# Patient Record
Sex: FEMALE | Race: BLACK OR AFRICAN AMERICAN | Employment: OTHER | URBAN - METROPOLITAN AREA
[De-identification: names, ages, dates, MRNs, and addresses within clinical notes are randomized per-mention and may not be internally consistent; named-entity substitution may affect disease eponyms.]

---

## 2017-05-02 ENCOUNTER — HOSPITAL ENCOUNTER (OUTPATIENT)
Dept: LAB | Age: 82
Discharge: HOME | End: 2017-05-02
Payer: MEDICARE

## 2017-05-02 ENCOUNTER — OFFICE VISIT (OUTPATIENT)
Dept: FAMILY MEDICINE CLINIC | Age: 82
End: 2017-05-02

## 2017-05-02 VITALS
OXYGEN SATURATION: 98 % | WEIGHT: 144 LBS | TEMPERATURE: 97.1 F | RESPIRATION RATE: 20 BRPM | DIASTOLIC BLOOD PRESSURE: 80 MMHG | HEART RATE: 60 BPM | BODY MASS INDEX: 29.03 KG/M2 | HEIGHT: 59 IN | SYSTOLIC BLOOD PRESSURE: 140 MMHG

## 2017-05-02 DIAGNOSIS — Z23 ENCOUNTER FOR IMMUNIZATION: ICD-10-CM

## 2017-05-02 DIAGNOSIS — E78.2 MIXED HYPERLIPIDEMIA: Chronic | ICD-10-CM

## 2017-05-02 DIAGNOSIS — Z13.39 SCREENING FOR ALCOHOLISM: ICD-10-CM

## 2017-05-02 DIAGNOSIS — Z00.00 ROUTINE GENERAL MEDICAL EXAMINATION AT A HEALTH CARE FACILITY: ICD-10-CM

## 2017-05-02 DIAGNOSIS — I10 ESSENTIAL HYPERTENSION: Primary | Chronic | ICD-10-CM

## 2017-05-02 DIAGNOSIS — I10 ESSENTIAL HYPERTENSION: Chronic | ICD-10-CM

## 2017-05-02 DIAGNOSIS — Z71.89 ADVANCED DIRECTIVES, COUNSELING/DISCUSSION: ICD-10-CM

## 2017-05-02 LAB
ALBUMIN SERPL BCP-MCNC: 3.7 G/DL (ref 3.4–5)
ALBUMIN/GLOB SERPL: 1.1 {RATIO} (ref 0.8–1.7)
ALP SERPL-CCNC: 32 U/L (ref 45–117)
ALT SERPL-CCNC: 23 U/L (ref 13–56)
ANION GAP BLD CALC-SCNC: 8 MMOL/L (ref 3–18)
AST SERPL W P-5'-P-CCNC: 27 U/L (ref 15–37)
BILIRUB SERPL-MCNC: 0.5 MG/DL (ref 0.2–1)
BUN SERPL-MCNC: 44 MG/DL (ref 7–18)
BUN/CREAT SERPL: 33 (ref 12–20)
CALCIUM SERPL-MCNC: 9.5 MG/DL (ref 8.5–10.1)
CHLORIDE SERPL-SCNC: 104 MMOL/L (ref 100–108)
CHOLEST SERPL-MCNC: 109 MG/DL
CO2 SERPL-SCNC: 29 MMOL/L (ref 21–32)
CREAT SERPL-MCNC: 1.35 MG/DL (ref 0.6–1.3)
GLOBULIN SER CALC-MCNC: 3.5 G/DL (ref 2–4)
GLUCOSE SERPL-MCNC: 95 MG/DL (ref 74–99)
HDLC SERPL-MCNC: 65 MG/DL (ref 40–60)
HDLC SERPL: 1.7 {RATIO} (ref 0–5)
LDLC SERPL CALC-MCNC: 26.8 MG/DL (ref 0–100)
LIPID PROFILE,FLP: ABNORMAL
POTASSIUM SERPL-SCNC: 3.6 MMOL/L (ref 3.5–5.5)
PROT SERPL-MCNC: 7.2 G/DL (ref 6.4–8.2)
SODIUM SERPL-SCNC: 141 MMOL/L (ref 136–145)
TRIGL SERPL-MCNC: 86 MG/DL (ref ?–150)
VLDLC SERPL CALC-MCNC: 17.2 MG/DL

## 2017-05-02 PROCEDURE — 80053 COMPREHEN METABOLIC PANEL: CPT | Performed by: FAMILY MEDICINE

## 2017-05-02 PROCEDURE — 80061 LIPID PANEL: CPT | Performed by: FAMILY MEDICINE

## 2017-05-02 PROCEDURE — 36415 COLL VENOUS BLD VENIPUNCTURE: CPT | Performed by: FAMILY MEDICINE

## 2017-05-02 RX ORDER — HYDROCHLOROTHIAZIDE 25 MG/1
25 TABLET ORAL DAILY
Qty: 30 TAB | Refills: 5 | Status: SHIPPED | OUTPATIENT
Start: 2017-05-02 | End: 2017-11-02 | Stop reason: SDUPTHER

## 2017-05-02 RX ORDER — SIMVASTATIN 40 MG/1
40 TABLET, FILM COATED ORAL
Qty: 30 TAB | Refills: 5 | Status: SHIPPED | OUTPATIENT
Start: 2017-05-02 | End: 2018-02-01 | Stop reason: SDUPTHER

## 2017-05-02 RX ORDER — HYDROCHLOROTHIAZIDE 25 MG/1
25 TABLET ORAL DAILY
COMMUNITY
End: 2017-05-02 | Stop reason: SDUPTHER

## 2017-05-02 NOTE — MR AVS SNAPSHOT
Visit Information Date & Time Provider Department Dept. Phone Encounter #  
 5/2/2017 11:30 AM Ghassan ArnavAlex [employer] [phone] [id number] Your Appointments 8/2/2017  9:15 AM  
FOLLOW UP EXAM with DAVEY [employer] ([address]) Appt Note: 3 month f/u  
 [address] [facility] [id number]  
[facility] [address] Upcoming Health Maintenance Date Due DTaP/Tdap/Td series (1 - Tdap) 8/27/1954 ZOSTER VACCINE AGE 60> 8/27/1993 GLAUCOMA SCREENING Q2Y 8/27/1998 OSTEOPOROSIS SCREENING (DEXA) 8/27/1998 Pneumococcal 65+ Low/Medium Risk (1 of 2 - PCV13) 8/27/1998 INFLUENZA AGE 9 TO ADULT 8/1/2017 MEDICARE YEARLY EXAM 5/3/2018 Allergies as of 5/2/2017  Review Complete On: 5/2/2017 By: Leonor Bolaños LPN No Known Allergies Current Immunizations  Never Reviewed No immunizations on file. Not reviewed this visit You Were Diagnosed With   
  
 Codes Comments Essential hypertension    -  Primary ICD-10-CM: I10 
ICD-9-CM: 401.9 Mixed hyperlipidemia     ICD-10-CM: E78.2 ICD-9-CM: 272.2 Routine general medical examination at a health care facility     ICD-10-CM: Z00.00 ICD-9-CM: V70.0 Screening for alcoholism     ICD-10-CM: Z13.89 ICD-9-CM: V79.1 Encounter for immunization     ICD-10-CM: K90 ICD-9-CM: V03.89 Advanced directives, counseling/discussion     ICD-10-CM: Z71.89 ICD-9-CM: V65.49 Vitals BP Pulse Temp Resp Height(growth percentile) Weight(growth percentile) 159/83 (BP 1 Location: Left arm, BP Patient Position: Sitting) 60 97.1 °F (36.2 °C) (Oral) 20 4' 11\" (1.499 m) 144 lb (65.3 kg) SpO2 BMI OB Status Smoking Status 98% 29.08 kg/m2 Postmenopausal Never Smoker Vitals History BMI and BSA Data Body Mass Index Body Surface Area  29.08 kg/m 2 1.65 m 2  
  
 Preferred Pharmacy Pharmacy Name Phone [facility] [address] [phone] Your Updated Medication List  
  
   
This list is accurate as of: 5/2/17 12:15 PM.  Always use your most recent med list.  
  
  
  
  
 aspirin, buffered 81 mg Tab Take 1 tablet by mouth daily. cloNIDine HCl 0.2 mg tablet Commonly known as:  CATAPRES Take 0.2 mg by mouth two (2) times a day. COLCRYS 0.6 mg tablet Generic drug:  colchicine Take 0.6 mg by mouth daily. Indications: GOUT  
  
 ferrous sulfate 324 mg (65 mg iron) tablet Take 325 mg by mouth Daily (before breakfast). fish oil-dha-epa 1,200-144-216 mg Cap Take 1 tablet by mouth daily. hydrALAZINE 100 mg tablet Commonly known as:  APRESOLINE Take 100 mg by mouth three (3) times daily. hydroCHLOROthiazide 25 mg tablet Commonly known as:  HYDRODIURIL Take 1 Tab by mouth daily. KLOR-CON 10 10 mEq tablet Generic drug:  potassium chloride SR Take 20 mEq by mouth daily. metFORMIN 500 mg tablet Commonly known as:  GLUCOPHAGE Take 500 mg by mouth two (2) times daily (with meals). NORVASC 10 mg tablet Generic drug:  amLODIPine Take 10 mg by mouth daily. OTHER Take 1 tablet by mouth daily. pneumococcal 13 ifrah conj dip 0.5 mL Syrg injection Commonly known as:  PREVNAR 13 (PF)  
0.5 mL by IntraMUSCular route once for 1 dose. polyethylene glycol 17 gram/dose powder Commonly known as:  Chaneta [place] Take 17 g by mouth daily. 1 tablespoon with 8 oz of water daily  
  
 psyllium Powd Commonly known as:  REGULOID, SUGAR FREE  
3.4 GRAMS PO QD THEN INCREASE SLOWLY. INFO; SERVING SIZE VARIES WITH DIFFERENT PRODUCTS; DISSOLVE IN WATER OR JUICE.  
  
 simvastatin 40 mg tablet Commonly known as:  ZOCOR Take 1 Tab by mouth nightly. STOOL SOFTENER 50 mg capsule Generic drug:  docusate sodium Take 50 mg by mouth two (2) times a day. varicella zoster vacine live 19,400 unit/0.65 mL Susr injection Commonly known as:  ZOSTAVAX  
1 Vial by SubCUTAneous route once for 1 dose. VITAMIN C 500 mg tablet Generic drug:  ascorbic acid (vitamin C) Take 500 mg by mouth daily. Prescriptions Printed Refills  
 pneumococcal 13 ifrah conj dip (PREVNAR 13, PF,) 0.5 mL syrg injection 0 Si.5 mL by IntraMUSCular route once for 1 dose. Class: Print Route: IntraMUSCular  
 varicella zoster vacine live (ZOSTAVAX) 19,400 unit/0.65 mL susr injection 0 Si Vial by SubCUTAneous route once for 1 dose. Class: Print Route: SubCUTAneous Prescriptions Sent to Pharmacy Refills  
 simvastatin (ZOCOR) 40 mg tablet 5 Sig: Take 1 Tab by mouth nightly. Class: Normal  
 Pharmacy: [id number] [facility], [facility] [address]. Ph #: [phone] Route: Oral  
 hydroCHLOROthiazide (HYDRODIURIL) 25 mg tablet 5 Sig: Take 1 Tab by mouth daily. Class: Normal  
 Pharmacy: [id number], [facility] [address]. Ph #: [phone] Route: Oral  
  
We Performed the Following ADVANCE CARE PLANNING FIRST 30 MINS [89565 CPT(R)] Patient Instructions Medicare Part B Preventive Services Limitations Recommendation Scheduled Bone Mass Measurement 
(age 72 & older, biennial) Requires diagnosis related to osteoporosis or estrogen deficiency. Biennial benefit unless patient has history of long-term glucocorticoid tx or baseline is needed because initial test was by other method recommend Cardiovascular Screening Blood Tests (every 5 years) Total cholesterol, HDL, Triglycerides Order as a panel if possible Up to date Colorectal Cancer Screening 
-Fecal occult blood test (annual) -Flexible sigmoidoscopy (5y) 
-Screening colonoscopy (10y) -Barium Enema  Not indicated Counseling to Prevent Tobacco Use (up to 8 sessions per year) - Counseling greater than 3 and up to 10 minutes - Counseling greater than 10 minutes Patients must be asymptomatic of tobacco-related conditions to receive as preventive service Diabetes Screening Tests (at least every 3 years, Medicare covers annually or at 6-month intervals for prediabetic patients) Fasting blood sugar (FBS) or glucose tolerance test (GTT) Patient must be diagnosed with one of the following: 
-Hypertension, Dyslipidemia, obesity, previous impaired FBS or GTT 
Or any two of the following: overweight, FH of diabetes, age ? 72, history of gestational diabetes, birth of baby weighing more than 9 pounds Up to date Diabetes Self-Management Training (DSMT) (no USPSTF recommendation) Requires referral by treating physician for patient with diabetes or renal disease. 10 hours of initial DSMT session of no less than 30 minutes each in a continuous 12-month period. 2 hours of follow-up DSMT in subsequent years. Glaucoma Screening (no USPSTF recommendation) Diabetes mellitus, family history, , age 48 or over,  American, age 72 or over recommend Human Immunodeficiency Virus (HIV) Screening (annually for increased risk patients) HIV-1 and HIV-2 by EIA, TYRONE, rapid antibody test, or oral mucosa transudate Patient must be at increased risk for HIV infection per USPSTF guidelines or pregnant. Tests covered annually for patients at increased risk. Pregnant patients may receive up to 3 test during pregnancy. Medical Nutrition Therapy (MNT) (for diabetes or renal disease not recommended schedule) Requires referral by treating physician for patient with diabetes or renal disease. Can be provided in same year as diabetes self-management training (DSMT), and CMS recommends medical nutrition therapy take place after DSMT. Up to 3 hours for initial year and 2 hours in subsequent years. Shingles Vaccination A shingles vaccine is also recommended once in a lifetime after age 61 recommend Seasonal Influenza Vaccination (annually) Pneumococcal Vaccination (once after 65)  recommend Hepatitis B Vaccinations (if medium/high risk) Medium/high risk factors:  End-stage renal disease, Hemophiliacs who received Factor VIII or IX concentrates, Clients of institutions for the mentally retarded, Persons who live in the same house as a HepB virus carrier, Homosexual men, Illicit injectable drug abusers. Screening Mammography (biennial age 54-69) Annually (age 36 or over) Not indicated Screening Pap Tests and Pelvic Examination (up to age 79 and after 79 if unknown history or abnormal study last 10 years) Every 24 months except high risk Not indicated Ultrasound Screening for Abdominal Aortic Aneurysm (AAA) (once) Patient must be referred through IPPE and not have had a screening for abdominal aortic aneurysm before under Medicare. Limited to patients who meet one of the following criteria: 
- Men who are 73-68 years old and have smoked more than 100 cigarettes in their lifetime. 
-Anyone with a FH of AAA 
-Anyone recommended for screening by USPSTF Introducing [facility]! Bob Infante introduces [employer] patient portal. Now you can access parts of your medical record, email your doctor's office, and request medication refills online. 1. In your internet browser, go to [email or web address] 2. Click on the First Time User? Click Here link in the Sign In box. You will see the New Member Sign Up page. 3. Enter your [employer] Access Code exactly as it appears below. You will not need to use this code after youve completed the sign-up process. If you do not sign up before the expiration date, you must request a new code. · [employer] Access Code: [email or web address] Expires: 7/31/2017 10:54 AM 
 
 4. Enter the last four digits of your Social Security Number (xxxx) and Date of Birth (mm/dd/yyyy) as indicated and click Submit. You will be taken to the next sign-up page. 5. Create a TextPayMe ID. This will be your TextPayMe login ID and cannot be changed, so think of one that is secure and easy to remember. 6. Create a TextPayMe password. You can change your password at any time. 7. Enter your Password Reset Question and Answer. This can be used at a later time if you forget your password. 8. Enter your e-mail address. You will receive e-mail notification when new information is available in [address] 9. Click Sign Up. You can now view and download portions of your medical record. 10. Click the Download Summary menu link to download a portable copy of your medical information. If you have questions, please visit the Frequently Asked Questions section of the [employer] website. Remember, TextPayMe is NOT to be used for urgent needs. For medical emergencies, dial 911. Now available from your iPhone and Android! Please provide this summary of care documentation to your next provider. Your primary care clinician is listed as Cuba Patel. If you have any questions after today's visit, please call [phone].

## 2017-05-02 NOTE — LETTER
5/2/2017 3:40 PM 
 
Ms. Omar Morales [address] Dear Omar Morales: Please find your most recent results below. Resulted Orders LIPID PANEL Result Value Ref Range LIPID PROFILE Cholesterol, total 109 <200 MG/DL Triglyceride 86 <150 MG/DL Comment:  
   The drugs N-acetylcysteine (NAC) and 
Metamiszole have been found to cause falsely 
low results in this chemical assay. Please 
be sure to submit blood samples obtained BEFORE administration of either of these 
drugs to assure correct results. HDL Cholesterol 65 (H) 40 - 60 MG/DL  
 LDL, calculated 26.8 0 - 100 MG/DL VLDL, calculated 17.2 MG/DL  
 CHOL/HDL Ratio 1.7 0 - 5.0 METABOLIC PANEL, COMPREHENSIVE Result Value Ref Range Sodium 141 136 - 145 mmol/L Potassium 3.6 3.5 - 5.5 mmol/L Chloride 104 100 - 108 mmol/L  
 CO2 29 21 - 32 mmol/L Anion gap 8 3.0 - 18 mmol/L Glucose 95 74 - 99 mg/dL BUN 44 (H) 7.0 - 18 MG/DL Creatinine 1.35 (H) 0.6 - 1.3 MG/DL  
 BUN/Creatinine ratio 33 (H) 12 - 20 GFR est AA 45 (L) >60 ml/min/1.73m2 GFR est non-AA 37 (L) >60 ml/min/1.73m2 Comment:  
   (NOTE) Estimated GFR is calculated using the Modification of Diet in Renal  
Disease (MDRD) Study equation, reported for both  Americans [facility]) and non- Americans (GFRNA), and normalized to 1.73m2  
body surface area. The physician must decide which value applies to  
the patient. The MDRD study equation should only be used in  
individuals age 25 or older. It has not been validated for the  
following: pregnant women, patients with serious comorbid conditions,  
or on certain medications, or persons with extremes of body size,  
muscle mass, or nutritional status. Calcium 9.5 8.5 - 10.1 MG/DL Bilirubin, total 0.5 0.2 - 1.0 MG/DL  
 ALT (SGPT) 23 13 - 56 U/L  
 AST (SGOT) 27 15 - 37 U/L Alk.  phosphatase 32 (L) 45 - 117 U/L  
 Protein, total 7.2 6.4 - 8.2 g/dL Albumin 3.7 3.4 - 5.0 g/dL Globulin 3.5 2.0 - 4.0 g/dL A-G Ratio 1.1 0.8 - 1.7 Normal glucose, liver, and cholesterol levels. Mild chronic kidney disease which we will monitor. Please follow up as scheduled. RECOMMENDATIONS: 
Follow up as scheduled Please call me if you have any questions: [phone] Sincerely, Ketty Oscar MD

## 2017-05-02 NOTE — PROGRESS NOTES
This is an Initial Medicare Annual Wellness Exam (AWV) (Performed 12 months after IPPE or effective date of Medicare Part B enrollment, Once in a lifetime)    I have reviewed the patient's medical history in detail and updated the computerized patient record. History     Past Medical History:   Diagnosis Date    Anemia     Carotid bruit 12/07/2013    Carotid Duplex: 1. Bilateral 1-49% stenosis of the internal carotid arteries. 2. No significant stenosis in the external carotid arteries bilaterally. 3. Antegrade flow in both vetebral arteries. 4. Normal flow in both subclavian arteries. Plaque Morphology: 1. Hyperechoic plaque in the bulb and right ICA. 2. Hyperechoic plaque in the bulb and left ICA.  CKD (chronic kidney disease) stage 3, GFR 30-59 ml/min     Diabetes (HCC)     NIDDM    Gastritis 10/27/2014    Duodenum Bx: No Specific Pathologic Abnormality. No Villous Abnormality. 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 No dysplasia or malignancy identified. Bacterial Organisms c/w H. Pylori are present. Z-Line Bx: GE mucosa w/ Acute/Chronic Inflammation & Reactive Changes. Focal Specialized Type Campos Mucosa Identified. No Dysplasia or Malignancy Identified.  Gout 12/10/2009    Elevated Uric Acid Level    Hyperlipidemia     Hypertension     RAMÓN (iron deficiency anemia)       Past Surgical History:   Procedure Laterality Date    HX COLONOSCOPY  10/27/2014    Dr. Arian Caruso HX ENDOSCOPY  10/27/2014    Dr. Kyra Becerril      Current Outpatient Prescriptions   Medication Sig Dispense Refill    simvastatin (ZOCOR) 40 mg tablet Take 1 Tab by mouth nightly. 30 Tab 5    docusate sodium (STOOL SOFTENER) 50 mg capsule Take 50 mg by mouth two (2) times a day.  hydroCHLOROthiazide (HYDRODIURIL) 25 mg tablet Take 1 Tab by mouth daily. 30 Tab 5    hydralazine (APRESOLINE) 100 mg tablet Take 100 mg by mouth three (3) times daily.       potassium chloride SR (KLOR-CON 10) 10 mEq tablet Take 20 mEq by mouth daily.  metformin (GLUCOPHAGE) 500 mg tablet Take 500 mg by mouth two (2) times daily (with meals).  Aspirin, Buffered 81 mg tab Take 1 tablet by mouth daily.  colchicine (COLCRYS) 0.6 mg tablet Take 0.6 mg by mouth daily. Indications: GOUT      cloNIDine HCl (CATAPRES) 0.2 mg tablet Take 0.2 mg by mouth two (2) times a day.  ascorbic acid (VITAMIN C) 500 mg tablet Take 500 mg by mouth daily.  amlodipine (NORVASC) 10 mg tablet Take 10 mg by mouth daily.  fish oil-dha-epa 1,200-144-216 mg cap Take 1 tablet by mouth daily.  polyethylene glycol (MIRALAX) 17 gram/dose powder Take 17 g by mouth daily. 1 tablespoon with 8 oz of water daily 850 g 0    psyllium (REGULOID, SUGAR FREE) powd 3.4 GRAMS PO QD THEN INCREASE SLOWLY. INFO; SERVING SIZE VARIES WITH DIFFERENT PRODUCTS; DISSOLVE IN WATER OR JUICE. 660 g 0    ferrous sulfate 324 mg (65 mg iron) tablet Take 325 mg by mouth Daily (before breakfast).  OTHER Take 1 tablet by mouth daily. No Known Allergies  No family history on file. Social History   Substance Use Topics    Smoking status: Never Smoker    Smokeless tobacco: Not on file    Alcohol use No     Patient Active Problem List   Diagnosis Code    Essential hypertension I10    Mixed hyperlipidemia E78.2         Depression Risk Factor Screening:     PHQ over the last two weeks 5/2/2017   Little interest or pleasure in doing things Not at all   Feeling down, depressed or hopeless Not at all   Total Score PHQ 2 0     Alcohol Risk Factor Screening: On any occasion during the past 3 months, have you had more than 3 drinks containing alcohol? No    Do you average more than 7 drinks per week? No    Functional Ability and Level of Safety:     Hearing Loss   normal-to-mild    Activities of Daily Living   Self-care.    Requires assistance with: no ADLs    Fall Risk     Fall Risk Assessment, last 15 [facility] 5/2/2017   Able to walk? Yes   Fall in past 12 months? No     Abuse Screen   Patient is not abused    Review of Systems   Constitutional: negative for fevers, chills, sweats and fatigue  Respiratory: negative for cough, sputum or wheezing  Cardiovascular: negative for chest pain, chest pressure/discomfort, dyspnea  Gastrointestinal: negative for nausea, vomiting, diarrhea, constipation and abdominal pain    Physical Examination       Evaluation of Cognitive Function:  Mood/affect:  happy  Appearance: age appropriate  Family member/caregiver input: present with her  [address] [facility] CTA    Patient Care Team:  Yvette Gramajo MD as PCP - General (Family Practice)    Advice/Referrals/Counseling   Education and counseling provided:  End-of-Life planning (with patient's consent)  Pneumococcal Vaccine  Influenza Vaccine  Bone mass measurement (DEXA)      Assessment/Plan   current treatment plan is effective, no change in therapy.

## 2017-05-02 NOTE — ACP (ADVANCE CARE PLANNING)
Advance Care Planning    Advance Care Planning (ACP) Provider Conversation Snapshot    Date of ACP Conversation: 05/04/17  Persons included in Conversation:  patient  Length of ACP Conversation in minutes:  16 minutes    Authorized Decision Maker (if patient is incapable of making informed decisions): This person is:   Healthcare Agent/Medical Power of  under Advance Directive          For Patients with Decision Making Capacity: \"In these circumstances, what matters most to you? \"  Care focused more on comfort or quality of life.     Conversation Outcomes / Follow-Up Plan:   Recommended completion of Advance Directive form after review of ACP materials and conversation with prospective healthcare agent

## 2017-05-02 NOTE — PATIENT INSTRUCTIONS
Medicare Part B Preventive Services Limitations Recommendation Scheduled   Bone Mass Measurement  (age 72 & older, biennial) Requires diagnosis related to osteoporosis or estrogen deficiency. Biennial benefit unless patient has history of long-term glucocorticoid tx or baseline is needed because initial test was by other method recommend    Cardiovascular Screening Blood Tests (every 5 years)  Total cholesterol, HDL, Triglycerides Order as a panel if possible Up to date    Colorectal Cancer Screening  -Fecal occult blood test (annual)  -Flexible sigmoidoscopy (5y)  -Screening colonoscopy (10y)  -Barium Enema  Not indicated    Counseling to Prevent Tobacco Use (up to 8 sessions per year)  - Counseling greater than 3 and up to 10 minutes  - Counseling greater than 10 minutes Patients must be asymptomatic of tobacco-related conditions to receive as preventive service     Diabetes Screening Tests (at least every 3 years, Medicare covers annually or at 6-month intervals for prediabetic patients)    Fasting blood sugar (FBS) or glucose tolerance test (GTT) Patient must be diagnosed with one of the following:  -Hypertension, Dyslipidemia, obesity, previous impaired FBS or GTT  Or any two of the following: overweight, FH of diabetes, age ? 72, history of gestational diabetes, birth of baby weighing more than 9 pounds Up to date    Diabetes Self-Management Training (DSMT) (no USPSTF recommendation) Requires referral by treating physician for patient with diabetes or renal disease. 10 hours of initial DSMT session of no less than 30 minutes each in a continuous 12-month period. 2 hours of follow-up DSMT in subsequent years.      Glaucoma Screening (no USPSTF recommendation) Diabetes mellitus, family history, , age 48 or over,  American, age 72 or over recommend    Human Immunodeficiency Virus (HIV) Screening (annually for increased risk patients)  HIV-1 and HIV-2 by EIA, TYRONE, rapid antibody test, or oral mucosa transudate Patient must be at increased risk for HIV infection per USPSTF guidelines or pregnant. Tests covered annually for patients at increased risk. Pregnant patients may receive up to 3 test during pregnancy. Medical Nutrition Therapy (MNT) (for diabetes or renal disease not recommended schedule) Requires referral by treating physician for patient with diabetes or renal disease. Can be provided in same year as diabetes self-management training (DSMT), and CMS recommends medical nutrition therapy take place after DSMT. Up to 3 hours for initial year and 2 hours in subsequent years. Shingles Vaccination A shingles vaccine is also recommended once in a lifetime after age 61 recommend    Seasonal Influenza Vaccination (annually)      Pneumococcal Vaccination (once after 72)  recommend    Hepatitis B Vaccinations (if medium/high risk) Medium/high risk factors:  End-stage renal disease,  Hemophiliacs who received Factor VIII or IX concentrates, Clients of institutions for the mentally retarded, Persons who live in the same house as a HepB virus carrier, Homosexual men, Illicit injectable drug abusers. Screening Mammography (biennial age 54-69) Annually (age 36 or over) Not indicated    Screening Pap Tests and Pelvic Examination (up to age 79 and after 79 if unknown history or abnormal study last 10 years) Every 25 months except high risk Not indicated    Ultrasound Screening for Abdominal Aortic Aneurysm (AAA) (once) Patient must be referred through [facility] and not have had a screening for abdominal aortic aneurysm before under Medicare.   Limited to patients who meet one of the following criteria:  - Men who are 73-68 years old and have smoked more than 100 cigarettes in their lifetime.  -Anyone with a FH of AAA  -Anyone recommended for screening by USPSTF

## 2017-05-04 PROBLEM — Z71.89 ADVANCED DIRECTIVES, COUNSELING/DISCUSSION: Status: ACTIVE | Noted: 2017-05-04

## 2017-05-04 NOTE — PROGRESS NOTES
Chronic Illness Visit    Today's Date:  2017  Patient's Name: Erendira Horn   Patient's :  1933     History:     Chief Complaint   Patient presents with   Zeb Montero is a 80 y.o. female presenting for  Establishment of Care. Hypertension/Hyperlipidemia    BP today is not at goal and less [id number] improved on recheck. Patients risk factors include: none  Patient is not checking home BP  Reports compliance and denies side effects to medications  Daily exercise and diet are as follows: denies healthy diet but tries to stay active  Weight is stable  Takes the below meds regularly with no side effects. Last LDL: 26          Past Medical History:   Diagnosis Date    Anemia     Carotid bruit 2013    Carotid Duplex: 1. Bilateral 1-49% stenosis of the internal carotid arteries. 2. No significant stenosis in the external carotid arteries bilaterally. 3. Antegrade flow in both vetebral arteries. 4. Normal flow in both subclavian arteries. Plaque Morphology: 1. Hyperechoic plaque in the bulb and right ICA. 2. Hyperechoic plaque in the bulb and left ICA.  CKD (chronic kidney disease) stage 3, GFR 30-59 ml/min     Diabetes ([facility])     NIDDM    Gastritis 10/27/2014    Duodenum Bx: No Specific Pathologic Abnormality. No Villous Abnormality. 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 No dysplasia or malignancy identified. Bacterial Organisms c/w H. Pylori are present. Z-Line Bx: GE mucosa w/ Acute/Chronic Inflammation & Reactive Changes. Focal Specialized Type Campos Mucosa Identified. No Dysplasia or Malignancy Identified.      Gout 12/10/2009    Elevated Uric Acid Level    Hyperlipidemia     Hypertension     RAMÓN (iron deficiency anemia)      Past Surgical History:   Procedure Laterality Date    HX COLONOSCOPY  10/27/2014    Dr. Michaela Sun HX ENDOSCOPY  10/27/2014    Dr. Dion Beltran History    Marital status:      Spouse name: N/A    Number of children: N/A    Years of education: N/A     Social History Main Topics    Smoking status: Never Smoker    Smokeless tobacco: None    Alcohol use No    Drug use: No    Sexual activity: Not Asked     Other Topics Concern    None     Social History Narrative     No family history on file. No Known Allergies    Problem List:      Patient Active Problem List   Diagnosis Code    Essential hypertension I10    Mixed hyperlipidemia E78.2       Medications:     Current Outpatient Prescriptions   Medication Sig    simvastatin (ZOCOR) 40 mg tablet Take 1 Tab by mouth nightly.  docusate sodium (STOOL SOFTENER) 50 mg capsule Take 50 mg by mouth two (2) times a day.  hydroCHLOROthiazide (HYDRODIURIL) 25 mg tablet Take 1 Tab by mouth daily.  hydralazine (APRESOLINE) 100 mg tablet Take 100 mg by mouth three (3) times daily.  potassium chloride SR (KLOR-CON 10) 10 mEq tablet Take 20 mEq by mouth daily.  metformin (GLUCOPHAGE) 500 mg tablet Take 500 mg by mouth two (2) times daily (with meals).  Aspirin, Buffered 81 mg tab Take 1 tablet by mouth daily.  colchicine (COLCRYS) 0.6 mg tablet Take 0.6 mg by mouth daily. Indications: GOUT    cloNIDine HCl (CATAPRES) 0.2 mg tablet Take 0.2 mg by mouth two (2) times a day.  ascorbic acid (VITAMIN C) 500 mg tablet Take 500 mg by mouth daily.  amlodipine (NORVASC) 10 mg tablet Take 10 mg by mouth daily.  fish oil-dha-epa 1,200-144-216 mg cap Take 1 tablet by mouth daily.  polyethylene glycol (MIRALAX) 17 gram/dose powder Take 17 g by mouth daily. 1 tablespoon with 8 oz of water daily    psyllium (REGULOID, SUGAR FREE) powd 3.4 GRAMS PO QD THEN INCREASE SLOWLY. INFO; SERVING SIZE VARIES WITH DIFFERENT PRODUCTS; DISSOLVE IN WATER OR JUICE.  ferrous sulfate 324 mg (65 mg iron) tablet Take 325 mg by mouth Daily (before breakfast).  OTHER Take 1 tablet by mouth daily.      No current facility-administered medications for this visit. Constitutional: negative for fevers, chills, sweats and fatigue  Respiratory: negative for cough, sputum or wheezing  Cardiovascular: negative for chest pain, chest pressure/discomfort, dyspnea  Gastrointestinal: negative for nausea, vomiting, diarrhea, constipation and abdominal pain  Musculoskeletal:negative for myalgias and arthralgias  Neurological: negative for headaches and dizziness  Behavioral/Psych: negative for anxiety and depression    Physical Assessment:   VS:    Vitals:    05/02/17 1120 05/02/17 1630   BP: 159/83 140/80   Pulse: 60    Resp: 20    Temp: 97.1 °F (36.2 °C)    TempSrc: Oral    SpO2: 98%    Weight: 144 lb (65.3 kg)    Height: 4' 11\" (1.499 m)        Lab Results   Component Value Date/Time    Sodium 141 05/02/2017 12:57 PM    Potassium 3.6 05/02/2017 12:57 PM    Chloride 104 05/02/2017 12:57 PM    CO2 29 05/02/2017 12:57 PM    Anion gap 8 05/02/2017 12:57 PM    Glucose 95 05/02/2017 12:57 PM    BUN 44 05/02/2017 12:57 PM    Creatinine 1.35 05/02/2017 12:57 PM    BUN/Creatinine ratio 33 05/02/2017 12:57 PM    GFR est AA 45 05/02/2017 12:57 PM    GFR est non-AA 37 05/02/2017 12:57 PM    Calcium 9.5 05/02/2017 12:57 PM       General:   Well-groomed, well-nourished, in no distress, pleasant, alert, appropriate and conversant. Mouth:  Good dentition, oropharynx WNL without membranes, exudates, petechiae or ulcers  Neck:   Neck supple, no swelling, mass or tenderness, no thyromegaly  Cardiovasc:   RRR, no MRG. Pulses 2+ and symmetric at distal extremities. Pulmonary:   Lungs clear bilaterally. Normal respiratory effort. Abdomen:   Abdomen soft, NT, ND, NAB. Extremities:   No edema, no TTP bilateral calves. LEs warm and well-perfused. Neuro:   Alert and oriented, no focal deficits. No facial asymmetry noted.   Skin:    No rashes or jaundice  Psych:  No pressured speech or abnormal thought content        Assessment/Plan & Orders:       1. Essential hypertension    2. Mixed hyperlipidemia    3. Routine general medical examination at a health care facility    4. Screening for alcoholism    5. Encounter for immunization    6. Advanced directives, counseling/discussion        Orders Placed This Encounter    ADVANCE CARE PLANNING FIRST 30 MINS    LIPID PANEL    METABOLIC PANEL, COMPREHENSIVE    simvastatin (ZOCOR) 40 mg tablet    DISCONTD: hydroCHLOROthiazide (HYDRODIURIL) 25 mg tablet    docusate sodium (STOOL SOFTENER) 50 mg capsule    hydroCHLOROthiazide (HYDRODIURIL) 25 mg tablet    pneumococcal 13 ifrah conj dip (PREVNAR 13, PF,) 0.5 mL syrg injection    varicella zoster vacine live (ZOSTAVAX) 19,400 unit/0.65 mL susr injection       HTN/Hyperlipidemia stable continue with current meds. CMP and Lipid panel done and stable. Refills done today. Follow up in 2-3 months    *Plan of care reviewed with patient. Patient in agreement with plan and expresses understanding. All questions answered and patient encouraged to call or RTO if further questions or concerns.     Bandar Melgoza MD  Family Medicine  5/2/2017  11:00 AM

## 2017-07-05 RX ORDER — AMLODIPINE BESYLATE 10 MG/1
10 TABLET ORAL DAILY
Qty: 30 TAB | Refills: 3 | Status: SHIPPED | OUTPATIENT
Start: 2017-07-05 | End: 2017-11-02 | Stop reason: SDUPTHER

## 2017-07-05 RX ORDER — HYDRALAZINE HYDROCHLORIDE 100 MG/1
100 TABLET, FILM COATED ORAL 3 TIMES DAILY
Qty: 90 TAB | Refills: 3 | Status: SHIPPED | OUTPATIENT
Start: 2017-07-05 | End: 2017-11-02 | Stop reason: SDUPTHER

## 2017-07-05 NOTE — TELEPHONE ENCOUNTER
7/5/2017  1:32 PM    Chief Complaint   Patient presents with    Medication Refill       Noted refill request for below medications. PCP Dr Per Lu. Last office visit 5/2017 and upcoming 8/2017. Refills done. Requested Prescriptions     Pending Prescriptions Disp Refills    amLODIPine (NORVASC) 10 mg tablet       Sig: Take 1 Tab by mouth daily.  hydrALAZINE (APRESOLINE) 100 mg tablet       Sig: Take 1 Tab by mouth three (3) times daily.

## 2017-07-05 NOTE — TELEPHONE ENCOUNTER
Pt  called in and stated that need refills on these medications, Amlodipine, Hydralazine, Simvastatin and that she is completely out. He was made aware that she has refill on the Simvastatin and the others  will be sent to the provider and that he can call the pharmacy later to see when they will be ready for  and he verbalized understanding.

## 2017-08-02 ENCOUNTER — OFFICE VISIT (OUTPATIENT)
Dept: FAMILY MEDICINE CLINIC | Age: 82
End: 2017-08-02

## 2017-08-02 VITALS
RESPIRATION RATE: 16 BRPM | SYSTOLIC BLOOD PRESSURE: 163 MMHG | BODY MASS INDEX: 29.43 KG/M2 | WEIGHT: 146 LBS | TEMPERATURE: 96.5 F | OXYGEN SATURATION: 98 % | HEIGHT: 59 IN | HEART RATE: 58 BPM | DIASTOLIC BLOOD PRESSURE: 81 MMHG

## 2017-08-02 DIAGNOSIS — I10 ESSENTIAL HYPERTENSION: Chronic | ICD-10-CM

## 2017-08-02 DIAGNOSIS — N18.30 CONTROLLED TYPE 2 DIABETES MELLITUS WITH STAGE 3 CHRONIC KIDNEY DISEASE, WITHOUT LONG-TERM CURRENT USE OF INSULIN (HCC): Primary | ICD-10-CM

## 2017-08-02 DIAGNOSIS — E11.22 CONTROLLED TYPE 2 DIABETES MELLITUS WITH STAGE 3 CHRONIC KIDNEY DISEASE, WITHOUT LONG-TERM CURRENT USE OF INSULIN (HCC): Primary | ICD-10-CM

## 2017-08-02 LAB — HBA1C MFR BLD HPLC: 6 %

## 2017-08-02 NOTE — PROGRESS NOTES
Patient: Nito Decker MRN: [id number]  SSN: [id number]    YOB: 1933  Age: 80 y.o. Sex: female      Date of Service: 8/2/2017   Provider: DAVEY Miller         REASON FOR VISIT:   Chief Complaint   Patient presents with    Follow-up     HTN and Diabetes         VITALS:   Visit Vitals    /81    Pulse (!) 58    Temp 96.5 °F (35.8 °C) (Oral)    Resp 16    Ht 4' 11\" (1.499 m)    Wt 146 lb (66.2 kg)    SpO2 98%    BMI 29.49 kg/m2       MEDICATIONS:   Current Outpatient Prescriptions on File Prior to Visit   Medication Sig Dispense Refill    amLODIPine (NORVASC) 10 mg tablet Take 1 Tab by mouth daily. 30 Tab 3    hydrALAZINE (APRESOLINE) 100 mg tablet Take 1 Tab by mouth three (3) times daily. 90 Tab 3    metFORMIN (GLUCOPHAGE) 500 mg tablet Take 1 Tab by mouth two (2) times daily (with meals). 60 Tab 5    simvastatin (ZOCOR) 40 mg tablet Take 1 Tab by mouth nightly. 30 Tab 5    docusate sodium (STOOL SOFTENER) 50 mg capsule Take 50 mg by mouth two (2) times a day.  hydroCHLOROthiazide (HYDRODIURIL) 25 mg tablet Take 1 Tab by mouth daily. 30 Tab 5    potassium chloride SR (KLOR-CON 10) 10 mEq tablet Take 20 mEq by mouth daily.  Aspirin, Buffered 81 mg tab Take 1 tablet by mouth daily.  colchicine (COLCRYS) 0.6 mg tablet Take 0.6 mg by mouth daily. Indications: GOUT      cloNIDine HCl (CATAPRES) 0.2 mg tablet Take 0.2 mg by mouth two (2) times a day.  ascorbic acid (VITAMIN C) 500 mg tablet Take 500 mg by mouth daily.  fish oil-dha-epa 1,200-144-216 mg cap Take 1 tablet by mouth daily.  polyethylene glycol (MIRALAX) 17 gram/dose powder Take 17 g by mouth daily. 1 tablespoon with 8 oz of water daily 850 g 0    psyllium (REGULOID, SUGAR FREE) powd 3.4 GRAMS PO QD THEN INCREASE SLOWLY.  INFO; SERVING SIZE VARIES WITH DIFFERENT PRODUCTS; DISSOLVE IN WATER OR JUICE. 660 g 0    ferrous sulfate 324 mg (65 mg iron) tablet Take 325 mg by mouth Daily (before breakfast).  OTHER Take 1 tablet by mouth daily. No current facility-administered medications on file prior to visit. ALLERGIES:   No Known Allergies     ACTIVE MEDICAL PROBLEMS:  Patient Active Problem List   Diagnosis Code    Essential hypertension I10    Mixed hyperlipidemia E78.2    Advanced directives, counseling/discussion Z71.89        MEDICAL/SURGICAL HISTORY:  Past Medical History:   Diagnosis Date    Anemia     Carotid bruit 12/07/2013    Carotid Duplex: 1. Bilateral 1-49% stenosis of the internal carotid arteries. 2. No significant stenosis in the external carotid arteries bilaterally. 3. Antegrade flow in both vetebral arteries. 4. Normal flow in both subclavian arteries. Plaque Morphology: 1. Hyperechoic plaque in the bulb and right ICA. 2. Hyperechoic plaque in the bulb and left ICA.  CKD (chronic kidney disease) stage 3, GFR 30-59 ml/min     Diabetes ([facility])     NIDDM    Gastritis 10/27/2014    Duodenum Bx: No Specific Pathologic Abnormality. No Villous Abnormality. 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 No dysplasia or malignancy identified. Bacterial Organisms c/w H. Pylori are present. Z-Line Bx: GE mucosa w/ Acute/Chronic Inflammation & Reactive Changes. Focal Specialized Type Campos Mucosa Identified. No Dysplasia or Malignancy Identified.  Gout 12/10/2009    Elevated Uric Acid Level    Hyperlipidemia     Hypertension     RAMÓN (iron deficiency anemia)       Past Surgical History:   Procedure Laterality Date    HX COLONOSCOPY  10/27/2014    Dr. Kaylee Brennan HX ENDOSCOPY  10/27/2014    Dr. Kaylan Fernandez:  History reviewed. No pertinent family history.      SOCIAL HISTORY:  Social History   Substance Use Topics    Smoking status: Never Smoker    Smokeless tobacco: Never Used    Alcohol use No         HISTORY OF PRESENT ILLNESS: Dawood Patel is a 80 y.o. female who presents to the office for a routine follow up visit. Diabetes -  Currently the patient's condition is well controlled. Last A1c: 6.6% on 3/11/16  Today's A1c: 6.0%  She reports compliance with metformin 500 mg BID   Home gluocse readings: not checking     Last urine microalbumin: unknown, but patient is noted to have some CKD on labs   Last lipid panel: 5/2/17, LDL 26.8. Patient is on statin therapy  Last foot exam: Follows with podiatry (Dr. Kenyon Callow) every 3 months   Last eye exam: unknown     Patient denies symptomatic hypo- or hyperglycemia since last visit. Denies headaches, lightheadedness, dizziness, chest pain, SOB, heart palpitations, nausea, vomiting, change in BMs, urinary frequency/urgency, skin changes/wounds, sensory disturbances     Hypertension -   Currently, the patient's condition is poorly controlled  She reports compliance with amlodipine 10 mg daily, clonidine 0.2 mg BID, and HCTZ 25 mg daily. She is also supposed to be taking hydralazine 100 mg TID, but admits she often forgets to take it. Home BP readings: not checking as home BP cuff has proven to be unreliable    Patient denies headaches, visual disturbances, shortness of breath, chest pain, heart palpitations, lightheadedness, syncope     REVIEW OF SYSTEMS:  Review of Systems   Constitutional: Negative for chills and fever. Respiratory: Negative for cough, shortness of breath and wheezing. Cardiovascular: Negative for chest pain and palpitations. Gastrointestinal: Negative for abdominal pain, blood in stool, constipation, diarrhea, nausea and vomiting. Neurological: Negative for dizziness and headaches. PHYSICAL EXAMINATION:  Physical Exam   Constitutional: She is oriented to person, place, and time and well-developed, well-nourished, and in no distress. Cardiovascular: Normal rate, regular rhythm, normal heart sounds and intact distal pulses. Exam reveals no gallop and no friction rub. No murmur heard.   Pulmonary/Chest: Effort normal and breath sounds normal. She has no wheezes. She has no rales. Neurological: She is alert and oriented to person, place, and time. Skin: Skin is warm and dry. Psychiatric: Mood, memory and affect normal.         RESULTS:  Results for orders placed or performed in visit on 08/02/17   AMB POC HEMOGLOBIN A1C   Result Value Ref Range    Hemoglobin A1c (POC) 6.0 %        ASSESSMENT/PLAN:  Diagnoses and all orders for this visit:    1. Controlled type 2 diabetes mellitus with stage 3 chronic kidney disease, without long-term current use of insulin (HCC)  - Well controlled   - Continue metformin 500 mg BID   - Renal function stable on last CMP, will continue to monitor  Orders:   -     AMB POC HEMOGLOBIN A1C    2. Essential hypertension  - BP not at goal, though patient is on the max dose of numerous meds and is hesitant to add a new one to her regimen  - Encouraged importance of compliance with hydralazine TID. She will make an effort to remember this medication  - Monitor BP at home     Follow up in 3 months or sooner PRN    Patient expresses understanding and is agreeable with the above plan.         DAVEY Graves   August 2, 2017    10:02 AM

## 2017-08-02 NOTE — MR AVS SNAPSHOT
Visit Information Date & Time Provider Department Dept. Phone Encounter #  
 8/2/2017  9:15 AM DAVEY Jensen [facility] [phone] [id number] Your Appointments 5/8/2018  9:15 AM  
Office Visit with Adrian Hernandez MD  
[facility]) Appt Note: [address] [facility] [address]  
[facility] [address] Upcoming Health Maintenance Date Due DTaP/Tdap/Td series (1 - Tdap) 8/27/1954 ZOSTER VACCINE AGE 60> 6/27/1993 GLAUCOMA SCREENING Q2Y 8/27/1998 OSTEOPOROSIS SCREENING (DEXA) 8/27/1998 Pneumococcal 65+ Low/Medium Risk (1 of 2 - PCV13) 8/27/1998 INFLUENZA AGE 9 TO ADULT 8/1/2017 MEDICARE YEARLY EXAM 5/3/2018 Allergies as of 8/2/2017  Review Complete On: 8/2/2017 By: Kilo Soto No Known Allergies Current Immunizations  Never Reviewed No immunizations on file. Not reviewed this visit You Were Diagnosed With   
  
 Codes Comments Controlled type 2 diabetes mellitus without complication, without long-term current use of insulin ([facility] [address]    -  Primary ICD-10-CM: E11.9 ICD-9-CM: 250.00 Essential hypertension     ICD-10-CM: I10 
ICD-9-CM: 401.9 Vitals BP Pulse Temp Resp Height(growth percentile) Weight(growth percentile) 163/81 (!) 58 96.5 °F (35.8 °C) (Oral) 16 4' 11\" (1.499 m) 146 lb (66.2 kg) SpO2 BMI OB Status Smoking Status 98% 29.49 kg/m2 Postmenopausal Never Smoker Vitals History BMI and BSA Data Body Mass Index Body Surface Area  
 29.49 kg/m 2 1.66 m 2 Preferred Pharmacy Pharmacy Name Phone [facility] [address] [phone] Your Updated Medication List  
  
   
This list is accurate as of: 8/2/17  9:43 AM.  Always use your most recent med list. amLODIPine 10 mg tablet Commonly known as:  Landon Rings Take 1 Tab by mouth daily. aspirin, buffered 81 mg Tab Take 1 tablet by mouth daily. cloNIDine HCl 0.2 mg tablet Commonly known as:  CATAPRES Take 0.2 mg by mouth two (2) times a day. COLCRYS 0.6 mg tablet Generic drug:  colchicine Take 0.6 mg by mouth daily. Indications: GOUT  
  
 ferrous sulfate 324 mg (65 mg iron) tablet Take 325 mg by mouth Daily (before breakfast). fish oil-dha-epa 1,200-144-216 mg Cap Take 1 tablet by mouth daily. hydrALAZINE 100 mg tablet Commonly known as:  APRESOLINE Take 1 Tab by mouth three (3) times daily. hydroCHLOROthiazide 25 mg tablet Commonly known as:  HYDRODIURIL Take 1 Tab by mouth daily. KLOR-CON 10 10 mEq tablet Generic drug:  potassium chloride SR Take 20 mEq by mouth daily. metFORMIN 500 mg tablet Commonly known as:  GLUCOPHAGE Take 1 Tab by mouth two (2) times daily (with meals). OTHER Take 1 tablet by mouth daily. polyethylene glycol 17 gram/dose powder Commonly known as:  Kunal Coma Take 17 g by mouth daily. 1 tablespoon with 8 oz of water daily  
  
 psyllium Powd Commonly known as:  REGULOID, SUGAR FREE  
3.4 GRAMS PO QD THEN INCREASE SLOWLY. INFO; SERVING SIZE VARIES WITH DIFFERENT PRODUCTS; DISSOLVE IN WATER OR JUICE.  
  
 simvastatin 40 mg tablet Commonly known as:  ZOCOR Take 1 Tab by mouth nightly. STOOL SOFTENER 50 mg capsule Generic drug:  docusate sodium Take 50 mg by mouth two (2) times a day. VITAMIN C 500 mg tablet Generic drug:  ascorbic acid (vitamin C) Take 500 mg by mouth daily. We Performed the Following AMB POC HEMOGLOBIN A1C [92536 CPT(R)] AMB POC URINE, MICROALBUMIN, SEMIQUANTITATIVE [63593 CPT(R)] Introducing [facility]!    
 Maribell Goodrich introduces [employer] patient portal. Now you can access parts of your medical record, email your doctor's office, and request medication refills online. 1. In your internet browser, go to [email or web address] 2. Click on the First Time User? Click Here link in the Sign In box. You will see the New Member Sign Up page. 3. Enter your E-House Access Code exactly as it appears below. You will not need to use this code after youve completed the sign-up process. If you do not sign up before the expiration date, you must request a new code. · E-House Access Code: [email or web address] Expires: 10/31/2017  9:01 AM 
 
4. Enter the last four digits of your Social Security Number (xxxx) and Date of Birth (mm/dd/yyyy) as indicated and click Submit. You will be taken to the next sign-up page. 5. Create a E-House ID. This will be your E-House login ID and cannot be changed, so think of one that is secure and easy to remember. 6. Create a E-House password. You can change your password at any time. 7. Enter your Password Reset Question and Answer. This can be used at a later time if you forget your password. 8. Enter your e-mail address. You will receive e-mail notification when new information is available in [address] 9. Click Sign Up. You can now view and download portions of your medical record. 10. Click the Download Summary menu link to download a portable copy of your medical information. If you have questions, please visit the Frequently Asked Questions section of the E-House website. Remember, E-House is NOT to be used for urgent needs. For medical emergencies, dial 911. Now available from your iPhone and Android! Please provide this summary of care documentation to your next provider. Your primary care clinician is listed as Tisha Mendez. If you have any questions after today's visit, please call [phone].

## 2017-10-02 RX ORDER — POTASSIUM CHLORIDE 750 MG/1
20 TABLET, FILM COATED, EXTENDED RELEASE ORAL DAILY
Qty: 30 TAB | Refills: 1 | Status: SHIPPED | OUTPATIENT
Start: 2017-10-02 | End: 2017-12-04 | Stop reason: SDUPTHER

## 2017-10-02 NOTE — TELEPHONE ENCOUNTER
Received a faxed refill request from [phone] [place] [address]. [address] for refill on Klor-Con. Pt last seen in office 2 months ago.

## 2017-11-02 ENCOUNTER — OFFICE VISIT (OUTPATIENT)
Dept: FAMILY MEDICINE CLINIC | Age: 82
End: 2017-11-02

## 2017-11-02 VITALS
HEART RATE: 60 BPM | HEIGHT: 59 IN | OXYGEN SATURATION: 100 % | TEMPERATURE: 96.9 F | SYSTOLIC BLOOD PRESSURE: 114 MMHG | WEIGHT: 142.5 LBS | BODY MASS INDEX: 28.73 KG/M2 | DIASTOLIC BLOOD PRESSURE: 61 MMHG | RESPIRATION RATE: 18 BRPM

## 2017-11-02 DIAGNOSIS — E11.22 CONTROLLED TYPE 2 DIABETES MELLITUS WITH STAGE 3 CHRONIC KIDNEY DISEASE, WITHOUT LONG-TERM CURRENT USE OF INSULIN (HCC): Primary | ICD-10-CM

## 2017-11-02 DIAGNOSIS — N18.30 CONTROLLED TYPE 2 DIABETES MELLITUS WITH STAGE 3 CHRONIC KIDNEY DISEASE, WITHOUT LONG-TERM CURRENT USE OF INSULIN (HCC): Primary | ICD-10-CM

## 2017-11-02 DIAGNOSIS — E78.2 MIXED HYPERLIPIDEMIA: Chronic | ICD-10-CM

## 2017-11-02 DIAGNOSIS — I10 ESSENTIAL HYPERTENSION: Chronic | ICD-10-CM

## 2017-11-02 LAB
MICROALBUMIN UR TEST STR-MCNC: 80 MG/L
MICROALBUMIN/CREAT RATIO POC: 100 MG/DL

## 2017-11-02 RX ORDER — HYDROCHLOROTHIAZIDE 25 MG/1
25 TABLET ORAL DAILY
Qty: 90 TAB | Refills: 1 | Status: SHIPPED | OUTPATIENT
Start: 2017-11-02 | End: 2018-02-02 | Stop reason: SDUPTHER

## 2017-11-02 RX ORDER — HYDRALAZINE HYDROCHLORIDE 100 MG/1
100 TABLET, FILM COATED ORAL 3 TIMES DAILY
Qty: 90 TAB | Refills: 3 | Status: SHIPPED | OUTPATIENT
Start: 2017-11-02 | End: 2018-02-02 | Stop reason: SDUPTHER

## 2017-11-02 RX ORDER — AMLODIPINE BESYLATE 10 MG/1
10 TABLET ORAL DAILY
Qty: 90 TAB | Refills: 1 | Status: SHIPPED | OUTPATIENT
Start: 2017-11-02 | End: 2018-04-24 | Stop reason: SDUPTHER

## 2017-11-02 NOTE — MR AVS SNAPSHOT
Visit Information Date & Time Provider Department Dept. Phone Encounter #  
 11/2/2017  9:15 AM Gavi Tijerina [facility] 81 [phone] [id number] Your Appointments 3/6/2018  9:30 AM  
FOLLOW UP EXAM with MD Gavi Catherine [facility] 81 [address]) Appt Note: 4 month routine care  follow up  
 [address] [facility] [address]  
[facility] [address]  
  
    
 5/8/2018  9:15 AM  
Office Visit with MD Gavi Tijerina [facility] 81 [address]) Appt Note: [address] [facility] [address]  
[facility] [address] Upcoming Health Maintenance Date Due  
 FOOT EXAM Q1 8/27/1943 MICROALBUMIN Q1 8/27/1943 EYE EXAM RETINAL OR DILATED Q1 8/27/1943 DTaP/Tdap/Td series (1 - Tdap) 8/27/1954 ZOSTER VACCINE AGE 60> 6/27/1993 GLAUCOMA SCREENING Q2Y 8/27/1998 OSTEOPOROSIS SCREENING (DEXA) 8/27/1998 Pneumococcal 65+ Low/Medium Risk (1 of 2 - PCV13) 8/27/1998 HEMOGLOBIN A1C Q6M 2/2/2018 LIPID PANEL Q1 5/2/2018 MEDICARE YEARLY EXAM 5/3/2018 Allergies as of 11/2/2017  Review Complete On: 11/2/2017 By: Giselle Simmons LPN No Known Allergies Current Immunizations  Never Reviewed No immunizations on file. Not reviewed this visit You Were Diagnosed With   
  
 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 
ICD-9-CM: 401.9 Mixed hyperlipidemia     ICD-10-CM: E78.2 ICD-9-CM: 272.2 Vitals BP Pulse Temp Resp Height(growth percentile) Weight(growth percentile)  114/61 (BP 1 Location: Right arm, BP Patient Position: Sitting) 60 96.9 °F (36.1 °C) (Oral) 18 4' 11\" (1.499 m) 142 lb 8 oz (64.6 kg) SpO2 BMI OB Status Smoking Status 100% 28.78 kg/m2 Postmenopausal Never Smoker Vitals History BMI and BSA Data Body Mass Index Body Surface Area 28.78 kg/m 2 1.64 m 2 Preferred Pharmacy Pharmacy Name Phone [facility] [address]. [phone] Your Updated Medication List  
  
   
This list is accurate as of: 11/2/17  9:49 AM.  Always use your most recent med list. amLODIPine 10 mg tablet Commonly known as:  Love Breach Take 1 Tab by mouth daily. aspirin, buffered 81 mg Tab Take 1 tablet by mouth daily. cloNIDine HCl 0.2 mg tablet Commonly known as:  CATAPRES Take 0.2 mg by mouth two (2) times a day. COLCRYS 0.6 mg tablet Generic drug:  colchicine Take 0.6 mg by mouth daily. Indications: GOUT  
  
 ferrous sulfate 324 mg (65 mg iron) tablet Take 325 mg by mouth Daily (before breakfast). fish oil-dha-epa 1,200-144-216 mg Cap Take 1 tablet by mouth daily. hydrALAZINE 100 mg tablet Commonly known as:  APRESOLINE Take 1 Tab by mouth three (3) times daily. hydroCHLOROthiazide 25 mg tablet Commonly known as:  HYDRODIURIL Take 1 Tab by mouth daily. metFORMIN 500 mg tablet Commonly known as:  GLUCOPHAGE Take 1 Tab by mouth two (2) times daily (with meals). OTHER Take 1 tablet by mouth daily. polyethylene glycol 17 gram/dose powder Commonly known as:  Alonza Schimke Take 17 g by mouth daily. 1 tablespoon with 8 oz of water daily  
  
 potassium chloride SR 10 mEq tablet Commonly known as:  KLOR-CON 10 Take 2 Tabs by mouth daily. psyllium Powd Commonly known as:  REGULOID, SUGAR FREE  
3.4 GRAMS PO QD THEN INCREASE SLOWLY. INFO; SERVING SIZE VARIES WITH DIFFERENT PRODUCTS; DISSOLVE IN WATER OR JUICE.  
  
 simvastatin 40 mg tablet Commonly known as:  ZOCOR  
 Take 1 Tab by mouth nightly. STOOL SOFTENER 50 mg capsule Generic drug:  docusate sodium Take 50 mg by mouth two (2) times a day. VITAMIN C 500 mg tablet Generic drug:  ascorbic acid (vitamin C) Take 500 mg by mouth daily. Prescriptions Sent to Pharmacy Refills  
 hydrALAZINE (APRESOLINE) 100 mg tablet 3 Sig: Take 1 Tab by mouth three (3) times daily. Class: Normal  
 Pharmacy: [id number] [place] [address]. Ph #: [phone] Route: Oral  
 hydroCHLOROthiazide (HYDRODIURIL) 25 mg tablet 1 Sig: Take 1 Tab by mouth daily. Class: Normal  
 Pharmacy: [id number] [place] [address]. Ph #: [phone] Route: Oral  
 amLODIPine (NORVASC) 10 mg tablet 1 Sig: Take 1 Tab by mouth daily. Class: Normal  
 Pharmacy: [address] [place] [address]. Ph #: [phone] Route: Oral  
  
Introducing [facility] & HEALTH SERVICES! Cheyanne Feldman introduces [employer] patient portal. Now you can access parts of your medical record, email your doctor's office, and request medication refills online. 1. In your internet browser, go to [email or web address] 2. Click on the First Time User? Click Here link in the Sign In box. You will see the New Member Sign Up page. 3. Enter your [employer] Access Code exactly as it appears below. You will not need to use this code after youve completed the sign-up process. If you do not sign up before the expiration date, you must request a new code. · [employer] Access Code: [email or web address] Expires: 1/31/2018  8:48 AM 
 
4. Enter the last four digits of your Social Security Number (xxxx) and Date of Birth (mm/dd/yyyy) as indicated and click Submit. You will be taken to the next sign-up page. 5. Create a I Had Cancert ID. This will be your MyChart login ID and cannot be changed, so think of one that is secure and easy to remember. 6. Create a Connectify password. You can change your password at any time. 7. Enter your Password Reset Question and Answer. This can be used at a later time if you forget your password. 8. Enter your e-mail address. You will receive e-mail notification when new information is available in [address] 9. Click Sign Up. You can now view and download portions of your medical record. 10. Click the Download Summary menu link to download a portable copy of your medical information. If you have questions, please visit the Frequently Asked Questions section of the Connectify website. Remember, Connectify is NOT to be used for urgent needs. For medical emergencies, dial 911. Now available from your iPhone and Android! Please provide this summary of care documentation to your next provider. Your primary care clinician is listed as Ellwood McColl. If you have any questions after today's visit, please call [phone].

## 2017-11-02 NOTE — PROGRESS NOTES
Chronic Illness Visit    Today's Date:  2017  Patient's Name: Alyson Graves   Patient's :  1933     History:     Chief Complaint   Patient presents with    Follow Up Chronic Condition     HTN, Diabetes       Alyson Graves is a 80 y.o. female presenting for  Establishment of Care. DM/Hypertension/Hyperlipidemia    BP today is at goal and less [id number] improved on recheck. Patients risk factors include: none  HgbA1c 6 (Aug 2017) stable on metformin   Patient is not checking home BP  Reports compliance and denies side effects to medications  Daily exercise and diet are as follows: denies healthy diet but tries to stay active  Weight is stable  Takes the below meds regularly with no side effects. Last LDL: 26          Past Medical History:   Diagnosis Date    Anemia     Carotid bruit 2013    Carotid Duplex: 1. Bilateral 1-49% stenosis of the internal carotid arteries. 2. No significant stenosis in the external carotid arteries bilaterally. 3. Antegrade flow in both vetebral arteries. 4. Normal flow in both subclavian arteries. Plaque Morphology: 1. Hyperechoic plaque in the bulb and right ICA. 2. Hyperechoic plaque in the bulb and left ICA.  CKD (chronic kidney disease) stage 3, GFR 30-59 ml/min     Diabetes (HCC)     NIDDM    Gastritis 10/27/2014    Duodenum Bx: No Specific Pathologic Abnormality. No Villous Abnormality. 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 No dysplasia or malignancy identified. Bacterial Organisms c/w H. Pylori are present. Z-Line Bx: GE mucosa w/ Acute/Chronic Inflammation & Reactive Changes. Focal Specialized Type Campos Mucosa Identified. No Dysplasia or Malignancy Identified.      Gout 12/10/2009    Elevated Uric Acid Level    Hyperlipidemia     Hypertension     RAMÓN (iron deficiency anemia)      Past Surgical History:   Procedure Laterality Date    HX COLONOSCOPY  10/27/2014    Dr. Rader Notice ENDOSCOPY  10/27/2014    Dr. Ciera Mandel History     Social History    Marital status:      Spouse name: N/A    Number of children: N/A    Years of education: N/A     Social History Main Topics    Smoking status: Never Smoker    Smokeless tobacco: Never Used    Alcohol use No    Drug use: No    Sexual activity: Yes     Partners: Male     Birth control/ protection: None     Other Topics Concern    None     Social History Narrative     Family History   Problem Relation Age of Onset    Hypertension Mother     Stroke Mother     Stroke Father      No Known Allergies    Problem List:      Patient Active Problem List   Diagnosis Code    Essential hypertension I10    Mixed hyperlipidemia E78.2    Advanced directives, counseling/discussion Z71.89    Controlled type 2 diabetes mellitus with stage 3 chronic kidney disease, without long-term current use of insulin ([facility]) E11.22, N18.3       Medications:     Current Outpatient Prescriptions   Medication Sig    hydrALAZINE (APRESOLINE) 100 mg tablet Take 1 Tab by mouth three (3) times daily.  hydroCHLOROthiazide (HYDRODIURIL) 25 mg tablet Take 1 Tab by mouth daily.  amLODIPine (NORVASC) 10 mg tablet Take 1 Tab by mouth daily.  potassium chloride SR (KLOR-CON 10) 10 mEq tablet Take 2 Tabs by mouth daily.  metFORMIN (GLUCOPHAGE) 500 mg tablet Take 1 Tab by mouth two (2) times daily (with meals).  simvastatin (ZOCOR) 40 mg tablet Take 1 Tab by mouth nightly.  docusate sodium (STOOL SOFTENER) 50 mg capsule Take 50 mg by mouth two (2) times a day.  polyethylene glycol (MIRALAX) 17 gram/dose powder Take 17 g by mouth daily. 1 tablespoon with 8 oz of water daily    Aspirin, Buffered 81 mg tab Take 1 tablet by mouth daily.  colchicine (COLCRYS) 0.6 mg tablet Take 0.6 mg by mouth daily. Indications: GOUT    cloNIDine HCl (CATAPRES) 0.2 mg tablet Take 0.2 mg by mouth two (2) times a day.     ascorbic acid (VITAMIN C) 500 mg tablet Take 500 mg by mouth daily.  psyllium (REGULOID, SUGAR FREE) powd 3.4 GRAMS PO QD THEN INCREASE SLOWLY. INFO; SERVING SIZE VARIES WITH DIFFERENT PRODUCTS; DISSOLVE IN WATER OR JUICE.  ferrous sulfate 324 mg (65 mg iron) tablet Take 325 mg by mouth Daily (before breakfast).  OTHER Take 1 tablet by mouth daily.  fish oil-dha-epa 1,200-144-216 mg cap Take 1 tablet by mouth daily. No current facility-administered medications for this visit. Constitutional: negative for fevers, chills, sweats and fatigue  Respiratory: negative for cough, sputum or wheezing  Cardiovascular: negative for chest pain, chest pressure/discomfort, dyspnea  Gastrointestinal: negative for nausea, vomiting, diarrhea, constipation and abdominal pain  Musculoskeletal:negative for myalgias and arthralgias  Neurological: negative for headaches and dizziness  Behavioral/Psych: negative for anxiety and depression    Physical Assessment:   VS:    Vitals:    11/02/17 0921   BP: 114/61   Pulse: 60   Resp: 18   Temp: 96.9 °F (36.1 °C)   TempSrc: Oral   SpO2: 100%   Weight: 142 lb 8 oz (64.6 kg)   Height: 4' 11\" (1.499 m)       Lab Results   Component Value Date/Time    Sodium 141 05/02/2017 12:57 PM    Potassium 3.6 05/02/2017 12:57 PM    Chloride 104 05/02/2017 12:57 PM    CO2 29 05/02/2017 12:57 PM    Anion gap 8 05/02/2017 12:57 PM    Glucose 95 05/02/2017 12:57 PM    BUN 44 05/02/2017 12:57 PM    Creatinine 1.35 05/02/2017 12:57 PM    BUN/Creatinine ratio 33 05/02/2017 12:57 PM    GFR est AA 45 05/02/2017 12:57 PM    GFR est non-AA 37 05/02/2017 12:57 PM    Calcium 9.5 05/02/2017 12:57 PM       General:   Well-groomed, well-nourished, in no distress, pleasant, alert, appropriate and conversant. Mouth:  Good dentition, oropharynx WNL without membranes, exudates, petechiae or ulcers  Neck:   Neck supple, no swelling, mass or tenderness, no thyromegaly  Cardiovasc:   RRR, no MRG.   Pulses 2+ and symmetric at distal extremities. Pulmonary:   Lungs clear bilaterally. Normal respiratory effort. Abdomen:   Abdomen soft, NT, ND, NAB. Extremities:   No edema, no TTP bilateral calves. LEs warm and well-perfused. Neuro:   Alert and oriented, no focal deficits. No facial asymmetry noted. Skin:    No rashes or jaundice  Psych:  No pressured speech or abnormal thought content        Assessment/Plan & Orders:       1. Controlled type 2 diabetes mellitus with stage 3 chronic kidney disease, without long-term current use of insulin ([facility]    2. Essential hypertension    3. Mixed hyperlipidemia        Orders Placed This Encounter    AMB POC URINE, MICROALBUMIN, SEMIQUANTITATIVE    hydrALAZINE (APRESOLINE) 100 mg tablet    hydroCHLOROthiazide (HYDRODIURIL) 25 mg tablet    amLODIPine (NORVASC) 10 mg tablet     DM stable on metformin   HTN/Hyperlipidemia stable continue with current meds. CMP and Lipid panel done and stable. Refills done today. Follow up in 2-3 months    *Plan of care reviewed with patient. Patient in agreement with plan and expresses understanding. All questions answered and patient encouraged to call or RTO if further questions or concerns.     Taylor Lewis MD  Family Medicine  11/2/2017  9:38 AM

## 2017-12-13 RX ORDER — COLCHICINE 0.6 MG/1
0.6 TABLET ORAL DAILY
OUTPATIENT
Start: 2017-12-13

## 2017-12-13 NOTE — TELEPHONE ENCOUNTER
Patient is requesting refill on   Requested Prescriptions     Pending Prescriptions Disp Refills    colchicine (COLCRYS) 0.6 mg tablet       Sig: Take 1 Tab by mouth daily. Indications: Gout     She stated the name on her medication bottle says Dr. Ki Peterson, and she is difficult getting her refills.  Therefore, she is requesting to have a refill on this medication with her current PCP

## 2017-12-13 NOTE — TELEPHONE ENCOUNTER
Patient's last OV: 11/2/17  Patient's next OV: 3/6/18  Medication(s) last filled on: last filled by Historical Provider

## 2017-12-15 NOTE — TELEPHONE ENCOUNTER
Noted. Call made to the pt, unable to leave a message voicemail down for maintenance. Call made to inform the pt that she need an appointment to discuss the medication requested because the provider has not ever ordered the Colchine for her.

## 2017-12-15 NOTE — TELEPHONE ENCOUNTER
I looked through her chart and never saw where Dr. Florida Isaac had treated for gout or prescribed this medication.  Please have patient schedule to be seen

## 2017-12-18 NOTE — TELEPHONE ENCOUNTER
Requested Prescriptions     Pending Prescriptions Disp Refills    metFORMIN (GLUCOPHAGE) 500 mg tablet 60 Tab 5     Sig: Take 1 Tab by mouth two (2) times daily (with meals). Patient been out for a couple of days.

## 2017-12-19 NOTE — TELEPHONE ENCOUNTER
Requested Prescriptions     Pending Prescriptions Disp Refills    metFORMIN (GLUCOPHAGE) 500 mg tablet 60 Tab 5     Sig: Take 1 Tab by mouth two (2) times daily (with meals). Patient is at the pharmacy waiting.

## 2017-12-20 RX ORDER — METFORMIN HYDROCHLORIDE 500 MG/1
500 TABLET ORAL 2 TIMES DAILY WITH MEALS
Qty: 60 TAB | Refills: 5 | Status: SHIPPED | OUTPATIENT
Start: 2017-12-20 | End: 2018-02-02 | Stop reason: SDUPTHER

## 2018-01-17 NOTE — TELEPHONE ENCOUNTER
Requested Prescriptions     Pending Prescriptions Disp Refills    cloNIDine HCl (CATAPRES) 0.2 mg tablet       Sig: Take 1 Tab by mouth two (2) times a day.      Pt is completely out

## 2018-01-18 RX ORDER — CLONIDINE HYDROCHLORIDE 0.2 MG/1
0.2 TABLET ORAL 2 TIMES DAILY
Qty: 60 TAB | Refills: 1 | Status: SHIPPED | OUTPATIENT
Start: 2018-01-18 | End: 2018-05-19 | Stop reason: SDUPTHER

## 2018-02-01 ENCOUNTER — OFFICE VISIT (OUTPATIENT)
Dept: FAMILY MEDICINE CLINIC | Age: 83
End: 2018-02-01

## 2018-02-01 VITALS
BODY MASS INDEX: 28.43 KG/M2 | TEMPERATURE: 96.7 F | SYSTOLIC BLOOD PRESSURE: 152 MMHG | HEIGHT: 59 IN | HEART RATE: 62 BPM | OXYGEN SATURATION: 100 % | DIASTOLIC BLOOD PRESSURE: 73 MMHG | WEIGHT: 141 LBS | RESPIRATION RATE: 18 BRPM

## 2018-02-01 DIAGNOSIS — E78.2 MIXED HYPERLIPIDEMIA: Chronic | ICD-10-CM

## 2018-02-01 DIAGNOSIS — I10 ESSENTIAL HYPERTENSION: Primary | Chronic | ICD-10-CM

## 2018-02-01 DIAGNOSIS — Z23 NEED FOR STREPTOCOCCUS PNEUMONIAE AND INFLUENZA VACCINATION: ICD-10-CM

## 2018-02-01 RX ORDER — SIMVASTATIN 40 MG/1
40 TABLET, FILM COATED ORAL
Qty: 90 TAB | Refills: 2 | Status: SHIPPED | OUTPATIENT
Start: 2018-02-01 | End: 2018-11-07 | Stop reason: SDUPTHER

## 2018-02-01 NOTE — MR AVS SNAPSHOT
[address] [facility] [address] [phone] Patient: Mindy Anaya MRN: [id number] TAF:[id number] Visit Information Date & Time Provider Department Dept. Phone Encounter #  
 2/1/2018 11:00 AM [address], [facility] [phone] [id number] Follow-up Instructions Return in about 3 months (around 5/1/2018) for Diabetes/ hypertension. Your Appointments 4/24/2018 10:30 AM  
FOLLOW UP EXAM with Malathi Orellana MD  
[address]) Appt Note: diabetes f/u  
 [address] [facility] [address]  
[facility] [address]  
  
    
 5/8/2018  9:15 AM  
Office Visit with Mónica Cabrera MD  
[address]) Appt Note: [address] [facility] [address]  
[facility] [address] Upcoming Health Maintenance Date Due  
 FOOT EXAM Q1 8/27/1943 EYE EXAM RETINAL OR DILATED Q1 8/27/1943 DTaP/Tdap/Td series (1 - Tdap) 8/27/1954 ZOSTER VACCINE AGE 60> 6/27/1993 GLAUCOMA SCREENING Q2Y 8/27/1998 OSTEOPOROSIS SCREENING (DEXA) 8/27/1998 Pneumococcal 65+ Low/Medium Risk (1 of 2 - PCV13) 8/27/1998 HEMOGLOBIN A1C Q6M 2/2/2018 LIPID PANEL Q1 5/2/2018 MEDICARE YEARLY EXAM 5/3/2018 MICROALBUMIN Q1 11/2/2018 Allergies as of 2/1/2018  Review Complete On: 2/1/2018 By: Malathi Orellana MD  
 No Known Allergies Current Immunizations  Never Reviewed Name Date Influenza High Dose Vaccine PF  Incomplete Pneumococcal Polysaccharide (PPSV-23)  Incomplete Not reviewed this visit You Were Diagnosed With   
  
 Codes Comments Mixed hyperlipidemia    -  Primary ICD-10-CM: C22.9 ICD-9-CM: 272.2  Need for Streptococcus pneumoniae and influenza vaccination     ICD-10-CM: Z23 
 ICD-9-CM: V06.6 Vitals BP Pulse Temp Resp Height(growth percentile) Weight(growth percentile) 152/73 (BP 1 Location: Right arm, BP Patient Position: Sitting) 62 96.7 °F (35.9 °C) (Oral) 18 4' 11\" (1.499 m) 141 lb (64 kg) SpO2 BMI OB Status Smoking Status 100% 28.48 kg/m2 Postmenopausal Never Smoker BMI and BSA Data Body Mass Index Body Surface Area  
 28.48 kg/m 2 1.63 m 2 Preferred Pharmacy Pharmacy Name Phone [address] [place] [address]. [phone] Your Updated Medication List  
  
   
This list is accurate as of: 2/1/18 12:14 PM.  Always use your most recent med list. amLODIPine 10 mg tablet Commonly known as:  Saintclair Rickers Take 1 Tab by mouth daily. aspirin, buffered 81 mg Tab Take 1 tablet by mouth daily. cloNIDine HCl 0.2 mg tablet Commonly known as:  CATAPRES Take 1 Tab by mouth two (2) times a day. COLCRYS 0.6 mg tablet Generic drug:  colchicine Take 0.6 mg by mouth daily. Indications: GOUT  
  
 ferrous sulfate 324 mg (65 mg iron) tablet Take 325 mg by mouth Daily (before breakfast). fish oil-dha-epa 1,200-144-216 mg Cap Take 1 tablet by mouth daily. hydrALAZINE 100 mg tablet Commonly known as:  APRESOLINE Take 1 Tab by mouth three (3) times daily. hydroCHLOROthiazide 25 mg tablet Commonly known as:  HYDRODIURIL Take 1 Tab by mouth daily. metFORMIN 500 mg tablet Commonly known as:  GLUCOPHAGE Take 1 Tab by mouth two (2) times daily (with meals). OTHER Take 1 tablet by mouth daily. polyethylene glycol 17 gram/dose powder Commonly known as:  Dhaval Mano Take 17 g by mouth daily. 1 tablespoon with 8 oz of water daily  
  
 potassium chloride SR 10 mEq tablet Commonly known as:  KLOR-CON 10  
TAKE TWO TABLETS BY MOUTH ONCE DAILY psyllium Powd Commonly known as:  REGULOID, SUGAR FREE  
 3. 4 GRAMS PO QD THEN INCREASE SLOWLY. INFO; SERVING SIZE VARIES WITH DIFFERENT PRODUCTS; DISSOLVE IN WATER OR JUICE.  
  
 simvastatin 40 mg tablet Commonly known as:  ZOCOR Take 1 Tab by mouth nightly. STOOL SOFTENER 50 mg capsule Generic drug:  docusate sodium Take 50 mg by mouth two (2) times a day. VITAMIN C 500 mg tablet Generic drug:  ascorbic acid (vitamin C) Take 500 mg by mouth daily. Prescriptions Sent to Pharmacy Refills  
 simvastatin (ZOCOR) 40 mg tablet 2 Sig: Take 1 Tab by mouth nightly. Class: Normal  
 Pharmacy: [facility] [address] [address].  #: [phone] Route: Oral  
  
We Performed the Following INFLUENZA VIRUS VACCINE, HIGH DOSE SEASONAL, PRESERVATIVE FREE [01484 CPT(R)] PNEUMOCOCCAL POLYSACCHARIDE VACCINE, 23-VALENT, ADULT OR IMMUNOSUPPRESSED PT DOSE, [71392 CPT(R)] Follow-up Instructions Return in about 3 months (around 5/1/2018) for Diabetes/ hypertension. Introducing [facility] & HEALTH SERVICES! [facility] introduces [employer] patient portal. Now you can access parts of your medical record, email your doctor's office, and request medication refills online. 1. In your internet browser, go to [email or web address]. [email or web address] 2. Click on the First Time User? Click Here link in the Sign In box. You will see the New Member Sign Up page. 3. Enter your CloudX Access Code exactly as it appears below. You will not need to use this code after youve completed the sign-up process. If you do not sign up before the expiration date, you must request a new code. · [employer] Access Code: [email or web address] Expires: 5/2/2018 12:14 PM 
 
4. Enter the last four digits of your Social Security Number (xxxx) and Date of Birth (mm/dd/yyyy) as indicated and click Submit. You will be taken to the next sign-up page. 5. Create a CloudX ID.  This will be your CloudX login ID and cannot be changed, so think of one that is secure and easy to remember. 6. Create a Conveneer password. You can change your password at any time. 7. Enter your Password Reset Question and Answer. This can be used at a later time if you forget your password. 8. Enter your e-mail address. You will receive e-mail notification when new information is available in [address] 9. Click Sign Up. You can now view and download portions of your medical record. 10. Click the Download Summary menu link to download a portable copy of your medical information. If you have questions, please visit the Frequently Asked Questions section of the [employer] website. Remember, [employer] is NOT to be used for urgent needs. For medical emergencies, dial 911. Now available from your iPhone and Android! Please provide this summary of care documentation to your next provider. Your primary care clinician is listed as Heena Rider. If you have any questions after today's visit, please call [phone].

## 2018-02-02 RX ORDER — HYDROCHLOROTHIAZIDE 25 MG/1
25 TABLET ORAL DAILY
Qty: 90 TAB | Refills: 2 | Status: SHIPPED | OUTPATIENT
Start: 2018-02-02 | End: 2018-04-24 | Stop reason: DRUGHIGH

## 2018-02-02 RX ORDER — HYDRALAZINE HYDROCHLORIDE 100 MG/1
100 TABLET, FILM COATED ORAL 3 TIMES DAILY
Qty: 270 TAB | Refills: 2 | Status: SHIPPED | OUTPATIENT
Start: 2018-02-02 | End: 2019-02-04 | Stop reason: SDUPTHER

## 2018-02-02 RX ORDER — POTASSIUM CHLORIDE 750 MG/1
10 TABLET, FILM COATED, EXTENDED RELEASE ORAL DAILY
Qty: 90 TAB | Refills: 2 | Status: SHIPPED | OUTPATIENT
Start: 2018-02-02 | End: 2018-11-07 | Stop reason: SDUPTHER

## 2018-02-02 RX ORDER — METFORMIN HYDROCHLORIDE 500 MG/1
500 TABLET ORAL 2 TIMES DAILY WITH MEALS
Qty: 60 TAB | Refills: 5 | Status: SHIPPED | OUTPATIENT
Start: 2018-02-02 | End: 2018-03-30 | Stop reason: SDUPTHER

## 2018-02-02 RX ORDER — COLCHICINE 0.6 MG/1
0.6 TABLET ORAL DAILY
Qty: 90 TAB | Refills: 2 | Status: SHIPPED | OUTPATIENT
Start: 2018-02-02 | End: 2019-03-01 | Stop reason: SDUPTHER

## 2018-02-03 NOTE — PROGRESS NOTES
Hypertension Follow Up Progress Note      Today's Date:  2/3/2018   Patient's Name: Nico Jimenez   Patient's :  1933     Subjective:     Nico Jimenez is a 80 y.o. female who presents for follow up of hypertension. Diet and Lifestyle: generally follows a low fat low cholesterol diet, generally follows a low sodium diet, nonsmoker  Home BP Monitoring: is not measured at home    Cardiovascular ROS: taking medications as instructed, no medication side effects noted, no TIA's, no chest pain on exertion, no dyspnea on exertion, no swelling of ankles. New concerns: none.      Review of Systems:   Gen- No weight loss, No Malaise, No fatigue  Eyes- No dipoplia, blurry vision  CVS- No Chest pain, no palpitations, no edema  Resp- No Cough, SOB, BOWERS, Wheezing  Neuro- No headaches  Skin- No easy bruising, No rashes      Hemoglobin A1c   Date Value Ref Range Status   2016 6.6 (H) 4.2 - 5.6 % Final     Comment:     ** NEW REFERENCE RANGE ESTABLISHED FOR THIS METHOD **  (NOTE)  HbA1C Interpretive Ranges  <5.7              Normal  5.7 - 6.4         Consider Prediabetes  >6.5              Consider Diabetes       BUN   Date Value Ref Range Status   2017 44 (H) 7.0 - 18 MG/DL Final     Creatinine   Date Value Ref Range Status   2017 1.35 (H) 0.6 - 1.3 MG/DL Final     GFR est AA   Date Value Ref Range Status   2017 45 (L) >60 ml/min/1.73m2 Final     Hemoglobin A1c   Date Value Ref Range Status   2016 6.6 (H) 4.2 - 5.6 % Final     Comment:     ** NEW REFERENCE RANGE ESTABLISHED FOR THIS METHOD **  (NOTE)  HbA1C Interpretive Ranges  <5.7              Normal  5.7 - 6.4         Consider Prediabetes  >6.5              Consider Diabetes       BUN   Date Value Ref Range Status   2017 44 (H) 7.0 - 18 MG/DL Final     Creatinine   Date Value Ref Range Status   2017 1.35 (H) 0.6 - 1.3 MG/DL Final     GFR est AA   Date Value Ref Range Status   2017 45 (L) >60 ml/min/1.73m2 Final Lab Results   Component Value Date/Time    Cholesterol, total 109 05/02/2017 12:57 PM    HDL Cholesterol 65 05/02/2017 12:57 PM    LDL, calculated 26.8 05/02/2017 12:57 PM    VLDL, calculated 17.2 05/02/2017 12:57 PM    Triglyceride 86 05/02/2017 12:57 PM    CHOL/HDL Ratio 1.7 05/02/2017 12:57 PM       Objective:     Visit Vitals    /73 (BP 1 Location: Right arm, BP Patient Position: Sitting)    Pulse 62    Temp 96.7 °F (35.9 °C) (Oral)    Resp 18    Ht 4' 11\" (1.499 m)    Wt 141 lb (64 kg)    SpO2 100%    BMI 28.48 kg/m2     Appearance: alert, well appearing, and in no distress and oriented to person, place, and time. Cardiovascular: Normal rate and regular rhythm, no gallop, no murmur., S1, S2 normal and no JVD   Pulmonary/Chest: Effort normal and breath sounds normal. No respiratory distress. No wheezes or rales  Lower Extremities (feet): warm, good capillary refill  Lab review: most recent lipid panel reviewed, showing LDL result meets goal.   CVS exam BP noted to be well controlled today in office,    Assessment/Plan:   hypertension stable. current treatment plan is effective, no change in therapy  lab results and schedule of future lab studies reviewed with patient. hypertension previously controlled, did not use BP meds today. current treatment plan is effective, no change in therapy. ICD-10-CM ICD-9-CM    1. Essential hypertension I10 401.9 hydrALAZINE (APRESOLINE) 100 mg tablet      hydroCHLOROthiazide (HYDRODIURIL) 25 mg tablet   2. Mixed hyperlipidemia E78.2 272.2 simvastatin (ZOCOR) 40 mg tablet   3.  Need for Streptococcus pneumoniae and influenza vaccination Z23 V06.6 INFLUENZA VIRUS VACCINE, HIGH DOSE SEASONAL, PRESERVATIVE FREE      PNEUMOCOCCAL POLYSACCHARIDE VACCINE, 23-VALENT, ADULT OR IMMUNOSUPPRESSED PT DOSE,         Recommendations:  Limit sodium < 1500 mg/day  DASH diet  Wt reduction and maintenance  Exercise- 30 min 5 days/wk with intense 20 min 3 days/wk  Moderation of Alcohol intake: 1 serving/day, 5/wk -female, 2 servings/day  9/wk-male  Do not smoke  Avoid NSAIDs, pseudoephedrine, phenylephrine and herbal suppliments such as bitter orange, ginsing, mandie's wort, licorice, caffeine pills. Discussed with patient at length the need for blood pressure re-evaluation and management with primary care. Discussed the long term sequelae for elevated blood pressure including blindness, CVA, MI, and renal failure/ dialysis. Pt agrees to follow up as directed.      Jacqui Walker MD

## 2018-04-02 RX ORDER — METFORMIN HYDROCHLORIDE 500 MG/1
500 TABLET ORAL 2 TIMES DAILY WITH MEALS
Qty: 60 TAB | Refills: 5 | Status: SHIPPED | OUTPATIENT
Start: 2018-04-02 | End: 2018-04-24 | Stop reason: SDUPTHER

## 2018-04-24 ENCOUNTER — OFFICE VISIT (OUTPATIENT)
Dept: FAMILY MEDICINE CLINIC | Age: 83
End: 2018-04-24

## 2018-04-24 ENCOUNTER — HOSPITAL ENCOUNTER (OUTPATIENT)
Dept: LAB | Age: 83
Discharge: HOME | End: 2018-04-24
Payer: MEDICARE

## 2018-04-24 VITALS
HEIGHT: 59 IN | TEMPERATURE: 97.6 F | BODY MASS INDEX: 28.63 KG/M2 | SYSTOLIC BLOOD PRESSURE: 147 MMHG | RESPIRATION RATE: 16 BRPM | OXYGEN SATURATION: 99 % | WEIGHT: 142 LBS | HEART RATE: 50 BPM | DIASTOLIC BLOOD PRESSURE: 76 MMHG

## 2018-04-24 DIAGNOSIS — I10 ESSENTIAL HYPERTENSION: Chronic | ICD-10-CM

## 2018-04-24 DIAGNOSIS — E11.22 CONTROLLED TYPE 2 DIABETES MELLITUS WITH STAGE 3 CHRONIC KIDNEY DISEASE, WITHOUT LONG-TERM CURRENT USE OF INSULIN (HCC): ICD-10-CM

## 2018-04-24 DIAGNOSIS — R53.83 FATIGUE, UNSPECIFIED TYPE: ICD-10-CM

## 2018-04-24 DIAGNOSIS — N18.30 CONTROLLED TYPE 2 DIABETES MELLITUS WITH STAGE 3 CHRONIC KIDNEY DISEASE, WITHOUT LONG-TERM CURRENT USE OF INSULIN (HCC): ICD-10-CM

## 2018-04-24 DIAGNOSIS — M79.89 LEG SWELLING: ICD-10-CM

## 2018-04-24 DIAGNOSIS — N18.30 CONTROLLED TYPE 2 DIABETES MELLITUS WITH STAGE 3 CHRONIC KIDNEY DISEASE, WITHOUT LONG-TERM CURRENT USE OF INSULIN (HCC): Primary | ICD-10-CM

## 2018-04-24 DIAGNOSIS — E11.22 CONTROLLED TYPE 2 DIABETES MELLITUS WITH STAGE 3 CHRONIC KIDNEY DISEASE, WITHOUT LONG-TERM CURRENT USE OF INSULIN (HCC): Primary | ICD-10-CM

## 2018-04-24 LAB
ALBUMIN SERPL-MCNC: 3.9 G/DL (ref 3.4–5)
ALBUMIN/GLOB SERPL: 1.1 {RATIO} (ref 0.8–1.7)
ALP SERPL-CCNC: 50 U/L (ref 45–117)
ALT SERPL-CCNC: 42 U/L (ref 13–56)
ANION GAP SERPL CALC-SCNC: 9 MMOL/L (ref 3–18)
AST SERPL-CCNC: 47 U/L (ref 15–37)
BASOPHILS # BLD: 0 K/UL (ref 0–0.06)
BASOPHILS NFR BLD: 0 % (ref 0–2)
BILIRUB SERPL-MCNC: 0.4 MG/DL (ref 0.2–1)
BUN SERPL-MCNC: 37 MG/DL (ref 7–18)
BUN/CREAT SERPL: 25 (ref 12–20)
CALCIUM SERPL-MCNC: 9.6 MG/DL (ref 8.5–10.1)
CHLORIDE SERPL-SCNC: 103 MMOL/L (ref 100–108)
CHOLEST SERPL-MCNC: 110 MG/DL
CO2 SERPL-SCNC: 29 MMOL/L (ref 21–32)
CREAT SERPL-MCNC: 1.47 MG/DL (ref 0.6–1.3)
DIFFERENTIAL METHOD BLD: ABNORMAL
EOSINOPHIL # BLD: 0.1 K/UL (ref 0–0.4)
EOSINOPHIL NFR BLD: 2 % (ref 0–5)
ERYTHROCYTE [DISTWIDTH] IN BLOOD BY AUTOMATED COUNT: 17.1 % (ref 11.6–14.5)
GLOBULIN SER CALC-MCNC: 3.5 G/DL (ref 2–4)
GLUCOSE SERPL-MCNC: 99 MG/DL (ref 74–99)
HBA1C MFR BLD HPLC: 5.9 %
HCT VFR BLD AUTO: 32 % (ref 35–45)
HDLC SERPL-MCNC: 63 MG/DL (ref 40–60)
HDLC SERPL: 1.7 {RATIO} (ref 0–5)
HGB BLD-MCNC: 10.6 G/DL (ref 12–16)
LDLC SERPL CALC-MCNC: 33.8 MG/DL (ref 0–100)
LIPID PROFILE,FLP: ABNORMAL
LYMPHOCYTES # BLD: 1.4 K/UL (ref 0.9–3.6)
LYMPHOCYTES NFR BLD: 37 % (ref 21–52)
MCH RBC QN AUTO: 27.9 PG (ref 24–34)
MCHC RBC AUTO-ENTMCNC: 33.1 G/DL (ref 31–37)
MCV RBC AUTO: 84.2 FL (ref 74–97)
MONOCYTES # BLD: 0.3 K/UL (ref 0.05–1.2)
MONOCYTES NFR BLD: 7 % (ref 3–10)
NEUTS SEG # BLD: 2.1 K/UL (ref 1.8–8)
NEUTS SEG NFR BLD: 54 % (ref 40–73)
PLATELET # BLD AUTO: 248 K/UL (ref 135–420)
PMV BLD AUTO: 9.8 FL (ref 9.2–11.8)
POTASSIUM SERPL-SCNC: 3.5 MMOL/L (ref 3.5–5.5)
PROT SERPL-MCNC: 7.4 G/DL (ref 6.4–8.2)
RBC # BLD AUTO: 3.8 M/UL (ref 4.2–5.3)
SODIUM SERPL-SCNC: 141 MMOL/L (ref 136–145)
TRIGL SERPL-MCNC: 66 MG/DL (ref ?–150)
VLDLC SERPL CALC-MCNC: 13.2 MG/DL
WBC # BLD AUTO: 3.9 K/UL (ref 4.6–13.2)

## 2018-04-24 PROCEDURE — 36415 COLL VENOUS BLD VENIPUNCTURE: CPT | Performed by: FAMILY MEDICINE

## 2018-04-24 PROCEDURE — 85025 COMPLETE CBC W/AUTO DIFF WBC: CPT | Performed by: FAMILY MEDICINE

## 2018-04-24 PROCEDURE — 80061 LIPID PANEL: CPT | Performed by: FAMILY MEDICINE

## 2018-04-24 PROCEDURE — 80053 COMPREHEN METABOLIC PANEL: CPT | Performed by: FAMILY MEDICINE

## 2018-04-24 RX ORDER — HYDROCHLOROTHIAZIDE 50 MG/1
50 TABLET ORAL DAILY
Qty: 90 TAB | Refills: 1 | Status: SHIPPED | OUTPATIENT
Start: 2018-04-24 | End: 2018-10-25

## 2018-04-24 RX ORDER — METFORMIN HYDROCHLORIDE 500 MG/1
500 TABLET ORAL 2 TIMES DAILY WITH MEALS
Qty: 180 TAB | Refills: 1 | Status: SHIPPED | OUTPATIENT
Start: 2018-04-24 | End: 2018-11-21

## 2018-04-24 RX ORDER — AMLODIPINE BESYLATE 10 MG/1
10 TABLET ORAL DAILY
Qty: 90 TAB | Refills: 1 | Status: SHIPPED | OUTPATIENT
Start: 2018-04-24 | End: 2018-12-24 | Stop reason: SDUPTHER

## 2018-04-24 RX ORDER — COLCHICINE 0.6 MG/1
0.6 TABLET ORAL DAILY
Qty: 90 TAB | Refills: 2 | Status: CANCELLED | OUTPATIENT
Start: 2018-04-24

## 2018-04-24 NOTE — PATIENT INSTRUCTIONS
Please come in to office in a few days for blood pressure check     DASH Diet: Care Instructions  Your Care Instructions    The DASH diet is an eating plan that can help lower your blood pressure. DASH stands for Dietary Approaches to Stop Hypertension. Hypertension is high blood pressure. The DASH diet focuses on eating foods that are high in calcium, potassium, and magnesium. These nutrients can lower blood pressure. The foods that are highest in these nutrients are fruits, vegetables, low-fat dairy products, nuts, seeds, and legumes. But taking calcium, potassium, and magnesium supplements instead of eating foods that are high in those nutrients does not have the same effect. The DASH diet also includes whole grains, fish, and poultry. The DASH diet is one of several lifestyle changes your doctor may recommend to lower your high blood pressure. Your doctor may also want you to decrease the amount of sodium in your diet. Lowering sodium while following the DASH diet can lower blood pressure even further than just the DASH diet alone. Follow-up care is a key part of your treatment and safety. Be sure to make and go to all appointments, and call your doctor if you are having problems. It's also a good idea to know your test results and keep a list of the medicines you take. How can you care for yourself at home? Following the DASH diet  · Eat 4 to 5 servings of fruit each day. A serving is 1 medium-sized piece of fruit, ½ cup chopped or canned fruit, 1/4 cup dried fruit, or 4 ounces (½ cup) of fruit juice. Choose fruit more often than fruit juice. · Eat 4 to 5 servings of vegetables each day. A serving is 1 cup of lettuce or raw leafy vegetables, ½ cup of chopped or cooked vegetables, or 4 ounces (½ cup) of vegetable juice. Choose vegetables more often than vegetable juice. · Get 2 to 3 servings of low-fat and fat-free dairy each day.  A serving is 8 ounces of milk, 1 cup of yogurt, or 1 ½ ounces of cheese. · Eat 6 to 8 servings of grains each day. A serving is 1 slice of bread, 1 ounce of dry cereal, or ½ cup of cooked rice, pasta, or cooked cereal. Try to choose whole-grain products as much as possible. · Limit lean meat, poultry, and fish to 2 servings each day. A serving is 3 ounces, about the size of a deck of cards. · Eat 4 to 5 servings of nuts, seeds, and legumes (cooked dried beans, lentils, and split peas) each week. A serving is 1/3 cup of nuts, 2 tablespoons of seeds, or ½ cup of cooked beans or peas. · Limit fats and oils to 2 to 3 servings each day. A serving is 1 teaspoon of vegetable oil or 2 tablespoons of salad dressing. · Limit sweets and added sugars to 5 servings or less a week. A serving is 1 tablespoon jelly or jam, ½ cup sorbet, or 1 cup of lemonade. · Eat less than 2,300 milligrams (mg) of sodium a day. If you limit your sodium to 1,500 mg a day, you can lower your blood pressure even more. Tips for success  · Start small. Do not try to make dramatic changes to your diet all at once. You might feel that you are missing out on your favorite foods and then be more likely to not follow the plan. Make small changes, and stick with them. Once those changes become habit, add a few more changes. · Try some of the following:  ¨ Make it a goal to eat a fruit or vegetable at every meal and at snacks. This will make it easy to get the recommended amount of fruits and vegetables each day. ¨ Try yogurt topped with fruit and nuts for a snack or healthy dessert. ¨ Add lettuce, tomato, cucumber, and onion to sandwiches. ¨ Combine a ready-made pizza crust with low-fat mozzarella cheese and lots of vegetable toppings. Try using tomatoes, squash, spinach, broccoli, carrots, cauliflower, and onions. ¨ Have a variety of cut-up vegetables with a low-fat dip as an appetizer instead of chips and dip. ¨ Sprinkle sunflower seeds or chopped almonds over salads.  Or try adding chopped walnuts or almonds to cooked vegetables. ¨ Try some vegetarian meals using beans and peas. Add garbanzo or kidney beans to salads. Make burritos and tacos with mashed dya beans or black beans. Where can you learn more? Go to http://viral-zenaida.info/. Enter E566 in the search box to learn more about \"DASH Diet: Care Instructions. \"  Current as of: September 21, 2016  Content Version: 11.4  © 6571-4826 [employer]. Care instructions adapted under license by [employer] (which disclaims liability or warranty for this information). If you have questions about a medical condition or this instruction, always ask your healthcare professional. [address] any warranty or liability for your use of this information.

## 2018-04-24 NOTE — MR AVS SNAPSHOT
[address] [facility] [address] [phone] Patient: Live Alamo MRN: [id number] Visit Information Date & Time Provider Department Dept. Phone Encounter #  
 4/24/2018 10:30 AM Brenda Spears [phone] [id number] Follow-up Instructions Return in about 6 months (around 10/24/2018) for diabetes. Upcoming Health Maintenance Date Due  
 FOOT EXAM Q1 8/27/1943 EYE EXAM RETINAL OR DILATED Q1 8/27/1943 DTaP/Tdap/Td series (1 - Tdap) 8/27/1954 ZOSTER VACCINE AGE 60> 6/27/1993 GLAUCOMA SCREENING Q2Y 8/27/1998 Bone Densitometry (Dexa) Screening 8/27/1998 HEMOGLOBIN A1C Q6M 2/2/2018 LIPID PANEL Q1 5/2/2018 MEDICARE YEARLY EXAM 5/3/2018 MICROALBUMIN Q1 11/2/2018 Pneumococcal 65+ Low/Medium Risk (2 of 2 - PCV13) 2/1/2019 Allergies as of 4/24/2018  Review Complete On: 4/24/2018 By: Edgar Kaufman No Known Allergies Current Immunizations  Never Reviewed Name Date Influenza High Dose Vaccine PF 2/1/2018 Pneumococcal Polysaccharide (PPSV-23) 2/1/2018 Not reviewed this visit You Were Diagnosed With   
  
 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 
ICD-9-CM: 401.9 Leg swelling     ICD-10-CM: M79.89 ICD-9-CM: 729.81 Fatigue, unspecified type     ICD-10-CM: R53.83 ICD-9-CM: 780.79 Vitals BP Pulse Temp Resp Height(growth percentile) Weight(growth percentile) 147/76 (!) 50 97.6 °F (36.4 °C) (Oral) 16 4' 11\" (1.499 m) 142 lb (64.4 kg) SpO2 BMI OB Status Smoking Status 99% 28.68 kg/m2 Postmenopausal Never Smoker Vitals History BMI and BSA Data Body Mass Index Body Surface Area 28.68 kg/m 2 1.64 m 2 Preferred Pharmacy Pharmacy Name Phone [address] [place] [address]. [phone] Your Updated Medication List  
  
   
This list is accurate as of 4/24/18 11:15 AM.  Always use your most recent med list. amLODIPine 10 mg tablet Commonly known as:  Moriah Fairwater Take 1 Tab by mouth daily. aspirin, buffered 81 mg Tab Take 1 tablet by mouth daily. cloNIDine HCl 0.2 mg tablet Commonly known as:  CATAPRES Take 1 Tab by mouth two (2) times a day. colchicine 0.6 mg tablet Commonly known as:  COLCRYS Take 1 Tab by mouth daily. Indications: Gout  
  
 ferrous sulfate 324 mg (65 mg iron) tablet Take 325 mg by mouth Daily (before breakfast). fish oil-dha-epa 1,200-144-216 mg Cap Take 1 tablet by mouth daily. hydrALAZINE 100 mg tablet Commonly known as:  APRESOLINE Take 1 Tab by mouth three (3) times daily. hydroCHLOROthiazide 50 mg tablet Commonly known as:  HYDRODIURIL Take 1 Tab by mouth daily. metFORMIN 500 mg tablet Commonly known as:  GLUCOPHAGE Take 1 Tab by mouth two (2) times daily (with meals). OTHER Take 1 tablet by mouth daily. polyethylene glycol 17 gram/dose powder Commonly known as:  Claudeen Cast Take 17 g by mouth daily. 1 tablespoon with 8 oz of water daily  
  
 potassium chloride SR 10 mEq tablet Commonly known as:  KLOR-CON 10 Take 1 Tab by mouth daily. psyllium Powd Commonly known as:  REGULOID, SUGAR FREE  
3.4 GRAMS PO QD THEN INCREASE SLOWLY. INFO; SERVING SIZE VARIES WITH DIFFERENT PRODUCTS; DISSOLVE IN WATER OR JUICE.  
  
 simvastatin 40 mg tablet Commonly known as:  ZOCOR Take 1 Tab by mouth nightly. STOOL SOFTENER 50 mg capsule Generic drug:  docusate sodium Take 50 mg by mouth two (2) times a day. VITAMIN C 500 mg tablet Generic drug:  ascorbic acid (vitamin C) Take 500 mg by mouth daily. Prescriptions Sent to Pharmacy Refills  
 amLODIPine (NORVASC) 10 mg tablet 1 Sig: Take 1 Tab by mouth daily. Class: Normal  
 Pharmacy: [facility] [address] ROBERT GLOVER [address] [place] [address]. Ph #: [phone] Route: Oral  
 metFORMIN (GLUCOPHAGE) 500 mg tablet 1 Sig: Take 1 Tab by mouth two (2) times daily (with meals). Class: Normal  
 Pharmacy: [facility] [address] [place] [address]. Ph #: [phone] Route: Oral  
 hydroCHLOROthiazide (HYDRODIURIL) 50 mg tablet 1 Sig: Take 1 Tab by mouth daily. Class: Normal  
 Pharmacy: [facility] [address]OVER [address] [place] [address]. Ph #: [phone] Route: Oral  
  
We Performed the Following AMB POC HEMOGLOBIN A1C [41534 CPT(R)] Follow-up Instructions Return in about 6 months (around 10/24/2018) for diabetes. To-Do List   
 04/24/2018 Lab:  CBC WITH AUTOMATED DIFF   
  
 04/24/2018 Lab:  LIPID PANEL   
  
 04/24/2018 Lab:  METABOLIC PANEL, COMPREHENSIVE Patient Instructions Please come in to office in a few days for blood pressure check DASH Diet: Care Instructions Your Care Instructions The DASH diet is an eating plan that can help lower your blood pressure. DASH stands for Dietary Approaches to Stop Hypertension. Hypertension is high blood pressure. The DASH diet focuses on eating foods that are high in calcium, potassium, and magnesium. These nutrients can lower blood pressure. The foods that are highest in these nutrients are fruits, vegetables, low-fat dairy products, nuts, seeds, and legumes. But taking calcium, potassium, and magnesium supplements instead of eating foods that are high in those nutrients does not have the same effect. The DASH diet also includes whole grains, fish, and poultry.  
The DASH diet is one of several lifestyle changes your doctor may recommend to lower your high blood pressure. Your doctor may also want you to decrease the amount of sodium in your diet. Lowering sodium while following the DASH diet can lower blood pressure even further than just the DASH diet alone. Follow-up care is a key part of your treatment and safety. Be sure to make and go to all appointments, and call your doctor if you are having problems. It's also a good idea to know your test results and keep a list of the medicines you take. How can you care for yourself at home? Following the DASH diet · Eat 4 to 5 servings of fruit each day. A serving is 1 medium-sized piece of fruit, ½ cup chopped or canned fruit, 1/4 cup dried fruit, or 4 ounces (½ cup) of fruit juice. Choose fruit more often than fruit juice. · Eat 4 to 5 servings of vegetables each day. A serving is 1 cup of lettuce or raw leafy vegetables, ½ cup of chopped or cooked vegetables, or 4 ounces (½ cup) of vegetable juice. Choose vegetables more often than vegetable juice. · Get 2 to 3 servings of low-fat and fat-free dairy each day. A serving is 8 ounces of milk, 1 cup of yogurt, or 1 ½ ounces of cheese. · Eat 6 to 8 servings of grains each day. A serving is 1 slice of bread, 1 ounce of dry cereal, or ½ cup of cooked rice, pasta, or cooked cereal. Try to choose whole-grain products as much as possible. · Limit lean meat, poultry, and fish to 2 servings each day. A serving is 3 ounces, about the size of a deck of cards. · Eat 4 to 5 servings of nuts, seeds, and legumes (cooked dried beans, lentils, and split peas) each week. A serving is 1/3 cup of nuts, 2 tablespoons of seeds, or ½ cup of cooked beans or peas. · Limit fats and oils to 2 to 3 servings each day. A serving is 1 teaspoon of vegetable oil or 2 tablespoons of salad dressing. · Limit sweets and added sugars to 5 servings or less a week. A serving is 1 tablespoon jelly or jam, ½ cup sorbet, or 1 cup of lemonade. · Eat less than 2,300 milligrams (mg) of sodium a day. If you limit your sodium to 1,500 mg a day, you can lower your blood pressure even more. Tips for success · Start small. Do not try to make dramatic changes to your diet all at once. You might feel that you are missing out on your favorite foods and then be more likely to not follow the plan. Make small changes, and stick with them. Once those changes become habit, add a few more changes. · Try some of the following: ¨ Make it a goal to eat a fruit or vegetable at every meal and at snacks. This will make it easy to get the recommended amount of fruits and vegetables each day. ¨ Try yogurt topped with fruit and nuts for a snack or healthy dessert. ¨ Add lettuce, tomato, cucumber, and onion to sandwiches. ¨ Combine a ready-made pizza crust with low-fat mozzarella cheese and lots of vegetable toppings. Try using tomatoes, squash, spinach, broccoli, carrots, cauliflower, and onions. ¨ Have a variety of cut-up vegetables with a low-fat dip as an appetizer instead of chips and dip. ¨ Sprinkle sunflower seeds or chopped almonds over salads. Or try adding chopped walnuts or almonds to cooked vegetables. ¨ Try some vegetarian meals using beans and peas. Add garbanzo or kidney beans to salads. Make burritos and tacos with mashed day beans or black beans. Where can you learn more? Go to http://viral-zenaida.info/. Enter G812 in the search box to learn more about \"DASH Diet: Care Instructions. \" Current as of: September 21, 2016 Content Version: 11.4 © 5471-2513 [employer]. Care instructions adapted under license by [employer] (which disclaims liability or warranty for this information). If you have questions about a medical condition or this instruction, always ask your healthcare professional. [address] any warranty or liability for your use of this information. Introducing [facility]! Vaishnavi Marie introduces Cadee patient portal. Now you can access parts of your medical record, email your doctor's office, and request medication refills online. 1. In your internet browser, go to [email or web address] 2. Click on the First Time User? Click Here link in the Sign In box. You will see the New Member Sign Up page. 3. Enter your Cadee Access Code exactly as it appears below. You will not need to use this code after youve completed the sign-up process. If you do not sign up before the expiration date, you must request a new code. · Cadee Access Code: [email or web address] Expires: 5/2/2018  1:14 PM 
 
4. Enter the last four digits of your Social Security Number (xxxx) and Date of Birth (mm/dd/yyyy) as indicated and click Submit. You will be taken to the next sign-up page. 5. Create a Cadee ID. This will be your Cadee login ID and cannot be changed, so think of one that is secure and easy to remember. 6. Create a Cadee password. You can change your password at any time. 7. Enter your Password Reset Question and Answer. This can be used at a later time if you forget your password. 8. Enter your e-mail address. You will receive e-mail notification when new information is available in [address] 9. Click Sign Up. You can now view and download portions of your medical record. 10. Click the Download Summary menu link to download a portable copy of your medical information. If you have questions, please visit the Frequently Asked Questions section of the C[employer]e website. Remember, Cadee is NOT to be used for urgent needs. For medical emergencies, dial 911. Now available from your iPhone and Android! Please provide this summary of care documentation to your next provider. Your primary care clinician is listed as August All. If you have any questions after today's visit, please call [phone].

## 2018-04-30 NOTE — PROGRESS NOTES
Follow Up Chronic Condition (Diabetes and HTN)        HPI: Mary Clark is a 80 y.o. female [address]  Here in follow up of her DM and HTN. She is using her medications as prescribed. Denies side effect. She remains very active. She reports frequent fatigue for several months now. She does sleep well however she goes to sleep at midnight frequently and wakes up at 6AM. She is not sure why she goes to bed so late. Just became a habit for her. Past Medical History:   Diagnosis Date    Anemia     Carotid bruit 12/07/2013    Carotid Duplex: 1. Bilateral 1-49% stenosis of the internal carotid arteries. 2. No significant stenosis in the external carotid arteries bilaterally. 3. Antegrade flow in both vetebral arteries. 4. Normal flow in both subclavian arteries. Plaque Morphology: 1. Hyperechoic plaque in the bulb and right ICA. 2. Hyperechoic plaque in the bulb and left ICA.  CKD (chronic kidney disease) stage 3, GFR 30-59 ml/min     Diabetes ([facility])     NIDDM    Gastritis 10/27/2014    Duodenum Bx: No Specific Pathologic Abnormality. No Villous Abnormality. 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 No dysplasia or malignancy identified. Bacterial Organisms c/w H. Pylori are present. Z-Line Bx: GE mucosa w/ Acute/Chronic Inflammation & Reactive Changes. Focal Specialized Type Campos Mucosa Identified. No Dysplasia or Malignancy Identified.      Gout 12/10/2009    Elevated Uric Acid Level    Hyperlipidemia     Hypertension     RAMÓN (iron deficiency anemia)            No Known Allergies    Past Surgical History:   Procedure Laterality Date    HX COLONOSCOPY  10/27/2014    Dr. Scooter Mena  10/27/2014    Dr. Beena Rader        Family History   Problem Relation Age of Onset    Hypertension Mother     Stroke Mother     Stroke Father        Social History     Social History    Marital status:      Spouse name: N/A    Number of children: N/A    Years of education: N/A     Occupational History    Not on file. Social History Main Topics    Smoking status: Never Smoker    Smokeless tobacco: Never Used    Alcohol use No    Drug use: No    Sexual activity: Yes     Partners: Male     Birth control/ protection: None     Other Topics Concern    Not on file     Social History Narrative       Gen- No weight loss, No Malaise, No fatigue  Eyes- No dipoplia, blurry vision  CVS- No Chest pain, no palpitations  Resp- No Cough, SOB, BOWERS, Wheezing  Neuro- No headaches  Skin- No easy bruising, No rashes      Visit Vitals    /76    Pulse (!) 50    Temp 97.6 °F (36.4 °C) (Oral)    Resp 16    Ht 4' 11\" (1.499 m)    Wt 142 lb (64.4 kg)    SpO2 99%    BMI 28.68 kg/m2       PE  GEN- NAD, AAOx3  CVS- RRR, +S1, +S2, No Murmurs, No JVD  PULM- CTABL, No W/R/R  EXT- 2+ pitting edema of b/L lower legs  NEURO-Normal Gait  PSYCH- Euthymic, normal afffect    Results for orders placed or performed in visit on 04/24/18   AMB POC HEMOGLOBIN A1C   Result Value Ref Range    Hemoglobin A1c (POC) 5.9 %       Assesment:  1. Essential hypertension  Slightly above goal. Increase HCTZ to 50mg daily. Follow up for BP recheck in a week  - amLODIPine (NORVASC) 10 mg tablet; Take 1 Tab by mouth daily. Dispense: 90 Tab; Refill: 1  - hydroCHLOROthiazide (HYDRODIURIL) 50 mg tablet; Take 1 Tab by mouth daily. Dispense: 90 Tab; Refill: 1    2. Controlled type 2 diabetes mellitus with stage 3 chronic kidney disease, without long-term current use of insulin (HCC)  Well controlled. Continue metformin use  - AMB POC HEMOGLOBIN A1C  - metFORMIN (GLUCOPHAGE) 500 mg tablet; Take 1 Tab by mouth two (2) times daily (with meals). Dispense: 180 Tab; Refill: 1  - LIPID PANEL; Future  - METABOLIC PANEL, COMPREHENSIVE; Future    3. Leg swelling  Increase HCTZ to 50mg daily  - hydroCHLOROthiazide (HYDRODIURIL) 50 mg tablet; Take 1 Tab by mouth daily. Dispense: 90 Tab; Refill: 1    4. Fatigue, unspecified type  Likely due to sleep patterns. Will check CBC to rule out anemia as cause  - CBC WITH AUTOMATED DIFF; Future    I have discussed the diagnosis with the patient and the intended management  The patient has received an after-visit summary and questions were answered concerning future plans. I have discussed medication usage, side effects and warnings with the patient as well. I have reviewed the plan of care with the patient, accepted their input and they are in agreement with the treatment goals. Follow-up Disposition:  Return in about 6 months (around 10/24/2018) for diabetes.       Juan Miguel Coburn MD                .

## 2018-05-01 NOTE — PROGRESS NOTES
I called pt to discuss lab results and the phone states \" that all circuits are busy please try call later.

## 2018-06-25 ENCOUNTER — OFFICE VISIT (OUTPATIENT)
Dept: FAMILY MEDICINE CLINIC | Age: 83
End: 2018-06-25

## 2018-06-25 ENCOUNTER — HOSPITAL ENCOUNTER (OUTPATIENT)
Dept: LAB | Age: 83
Discharge: HOME | End: 2018-06-25
Payer: MEDICARE

## 2018-06-25 VITALS
TEMPERATURE: 97.6 F | HEIGHT: 59 IN | WEIGHT: 139.8 LBS | HEART RATE: 54 BPM | RESPIRATION RATE: 17 BRPM | DIASTOLIC BLOOD PRESSURE: 82 MMHG | SYSTOLIC BLOOD PRESSURE: 139 MMHG | BODY MASS INDEX: 28.18 KG/M2 | OXYGEN SATURATION: 99 %

## 2018-06-25 DIAGNOSIS — I10 ESSENTIAL HYPERTENSION: Chronic | ICD-10-CM

## 2018-06-25 DIAGNOSIS — R01.1 HEART MURMUR: ICD-10-CM

## 2018-06-25 DIAGNOSIS — D64.9 NORMOCYTIC ANEMIA: ICD-10-CM

## 2018-06-25 DIAGNOSIS — E78.2 MIXED HYPERLIPIDEMIA: Chronic | ICD-10-CM

## 2018-06-25 DIAGNOSIS — M10.9 GOUT OF FOOT, UNSPECIFIED CAUSE, UNSPECIFIED CHRONICITY, UNSPECIFIED LATERALITY: ICD-10-CM

## 2018-06-25 DIAGNOSIS — N18.30 CONTROLLED TYPE 2 DIABETES MELLITUS WITH STAGE 3 CHRONIC KIDNEY DISEASE, WITHOUT LONG-TERM CURRENT USE OF INSULIN (HCC): ICD-10-CM

## 2018-06-25 DIAGNOSIS — E11.22 CONTROLLED TYPE 2 DIABETES MELLITUS WITH STAGE 3 CHRONIC KIDNEY DISEASE, WITHOUT LONG-TERM CURRENT USE OF INSULIN (HCC): Primary | ICD-10-CM

## 2018-06-25 DIAGNOSIS — N18.30 CONTROLLED TYPE 2 DIABETES MELLITUS WITH STAGE 3 CHRONIC KIDNEY DISEASE, WITHOUT LONG-TERM CURRENT USE OF INSULIN (HCC): Primary | ICD-10-CM

## 2018-06-25 DIAGNOSIS — E11.22 CONTROLLED TYPE 2 DIABETES MELLITUS WITH STAGE 3 CHRONIC KIDNEY DISEASE, WITHOUT LONG-TERM CURRENT USE OF INSULIN (HCC): ICD-10-CM

## 2018-06-25 LAB
ANION GAP SERPL CALC-SCNC: 7 MMOL/L (ref 3–18)
BASOPHILS # BLD: 0 K/UL (ref 0–0.1)
BASOPHILS NFR BLD: 0 % (ref 0–2)
BUN SERPL-MCNC: 47 MG/DL (ref 7–18)
BUN/CREAT SERPL: 27 (ref 12–20)
CALCIUM SERPL-MCNC: 9 MG/DL (ref 8.5–10.1)
CHLORIDE SERPL-SCNC: 109 MMOL/L (ref 100–108)
CO2 SERPL-SCNC: 28 MMOL/L (ref 21–32)
CREAT SERPL-MCNC: 1.73 MG/DL (ref 0.6–1.3)
DIFFERENTIAL METHOD BLD: ABNORMAL
EOSINOPHIL # BLD: 0.1 K/UL (ref 0–0.4)
EOSINOPHIL NFR BLD: 2 % (ref 0–5)
ERYTHROCYTE [DISTWIDTH] IN BLOOD BY AUTOMATED COUNT: 16.6 % (ref 11.6–14.5)
FERRITIN SERPL-MCNC: 106 NG/ML (ref 8–388)
GLUCOSE SERPL-MCNC: 89 MG/DL (ref 74–99)
HCT VFR BLD AUTO: 30.7 % (ref 35–45)
HGB BLD-MCNC: 10.1 G/DL (ref 12–16)
IRON SATN MFR SERPL: 17 %
IRON SERPL-MCNC: 48 UG/DL (ref 50–175)
LYMPHOCYTES # BLD: 1.5 K/UL (ref 0.9–3.6)
LYMPHOCYTES NFR BLD: 37 % (ref 21–52)
MCH RBC QN AUTO: 28 PG (ref 24–34)
MCHC RBC AUTO-ENTMCNC: 32.9 G/DL (ref 31–37)
MCV RBC AUTO: 85 FL (ref 74–97)
MONOCYTES # BLD: 0.2 K/UL (ref 0.05–1.2)
MONOCYTES NFR BLD: 6 % (ref 3–10)
NEUTS SEG # BLD: 2.2 K/UL (ref 1.8–8)
NEUTS SEG NFR BLD: 55 % (ref 40–73)
PLATELET # BLD AUTO: 244 K/UL (ref 135–420)
PMV BLD AUTO: 10.2 FL (ref 9.2–11.8)
POTASSIUM SERPL-SCNC: 3.8 MMOL/L (ref 3.5–5.5)
RBC # BLD AUTO: 3.61 M/UL (ref 4.2–5.3)
SODIUM SERPL-SCNC: 144 MMOL/L (ref 136–145)
TIBC SERPL-MCNC: 287 UG/DL (ref 250–450)
URATE SERPL-MCNC: 9.2 MG/DL (ref 2.6–7.2)
WBC # BLD AUTO: 4.1 K/UL (ref 4.6–13.2)

## 2018-06-25 PROCEDURE — 85025 COMPLETE CBC W/AUTO DIFF WBC: CPT | Performed by: FAMILY MEDICINE

## 2018-06-25 PROCEDURE — 83540 ASSAY OF IRON: CPT | Performed by: FAMILY MEDICINE

## 2018-06-25 PROCEDURE — 82728 ASSAY OF FERRITIN: CPT | Performed by: FAMILY MEDICINE

## 2018-06-25 PROCEDURE — 36415 COLL VENOUS BLD VENIPUNCTURE: CPT | Performed by: FAMILY MEDICINE

## 2018-06-25 PROCEDURE — 80048 BASIC METABOLIC PNL TOTAL CA: CPT | Performed by: FAMILY MEDICINE

## 2018-06-25 PROCEDURE — 84550 ASSAY OF BLOOD/URIC ACID: CPT | Performed by: FAMILY MEDICINE

## 2018-06-25 RX ORDER — CHOLECALCIFEROL TAB 125 MCG (5000 UNIT) 125 MCG
TAB ORAL DAILY
COMMUNITY

## 2018-06-25 NOTE — PROGRESS NOTES
Sonia Howell, 80 y.o.,  female    SUBJECTIVE  Establish care, previous clinic has closed    DM w/ CKD 3- long standing history, taking metformin. Reports 's. Reviewed labs 4/18 a1c 5.9. HTN/HL- denies cp, sob, lightheadedness. Taking medications. No previous significant cardiac history. On exam noted murmur, says has not been told in the past, or evaluated. Gout- usually foot swelling and pain, related to seafood. 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 Lives with  who is in his [de-identified]. Performs ADLs without much difficulty. No longer drives    ROS:  See HPI, all others negative        Patient Active Problem List   Diagnosis Code    Essential hypertension I10    Mixed hyperlipidemia E78.2    Advanced directives, counseling/discussion Z71.89    Controlled type 2 diabetes mellitus with stage 3 chronic kidney disease, without long-term current use of insulin (HCC) E11.22, N18.3       Current Outpatient Prescriptions   Medication Sig Dispense Refill    cholecalciferol, VITAMIN D3, (VITAMIN D3) 5,000 unit tab tablet Take  by mouth daily.  cloNIDine HCl (CATAPRES) 0.2 mg tablet TAKE ONE TABLET BY MOUTH TWICE DAILY 60 Tab 2    amLODIPine (NORVASC) 10 mg tablet Take 1 Tab by mouth daily. 90 Tab 1    metFORMIN (GLUCOPHAGE) 500 mg tablet Take 1 Tab by mouth two (2) times daily (with meals). 180 Tab 1    hydroCHLOROthiazide (HYDRODIURIL) 50 mg tablet Take 1 Tab by mouth daily. 90 Tab 1    potassium chloride SR (KLOR-CON 10) 10 mEq tablet Take 1 Tab by mouth daily. 90 Tab 2    hydrALAZINE (APRESOLINE) 100 mg tablet Take 1 Tab by mouth three (3) times daily. 270 Tab 2    colchicine (COLCRYS) 0.6 mg tablet Take 1 Tab by mouth daily. Indications: Gout 90 Tab 2    simvastatin (ZOCOR) 40 mg tablet Take 1 Tab by mouth nightly. 90 Tab 2    docusate sodium (STOOL SOFTENER) 50 mg capsule Take 50 mg by mouth two (2) times a day.       Aspirin, Buffered 81 mg tab Take 1 tablet by mouth daily.  fish oil-dha-epa 1,200-144-216 mg cap Take 1 tablet by mouth daily. No Known Allergies    Past Medical History:   Diagnosis Date    Anemia     Carotid bruit 12/07/2013    Carotid Duplex: 1. Bilateral 1-49% stenosis of the internal carotid arteries. 2. No significant stenosis in the external carotid arteries bilaterally. 3. Antegrade flow in both vetebral arteries. 4. Normal flow in both subclavian arteries. Plaque Morphology: 1. Hyperechoic plaque in the bulb and right ICA. 2. Hyperechoic plaque in the bulb and left ICA.  CKD (chronic kidney disease) stage 3, GFR 30-59 ml/min     Diabetes ([facility])     NIDDM    Gastritis 10/27/2014    Duodenum Bx: No Specific Pathologic Abnormality. No Villous Abnormality. 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 No dysplasia or malignancy identified. Bacterial Organisms c/w H. Pylori are present. Z-Line Bx: GE mucosa w/ Acute/Chronic Inflammation & Reactive Changes. Focal Specialized Type Campos Mucosa Identified. No Dysplasia or Malignancy Identified.  Gout 12/10/2009    Elevated Uric Acid Level    Hyperlipidemia     Hypertension     RAMÓN (iron deficiency anemia)        Social History     Social History    Marital status:      Spouse name: N/A    Number of children: N/A    Years of education: N/A     Occupational History    Not on file.      Social History Main Topics    Smoking status: Never Smoker    Smokeless tobacco: Never Used    Alcohol use No    Drug use: No    Sexual activity: Not Currently     Partners: Male     Birth control/ protection: None     Other Topics Concern    Not on file     Social History Narrative       Family History   Problem Relation Age of Onset    Hypertension Mother     Stroke Mother     Stroke Father          OBJECTIVE    Physical Exam:     Visit Vitals    /82 (BP 1 Location: Left arm, BP Patient Position: Sitting)    Pulse (!) 54    Temp 97.6 °F (36.4 °C) (Oral)    Resp 17    Ht 4' 11\" (1.499 m)    Wt 139 lb 12.8 oz (63.4 kg)    SpO2 99%    BMI 28.24 kg/m2       General: alert, well-appearing, AA,  in no apparent distress or pain  Head: atraumatic. Non-tender maxillary and frontal sinuses  Eyes: Lids with no discharge, no matting, conjunctivae clear and non injected, full EOMs, PERLLA  Ears: pinna non-tender, external auditory canal patent, TM intact  Mouth/throat:tonsils non enlarged, pharynx non erythematous and no lesion, nasal mucosa normal  Neck: supple, no adenopathy palpated  CVS: normal rate, regular rhythm, + murmur  Lungs:clear to ausculation bilaterally, no crackles, wheezing or rhonchi noted  Abdomen: normoactive bowel sounds, soft, non-tender  Extremities: trace bipedal edema, feet: normal monofilament  Skin: warm, no lesions, rashes noted  Psych:  mood and affect normal      Results for orders placed or performed during the hospital encounter of 04/24/18   LIPID PANEL   Result Value Ref Range    LIPID PROFILE          Cholesterol, total 110 <200 MG/DL    Triglyceride 66 <150 MG/DL    HDL Cholesterol 63 (H) 40 - 60 MG/DL    LDL, calculated 33.8 0 - 100 MG/DL    VLDL, calculated 13.2 MG/DL    CHOL/HDL Ratio 1.7 0 - 5.0     METABOLIC PANEL, COMPREHENSIVE   Result Value Ref Range    Sodium 141 136 - 145 mmol/L    Potassium 3.5 3.5 - 5.5 mmol/L    Chloride 103 100 - 108 mmol/L    CO2 29 21 - 32 mmol/L    Anion gap 9 3.0 - 18 mmol/L    Glucose 99 74 - 99 mg/dL    BUN 37 (H) 7.0 - 18 MG/DL    Creatinine 1.47 (H) 0.6 - 1.3 MG/DL    BUN/Creatinine ratio 25 (H) 12 - 20      GFR est AA 41 (L) >60 ml/min/1.73m2    GFR est non-AA 34 (L) >60 ml/min/1.73m2    Calcium 9.6 8.5 - 10.1 MG/DL    Bilirubin, total 0.4 0.2 - 1.0 MG/DL    ALT (SGPT) 42 13 - 56 U/L    AST (SGOT) 47 (H) 15 - 37 U/L    Alk.  phosphatase 50 45 - 117 U/L    Protein, total 7.4 6.4 - 8.2 g/dL    Albumin 3.9 3.4 - 5.0 g/dL    Globulin 3.5 2.0 - 4.0 g/dL    A-G Ratio 1.1 0.8 - 1.7     CBC WITH AUTOMATED DIFF   Result Value Ref Range    WBC 3.9 (L) 4.6 - 13.2 K/uL    RBC 3.80 (L) 4.20 - 5.30 M/uL    HGB 10.6 (L) 12.0 - 16.0 g/dL    HCT 32.0 (L) 35.0 - 45.0 %    MCV 84.2 74.0 - 97.0 FL    MCH 27.9 24.0 - 34.0 PG    MCHC 33.1 31.0 - 37.0 g/dL    RDW 17.1 (H) 11.6 - 14.5 %    PLATELET 781 789 - 047 K/uL    MPV 9.8 9.2 - 11.8 FL    NEUTROPHILS 54 40 - 73 %    LYMPHOCYTES 37 21 - 52 %    MONOCYTES 7 3 - 10 %    EOSINOPHILS 2 0 - 5 %    BASOPHILS 0 0 - 2 %    ABS. NEUTROPHILS 2.1 1.8 - 8.0 K/UL    ABS. LYMPHOCYTES 1.4 0.9 - 3.6 K/UL    ABS. MONOCYTES 0.3 0.05 - 1.2 K/UL    ABS. EOSINOPHILS 0.1 0.0 - 0.4 K/UL    ABS. BASOPHILS 0.0 0.0 - 0.06 K/UL    DF AUTOMATED         ASSESSMENT/PLAN  Diagnoses and all orders for this visit:    1. Controlled type 2 diabetes mellitus with stage 3 chronic kidney disease, without long-term current use of insulin (HCC)  Controlled, cont metformin  Monitor kidney function  Request eye records dr. Mayte Ozuna  -     METABOLIC PANEL, BASIC; Future    2. Essential hypertension  Fair control, cont current regimen for now  -     METABOLIC PANEL, BASIC; Future    3. Mixed hyperlipidemia  Good range LDL <100, on zocor    4. Gout of foot, unspecified cause, unspecified chronicity, unspecified laterality  On prophylactic colchicine  -     URIC ACID; Future    5. Normocytic anemia  Consider ACD  -     CBC WITH AUTOMATED DIFF; Future  -     IRON PROFILE; Future  -     FERRITIN; Future    6. Heart murmur  asymptomatic  -     Louis Pinedo ref [facility]    BMI 28  Encourage wt loss    Follow-up Disposition:  Return in about 4 weeks (around 7/23/2018), or plan for medicare wellness. Patient understands plan of care. Patient has provided input and agrees with goals.

## 2018-06-25 NOTE — MR AVS SNAPSHOT
[facility] 
[phone] Patient: Shaina Aggarwal MRN: [id number] PIN:[id number] Visit Information Date & Time Provider Department Dept. Phone Encounter #  
 6/25/2018 10:30 AM Beckie Dunbar, [address] [facility] [id number] Follow-up Instructions Return in about 4 weeks (around 7/23/2018), or plan for medicare wellness. Upcoming Health Maintenance Date Due  
 FOOT EXAM Q1 8/27/1943 EYE EXAM RETINAL OR DILATED Q1 8/27/1943 DTaP/Tdap/Td series (1 - Tdap) 8/27/1954 ZOSTER VACCINE AGE 60> 6/27/1993 GLAUCOMA SCREENING Q2Y 8/27/1998 Bone Densitometry (Dexa) Screening 8/27/1998 MEDICARE YEARLY EXAM 5/3/2018 Influenza Age 5 to Adult 8/1/2018 HEMOGLOBIN A1C Q6M 10/24/2018 MICROALBUMIN Q1 11/2/2018 Pneumococcal 65+ Low/Medium Risk (2 of 2 - PCV13) 2/1/2019 LIPID PANEL Q1 4/24/2019 Allergies as of 6/25/2018  Review Complete On: 6/25/2018 By: Beckie Dunbar MD  
 No Known Allergies Current Immunizations  Never Reviewed Name Date Influenza High Dose Vaccine PF 2/1/2018 Pneumococcal Polysaccharide (PPSV-23) 2/1/2018 Not reviewed this visit You Were Diagnosed With   
  
 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 
ICD-9-CM: 401.9 Mixed hyperlipidemia     ICD-10-CM: E78.2 ICD-9-CM: 272.2 Gout of foot, unspecified cause, unspecified chronicity, unspecified laterality     ICD-10-CM: M10.9 ICD-9-CM: 274.00 Normocytic anemia     ICD-10-CM: D64.9 ICD-9-CM: 101. 9 Vitals BP Pulse Temp Resp Height(growth percentile) Weight(growth percentile) 142/82 (BP 1 Location: Left arm, BP Patient Position: Sitting) (!) 54 97.6 °F (36.4 °C) (Oral) 17 4' 11\" (1.499 m) 139 lb 12.8 oz (63.4 kg) SpO2 BMI OB Status Smoking Status 99% 28.24 kg/m2 Postmenopausal Never Smoker Vitals History BMI and BSA Data Body Mass Index Body Surface Area  
 28.24 kg/m 2 1.62 m 2 Preferred Pharmacy Pharmacy Name Phone [address] [place] [address]. [phone] Your Updated Medication List  
  
   
This list is accurate as of 6/25/18 11:03 AM.  Always use your most recent med list. amLODIPine 10 mg tablet Commonly known as:  Rosalba Lino Take 1 Tab by mouth daily. aspirin, buffered 81 mg Tab Take 1 tablet by mouth daily. cholecalciferol (VITAMIN D3) 5,000 unit Tab tablet Commonly known as:  VITAMIN D3 Take  by mouth daily. cloNIDine HCl 0.2 mg tablet Commonly known as:  CATAPRES  
TAKE ONE TABLET BY MOUTH TWICE DAILY  
  
 colchicine 0.6 mg tablet Commonly known as:  COLCRYS Take 1 Tab by mouth daily. Indications: Gout  
  
 fish oil-dha-epa 1,200-144-216 mg Cap Take 1 tablet by mouth daily. hydrALAZINE 100 mg tablet Commonly known as:  APRESOLINE Take 1 Tab by mouth three (3) times daily. hydroCHLOROthiazide 50 mg tablet Commonly known as:  HYDRODIURIL Take 1 Tab by mouth daily. metFORMIN 500 mg tablet Commonly known as:  GLUCOPHAGE Take 1 Tab by mouth two (2) times daily (with meals). potassium chloride SR 10 mEq tablet Commonly known as:  KLOR-CON 10 Take 1 Tab by mouth daily. simvastatin 40 mg tablet Commonly known as:  ZOCOR Take 1 Tab by mouth nightly. STOOL SOFTENER 50 mg capsule Generic drug:  docusate sodium Take 50 mg by mouth two (2) times a day. We Performed the Following  DIABETES FOOT EXAM [[facility]] Follow-up Instructions Return in about 4 weeks (around 7/23/2018), or plan for medicare wellness. To-Do List   
 06/25/2018 Lab:  CBC WITH AUTOMATED DIFF   
  
 06/25/2018 Lab:  FERRITIN   
  
 06/25/2018 Lab:  IRON PROFILE   
  
 06/25/2018 Lab:  METABOLIC PANEL, BASIC   
  
 06/25/2018 Lab:  URIC ACID Patient Instructions Learning About Diabetes Food Guidelines Your Care Instructions Meal planning is important to manage diabetes. It helps keep your blood sugar at a target level (which you set with your doctor). You don't have to eat special foods. You can eat what your family eats, including sweets once in a while. But you do have to pay attention to how often you eat and how much you eat of certain foods. You may want to work with a dietitian or a certified diabetes educator (CDE) to help you plan meals and snacks. A dietitian or CDE can also help you lose weight if that is one of your goals. What should you know about eating carbs? Managing the amount of carbohydrate (carbs) you eat is an important part of healthy meals when you have diabetes. Carbohydrate is found in many foods. · Learn which foods have carbs. And learn the amounts of carbs in different foods. ¨ Bread, cereal, pasta, and rice have about 15 grams of carbs in a serving. A serving is 1 slice of bread (1 ounce), ½ cup of cooked cereal, or 1/3 cup of cooked pasta or rice. ¨ Fruits have 15 grams of carbs in a serving. A serving is 1 small fresh fruit, such as an apple or orange; ½ of a banana; ½ cup of cooked or canned fruit; ½ cup of fruit juice; 1 cup of melon or raspberries; or 2 tablespoons of dried fruit. ¨ Milk and no-sugar-added yogurt have 15 grams of carbs in a serving. A serving is 1 cup of milk or 2/3 cup of no-sugar-added yogurt. ¨ Starchy vegetables have 15 grams of carbs in a serving.  A serving is ½ cup of mashed potatoes or sweet potato; 1 cup winter squash; ½ of a small baked potato; ½ cup of cooked beans; or ½ cup cooked corn or green peas. · Learn how much carbs to eat each day and at each meal. A dietitian or CDE can teach you how to keep track of the amount of carbs you eat. This is called carbohydrate counting. · If you are not sure how to count carbohydrate grams, use the Plate Method to plan meals. It is a good, quick way to make sure that you have a balanced meal. It also helps you spread carbs throughout the day. ¨ Divide your plate by types of foods. Put non-starchy vegetables on half the plate, meat or other protein food on one-quarter of the plate, and a grain or starchy vegetable in the final quarter of the plate. To this you can add a small piece of fruit and 1 cup of milk or yogurt, depending on how many carbs you are supposed to eat at a meal. 
· Try to eat about the same amount of carbs at each meal. Do not \"save up\" your daily allowance of carbs to eat at one meal. 
· Proteins have very little or no carbs per serving. Examples of proteins are beef, chicken, turkey, fish, eggs, tofu, cheese, cottage cheese, and peanut butter. A serving size of meat is 3 ounces, which is about the size of a deck of cards. Examples of meat substitute serving sizes (equal to 1 ounce of meat) are 1/4 cup of cottage cheese, 1 egg, 1 tablespoon of peanut butter, and ½ cup of tofu. How can you eat out and still eat healthy? · Learn to estimate the serving sizes of foods that have carbohydrate. If you measure food at home, it will be easier to estimate the amount in a serving of restaurant food. · If the meal you order has too much carbohydrate (such as potatoes, corn, or baked beans), ask to have a low-carbohydrate food instead. Ask for a salad or green vegetables. · If you use insulin, check your blood sugar before and after eating out to help you plan how much to eat in the future.  
· If you eat more carbohydrate at a meal than you had planned, take a walk or do other exercise. This will help lower your blood sugar. What else should you know? · Limit saturated fat, such as the fat from meat and dairy products. This is a healthy choice because people who have diabetes are at higher risk of heart disease. So choose lean cuts of meat and nonfat or low-fat dairy products. Use olive or canola oil instead of butter or shortening when cooking. · Don't skip meals. Your blood sugar may drop too low if you skip meals and take insulin or certain medicines for diabetes. · Check with your doctor before you drink alcohol. Alcohol can cause your blood sugar to drop too low. Alcohol can also cause a bad reaction if you take certain diabetes medicines. Follow-up care is a key part of your treatment and safety. Be sure to make and go to all appointments, and call your doctor if you are having problems. It's also a good idea to know your test results and keep a list of the medicines you take. Where can you learn more? Go to http://viral-zenaida.info/. Enter L811 in the search box to learn more about \"Learning About Diabetes Food Guidelines. \" Current as of: March 13, 2017 Content Version: 11.4 © 6325-5640 [employer]. Care instructions adapted under license by [employer] (which disclaims liability or warranty for this information). If you have questions about a medical condition or this instruction, always ask your healthcare professional. [place] [address] any warranty or liability for your use of this information. Introducing [facility]! [address] introduces Fliqz patient portal. Now you can access parts of your medical record, email your doctor's office, and request medication refills online. 1. In your internet browser, go to [email or web address] 2. Click on the First Time User? Click Here link in the Sign In box. You will see the New Member Sign Up page. 3. Enter your scoo mobility Access Code exactly as it appears below. You will not need to use this code after youve completed the sign-up process. If you do not sign up before the expiration date, you must request a new code. · scoo mobility Access Code: [email or web address] Expires: 9/23/2018 11:03 AM 
 
4. Enter the last four digits of your Social Security Number (xxxx) and Date of Birth (mm/dd/yyyy) as indicated and click Submit. You will be taken to the next sign-up page. 5. Create a scoo mobility ID. This will be your scoo mobility login ID and cannot be changed, so think of one that is secure and easy to remember. 6. Create a scoo mobility password. You can change your password at any time. 7. Enter your Password Reset Question and Answer. This can be used at a later time if you forget your password. 8. Enter your e-mail address. You will receive e-mail notification when new information is available in [address] 9. Click Sign Up. You can now view and download portions of your medical record. 10. Click the Download Summary menu link to download a portable copy of your medical information. If you have questions, please visit the Frequently Asked Questions section of the scoo mobility website. Remember, scoo mobility is NOT to be used for urgent needs. For medical emergencies, dial 911. Now available from your iPhone and Android! Please provide this summary of care documentation to your next provider. Your primary care clinician is listed as Dianne Coon. If you have any questions after today's visit, please call [phone].

## 2018-06-25 NOTE — PROGRESS NOTES
Chief Complaint   Patient presents with    Establish Care    Hypertension    Cholesterol Problem    Diabetes

## 2018-06-25 NOTE — PATIENT INSTRUCTIONS

## 2018-07-02 NOTE — PROGRESS NOTES
Anemia is stable, uric acid and kidney function tests are elevated. Will discuss further on next visit. pls notify pt.

## 2018-07-05 NOTE — PROGRESS NOTES
Contacted patient and verified identity using name and date of birth (2- identifiers). Patient advised anemia is stable, uric acid and kidney function tests are elevated. Patient informed to keep 7/25 appt to discuss further. Patient voiced understanding.

## 2018-07-25 ENCOUNTER — HOSPITAL ENCOUNTER (OUTPATIENT)
Dept: LAB | Age: 83
Discharge: HOME | End: 2018-07-25
Payer: MEDICARE

## 2018-07-25 ENCOUNTER — OFFICE VISIT (OUTPATIENT)
Dept: FAMILY MEDICINE CLINIC | Age: 83
End: 2018-07-25

## 2018-07-25 VITALS
SYSTOLIC BLOOD PRESSURE: 130 MMHG | OXYGEN SATURATION: 98 % | HEIGHT: 59 IN | WEIGHT: 135.4 LBS | BODY MASS INDEX: 27.3 KG/M2 | HEART RATE: 64 BPM | TEMPERATURE: 97.5 F | DIASTOLIC BLOOD PRESSURE: 80 MMHG | RESPIRATION RATE: 14 BRPM

## 2018-07-25 DIAGNOSIS — Z78.0 POST-MENOPAUSAL: ICD-10-CM

## 2018-07-25 DIAGNOSIS — E11.22 CONTROLLED TYPE 2 DIABETES MELLITUS WITH STAGE 3 CHRONIC KIDNEY DISEASE, WITHOUT LONG-TERM CURRENT USE OF INSULIN (HCC): ICD-10-CM

## 2018-07-25 DIAGNOSIS — N18.30 STAGE 3 CHRONIC KIDNEY DISEASE (HCC): ICD-10-CM

## 2018-07-25 DIAGNOSIS — N18.30 CONTROLLED TYPE 2 DIABETES MELLITUS WITH STAGE 3 CHRONIC KIDNEY DISEASE, WITHOUT LONG-TERM CURRENT USE OF INSULIN (HCC): ICD-10-CM

## 2018-07-25 DIAGNOSIS — Z00.00 MEDICARE ANNUAL WELLNESS VISIT, SUBSEQUENT: Primary | ICD-10-CM

## 2018-07-25 DIAGNOSIS — E78.2 MIXED HYPERLIPIDEMIA: Chronic | ICD-10-CM

## 2018-07-25 DIAGNOSIS — Z71.89 ADVANCE CARE PLANNING: ICD-10-CM

## 2018-07-25 DIAGNOSIS — I10 ESSENTIAL HYPERTENSION: Chronic | ICD-10-CM

## 2018-07-25 DIAGNOSIS — D63.8 ANEMIA OF CHRONIC DISEASE: ICD-10-CM

## 2018-07-25 LAB
ALBUMIN SERPL-MCNC: 4 G/DL (ref 3.4–5)
ALBUMIN/GLOB SERPL: 1.1 {RATIO} (ref 0.8–1.7)
ALP SERPL-CCNC: 41 U/L (ref 45–117)
ALT SERPL-CCNC: 26 U/L (ref 13–56)
ANION GAP SERPL CALC-SCNC: 8 MMOL/L (ref 3–18)
AST SERPL-CCNC: 38 U/L (ref 15–37)
BILIRUB SERPL-MCNC: 0.6 MG/DL (ref 0.2–1)
BUN SERPL-MCNC: 40 MG/DL (ref 7–18)
BUN/CREAT SERPL: 27 (ref 12–20)
CALCIUM SERPL-MCNC: 9.8 MG/DL (ref 8.5–10.1)
CHLORIDE SERPL-SCNC: 104 MMOL/L (ref 100–108)
CO2 SERPL-SCNC: 29 MMOL/L (ref 21–32)
CREAT SERPL-MCNC: 1.47 MG/DL (ref 0.6–1.3)
GLOBULIN SER CALC-MCNC: 3.6 G/DL (ref 2–4)
GLUCOSE SERPL-MCNC: 105 MG/DL (ref 74–99)
POTASSIUM SERPL-SCNC: 3.6 MMOL/L (ref 3.5–5.5)
PROT SERPL-MCNC: 7.6 G/DL (ref 6.4–8.2)
SODIUM SERPL-SCNC: 141 MMOL/L (ref 136–145)

## 2018-07-25 PROCEDURE — 80053 COMPREHEN METABOLIC PANEL: CPT | Performed by: FAMILY MEDICINE

## 2018-07-25 PROCEDURE — 36415 COLL VENOUS BLD VENIPUNCTURE: CPT | Performed by: FAMILY MEDICINE

## 2018-07-25 NOTE — PROGRESS NOTES
1. Have you been to the ER, urgent care clinic since your last visit? Hospitalized since your last visit? No    2. Have you seen or consulted any other health care providers outside of the [address] [place] [address] since your last visit? Include any pap smears or colon screening. No        This is the Subsequent Medicare Annual Wellness Exam, performed 12 months or more after the Initial AWV or the last Subsequent AWV    I have reviewed the patient's medical history in detail and updated the computerized patient record. History     Past Medical History:   Diagnosis Date    Anemia     Carotid bruit 12/07/2013    Carotid Duplex: 1. Bilateral 1-49% stenosis of the internal carotid arteries. 2. No significant stenosis in the external carotid arteries bilaterally. 3. Antegrade flow in both vetebral arteries. 4. Normal flow in both subclavian arteries. Plaque Morphology: 1. Hyperechoic plaque in the bulb and right ICA. 2. Hyperechoic plaque in the bulb and left ICA.  CKD (chronic kidney disease) stage 3, GFR 30-59 ml/min     Diabetes (HCC)     NIDDM    Gastritis 10/27/2014    Duodenum Bx: No Specific Pathologic Abnormality. No Villous Abnormality. 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 No dysplasia or malignancy identified. Bacterial Organisms c/w H. Pylori are present. Z-Line Bx: GE mucosa w/ Acute/Chronic Inflammation & Reactive Changes. Focal Specialized Type Campos Mucosa Identified. No Dysplasia or Malignancy Identified.  Gout 12/10/2009    Elevated Uric Acid Level    Hyperlipidemia     Hypertension     RAMÓN (iron deficiency anemia)       Past Surgical History:   Procedure Laterality Date    HX COLONOSCOPY  10/27/2014    Dr. Natalie Kuhn HX ENDOSCOPY  10/27/2014    Dr. Neftali Barone      Current Outpatient Prescriptions   Medication Sig Dispense Refill    cholecalciferol, VITAMIN D3, (VITAMIN D3) 5,000 unit tab tablet Take  by mouth daily.  cloNIDine HCl (CATAPRES) 0.2 mg tablet TAKE ONE TABLET BY MOUTH TWICE DAILY 60 Tab 2    amLODIPine (NORVASC) 10 mg tablet Take 1 Tab by mouth daily. 90 Tab 1    metFORMIN (GLUCOPHAGE) 500 mg tablet Take 1 Tab by mouth two (2) times daily (with meals). 180 Tab 1    hydroCHLOROthiazide (HYDRODIURIL) 50 mg tablet Take 1 Tab by mouth daily. 90 Tab 1    potassium chloride SR (KLOR-CON 10) 10 mEq tablet Take 1 Tab by mouth daily. 90 Tab 2    hydrALAZINE (APRESOLINE) 100 mg tablet Take 1 Tab by mouth three (3) times daily. 270 Tab 2    colchicine (COLCRYS) 0.6 mg tablet Take 1 Tab by mouth daily. Indications: Gout 90 Tab 2    simvastatin (ZOCOR) 40 mg tablet Take 1 Tab by mouth nightly. 90 Tab 2    docusate sodium (STOOL SOFTENER) 50 mg capsule Take 50 mg by mouth two (2) times a day.  Aspirin, Buffered 81 mg tab Take 1 tablet by mouth daily.  fish oil-dha-epa 1,200-144-216 mg cap Take 1 tablet by mouth daily.        No Known Allergies  Family History   Problem Relation Age of Onset    Hypertension Mother     Stroke Mother     Stroke Father      Social History   Substance Use Topics    Smoking status: Never Smoker    Smokeless tobacco: Never Used    Alcohol use No     Patient Active Problem List   Diagnosis Code    Essential hypertension I10    Mixed hyperlipidemia E78.2    Advanced directives, counseling/discussion Z71.89    Controlled type 2 diabetes mellitus with stage 3 chronic kidney disease, without long-term current use of insulin ([facility]) E11.22, N18.3       Depression Risk Factor Screening:     PHQ over the last two weeks 2/1/2018   Little interest or pleasure in doing things Not at all   Feeling down, depressed, irritable, or hopeless Not at all   Total Score PHQ 2 0   Trouble falling or staying asleep, or sleeping too much Not at all   Poor appetite, weight loss, or overeating More than half the days   Feeling bad about yourself - or that you are a failure or have let yourself or your family down Not at all   Trouble concentrating on things such as school, work, reading, or watching TV Not at all   Moving or speaking so slowly that other people could have noticed; or the opposite being so fidgety that others notice Not at all   Thoughts of being better off dead, or hurting yourself in some way Not at all     Alcohol Risk Factor Screening: You do not drink alcohol or very rarely. Functional Ability and Level of Safety:   Hearing Loss  Hearing is good. Activities of Daily Living  The home contains: no safety equipment. Patient does total self care    Fall Risk  Fall Risk Assessment, last 12 mths 4/24/2018   Able to walk? Yes   Fall in past 12 months? No       Abuse Screen  Patient is not abused    Cognitive Screening   Evaluation of Cognitive Function:  Has your family/caregiver stated any concerns about your memory: no  Normal    Patient Care Team   Patient Care Team:  Sameer Armendariz MD as PCP - General (Family Practice)    Assessment/Plan   Education and counseling provided:  Are appropriate based on today's review and evaluation  End-of-Life planning (with patient's consent)-discussed, provided form  Pneumococcal Vaccine- 23 2/18, plan for 13 1 year after 2/19  Cardiovascular screening blood test-utd  Bone mass measurement (DEXA)- due, order placed  Screening for glaucoma- 4/18    Diagnoses and all orders for this visit:    1. Medicare annual wellness visit, subsequent      Health Maintenance Due   Topic Date Due    EYE EXAM RETINAL OR DILATED Q1  [birth year]1943    DTaP/Tdap/Td series (1 - Tdap) 08/27/1954    ZOSTER VACCINE AGE 60>  06/27/1993    GLAUCOMA SCREENING Q2Y  08/27/1998    Bone Densitometry (Dexa) Screening  08/27/1998    MEDICARE YEARLY EXAM  05/03/2018       Lawnell Thomas, 80 y.o.,  female    SUBJECTIVE  Ff-up    DM w/ CKD 3- long standing history, taking metformin. Reports 's. Reviewed labs 4/18 a1c 5.9. CKD- noted elevation of creatinine. Denies nsaid use. She is on hctz for years. HTN/HL- denies cp, sob, lightheadedness. Taking medications. No previous significant cardiac history. Heart murmur- no sob, syncope, chest pain, leg edema. Has appt with cards in September for evaluation. Gout- usually foot swelling and pain, related to seafood. 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 Lives with  who is in his [de-identified]. Performs ADLs without much difficulty. No longer drives    ROS:  See HPI, all others negative        Patient Active Problem List   Diagnosis Code    Essential hypertension I10    Mixed hyperlipidemia E78.2    Advanced directives, counseling/discussion Z71.89    Controlled type 2 diabetes mellitus with stage 3 chronic kidney disease, without long-term current use of insulin ([facility]) E11.22, N18.3       Current Outpatient Prescriptions   Medication Sig Dispense Refill    cholecalciferol, VITAMIN D3, (VITAMIN D3) 5,000 unit tab tablet Take  by mouth daily.  cloNIDine HCl (CATAPRES) 0.2 mg tablet TAKE ONE TABLET BY MOUTH TWICE DAILY 60 Tab 2    amLODIPine (NORVASC) 10 mg tablet Take 1 Tab by mouth daily. 90 Tab 1    metFORMIN (GLUCOPHAGE) 500 mg tablet Take 1 Tab by mouth two (2) times daily (with meals). 180 Tab 1    hydroCHLOROthiazide (HYDRODIURIL) 50 mg tablet Take 1 Tab by mouth daily. 90 Tab 1    potassium chloride SR (KLOR-CON 10) 10 mEq tablet Take 1 Tab by mouth daily. 90 Tab 2    hydrALAZINE (APRESOLINE) 100 mg tablet Take 1 Tab by mouth three (3) times daily. 270 Tab 2    colchicine (COLCRYS) 0.6 mg tablet Take 1 Tab by mouth daily. Indications: Gout 90 Tab 2    simvastatin (ZOCOR) 40 mg tablet Take 1 Tab by mouth nightly. 90 Tab 2    docusate sodium (STOOL SOFTENER) 50 mg capsule Take 50 mg by mouth two (2) times a day.  Aspirin, Buffered 81 mg tab Take 1 tablet by mouth daily.  fish oil-dha-epa 1,200-144-216 mg cap Take 1 tablet by mouth daily.          No Known Allergies    Past Medical History:   Diagnosis Date    Anemia     Carotid bruit 12/07/2013    Carotid Duplex: 1. Bilateral 1-49% stenosis of the internal carotid arteries. 2. No significant stenosis in the external carotid arteries bilaterally. 3. Antegrade flow in both vetebral arteries. 4. Normal flow in both subclavian arteries. Plaque Morphology: 1. Hyperechoic plaque in the bulb and right ICA. 2. Hyperechoic plaque in the bulb and left ICA.  CKD (chronic kidney disease) stage 3, GFR 30-59 ml/min     Diabetes (HCC)     NIDDM    Gastritis 10/27/2014    Duodenum Bx: No Specific Pathologic Abnormality. No Villous Abnormality. 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 No dysplasia or malignancy identified. Bacterial Organisms c/w H. Pylori are present. Z-Line Bx: GE mucosa w/ Acute/Chronic Inflammation & Reactive Changes. Focal Specialized Type Campos Mucosa Identified. No Dysplasia or Malignancy Identified.  Gout 12/10/2009    Elevated Uric Acid Level    Hyperlipidemia     Hypertension     RAMÓN (iron deficiency anemia)        Social History     Social History    Marital status:      Spouse name: N/A    Number of children: N/A    Years of education: N/A     Occupational History    Not on file.      Social History Main Topics    Smoking status: Never Smoker    Smokeless tobacco: Never Used    Alcohol use No    Drug use: No    Sexual activity: Not Currently     Partners: Male     Birth control/ protection: None     Other Topics Concern    Not on file     Social History Narrative       Family History   Problem Relation Age of Onset    Hypertension Mother     Stroke Mother     Stroke Father          OBJECTIVE    Physical Exam:     Visit Vitals    /80 (BP 1 Location: Left arm, BP Patient Position: Sitting)    Pulse 64    Temp 97.5 °F (36.4 °C) (Oral)    Resp 14    Ht 4' 11\" (1.499 m)    Wt 135 lb 6.4 oz (61.4 kg)    SpO2 98%    BMI 27.35 kg/m2 General: alert, well-appearing, AA,  in no apparent distress or pain  Neck: supple, no adenopathy palpated  CVS: normal rate, regular rhythm, + murmur  Lungs:clear to ausculation bilaterally, no crackles, wheezing or rhonchi noted  Abdomen: normoactive bowel sounds, soft, non-tender  Extremities: trace bipedal edema, feet: normal monofilament  Skin: warm, no lesions, rashes noted  Psych:  mood and affect normal      Results for orders placed or performed during the hospital encounter of 06/25/18   CBC WITH AUTOMATED DIFF   Result Value Ref Range    WBC 4.1 (L) 4.6 - 13.2 K/uL    RBC 3.61 (L) 4.20 - 5.30 M/uL    HGB 10.1 (L) 12.0 - 16.0 g/dL    HCT 30.7 (L) 35.0 - 45.0 %    MCV 85.0 74.0 - 97.0 FL    MCH 28.0 24.0 - 34.0 PG    MCHC 32.9 31.0 - 37.0 g/dL    RDW 16.6 (H) 11.6 - 14.5 %    PLATELET 068 970 - 406 K/uL    MPV 10.2 9.2 - 11.8 FL    NEUTROPHILS 55 40 - 73 %    LYMPHOCYTES 37 21 - 52 %    MONOCYTES 6 3 - 10 %    EOSINOPHILS 2 0 - 5 %    BASOPHILS 0 0 - 2 %    ABS. NEUTROPHILS 2.2 1.8 - 8.0 K/UL    ABS. LYMPHOCYTES 1.5 0.9 - 3.6 K/UL    ABS. MONOCYTES 0.2 0.05 - 1.2 K/UL    ABS. EOSINOPHILS 0.1 0.0 - 0.4 K/UL    ABS.  BASOPHILS 0.0 0.0 - 0.1 K/UL    DF AUTOMATED     IRON PROFILE   Result Value Ref Range    Iron 48 (L) 50 - 175 ug/dL    TIBC 287 250 - 450 ug/dL    Iron % saturation 17 %   FERRITIN   Result Value Ref Range    Ferritin 106 8 - 967 NG/ML   METABOLIC PANEL, BASIC   Result Value Ref Range    Sodium 144 136 - 145 mmol/L    Potassium 3.8 3.5 - 5.5 mmol/L    Chloride 109 (H) 100 - 108 mmol/L    CO2 28 21 - 32 mmol/L    Anion gap 7 3.0 - 18 mmol/L    Glucose 89 74 - 99 mg/dL    BUN 47 (H) 7.0 - 18 MG/DL    Creatinine 1.73 (H) 0.6 - 1.3 MG/DL    BUN/Creatinine ratio 27 (H) 12 - 20      GFR est AA 34 (L) >60 ml/min/1.73m2    GFR est non-AA 28 (L) >60 ml/min/1.73m2    Calcium 9.0 8.5 - 10.1 MG/DL   URIC ACID   Result Value Ref Range    Uric acid 9.2 (H) 2.6 - 7.2 MG/DL       ASSESSMENT/PLAN  Diagnoses and all orders for this visit:    Controlled type 2 diabetes mellitus with stage 3 chronic kidney disease, without long-term current use of insulin (HCC)  Controlled, cont metformin  Monitor kidney function, if GFR <30 on repeat check, will plan to discontinue metformin and hctz and work on dietary measures   Recheck BMP today  Eye exam- 4/18  Foot exam 6/18  Microalbumin 11/17  Lipid panel 4/18  a1c 4/18   BMP today  Check CMP/lipid panel/a1c/microalbumin in 3 months    Essential hypertension  Controlled, Creatinine trending up  Will recheck and plan to switch hctz to norvasc if worse. ont current regimen for now  -     METABOLIC PANEL, BASIC; Future     Mixed hyperlipidemia  Good range LDL <100, on zocor       Gout of foot, unspecified cause, unspecified chronicity, unspecified laterality  On prophylactic colchicine, consider switching to allopurinol    Anemia of chronic disease  Stable, Baseline 10's  Monitoring  Recheck cbc in 3 months    BMI 27  Encourage wt loss    Follow-up Disposition:  Return in about 3 months (around 10/25/2018), or if symptoms worsen or fail to improve. Patient understands plan of care. Patient has provided input and agrees with goals.

## 2018-07-25 NOTE — PATIENT INSTRUCTIONS
Medicare Wellness Visit, Female    The best way to live healthy is to have a lifestyle where you eat a well-balanced diet, exercise regularly, limit alcohol use, and quit all forms of tobacco/nicotine, if applicable. Regular preventive services are another way to keep healthy. Preventive services (vaccines, screening tests, monitoring & exams) can help personalize your care plan, which helps you manage your own care. Screening tests can find health problems at the earliest stages, when they are easiest to treat. [facility] follows the current, evidence-based guidelines published by the [facility]) when recommending preventive services for our patients. Because we follow these guidelines, sometimes recommendations change over time as research supports it. (For example, mammograms used to be recommended annually. Even though Medicare will still pay for an annual mammogram, the newer guidelines recommend a mammogram every two years for women of average risk.)    Of course, you and your provider may decide to screen more often for some diseases, based on your risk and co-morbidities (chronic disease you are already diagnosed with). Preventive services for you include:    - Medicare offers their members a free annual wellness visit, which is time for you and your primary care provider to discuss and plan for your preventive service needs. Take advantage of this benefit every year!    -All people over age 72 should receive the recommended pneumonia vaccines. Current USPSTF guidelines recommend a series of two vaccines for the best pneumonia protection.     -All adults should have a yearly flu vaccine and a tetanus vaccine every 10 years. All adults age 61 years should receive a shingles vaccine once in their lifetime.      -A bone mass density test is recommended when a woman turns 65 to screen for osteoporosis.  This test is only recommended once as a screening. Some providers will use this same test as a disease monitoring tool if you already have osteoporosis. -All adults age 38-68 years who are overweight should have a diabetes screening test once every three years.     -Other screening tests & preventive services for persons with diabetes include: an eye exam to screen for diabetic retinopathy, a kidney function test, a foot exam, and stricter control over your cholesterol.     -Cardiovascular screening for adults with routine risk involves an electrocardiogram (ECG) at intervals determined by the provider.     -Colorectal cancer screenings should be done for adults age 54-65 years with normal risk. There are a number of acceptable methods of screening for this type of cancer. Each test has its own benefits and drawbacks. Discuss with your provider what is most appropriate for you during your annual wellness visit. The different tests include: colonoscopy (considered the best screening method), a fecal occult blood test, a fecal DNA test, and sigmoidoscopy. -Breast cancer screenings are recommended every other year for women of normal risk age 54-69 years.     -Cervical cancer screenings for women over age 72 are only recommended with certain risk factors.     -All adults born between [facility] should be screened once for Hepatitis C.      Here is a list of your current Health Maintenance items (your personalized list of preventive services) with a due date:  Health Maintenance Due   Topic Date Due    Eye Exam  08/27/1943    DTaP/Tdap/Td  (1 - Tdap) 08/27/1954    Shingles Vaccine  06/27/1993    Glaucoma Screening   08/27/1998    Bone Mineral Density   08/27/1998    Annual Well Visit  05/03/2018

## 2018-07-25 NOTE — ACP (ADVANCE CARE PLANNING)
Advance Care Planning (ACP) Provider Note - Comprehensive     Date of ACP Conversation: 07/25/18  Persons included in Conversation:  patient  Length of ACP Conversation in minutes:  16 minutes    Authorized Decision Maker (if patient is incapable of making informed decisions): This person is:  Has yet to decide        General ACP for ALL Patients with Decision Making Capacity:   Importance of advance care planning, including choosing a healthcare agent to communicate patient's healthcare decisions if patient lost the ability to make decisions, such as after a sudden illness or accident  Understanding of the healthcare agent role was assessed and information provided  Exploration of values, goals, and preferences if recovery is not expected, even with continued medical treatment in the event of: Imminent death  Severe, permanent brain injury  \"In these circumstances, what matters most to you? \"  Care focused more on comfort or quality of life. For Serious or Chronic Illness:  Understanding of medical condition    Understanding of CPR, goals and expected outcomes, benefits and burdens discussed. Information on CPR success rates provided (e.g. for CPR in hospital, survival to d/c at two weeks is 22%, for chronically ill or elderly/frail survival is less than 3%); Individual asked to communicate understanding of information in his/her own words.   Explored fears and concerns regarding CPR or possible outcomes    Interventions Provided:  Recommended completion of Advance Directive form after review of ACP materials and conversation with prospective healthcare agent   Recommended communicating the plan and making copies for the healthcare agent, personal physician, and others as appropriate (e.g., health system)  Recommended review of completed ACP document annually or upon change in health status

## 2018-07-25 NOTE — MR AVS SNAPSHOT
[facility] [address] [facility] 
[phone] Patient: Onesimo Peter MRN: [id number] WXV:[id number] Visit Information Date & Time Provider Department Dept. Phone Encounter #  
 7/25/2018  9:30 AM Luis Zelaya, [address] [facility] [id number] Follow-up Instructions Return in about 3 months (around 10/25/2018), or if symptoms worsen or fail to improve. Your Appointments 7/25/2018  9:30 AM  
Office Visit with Luis Zelaya MD  
[address] [place] [address] ([facility]) Appt Note: AWV  and 4wk f/u/SIGN RELEASE FOR EYE/Dr. Bárbara Tran [address] [place] [address] [address] 200 [facility]  
  
    
 9/6/2018 11:00 AM  
New Patient with Jonatan Dickinson MD  
[facility] ([facility]) Appt Note: Np referred by Dr Rex Kim for heart murmur [facility] [id number]  
[phone] [address] Upcoming Health Maintenance Date Due  
 EYE EXAM RETINAL OR DILATED Q1 8/27/1943 DTaP/Tdap/Td series (1 - Tdap) 8/27/1954 ZOSTER VACCINE AGE 60> 6/27/1993 GLAUCOMA SCREENING Q2Y 8/27/1998 Bone Densitometry (Dexa) Screening 8/27/1998 MEDICARE YEARLY EXAM 5/3/2018 Influenza Age 5 to Adult 8/1/2018 HEMOGLOBIN A1C Q6M 10/24/2018 MICROALBUMIN Q1 11/2/2018 Pneumococcal 65+ Low/Medium Risk (2 of 2 - PCV13) 2/1/2019 LIPID PANEL Q1 4/24/2019 FOOT EXAM Q1 6/25/2019 Allergies as of 7/25/2018  Review Complete On: 7/25/2018 By: Raya Galeas LPN No Known Allergies Current Immunizations  Never Reviewed Name Date Influenza High Dose Vaccine PF 2/1/2018 Pneumococcal Polysaccharide (PPSV-23) 2/1/2018 Not reviewed this visit You Were Diagnosed With   
  
 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 
ICD-9-CM: 401.9 Mixed hyperlipidemia     ICD-10-CM: E78.2 ICD-9-CM: 272.2 Anemia of chronic disease     ICD-10-CM: D63.8 ICD-9-CM: 285.29 Post-menopausal     ICD-10-CM: Z78.0 ICD-9-CM: V49.81 Stage 3 chronic kidney disease     ICD-10-CM: N18.3 ICD-9-CM: 755. 3 Vitals BP Pulse Temp Resp Height(growth percentile) Weight(growth percentile) 130/80 (BP 1 Location: Left arm, BP Patient Position: Sitting) 64 97.5 °F (36.4 °C) (Oral) 14 4' 11\" (1.499 m) 135 lb 6.4 oz (61.4 kg) SpO2 BMI OB Status Smoking Status 98% 27.35 kg/m2 Postmenopausal Never Smoker Vitals History BMI and BSA Data Body Mass Index Body Surface Area  
 27.35 kg/m 2 1.6 m 2 Preferred Pharmacy Pharmacy Name Phone [address]. [phone] Your Updated Medication List  
  
   
This list is accurate as of 7/25/18  8:56 AM.  Always use your most recent med list. amLODIPine 10 mg tablet Commonly known as:  Elenita Efrain Take 1 Tab by mouth daily. aspirin, buffered 81 mg Tab Take 1 tablet by mouth daily. cholecalciferol (VITAMIN D3) 5,000 unit Tab tablet Commonly known as:  VITAMIN D3 Take  by mouth daily. cloNIDine HCl 0.2 mg tablet Commonly known as:  CATAPRES  
TAKE ONE TABLET BY MOUTH TWICE DAILY  
  
 colchicine 0.6 mg tablet Commonly known as:  COLCRYS Take 1 Tab by mouth daily. Indications: Gout  
  
 fish oil-dha-epa 1,200-144-216 mg Cap Take 1 tablet by mouth daily. hydrALAZINE 100 mg tablet Commonly known as:  APRESOLINE Take 1 Tab by mouth three (3) times daily. hydroCHLOROthiazide 50 mg tablet Commonly known as:  HYDRODIURIL Take 1 Tab by mouth daily. metFORMIN 500 mg tablet Commonly known as:  GLUCOPHAGE Take 1 Tab by mouth two (2) times daily (with meals). potassium chloride SR 10 mEq tablet Commonly known as:  KLOR-CON 10 Take 1 Tab by mouth daily. simvastatin 40 mg tablet Commonly known as:  ZOCOR Take 1 Tab by mouth nightly. STOOL SOFTENER 50 mg capsule Generic drug:  docusate sodium Take 50 mg by mouth two (2) times a day. Follow-up Instructions Return in about 3 months (around 10/25/2018), or if symptoms worsen or fail to improve. To-Do List   
 07/25/2018 Imaging:  DEXA BONE DENSITY STUDY AXIAL   
  
 07/25/2018 Lab:  METABOLIC PANEL, BASIC   
  
 10/25/2018 Lab:  CBC WITH AUTOMATED DIFF   
  
 10/25/2018 Lab:  HEMOGLOBIN A1C W/O EAG   
  
 10/25/2018 Lab:  LIPID PANEL   
  
 10/25/2018 Lab:  METABOLIC PANEL, COMPREHENSIVE   
  
 10/25/2018 Lab:  MICROALBUMIN, UR, RAND W/ MICROALB/CREAT RATIO Patient Instructions Medicare Wellness Visit, Female The best way to live healthy is to have a lifestyle where you eat a well-balanced diet, exercise regularly, limit alcohol use, and quit all forms of tobacco/nicotine, if applicable. Regular preventive services are another way to keep healthy. Preventive services (vaccines, screening tests, monitoring & exams) can help personalize your care plan, which helps you manage your own care. Screening tests can find health problems at the earliest stages, when they are easiest to treat. [facility] follows the current, evidence-based guidelines published by the [place] [employer] (USPSTF) when recommending preventive services for our patients.  Because we follow these guidelines, sometimes recommendations change over time as research supports it. (For example, mammograms used to be recommended annually. Even though Medicare will still pay for an annual mammogram, the newer guidelines recommend a mammogram every two years for women of average risk.) Of course, you and your provider may decide to screen more often for some diseases, based on your risk and co-morbidities (chronic disease you are already diagnosed with). Preventive services for you include: - Medicare offers their members a free annual wellness visit, which is time for you and your primary care provider to discuss and plan for your preventive service needs. Take advantage of this benefit every year! 
 
-All people over age 72 should receive the recommended pneumonia vaccines. Current USPSTF guidelines recommend a series of two vaccines for the best pneumonia protection.  
 
-All adults should have a yearly flu vaccine and a tetanus vaccine every 10 years. All adults age 61 years should receive a shingles vaccine once in their lifetime.   
 
-A bone mass density test is recommended when a woman turns 65 to screen for osteoporosis. This test is only recommended once as a screening. Some providers will use this same test as a disease monitoring tool if you already have osteoporosis. -All adults age 38-68 years who are overweight should have a diabetes screening test once every three years.  
 
-Other screening tests & preventive services for persons with diabetes include: an eye exam to screen for diabetic retinopathy, a kidney function test, a foot exam, and stricter control over your cholesterol.  
 
-Cardiovascular screening for adults with routine risk involves an electrocardiogram (ECG) at intervals determined by the provider.  
 
-Colorectal cancer screenings should be done for adults age 54-65 years with normal risk.  There are a number of acceptable methods of screening for this type of cancer. Each test has its own benefits and drawbacks. Discuss with your provider what is most appropriate for you during your annual wellness visit. The different tests include: colonoscopy (considered the best screening method), a fecal occult blood test, a fecal DNA test, and sigmoidoscopy. -Breast cancer screenings are recommended every other year for women of normal risk age 54-69 years.  
 
-Cervical cancer screenings for women over age 72 are only recommended with certain risk factors.  
 
-All adults born between [facility] should be screened once for Hepatitis C. Here is a list of your current Health Maintenance items (your personalized list of preventive services) with a due date: 
Health Maintenance Due Topic Date Due  Eye Exam  08/27/1943  
 DTaP/Tdap/Td  (1 - Tdap) 08/27/1954  Shingles Vaccine  06/27/1993  Glaucoma Screening   08/27/1998  Bone Mineral Density   08/27/1998 [facility] Annual Well Visit  05/03/2018 Introducing [facility]! Ameena Butler introduces [employer] patient portal. Now you can access parts of your medical record, email your doctor's office, and request medication refills online. 1. In your internet browser, go to [email or web address] 2. Click on the First Time User? Click Here link in the Sign In box. You will see the New Member Sign Up page. 3. Enter your [employer] Access Code exactly as it appears below. You will not need to use this code after youve completed the sign-up process. If you do not sign up before the expiration date, you must request a new code. · [employer] Access Code: [email or web address] Expires: 9/23/2018 11:03 AM 
 
4. Enter the last four digits of your Social Security Number (xxxx) and Date of Birth (mm/dd/yyyy) as indicated and click Submit. You will be taken to the next sign-up page. 5. Create a [employer] ID.  This will be your [employer] login ID and cannot be changed, so think of one that is secure and easy to remember. 6. Create a [employer] password. You can change your password at any time. 7. Enter your Password Reset Question and Answer. This can be used at a later time if you forget your password. 8. Enter your e-mail address. You will receive e-mail notification when new information is available in [address] 9. Click Sign Up. You can now view and download portions of your medical record. 10. Click the Download Summary menu link to download a portable copy of your medical information. If you have questions, please visit the Frequently Asked Questions section of the [employer] website. Remember, [employer] is NOT to be used for urgent needs. For medical emergencies, dial 911. Now available from your iPhone and Android! Please provide this summary of care documentation to your next provider. Your primary care clinician is listed as Chin Diaz. If you have any questions after today's visit, please call [phone].

## 2018-07-27 NOTE — PROGRESS NOTES
Patient identified with 2 identifiers (name and ).   Patient aware of kidney function is stable will monitor

## 2018-08-03 ENCOUNTER — HOSPITAL ENCOUNTER (OUTPATIENT)
Dept: BONE DENSITY | Age: 83
Discharge: HOME | End: 2018-08-03
Attending: FAMILY MEDICINE
Payer: MEDICARE

## 2018-08-03 DIAGNOSIS — Z78.0 POST-MENOPAUSAL: ICD-10-CM

## 2018-08-03 PROCEDURE — 77080 DXA BONE DENSITY AXIAL: CPT

## 2018-08-14 NOTE — PROGRESS NOTES
Contacted patient and verified identity using name and date of birth (2- identifiers). Patient advised normal bone density scan. Patient voiced understanding.

## 2018-09-17 ENCOUNTER — OFFICE VISIT (OUTPATIENT)
Dept: CARDIOLOGY CLINIC | Age: 83
End: 2018-09-17

## 2018-09-17 VITALS
HEIGHT: 59 IN | OXYGEN SATURATION: 97 % | BODY MASS INDEX: 27.21 KG/M2 | WEIGHT: 135 LBS | SYSTOLIC BLOOD PRESSURE: 126 MMHG | HEART RATE: 62 BPM | DIASTOLIC BLOOD PRESSURE: 70 MMHG

## 2018-09-17 DIAGNOSIS — N18.30 CONTROLLED TYPE 2 DIABETES MELLITUS WITH STAGE 3 CHRONIC KIDNEY DISEASE, WITHOUT LONG-TERM CURRENT USE OF INSULIN (HCC): ICD-10-CM

## 2018-09-17 DIAGNOSIS — I10 ESSENTIAL HYPERTENSION: Chronic | ICD-10-CM

## 2018-09-17 DIAGNOSIS — E78.2 MIXED HYPERLIPIDEMIA: Chronic | ICD-10-CM

## 2018-09-17 DIAGNOSIS — E11.22 CONTROLLED TYPE 2 DIABETES MELLITUS WITH STAGE 3 CHRONIC KIDNEY DISEASE, WITHOUT LONG-TERM CURRENT USE OF INSULIN (HCC): ICD-10-CM

## 2018-09-17 DIAGNOSIS — R01.1 HEART MURMUR: Primary | ICD-10-CM

## 2018-09-17 NOTE — MR AVS SNAPSHOT
[address] [place] [address] [phone] Patient: Cathleen Cho MRN: [id number] Visit Information Date & Time Provider Department Dept. Phone Encounter #  
 9/17/2018  9:00 AM Charly Kaufamn MD [facility] [address] [id number] Your Appointments 10/25/2018  8:45 AM  
FOLLOW UP EXAM with Kitty Mariano MD  
[facility] ([address]) Appt Note: 3 month f/u  
 [address] 200 [facility]  
[address] [facility] 200 [facility] Upcoming Health Maintenance Date Due  
 EYE EXAM RETINAL OR DILATED Q1 8/27/1943 DTaP/Tdap/Td series (1 - Tdap) 8/27/1954 ZOSTER VACCINE AGE 60> 6/27/1993 Influenza Age 5 to Adult 8/1/2018 HEMOGLOBIN A1C Q6M 10/24/2018 MICROALBUMIN Q1 11/2/2018 Pneumococcal 65+ Low/Medium Risk (2 of 2 - PCV13) 2/1/2019 LIPID PANEL Q1 4/24/2019 FOOT EXAM Q1 6/25/2019 MEDICARE YEARLY EXAM 7/26/2019 GLAUCOMA SCREENING Q2Y 4/11/2020 Allergies as of 9/17/2018  Review Complete On: 7/25/2018 By: Kitty Mariano MD  
 No Known Allergies Current Immunizations  Never Reviewed Name Date Influenza High Dose Vaccine PF 2/1/2018 Pneumococcal Polysaccharide (PPSV-23) 2/1/2018 Not reviewed this visit You Were Diagnosed With   
  
 Codes Comments Heart murmur    -  Primary ICD-10-CM: R01.1 ICD-9-CM: 785.2 Essential hypertension     ICD-10-CM: I10 
ICD-9-CM: 401.9 Mixed hyperlipidemia     ICD-10-CM: E78.2 ICD-9-CM: 272.2 Vitals BP Pulse Height(growth percentile) Weight(growth percentile) SpO2 BMI  
 126/70 62 4' 11\" (1.499 m) 135 lb (61.2 kg) 97% 27.27 kg/m2 OB Status Smoking Status Postmenopausal Never Smoker Vitals History BMI and BSA Data Body Mass Index Body Surface Area  
 27.27 kg/m 2 1.6 m 2 Preferred Pharmacy Pharmacy Name Phone [address] [place] [address]. [phone] Your Updated Medication List  
  
   
This list is accurate as of 9/17/18  9:34 AM.  Always use your most recent med list. amLODIPine 10 mg tablet Commonly known as:  Wil Calvert Take 1 Tab by mouth daily. aspirin, buffered 81 mg Tab Take 1 tablet by mouth daily. cholecalciferol (VITAMIN D3) 5,000 unit Tab tablet Commonly known as:  VITAMIN D3 Take  by mouth daily. cloNIDine HCl 0.2 mg tablet Commonly known as:  CATAPRES  
TAKE ONE TABLET BY MOUTH TWICE DAILY  
  
 colchicine 0.6 mg tablet Commonly known as:  COLCRYS Take 1 Tab by mouth daily. Indications: Gout  
  
 fish oil-dha-epa 1,200-144-216 mg Cap Take 1 tablet by mouth daily. hydrALAZINE 100 mg tablet Commonly known as:  APRESOLINE Take 1 Tab by mouth three (3) times daily. hydroCHLOROthiazide 50 mg tablet Commonly known as:  HYDRODIURIL Take 1 Tab by mouth daily. metFORMIN 500 mg tablet Commonly known as:  GLUCOPHAGE Take 1 Tab by mouth two (2) times daily (with meals). potassium chloride SR 10 mEq tablet Commonly known as:  KLOR-CON 10 Take 1 Tab by mouth daily. simvastatin 40 mg tablet Commonly known as:  ZOCOR Take 1 Tab by mouth nightly. STOOL SOFTENER 50 mg capsule Generic drug:  docusate sodium Take 50 mg by mouth two (2) times a day. We Performed the Following AMB POC EKG ROUTINE W/ 12 LEADS, INTER & REP [85704 CPT(R)] To-Do List   
 09/17/2018 Echocardiography:  ECHO ADULT COMPLETE Patient Instructions Echo for heart murmur if normal follow up prn Introducing [facility]!    
 [facility] [employer] introduces [employer] patient portal. Now you can access parts of your medical record, email your doctor's office, and request medication refills online. 1. In your internet browser, go to [email or web address] 2. Click on the First Time User? Click Here link in the Sign In box. You will see the New Member Sign Up page. 3. Enter your [employer] Access Code exactly as it appears below. You will not need to use this code after youve completed the sign-up process. If you do not sign up before the expiration date, you must request a new code. · [employer] Access Code: [email or web address] Expires: 9/23/2018 11:03 AM 
 
4. Enter the last four digits of your Social Security Number (xxxx) and Date of Birth (mm/dd/yyyy) as indicated and click Submit. You will be taken to the next sign-up page. 5. Create a [employer] ID. This will be your [employer] login ID and cannot be changed, so think of one that is secure and easy to remember. 6. Create a [employer] password. You can change your password at any time. 7. Enter your Password Reset Question and Answer. This can be used at a later time if you forget your password. 8. Enter your e-mail address. You will receive e-mail notification when new information is available in [address] 9. Click Sign Up. You can now view and download portions of your medical record. 10. Click the Download Summary menu link to download a portable copy of your medical information. If you have questions, please visit the Frequently Asked Questions section of the [employer] website. Remember, [employer] is NOT to be used for urgent needs. For medical emergencies, dial 911. Now available from your iPhone and Android! Please provide this summary of care documentation to your next provider. Your primary care clinician is listed as Allan Ramirez. If you have any questions after today's visit, please call [phone].

## 2018-09-17 NOTE — PROGRESS NOTES
HISTORY OF PRESENT ILLNESS  Joni Seay is a 80 y.o. female. HPI    Patient presents for a new office visit. She does not have a prior cardiac history. She was referred here for evaluation of a cardiac murmur. She has a long-standing history of essential hypertension, type 2 diabetes mellitus, and dyslipidemia. Patient admits that she does get occasional leg swelling when she stands on her feet for long periods of time but this will improve at night or when she elevates her legs. Has noted gradual decrease in her activity level and increased fatigue over the past few years but this has been more of a gradual onset. She is never experience any exertional chest pain or shortness of breath. No heart palpitations, dizziness, syncope or near syncope. Past Medical History:   Diagnosis Date    Anemia     Carotid bruit 12/07/2013    Carotid Duplex: 1. Bilateral 1-49% stenosis of the internal carotid arteries. 2. No significant stenosis in the external carotid arteries bilaterally. 3. Antegrade flow in both vetebral arteries. 4. Normal flow in both subclavian arteries. Plaque Morphology: 1. Hyperechoic plaque in the bulb and right ICA. 2. Hyperechoic plaque in the bulb and left ICA.  CKD (chronic kidney disease) stage 3, GFR 30-59 ml/min     Diabetes ([facility])     NIDDM    Gastritis 10/27/2014    Duodenum Bx: No Specific Pathologic Abnormality. No Villous Abnormality. 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 No dysplasia or malignancy identified. Bacterial Organisms c/w H. Pylori are present. Z-Line Bx: GE mucosa w/ Acute/Chronic Inflammation & Reactive Changes. Focal Specialized Type Campos Mucosa Identified. No Dysplasia or Malignancy Identified.      Gout 12/10/2009    Elevated Uric Acid Level    Hyperlipidemia     Hypertension     RAMÓN (iron deficiency anemia)      Current Outpatient Prescriptions   Medication Sig Dispense Refill    cholecalciferol, VITAMIN D3, (VITAMIN D3) 5,000 unit tab tablet Take  by mouth daily.  cloNIDine HCl (CATAPRES) 0.2 mg tablet TAKE ONE TABLET BY MOUTH TWICE DAILY 60 Tab 2    amLODIPine (NORVASC) 10 mg tablet Take 1 Tab by mouth daily. 90 Tab 1    metFORMIN (GLUCOPHAGE) 500 mg tablet Take 1 Tab by mouth two (2) times daily (with meals). 180 Tab 1    hydroCHLOROthiazide (HYDRODIURIL) 50 mg tablet Take 1 Tab by mouth daily. 90 Tab 1    potassium chloride SR (KLOR-CON 10) 10 mEq tablet Take 1 Tab by mouth daily. 90 Tab 2    hydrALAZINE (APRESOLINE) 100 mg tablet Take 1 Tab by mouth three (3) times daily. 270 Tab 2    colchicine (COLCRYS) 0.6 mg tablet Take 1 Tab by mouth daily. Indications: Gout 90 Tab 2    simvastatin (ZOCOR) 40 mg tablet Take 1 Tab by mouth nightly. 90 Tab 2    docusate sodium (STOOL SOFTENER) 50 mg capsule Take 50 mg by mouth two (2) times a day.  Aspirin, Buffered 81 mg tab Take 1 tablet by mouth daily.  fish oil-dha-epa 1,200-144-216 mg cap Take 1 tablet by mouth daily. No Known Allergies     Social History   Substance Use Topics    Smoking status: Never Smoker    Smokeless tobacco: Never Used    Alcohol use No     Family History   Problem Relation Age of Onset    Hypertension Mother     Stroke Mother     Stroke Father          Review of Systems   Constitutional: Positive for malaise/fatigue. Negative for chills, fever and weight loss. HENT: Negative for nosebleeds. Eyes: Negative for blurred vision and double vision. Respiratory: Negative for cough, shortness of breath and wheezing. Cardiovascular: Positive for leg swelling. Negative for chest pain, palpitations, orthopnea, claudication and PND. Gastrointestinal: Negative for abdominal pain, heartburn, nausea and vomiting. Genitourinary: Negative for dysuria and hematuria. Musculoskeletal: Negative for falls and myalgias. Skin: Negative for rash. Neurological: Negative for dizziness, focal weakness and headaches. Endo/Heme/Allergies: Does not bruise/bleed easily. Psychiatric/Behavioral: Negative for substance abuse. Visit Vitals    /70    Pulse 62    Ht 4' 11\" (1.499 m)    Wt 61.2 kg (135 lb)    SpO2 97%    BMI 27.27 kg/m2       Physical Exam   Constitutional: She is oriented to person, place, and time. She appears well-developed and well-nourished. HENT:   Head: Normocephalic and atraumatic. Eyes: Conjunctivae are normal.   Neck: Neck supple. No JVD present. Carotid bruit is not present. Cardiovascular: Normal rate, regular rhythm, S1 normal, S2 normal and normal pulses. Exam reveals no gallop. Murmur heard. Midsystolic murmur is present with a grade of 1/6  at the lower right sternal border  Pulmonary/Chest: Effort normal and breath sounds normal. She has no wheezes. She has no rales. Abdominal: Soft. Bowel sounds are normal. There is no tenderness. Musculoskeletal: She exhibits no edema, tenderness or deformity. Neurological: She is alert and oriented to person, place, and time. Skin: Skin is warm and dry. Psychiatric: She has a normal mood and affect. Her behavior is normal. Thought content normal.     EKG: Normal sinus rhythm, left axis deviation, poor R-wave progression, no ST or T-wave abnormalities concerning for ischemia. Normal QTc interval.  No previous EKG for comparison. ASSESSMENT and PLAN  Encounter Diagnoses   Name Primary?  Heart murmur Yes    Essential hypertension     Mixed hyperlipidemia     Controlled type 2 diabetes mellitus with stage 3 chronic kidney disease, without long-term current use of insulin (HCC)      Heart murmur. Newly diagnosed according to the patient. She does not have any concerning symptoms for congestive heart failure. She does have a mildly abnormal EKG, so I have recommended an echocardiogram for further evaluation of any significant structural heart disease.   If this is unremarkable, no further cardiac workup is necessary. Essential hypertension. Patient's blood pressure appears well-controlled on her current medical regimen all of which I would continue. Dyslipidemia. Patient has been treated with simvastatin. This is followed by her PCP. .    Diabetes mellitus, type II. Patient is managed with oral agents. From a cardiac standpoint for her hemoglobin A1c to be less than 7. As long as her echocardiogram is unremarkable, patient can follow-up as needed.

## 2018-09-17 NOTE — PROGRESS NOTES
Jennifer Hurd presents today for   Chief Complaint   Patient presents with    New Patient     referred by PCP for heart murmur        Jennifer Hurd preferred language for health care discussion is english/other. Is someone accompanying this pt? No     Is the patient using any DME equipment during OV? No     Depression Screening:  PHQ over the last two weeks 2/1/2018   Little interest or pleasure in doing things Not at all   Feeling down, depressed, irritable, or hopeless Not at all   Total Score PHQ 2 0   Trouble falling or staying asleep, or sleeping too much Not at all   Poor appetite, weight loss, or overeating More than half the days   Feeling bad about yourself - or that you are a failure or have let yourself or your family down Not at all   Trouble concentrating on things such as school, work, reading, or watching TV Not at all   Moving or speaking so slowly that other people could have noticed; or the opposite being so fidgety that others notice Not at all   Thoughts of being better off dead, or hurting yourself in some way Not at all       Learning Assessment:  Learning Assessment 9/17/2018   PRIMARY LEARNER Patient   HIGHEST LEVEL OF EDUCATION - PRIMARY LEARNER  -   [facility] LEARNER -   CO-LEARNER CAREGIVER -   PRIMARY LANGUAGE ENGLISH   LEARNER PREFERENCE PRIMARY DEMONSTRATION   ANSWERED BY Patient   RELATIONSHIP SELF       Abuse Screening:  Abuse Screening Questionnaire 4/24/2018   Do you ever feel afraid of your partner? N   Are you in a relationship with someone who physically or mentally threatens you? N   Is it safe for you to go home? Y       Fall Risk  Fall Risk Assessment, last 12 mths 4/24/2018   Able to walk? Yes   Fall in past 12 months? No       Pt currently taking Anticoagulant therapy? ASA 81mg daily     Coordination of Care:  1. Have you been to the ER, urgent care clinic since your last visit? Hospitalized since your last visit? No     2.  Have you seen or consulted any other health care providers outside of the [address] [place] [address] since your last visit? Include any pap smears or colon screening.  No

## 2018-09-21 ENCOUNTER — HOSPITAL ENCOUNTER (OUTPATIENT)
Dept: NON INVASIVE DIAGNOSTICS | Age: 83
Discharge: HOME | End: 2018-09-21
Attending: INTERNAL MEDICINE
Payer: MEDICARE

## 2018-09-21 VITALS
SYSTOLIC BLOOD PRESSURE: 126 MMHG | DIASTOLIC BLOOD PRESSURE: 70 MMHG | WEIGHT: 135 LBS | BODY MASS INDEX: 27.21 KG/M2 | HEIGHT: 59 IN

## 2018-09-21 DIAGNOSIS — R01.1 HEART MURMUR: ICD-10-CM

## 2018-09-21 LAB
ECHO AO ROOT DIAM: 3.18 CM
ECHO AV REGURGITANT PHT: 534.4 CM
ECHO IVC SNIFF: 2.12 CM
ECHO LA AREA 4C: 21.9 CM2
ECHO LA VOL 2C: 61.8 ML (ref 22–52)
ECHO LA VOL 4C: 67.18 ML (ref 22–52)
ECHO LA VOL BP: 71.98 ML (ref 22–52)
ECHO LA VOL/BSA BIPLANE: 46.13 ML/M2
ECHO LA VOLUME INDEX A2C: 39.61 ML/M2
ECHO LA VOLUME INDEX A4C: 43.06 ML/M2
ECHO LV INTERNAL DIMENSION DIASTOLIC: 4.16 CM (ref 3.9–5.3)
ECHO LV INTERNAL DIMENSION SYSTOLIC: 2.66 CM
ECHO LV IVSD: 1.38 CM (ref 0.6–0.9)
ECHO LV MASS 2D: 258.6 G (ref 67–162)
ECHO LV MASS INDEX 2D: 165.7 G/M2
ECHO LV POSTERIOR WALL DIASTOLIC: 1.39 CM (ref 0.6–0.9)
ECHO LVOT DIAM: 1.97 CM
ECHO LVOT PEAK GRADIENT: 5.2 MMHG
ECHO LVOT PEAK VELOCITY: 113.92 CM/S
ECHO LVOT VTI: 25.45 CM
ECHO MV A VELOCITY: 100.1 CM/S
ECHO MV E DECELERATION TIME (DT): 254.3 MS
ECHO MV E VELOCITY: 0.66 CM/S
ECHO MV E/A RATIO: 0.01
ECHO PVEIN A VELOCITY: 0.06 CM/S
ECHO PVEIN PEAK D VELOCITY: 0.28 CM/S
ECHO PVEIN S/D RATIO: 94.7
ECHO TV REGURGITANT MAX VELOCITY: 306.99 CM/S
ECHO TV REGURGITANT PEAK GRADIENT: 37.7 MMHG
PISA AR MAX VEL: 426.53 CM/S

## 2018-09-21 PROCEDURE — 93306 TTE W/DOPPLER COMPLETE: CPT

## 2018-09-21 NOTE — PROGRESS NOTES
Per your last note\" Heart murmur. Newly diagnosed according to the patient. She does not have any concerning symptoms for congestive heart failure. She does have a mildly abnormal EKG, so I have recommended an echocardiogram for further evaluation of any significant structural heart disease. If this is unremarkable, no further cardiac workup is necessary.

## 2018-09-25 ENCOUNTER — TELEPHONE (OUTPATIENT)
Dept: CARDIOLOGY CLINIC | Age: 83
End: 2018-09-25

## 2018-09-25 NOTE — TELEPHONE ENCOUNTER
Patient made aware of results and instructions. This has been fully explained to the patient, who indicates understanding.

## 2018-09-25 NOTE — TELEPHONE ENCOUNTER
----- Message from Linda Carrel, MD sent at 9/24/2018  9:13 AM EDT -----  Please let the patient know that her tricuspid valve leaks a moderate amount and that is likely causing her heart murmur. At this point I would continue to control her blood pressure. No further workup needed. She can follow up with me as scheduled  ----- Message -----     From: Fermin Dubose LPN     Sent: 3/09/5445   2:47 PM       To: Linda Carrel, MD    Per your last note\" Heart murmur. Newly diagnosed according to the patient. She does not have any concerning symptoms for congestive heart failure. She does have a mildly abnormal EKG, so I have recommended an echocardiogram for further evaluation of any significant structural heart disease. If this is unremarkable, no further cardiac workup is necessary.

## 2018-10-25 ENCOUNTER — OFFICE VISIT (OUTPATIENT)
Dept: FAMILY MEDICINE CLINIC | Age: 83
End: 2018-10-25

## 2018-10-25 ENCOUNTER — HOSPITAL ENCOUNTER (OUTPATIENT)
Dept: LAB | Age: 83
Discharge: HOME | End: 2018-10-25
Payer: MEDICARE

## 2018-10-25 VITALS
SYSTOLIC BLOOD PRESSURE: 162 MMHG | BODY MASS INDEX: 27.54 KG/M2 | OXYGEN SATURATION: 99 % | RESPIRATION RATE: 14 BRPM | TEMPERATURE: 98.1 F | HEIGHT: 59 IN | HEART RATE: 57 BPM | DIASTOLIC BLOOD PRESSURE: 72 MMHG | WEIGHT: 136.6 LBS

## 2018-10-25 DIAGNOSIS — R01.1 HEART MURMUR: ICD-10-CM

## 2018-10-25 DIAGNOSIS — Z23 ENCOUNTER FOR IMMUNIZATION: ICD-10-CM

## 2018-10-25 DIAGNOSIS — M10.9 GOUT, UNSPECIFIED CAUSE, UNSPECIFIED CHRONICITY, UNSPECIFIED SITE: ICD-10-CM

## 2018-10-25 DIAGNOSIS — N18.30 CKD (CHRONIC KIDNEY DISEASE) STAGE 3, GFR 30-59 ML/MIN (HCC): ICD-10-CM

## 2018-10-25 DIAGNOSIS — E78.2 MIXED HYPERLIPIDEMIA: ICD-10-CM

## 2018-10-25 DIAGNOSIS — D63.8 ANEMIA OF CHRONIC DISEASE: ICD-10-CM

## 2018-10-25 DIAGNOSIS — I10 ESSENTIAL HYPERTENSION: Primary | Chronic | ICD-10-CM

## 2018-10-25 DIAGNOSIS — I10 ESSENTIAL HYPERTENSION: Chronic | ICD-10-CM

## 2018-10-25 LAB
ANION GAP SERPL CALC-SCNC: 8 MMOL/L (ref 3–18)
BASOPHILS # BLD: 0 K/UL (ref 0–0.1)
BASOPHILS NFR BLD: 0 % (ref 0–2)
BUN SERPL-MCNC: 31 MG/DL (ref 7–18)
BUN/CREAT SERPL: 25 (ref 12–20)
CALCIUM SERPL-MCNC: 8.9 MG/DL (ref 8.5–10.1)
CHLORIDE SERPL-SCNC: 104 MMOL/L (ref 100–108)
CO2 SERPL-SCNC: 28 MMOL/L (ref 21–32)
CREAT SERPL-MCNC: 1.26 MG/DL (ref 0.6–1.3)
DIFFERENTIAL METHOD BLD: ABNORMAL
EOSINOPHIL # BLD: 0.1 K/UL (ref 0–0.4)
EOSINOPHIL NFR BLD: 2 % (ref 0–5)
ERYTHROCYTE [DISTWIDTH] IN BLOOD BY AUTOMATED COUNT: 16.2 % (ref 11.6–14.5)
GLUCOSE SERPL-MCNC: 108 MG/DL (ref 74–99)
HCT VFR BLD AUTO: 31.4 % (ref 35–45)
HGB BLD-MCNC: 10.2 G/DL (ref 12–16)
LYMPHOCYTES # BLD: 1.6 K/UL (ref 0.9–3.6)
LYMPHOCYTES NFR BLD: 37 % (ref 21–52)
MCH RBC QN AUTO: 28 PG (ref 24–34)
MCHC RBC AUTO-ENTMCNC: 32.5 G/DL (ref 31–37)
MCV RBC AUTO: 86.3 FL (ref 74–97)
MONOCYTES # BLD: 0.3 K/UL (ref 0.05–1.2)
MONOCYTES NFR BLD: 8 % (ref 3–10)
NEUTS SEG # BLD: 2.4 K/UL (ref 1.8–8)
NEUTS SEG NFR BLD: 53 % (ref 40–73)
PLATELET # BLD AUTO: 249 K/UL (ref 135–420)
PMV BLD AUTO: 9.8 FL (ref 9.2–11.8)
POTASSIUM SERPL-SCNC: 3.9 MMOL/L (ref 3.5–5.5)
RBC # BLD AUTO: 3.64 M/UL (ref 4.2–5.3)
SODIUM SERPL-SCNC: 140 MMOL/L (ref 136–145)
WBC # BLD AUTO: 4.4 K/UL (ref 4.6–13.2)

## 2018-10-25 PROCEDURE — 85025 COMPLETE CBC W/AUTO DIFF WBC: CPT | Performed by: FAMILY MEDICINE

## 2018-10-25 PROCEDURE — 36415 COLL VENOUS BLD VENIPUNCTURE: CPT | Performed by: FAMILY MEDICINE

## 2018-10-25 PROCEDURE — 80048 BASIC METABOLIC PNL TOTAL CA: CPT | Performed by: FAMILY MEDICINE

## 2018-10-25 NOTE — PATIENT INSTRUCTIONS
PLEASE TAKE YOUR MEDICATIONS PRIOR TO YOUR NEXT DOCTOR VISIT     DASH Diet: Care Instructions  Your Care Instructions    The DASH diet is an eating plan that can help lower your blood pressure. DASH stands for Dietary Approaches to Stop Hypertension. Hypertension is high blood pressure. The DASH diet focuses on eating foods that are high in calcium, potassium, and magnesium. These nutrients can lower blood pressure. The foods that are highest in these nutrients are fruits, vegetables, low-fat dairy products, nuts, seeds, and legumes. But taking calcium, potassium, and magnesium supplements instead of eating foods that are high in those nutrients does not have the same effect. The DASH diet also includes whole grains, fish, and poultry. The DASH diet is one of several lifestyle changes your doctor may recommend to lower your high blood pressure. Your doctor may also want you to decrease the amount of sodium in your diet. Lowering sodium while following the DASH diet can lower blood pressure even further than just the DASH diet alone. Follow-up care is a key part of your treatment and safety. Be sure to make and go to all appointments, and call your doctor if you are having problems. It's also a good idea to know your test results and keep a list of the medicines you take. How can you care for yourself at home? Following the DASH diet  · Eat 4 to 5 servings of fruit each day. A serving is 1 medium-sized piece of fruit, ½ cup chopped or canned fruit, 1/4 cup dried fruit, or 4 ounces (½ cup) of fruit juice. Choose fruit more often than fruit juice. · Eat 4 to 5 servings of vegetables each day. A serving is 1 cup of lettuce or raw leafy vegetables, ½ cup of chopped or cooked vegetables, or 4 ounces (½ cup) of vegetable juice. Choose vegetables more often than vegetable juice. · Get 2 to 3 servings of low-fat and fat-free dairy each day.  A serving is 8 ounces of milk, 1 cup of yogurt, or 1 ½ ounces of cheese. · Eat 6 to 8 servings of grains each day. A serving is 1 slice of bread, 1 ounce of dry cereal, or ½ cup of cooked rice, pasta, or cooked cereal. Try to choose whole-grain products as much as possible. · Limit lean meat, poultry, and fish to 2 servings each day. A serving is 3 ounces, about the size of a deck of cards. · Eat 4 to 5 servings of nuts, seeds, and legumes (cooked dried beans, lentils, and split peas) each week. A serving is 1/3 cup of nuts, 2 tablespoons of seeds, or ½ cup of cooked beans or peas. · Limit fats and oils to 2 to 3 servings each day. A serving is 1 teaspoon of vegetable oil or 2 tablespoons of salad dressing. · Limit sweets and added sugars to 5 servings or less a week. A serving is 1 tablespoon jelly or jam, ½ cup sorbet, or 1 cup of lemonade. · Eat less than 2,300 milligrams (mg) of sodium a day. If you limit your sodium to 1,500 mg a day, you can lower your blood pressure even more. Tips for success  · Start small. Do not try to make dramatic changes to your diet all at once. You might feel that you are missing out on your favorite foods and then be more likely to not follow the plan. Make small changes, and stick with them. Once those changes become habit, add a few more changes. · Try some of the following:  ? Make it a goal to eat a fruit or vegetable at every meal and at snacks. This will make it easy to get the recommended amount of fruits and vegetables each day. ? Try yogurt topped with fruit and nuts for a snack or healthy dessert. ? Add lettuce, tomato, cucumber, and onion to sandwiches. ? Combine a ready-made pizza crust with low-fat mozzarella cheese and lots of vegetable toppings. Try using tomatoes, squash, spinach, broccoli, carrots, cauliflower, and onions. ? Have a variety of cut-up vegetables with a low-fat dip as an appetizer instead of chips and dip. ? Sprinkle sunflower seeds or chopped almonds over salads.  Or try adding chopped walnuts or almonds to cooked vegetables. ? Try some vegetarian meals using beans and peas. Add garbanzo or kidney beans to salads. Make burritos and tacos with mashed day beans or black beans. Where can you learn more? Go to http://viral-zenaida.info/. Enter B747 in the search box to learn more about \"DASH Diet: Care Instructions. \"  Current as of: December 6, 2017  Content Version: 11.8  © 9922-9964 [employer]. Care instructions adapted under license by [employer] (which disclaims liability or warranty for this information). If you have questions about a medical condition or this instruction, always ask your healthcare professional. [address] any warranty or liability for your use of this information.

## 2018-10-25 NOTE — PROGRESS NOTES
Yemi Gamez, 80 y.o.,  female    SUBJECTIVE  Ff-up    DM w/ CKD 3- taking metformin. Reports 's. 4/18 a1c 5.9. HTN/HL- denies cp, sob, lightheadedness. Taking medications. hctz was switched to norvasc due to increasing creatinine levels. No previous significant cardiac history. Did not take BP meds yet today as she is fasting. Heart murmur- no sob, syncope, chest pain, leg edema. Cardiology notes reviewed. Echo showing  Aortic valve regurg, monitoring    Gout- usually foot swelling and pain, related to seafood. 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 Lives with  who is in his [de-identified]. Performs ADLs without much difficulty. No longer drives    ROS:  See HPI, all others negative        Patient Active Problem List   Diagnosis Code    Essential hypertension I10    Mixed hyperlipidemia E78.2    Advanced directives, counseling/discussion Z71.89    Controlled type 2 diabetes mellitus with stage 3 chronic kidney disease, without long-term current use of insulin ([facility]) E11.22, N18.3       Current Outpatient Prescriptions   Medication Sig Dispense Refill    cholecalciferol, VITAMIN D3, (VITAMIN D3) 5,000 unit tab tablet Take  by mouth daily.  cloNIDine HCl (CATAPRES) 0.2 mg tablet TAKE ONE TABLET BY MOUTH TWICE DAILY 60 Tab 2    amLODIPine (NORVASC) 10 mg tablet Take 1 Tab by mouth daily. 90 Tab 1    metFORMIN (GLUCOPHAGE) 500 mg tablet Take 1 Tab by mouth two (2) times daily (with meals). 180 Tab 1    hydroCHLOROthiazide (HYDRODIURIL) 50 mg tablet Take 1 Tab by mouth daily. 90 Tab 1    potassium chloride SR (KLOR-CON 10) 10 mEq tablet Take 1 Tab by mouth daily. 90 Tab 2    hydrALAZINE (APRESOLINE) 100 mg tablet Take 1 Tab by mouth three (3) times daily. 270 Tab 2    colchicine (COLCRYS) 0.6 mg tablet Take 1 Tab by mouth daily. Indications: Gout 90 Tab 2    simvastatin (ZOCOR) 40 mg tablet Take 1 Tab by mouth nightly.  90 Tab 2    docusate sodium (STOOL SOFTENER) 50 mg capsule Take 50 mg by mouth two (2) times a day.  Aspirin, Buffered 81 mg tab Take 1 tablet by mouth daily.  fish oil-dha-epa 1,200-144-216 mg cap Take 1 tablet by mouth daily. No Known Allergies    Past Medical History:   Diagnosis Date    Anemia     Carotid bruit 12/07/2013    Carotid Duplex: 1. Bilateral 1-49% stenosis of the internal carotid arteries. 2. No significant stenosis in the external carotid arteries bilaterally. 3. Antegrade flow in both vetebral arteries. 4. Normal flow in both subclavian arteries. Plaque Morphology: 1. Hyperechoic plaque in the bulb and right ICA. 2. Hyperechoic plaque in the bulb and left ICA.  CKD (chronic kidney disease) stage 3, GFR 30-59 ml/min     Diabetes ([facility])     NIDDM    Gastritis 10/27/2014    Duodenum Bx: No Specific Pathologic Abnormality. No Villous Abnormality. 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 No dysplasia or malignancy identified. Bacterial Organisms c/w H. Pylori are present. Z-Line Bx: GE mucosa w/ Acute/Chronic Inflammation & Reactive Changes. Focal Specialized Type Campos Mucosa Identified. No Dysplasia or Malignancy Identified.  Gout 12/10/2009    Elevated Uric Acid Level    Hyperlipidemia     Hypertension     RAMÓN (iron deficiency anemia)        Social History     Social History    Marital status:      Spouse name: N/A    Number of children: N/A    Years of education: N/A     Occupational History    Not on file.      Social History Main Topics    Smoking status: Never Smoker    Smokeless tobacco: Never Used    Alcohol use No    Drug use: No    Sexual activity: Not Currently     Partners: Male     Birth control/ protection: None     Other Topics Concern    Not on file     Social History Narrative       Family History   Problem Relation Age of Onset    Hypertension Mother     Stroke Mother     Stroke Father          OBJECTIVE    Physical Exam: Visit Vitals  /72 (BP 1 Location: Left arm, BP Patient Position: Sitting)   Pulse (!) 57   Temp 98.1 °F (36.7 °C) (Oral)   Resp 14   Ht 4' 11\" (1.499 m)   Wt 136 lb 9.6 oz (62 kg)   SpO2 99%   BMI 27.59 kg/m²       General: alert, well-appearing, AA,  in no apparent distress or pain  Neck: supple, no adenopathy palpated  CVS: normal rate, regular rhythm, + murmur  Lungs:clear to ausculation bilaterally, no crackles, wheezing or rhonchi noted  Extremities: trace bipedal edema, feet: normal monofilament  Skin: warm, no lesions, rashes noted  Psych:  mood and affect normal      Results for orders placed or performed during the hospital encounter of 06/25/18   CBC WITH AUTOMATED DIFF   Result Value Ref Range    WBC 4.1 (L) 4.6 - 13.2 K/uL    RBC 3.61 (L) 4.20 - 5.30 M/uL    HGB 10.1 (L) 12.0 - 16.0 g/dL    HCT 30.7 (L) 35.0 - 45.0 %    MCV 85.0 74.0 - 97.0 FL    MCH 28.0 24.0 - 34.0 PG    MCHC 32.9 31.0 - 37.0 g/dL    RDW 16.6 (H) 11.6 - 14.5 %    PLATELET 830 223 - 616 K/uL    MPV 10.2 9.2 - 11.8 FL    NEUTROPHILS 55 40 - 73 %    LYMPHOCYTES 37 21 - 52 %    MONOCYTES 6 3 - 10 %    EOSINOPHILS 2 0 - 5 %    BASOPHILS 0 0 - 2 %    ABS. NEUTROPHILS 2.2 1.8 - 8.0 K/UL    ABS. LYMPHOCYTES 1.5 0.9 - 3.6 K/UL    ABS. MONOCYTES 0.2 0.05 - 1.2 K/UL    ABS. EOSINOPHILS 0.1 0.0 - 0.4 K/UL    ABS.  BASOPHILS 0.0 0.0 - 0.1 K/UL    DF AUTOMATED     IRON PROFILE   Result Value Ref Range    Iron 48 (L) 50 - 175 ug/dL    TIBC 287 250 - 450 ug/dL    Iron % saturation 17 %   FERRITIN   Result Value Ref Range    Ferritin 106 8 - 432 NG/ML   METABOLIC PANEL, BASIC   Result Value Ref Range    Sodium 144 136 - 145 mmol/L    Potassium 3.8 3.5 - 5.5 mmol/L    Chloride 109 (H) 100 - 108 mmol/L    CO2 28 21 - 32 mmol/L    Anion gap 7 3.0 - 18 mmol/L    Glucose 89 74 - 99 mg/dL    BUN 47 (H) 7.0 - 18 MG/DL    Creatinine 1.73 (H) 0.6 - 1.3 MG/DL    BUN/Creatinine ratio 27 (H) 12 - 20      GFR est AA 34 (L) >60 ml/min/1.73m2    GFR est non-AA 28 (L) >60 ml/min/1.73m2    Calcium 9.0 8.5 - 10.1 MG/DL   URIC ACID   Result Value Ref Range    Uric acid 9.2 (H) 2.6 - 7.2 MG/DL       ASSESSMENT/PLAN  Diagnoses and all orders for this visit:    Controlled type 2 diabetes mellitus with stage 3 chronic kidney disease, without long-term current use of insulin (HCC)  Controlled, cont metformin  Monitor kidney function, if GFR <30 on repeat check, will plan to discontinue metformin and hctz and work on dietary measures   Recheck BMP/cbc today  Eye exam- 4/18  Foot exam 6/18  Microalbumin 11/17  Lipid panel 4/18  a1c 4/18     Essential hypertension  Advised to take bp meds prior to doctor visits  Previously Controlled  Cont current meds for now, norvasc, clonidine, hydralazine  -     METABOLIC PANEL, BASIC; Future     Mixed hyperlipidemia  Good range LDL <100, on zocor       Gout of foot, unspecified cause, unspecified chronicity, unspecified laterality  On prophylactic colchicine, consider switching to allopurinol    Anemia of chronic disease  Stable, Baseline 10's  Monitoring  Recheck cbc today  BMI 27  Encourage wt loss    Heart murmur  Aortic regurg  Asymptomatic  Monitoring, following cardiolgoy    CKD 3  Monitoring    Encounter for vaccine  High dose flu vaccine given today    Follow-up Disposition:  Return in about 4 weeks or sooner prn    Patient understands plan of care. Patient has provided input and agrees with goals.

## 2018-11-02 NOTE — PROGRESS NOTES
Anemia is stable  Kidney function has improved, will not make medication changes at this time  Monitoring  pls notify pt.

## 2018-11-02 NOTE — PROGRESS NOTES
Patient identified with 2 identifiers (name and ). Patient aware of anemia stable and kidney function has improved. Will not make medication changes at this time.

## 2018-11-21 ENCOUNTER — OFFICE VISIT (OUTPATIENT)
Dept: FAMILY MEDICINE CLINIC | Age: 83
End: 2018-11-21

## 2018-11-21 VITALS
SYSTOLIC BLOOD PRESSURE: 148 MMHG | DIASTOLIC BLOOD PRESSURE: 76 MMHG | HEART RATE: 65 BPM | WEIGHT: 138 LBS | TEMPERATURE: 97.6 F | BODY MASS INDEX: 27.82 KG/M2 | OXYGEN SATURATION: 97 % | HEIGHT: 59 IN | RESPIRATION RATE: 16 BRPM

## 2018-11-21 DIAGNOSIS — N18.30 CONTROLLED TYPE 2 DIABETES MELLITUS WITH STAGE 3 CHRONIC KIDNEY DISEASE, WITHOUT LONG-TERM CURRENT USE OF INSULIN (HCC): Primary | ICD-10-CM

## 2018-11-21 DIAGNOSIS — E11.22 CONTROLLED TYPE 2 DIABETES MELLITUS WITH STAGE 3 CHRONIC KIDNEY DISEASE, WITHOUT LONG-TERM CURRENT USE OF INSULIN (HCC): Primary | ICD-10-CM

## 2018-11-21 DIAGNOSIS — D63.8 ANEMIA OF CHRONIC DISEASE: ICD-10-CM

## 2018-11-21 DIAGNOSIS — N18.30 CKD (CHRONIC KIDNEY DISEASE) STAGE 3, GFR 30-59 ML/MIN (HCC): ICD-10-CM

## 2018-11-21 DIAGNOSIS — I10 ESSENTIAL HYPERTENSION: ICD-10-CM

## 2018-11-21 DIAGNOSIS — R01.1 HEART MURMUR: ICD-10-CM

## 2018-11-21 DIAGNOSIS — E78.2 MIXED HYPERLIPIDEMIA: ICD-10-CM

## 2018-11-21 LAB — HGB BLD-MCNC: 5.6 G/DL

## 2018-11-21 RX ORDER — LISINOPRIL 10 MG/1
10 TABLET ORAL DAILY
Qty: 90 TAB | Refills: 0 | Status: SHIPPED | OUTPATIENT
Start: 2018-11-21 | End: 2018-12-31 | Stop reason: SDUPTHER

## 2018-11-21 NOTE — PROGRESS NOTES
Bert Lambert, 80 y.o.,  female    SUBJECTIVE  Ff-up    DM w/ CKD 3- taking metformin. Reports FBG 1teens. 4/18 a1c 5.9. Creatinine level was trending up, upon repeat improved with stopping hctz. HTN/HL- denies cp, sob, lightheadedness. Taking medications:norvasc, hydralazine, clonidine. Heart murmur- no sob, syncope, chest pain, leg edema. Cardiology following. Echo showing  Aortic valve regurg, monitoring    Gout- usually foot swelling and pain, related to seafood. 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 Lives with  who is in his [de-identified]. Performs ADLs without much difficulty. 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  cloNIDine HCl (CATAPRES) 0.2 mg tablet TAKE ONE TABLET BY MOUTH TWICE DAILY 60 Tab 2    amLODIPine (NORVASC) 10 mg tablet Take 1 Tab by mouth daily. 90 Tab 1    metFORMIN (GLUCOPHAGE) 500 mg tablet Take 1 Tab by mouth two (2) times daily (with meals). 180 Tab 1    hydroCHLOROthiazide (HYDRODIURIL) 50 mg tablet Take 1 Tab by mouth daily. 90 Tab 1    potassium chloride SR (KLOR-CON 10) 10 mEq tablet Take 1 Tab by mouth daily. 90 Tab 2    hydrALAZINE (APRESOLINE) 100 mg tablet Take 1 Tab by mouth three (3) times daily. 270 Tab 2    colchicine (COLCRYS) 0.6 mg tablet Take 1 Tab by mouth daily. Indications: Gout 90 Tab 2    simvastatin (ZOCOR) 40 mg tablet Take 1 Tab by mouth nightly.  90 Tab 2    docusate sodium (STOOL SOFTENER) 50 mg capsule Take 50 mg by mouth two (2) times a day.  Aspirin, Buffered 81 mg tab Take 1 tablet by mouth daily.  fish oil-dha-epa 1,200-144-216 mg cap Take 1 tablet by mouth daily. No Known Allergies    Past Medical History:   Diagnosis Date    Anemia     Carotid bruit 12/07/2013    Carotid Duplex: 1. Bilateral 1-49% stenosis of the internal carotid arteries. 2. No significant stenosis in the external carotid arteries bilaterally. 3. Antegrade flow in both vetebral arteries. 4. Normal flow in both subclavian arteries. Plaque Morphology: 1. Hyperechoic plaque in the bulb and right ICA. 2. Hyperechoic plaque in the bulb and left ICA.  CKD (chronic kidney disease) stage 3, GFR 30-59 ml/min     Diabetes ([facility])     NIDDM    Gastritis 10/27/2014    Duodenum Bx: No Specific Pathologic Abnormality. No Villous Abnormality. 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 No dysplasia or malignancy identified. Bacterial Organisms c/w H. Pylori are present. Z-Line Bx: GE mucosa w/ Acute/Chronic Inflammation & Reactive Changes. Focal Specialized Type Campos Mucosa Identified. No Dysplasia or Malignancy Identified.  Gout 12/10/2009    Elevated Uric Acid Level    Hyperlipidemia     Hypertension     RAMÓN (iron deficiency anemia)        Social History     Social History    Marital status:      Spouse name: N/A    Number of children: N/A    Years of education: N/A     Occupational History    Not on file.      Social History Main Topics    Smoking status: Never Smoker    Smokeless tobacco: Never Used    Alcohol use No    Drug use: No    Sexual activity: Not Currently     Partners: Male     Birth control/ protection: None     Other Topics Concern    Not on file     Social History Narrative       Family History   Problem Relation Age of Onset    Hypertension Mother     Stroke Mother     Stroke Father          OBJECTIVE    Physical Exam:     Visit Vitals  /76 (BP 1 Location: Left arm, BP Patient Position: Sitting)   Pulse 65   Temp 97.6 °F (36.4 °C) (Oral)   Resp 16   Ht 4' 11\" (1.499 m)   Wt 138 lb (62.6 kg)   SpO2 97%   BMI 27.87 kg/m²       General: alert, well-appearing, AA,  in no apparent distress or pain  Neck: supple, no adenopathy palpated  CVS: normal rate, regular rhythm, + murmur  Lungs:clear to ausculation bilaterally, no crackles, wheezing or rhonchi noted  Extremities: trace bipedal edema, feet: normal monofilament  Skin: warm, no lesions, rashes noted  Psych:  mood and affect normal    Results for orders placed or performed in visit on 11/21/18   AMB POC HEMOGLOBIN (HGB)   Result Value Ref Range    Hemoglobin (POC) 5.6        ASSESSMENT/PLAN  Diagnoses and all orders for this visit:    Controlled type 2 diabetes mellitus with stage 3 chronic kidney disease, without long-term current use of insulin (HCC)  Controlled  Will stop metformin and monitor  Monitor kidney function, BMP next month   Eye exam- 4/18  Foot exam 6/18  Microalbumin 11/17  Lipid panel 4/18  a1c 11/18   Check bmp next month, prior to next visit    Essential hypertension  Fair control  Cont  norvasc, clonidine, hydralazine  Trial addition of lisinopril 10 mg w/ CKD  -     METABOLIC PANEL, BASIC; Future     Mixed hyperlipidemia  Good range LDL <100, on zocor     Gout of foot, unspecified cause, unspecified chronicity, unspecified laterality  On prophylactic colchicine, consider switching to allopurinol    Anemia of chronic disease  Stable, Baseline 10's  Monitoring  BMI 27  Encourage wt loss    Heart murmur  Aortic regurg  Asymptomatic  Monitoring, following cardiolgoy    CKD 3  Monitoring    Follow-up Disposition:  Return in about 4 weeks or sooner prn    Patient understands plan of care. Patient has provided input and agrees with goals.

## 2018-11-21 NOTE — PATIENT INSTRUCTIONS

## 2018-12-19 ENCOUNTER — HOSPITAL ENCOUNTER (OUTPATIENT)
Dept: LAB | Age: 83
Discharge: HOME | End: 2018-12-19
Payer: MEDICARE

## 2018-12-19 DIAGNOSIS — N18.30 CONTROLLED TYPE 2 DIABETES MELLITUS WITH STAGE 3 CHRONIC KIDNEY DISEASE, WITHOUT LONG-TERM CURRENT USE OF INSULIN (HCC): ICD-10-CM

## 2018-12-19 DIAGNOSIS — E11.22 CONTROLLED TYPE 2 DIABETES MELLITUS WITH STAGE 3 CHRONIC KIDNEY DISEASE, WITHOUT LONG-TERM CURRENT USE OF INSULIN (HCC): ICD-10-CM

## 2018-12-19 LAB
ANION GAP SERPL CALC-SCNC: 7 MMOL/L (ref 3–18)
BUN SERPL-MCNC: 47 MG/DL (ref 7–18)
BUN/CREAT SERPL: 37 (ref 12–20)
CALCIUM SERPL-MCNC: 8.9 MG/DL (ref 8.5–10.1)
CHLORIDE SERPL-SCNC: 106 MMOL/L (ref 100–108)
CO2 SERPL-SCNC: 31 MMOL/L (ref 21–32)
CREAT SERPL-MCNC: 1.27 MG/DL (ref 0.6–1.3)
GLUCOSE SERPL-MCNC: 123 MG/DL (ref 74–99)
POTASSIUM SERPL-SCNC: 3.8 MMOL/L (ref 3.5–5.5)
SODIUM SERPL-SCNC: 144 MMOL/L (ref 136–145)

## 2018-12-19 PROCEDURE — 36415 COLL VENOUS BLD VENIPUNCTURE: CPT

## 2018-12-19 PROCEDURE — 80048 BASIC METABOLIC PNL TOTAL CA: CPT

## 2018-12-31 ENCOUNTER — OFFICE VISIT (OUTPATIENT)
Dept: FAMILY MEDICINE CLINIC | Age: 83
End: 2018-12-31

## 2018-12-31 VITALS
TEMPERATURE: 97.9 F | RESPIRATION RATE: 13 BRPM | HEART RATE: 65 BPM | HEIGHT: 59 IN | SYSTOLIC BLOOD PRESSURE: 160 MMHG | OXYGEN SATURATION: 98 % | WEIGHT: 146.4 LBS | DIASTOLIC BLOOD PRESSURE: 82 MMHG | BODY MASS INDEX: 29.52 KG/M2

## 2018-12-31 DIAGNOSIS — N18.30 CKD (CHRONIC KIDNEY DISEASE) STAGE 3, GFR 30-59 ML/MIN (HCC): Primary | ICD-10-CM

## 2018-12-31 DIAGNOSIS — E78.2 MIXED HYPERLIPIDEMIA: ICD-10-CM

## 2018-12-31 DIAGNOSIS — E11.22 CONTROLLED TYPE 2 DIABETES MELLITUS WITH STAGE 3 CHRONIC KIDNEY DISEASE, WITHOUT LONG-TERM CURRENT USE OF INSULIN (HCC): ICD-10-CM

## 2018-12-31 DIAGNOSIS — Z71.89 ADVANCE CARE PLANNING: ICD-10-CM

## 2018-12-31 DIAGNOSIS — R01.1 HEART MURMUR: ICD-10-CM

## 2018-12-31 DIAGNOSIS — D63.8 ANEMIA OF CHRONIC DISEASE: ICD-10-CM

## 2018-12-31 DIAGNOSIS — M10.9 GOUT, UNSPECIFIED CAUSE, UNSPECIFIED CHRONICITY, UNSPECIFIED SITE: ICD-10-CM

## 2018-12-31 DIAGNOSIS — N18.30 CONTROLLED TYPE 2 DIABETES MELLITUS WITH STAGE 3 CHRONIC KIDNEY DISEASE, WITHOUT LONG-TERM CURRENT USE OF INSULIN (HCC): ICD-10-CM

## 2018-12-31 DIAGNOSIS — I10 ESSENTIAL HYPERTENSION: ICD-10-CM

## 2018-12-31 RX ORDER — HYDROCHLOROTHIAZIDE 50 MG/1
25 TABLET ORAL DAILY
COMMUNITY
End: 2018-12-31 | Stop reason: SDUPTHER

## 2018-12-31 RX ORDER — POTASSIUM CHLORIDE 750 MG/1
TABLET, FILM COATED, EXTENDED RELEASE ORAL
COMMUNITY
Start: 2018-11-08 | End: 2019-03-01

## 2018-12-31 RX ORDER — BLOOD-GLUCOSE METER
EACH MISCELLANEOUS
Qty: 1 EACH | Refills: 0 | Status: SHIPPED | OUTPATIENT
Start: 2018-12-31 | End: 2022-10-20 | Stop reason: SDUPTHER

## 2018-12-31 RX ORDER — LISINOPRIL 10 MG/1
10 TABLET ORAL DAILY
Qty: 90 TAB | Refills: 0 | Status: SHIPPED | OUTPATIENT
Start: 2018-12-31 | End: 2019-03-01 | Stop reason: SDUPTHER

## 2018-12-31 RX ORDER — HYDROCHLOROTHIAZIDE 50 MG/1
25 TABLET ORAL DAILY
Qty: 90 TAB | Refills: 1 | Status: SHIPPED | OUTPATIENT
Start: 2018-12-31 | End: 2019-03-01

## 2018-12-31 NOTE — PROGRESS NOTES
1. Have you been to the ER, urgent care clinic since your last visit? Hospitalized since your last visit? No    2. Have you seen or consulted any other health care providers outside of the [address] [place] [address] since your last visit? Include any pap smears or colon screening.  No    Chief Complaint   Patient presents with    Diabetes    Hypertension    Cholesterol Problem    Gout    Chronic Kidney Disease

## 2018-12-31 NOTE — PROGRESS NOTES
Estrellita Gallegos, 80 y.o.,  female    SUBJECTIVE  Ff-up    DM w/ CKD 3-taken off metformin with a1c 5.6 in November. She Reports FBG still  1teens. HTN- she reports wanting to resume hctz, we have discontinued previously with worsening trend on kidney function test.  Says slightly worsening ankle edema off medication. She was tried on lisinopril 10 mg on last visit. She says she is currently taking along with hydralazine, clonidine and norvasc, however when medicine back was checked it was not there. She is to call us when she gets home. HL- on statin. Heart murmur- no sob, syncope, chest pain, leg edema. Cardiology following. Echo showing  Aortic valve regurg, monitoring    Gout- usually foot swelling and pain, related to seafood. 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 Lives with  who is in his [de-identified]. Performs ADLs without much difficulty. No longer drives    ROS:  See HPI, all others negative        Patient Active Problem List   Diagnosis Code    Essential hypertension I10    Mixed hyperlipidemia E78.2    Advanced directives, counseling/discussion Z71.89    Controlled type 2 diabetes mellitus with stage 3 chronic kidney disease, without long-term current use of insulin (HCC) E11.22, N18.3       Current Outpatient Prescriptions   Medication Sig Dispense Refill    cholecalciferol, VITAMIN D3, (VITAMIN D3) 5,000 unit tab tablet Take  by mouth daily.  cloNIDine HCl (CATAPRES) 0.2 mg tablet TAKE ONE TABLET BY MOUTH TWICE DAILY 60 Tab 2    amLODIPine (NORVASC) 10 mg tablet Take 1 Tab by mouth daily. 90 Tab 1    metFORMIN (GLUCOPHAGE) 500 mg tablet Take 1 Tab by mouth two (2) times daily (with meals). 180 Tab 1    hydroCHLOROthiazide (HYDRODIURIL) 50 mg tablet Take 1 Tab by mouth daily. 90 Tab 1    potassium chloride SR (KLOR-CON 10) 10 mEq tablet Take 1 Tab by mouth daily.  90 Tab 2    hydrALAZINE (APRESOLINE) 100 mg tablet Take 1 Tab by mouth three (3) times daily. 270 Tab 2    colchicine (COLCRYS) 0.6 mg tablet Take 1 Tab by mouth daily. Indications: Gout 90 Tab 2    simvastatin (ZOCOR) 40 mg tablet Take 1 Tab by mouth nightly. 90 Tab 2    docusate sodium (STOOL SOFTENER) 50 mg capsule Take 50 mg by mouth two (2) times a day.  Aspirin, Buffered 81 mg tab Take 1 tablet by mouth daily.  fish oil-dha-epa 1,200-144-216 mg cap Take 1 tablet by mouth daily. No Known Allergies    Past Medical History:   Diagnosis Date    Anemia     Carotid bruit 12/07/2013    Carotid Duplex: 1. Bilateral 1-49% stenosis of the internal carotid arteries. 2. No significant stenosis in the external carotid arteries bilaterally. 3. Antegrade flow in both vetebral arteries. 4. Normal flow in both subclavian arteries. Plaque Morphology: 1. Hyperechoic plaque in the bulb and right ICA. 2. Hyperechoic plaque in the bulb and left ICA.  CKD (chronic kidney disease) stage 3, GFR 30-59 ml/min     Diabetes ([facility])     NIDDM    Gastritis 10/27/2014    Duodenum Bx: No Specific Pathologic Abnormality. No Villous Abnormality. 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 No dysplasia or malignancy identified. Bacterial Organisms c/w H. Pylori are present. Z-Line Bx: GE mucosa w/ Acute/Chronic Inflammation & Reactive Changes. Focal Specialized Type Campos Mucosa Identified. No Dysplasia or Malignancy Identified.  Gout 12/10/2009    Elevated Uric Acid Level    Hyperlipidemia     Hypertension     RAMÓN (iron deficiency anemia)        Social History     Social History    Marital status:      Spouse name: N/A    Number of children: N/A    Years of education: N/A     Occupational History    Not on file.      Social History Main Topics    Smoking status: Never Smoker    Smokeless tobacco: Never Used    Alcohol use No    Drug use: No    Sexual activity: Not Currently     Partners: Male     Birth control/ protection: None     Other Topics Concern    Not on file     Social History Narrative       Family History   Problem Relation Age of Onset    Hypertension Mother     Stroke Mother     Stroke Father          OBJECTIVE    Physical Exam:     Visit Vitals  /82 (BP 1 Location: Right arm, BP Patient Position: Sitting)   Pulse 65   Temp 97.9 °F (36.6 °C) (Oral)   Resp 13   Ht 4' 11\" (1.499 m)   Wt 146 lb 6.4 oz (66.4 kg)   SpO2 98%   BMI 29.57 kg/m²       General: alert, well-appearing, AA,  in no apparent distress or pain  Neck: supple, no adenopathy palpated  CVS: normal rate, regular rhythm, + murmur  Lungs:clear to ausculation bilaterally, no crackles, wheezing or rhonchi noted  Extremities: trace bipedal edema  Skin: warm, no lesions, rashes noted  Psych:  mood and affect normal    Results for orders placed or performed during the hospital encounter of 60/84/43   METABOLIC PANEL, BASIC   Result Value Ref Range    Sodium 144 136 - 145 mmol/L    Potassium 3.8 3.5 - 5.5 mmol/L    Chloride 106 100 - 108 mmol/L    CO2 31 21 - 32 mmol/L    Anion gap 7 3.0 - 18 mmol/L    Glucose 123 (H) 74 - 99 mg/dL    BUN 47 (H) 7.0 - 18 MG/DL    Creatinine 1.27 0.6 - 1.3 MG/DL    BUN/Creatinine ratio 37 (H) 12 - 20      GFR est AA 48 (L) >60 ml/min/1.73m2    GFR est non-AA 40 (L) >60 ml/min/1.73m2    Calcium 8.9 8.5 - 10.1 MG/DL       ASSESSMENT/PLAN  Diagnoses and all orders for this visit:    Controlled type 2 diabetes mellitus with stage 3 chronic kidney disease, without long-term current use of insulin (HCC)  Controlled, off metformin    Essential hypertension  Fair control  Cont  norvasc, clonidine, hydralazine  Will resume hctz with worsening ankle edema  Trial addition of lisinopril 10 mg w/ CKD  DASH diet, BP Log     Mixed hyperlipidemia  Good range LDL <100, on zocor     Gout of foot, unspecified cause, unspecified chronicity, unspecified laterality  On prophylactic colchicine, consider switching to allopurinol    Anemia of chronic disease  Stable, Baseline 10's  Monitoring  BMI 27  Encourage wt loss    Heart murmur  Aortic regurg  Asymptomatic  Monitoring, following cardiolgoy    CKD 3  Monitoring    BMI 29  Encouraged wt loss    Follow-up Disposition:  Return in about 4 weeks or sooner prn    Patient understands plan of care. Patient has provided input and agrees with goals.

## 2019-01-08 NOTE — TELEPHONE ENCOUNTER
This pharmacy faxed over request for the following prescriptions to be filled:    Medication requested :   Requested Prescriptions     Pending Prescriptions Disp Refills    glucose blood VI test strips (PRODIGY NO CODING) strip 100 Strip 3     Sig: Check fasting glucose once daily       PCP: helen  Pharmacy or Print: [facility]   Mail order or Jourdan [phone]    Scheduled appointment if not seen by current providers in office: lov 12/31/18 ucv 3/1/19

## 2019-01-28 ENCOUNTER — OFFICE VISIT (OUTPATIENT)
Dept: FAMILY MEDICINE CLINIC | Age: 84
End: 2019-01-28

## 2019-01-28 VITALS
DIASTOLIC BLOOD PRESSURE: 72 MMHG | HEIGHT: 59 IN | RESPIRATION RATE: 13 BRPM | BODY MASS INDEX: 30.28 KG/M2 | WEIGHT: 150.2 LBS | TEMPERATURE: 97.7 F | OXYGEN SATURATION: 98 % | HEART RATE: 62 BPM | SYSTOLIC BLOOD PRESSURE: 136 MMHG

## 2019-01-28 DIAGNOSIS — D63.8 ANEMIA OF CHRONIC DISEASE: ICD-10-CM

## 2019-01-28 DIAGNOSIS — M10.9 GOUT, UNSPECIFIED CAUSE, UNSPECIFIED CHRONICITY, UNSPECIFIED SITE: ICD-10-CM

## 2019-01-28 DIAGNOSIS — E78.2 MIXED HYPERLIPIDEMIA: ICD-10-CM

## 2019-01-28 DIAGNOSIS — R01.1 HEART MURMUR: ICD-10-CM

## 2019-01-28 DIAGNOSIS — E11.22 CONTROLLED TYPE 2 DIABETES MELLITUS WITH STAGE 3 CHRONIC KIDNEY DISEASE, WITHOUT LONG-TERM CURRENT USE OF INSULIN (HCC): Primary | ICD-10-CM

## 2019-01-28 DIAGNOSIS — I10 ESSENTIAL HYPERTENSION: ICD-10-CM

## 2019-01-28 DIAGNOSIS — N18.30 CONTROLLED TYPE 2 DIABETES MELLITUS WITH STAGE 3 CHRONIC KIDNEY DISEASE, WITHOUT LONG-TERM CURRENT USE OF INSULIN (HCC): Primary | ICD-10-CM

## 2019-01-28 DIAGNOSIS — N18.30 CKD (CHRONIC KIDNEY DISEASE) STAGE 3, GFR 30-59 ML/MIN (HCC): ICD-10-CM

## 2019-01-28 NOTE — PROGRESS NOTES
1. Have you been to the ER, urgent care clinic since your last visit? Hospitalized since your last visit? No    2. Have you seen or consulted any other health care providers outside of the [address] [place] [address] since your last visit? Include any pap smears or colon screening.  No    Chief Complaint   Patient presents with    Gout    Hypertension    Cholesterol Problem    Chronic Kidney Disease

## 2019-01-28 NOTE — PROGRESS NOTES
Toy Gaytan, 80 y.o.,  female    SUBJECTIVE  Ff-up    DM w/ CKD 3-  She Reports FBG still  1teens. Off metformin for several months now, last a1c is 5.6  Off med    HTN-resumed hctz,  With improved  ankle edema off medication. Continues to take lisinopril hydralazine, clonidine and norvasc. HL- on statin. Heart murmur- no sob, syncope, chest pain, leg edema. Cardiology following. Echo showing  Aortic valve regurg, monitoring    Gout- usually foot swelling and pain, related to seafood. 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 Lives with  who is in his [de-identified]. Performs ADLs without much difficulty. No longer drives    ROS:  See HPI, all others negative        Patient Active Problem List   Diagnosis Code    Essential hypertension I10    Mixed hyperlipidemia E78.2    Advanced directives, counseling/discussion Z71.89    Controlled type 2 diabetes mellitus with stage 3 chronic kidney disease, without long-term current use of insulin ([facility]) E11.22, N18.3       Current Outpatient Prescriptions   Medication Sig Dispense Refill    cholecalciferol, VITAMIN D3, (VITAMIN D3) 5,000 unit tab tablet Take  by mouth daily.  cloNIDine HCl (CATAPRES) 0.2 mg tablet TAKE ONE TABLET BY MOUTH TWICE DAILY 60 Tab 2    amLODIPine (NORVASC) 10 mg tablet Take 1 Tab by mouth daily. 90 Tab 1    metFORMIN (GLUCOPHAGE) 500 mg tablet Take 1 Tab by mouth two (2) times daily (with meals). 180 Tab 1    hydroCHLOROthiazide (HYDRODIURIL) 50 mg tablet Take 1 Tab by mouth daily. 90 Tab 1    potassium chloride SR (KLOR-CON 10) 10 mEq tablet Take 1 Tab by mouth daily. 90 Tab 2    hydrALAZINE (APRESOLINE) 100 mg tablet Take 1 Tab by mouth three (3) times daily. 270 Tab 2    colchicine (COLCRYS) 0.6 mg tablet Take 1 Tab by mouth daily. Indications: Gout 90 Tab 2    simvastatin (ZOCOR) 40 mg tablet Take 1 Tab by mouth nightly.  90 Tab 2    docusate sodium (STOOL SOFTENER) 50 mg capsule Take 50 mg by mouth two (2) times a day.  Aspirin, Buffered 81 mg tab Take 1 tablet by mouth daily.  fish oil-dha-epa 1,200-144-216 mg cap Take 1 tablet by mouth daily. No Known Allergies    Past Medical History:   Diagnosis Date    Anemia     Carotid bruit 12/07/2013    Carotid Duplex: 1. Bilateral 1-49% stenosis of the internal carotid arteries. 2. No significant stenosis in the external carotid arteries bilaterally. 3. Antegrade flow in both vetebral arteries. 4. Normal flow in both subclavian arteries. Plaque Morphology: 1. Hyperechoic plaque in the bulb and right ICA. 2. Hyperechoic plaque in the bulb and left ICA.  CKD (chronic kidney disease) stage 3, GFR 30-59 ml/min     Diabetes ([facility])     NIDDM    Gastritis 10/27/2014    Duodenum Bx: No Specific Pathologic Abnormality. No Villous Abnormality. 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 No dysplasia or malignancy identified. Bacterial Organisms c/w H. Pylori are present. Z-Line Bx: GE mucosa w/ Acute/Chronic Inflammation & Reactive Changes. Focal Specialized Type Campos Mucosa Identified. No Dysplasia or Malignancy Identified.  Gout 12/10/2009    Elevated Uric Acid Level    Hyperlipidemia     Hypertension     RAMÓN (iron deficiency anemia)        Social History     Social History    Marital status:      Spouse name: N/A    Number of children: N/A    Years of education: N/A     Occupational History    Not on file.      Social History Main Topics    Smoking status: Never Smoker    Smokeless tobacco: Never Used    Alcohol use No    Drug use: No    Sexual activity: Not Currently     Partners: Male     Birth control/ protection: None     Other Topics Concern    Not on file     Social History Narrative       Family History   Problem Relation Age of Onset    Hypertension Mother     Stroke Mother     Stroke Father          OBJECTIVE    Physical Exam:     Visit Vitals  /72 (BP 1 Location: Right arm, BP Patient Position: Sitting)   Pulse 62   Temp 97.7 °F (36.5 °C) (Oral)   Resp 13   Ht 4' 11\" (1.499 m)   Wt 150 lb 3.2 oz (68.1 kg)   SpO2 98%   BMI 30.34 kg/m²       General: alert, well-appearing, AA,  in no apparent distress or pain  Neck: supple, no adenopathy palpated  CVS: normal rate, regular rhythm, + murmur  Lungs:clear to ausculation bilaterally, no crackles, wheezing or rhonchi noted  Extremities: trace bipedal edema  Skin: warm, no lesions, rashes noted  Psych:  mood and affect normal    Results for orders placed or performed during the hospital encounter of 96/61/81   METABOLIC PANEL, BASIC   Result Value Ref Range    Sodium 144 136 - 145 mmol/L    Potassium 3.8 3.5 - 5.5 mmol/L    Chloride 106 100 - 108 mmol/L    CO2 31 21 - 32 mmol/L    Anion gap 7 3.0 - 18 mmol/L    Glucose 123 (H) 74 - 99 mg/dL    BUN 47 (H) 7.0 - 18 MG/DL    Creatinine 1.27 0.6 - 1.3 MG/DL    BUN/Creatinine ratio 37 (H) 12 - 20      GFR est AA 48 (L) >60 ml/min/1.73m2    GFR est non-AA 40 (L) >60 ml/min/1.73m2    Calcium 8.9 8.5 - 10.1 MG/DL       ASSESSMENT/PLAN  Diagnoses and all orders for this visit:    Controlled type 2 diabetes mellitus with stage 3 chronic kidney disease, without long-term current use of insulin (HCC)  Controlled, off metformin  A1c/ cmp/ lipid/ cbc/ microalbumin prior to next visit    Essential hypertension  Fair control  Cont  norvasc, clonidine, hydralazine  Hctz,  lisinopril 10 mg w/ CKD  DASH diet, BP Log     Mixed hyperlipidemia  Good range LDL <100, on zocor     Gout of foot, unspecified cause, unspecified chronicity, unspecified laterality  On prophylactic colchicine, consider switching to allopurinol    Anemia of chronic disease  Stable, Baseline 10's  Monitoring  BMI 27  Encourage wt loss    Heart murmur  Aortic regurg  Asymptomatic  Monitoring, following cardiolgoy    CKD 3  Monitoring    BMI 30  Encouraged wt loss    Follow-up Disposition:  Return in about 2 months or sooner prn    Patient understands plan of care. Patient has provided input and agrees with goals.

## 2019-01-28 NOTE — PATIENT INSTRUCTIONS
DASH Diet: Care Instructions  Your Care Instructions    The DASH diet is an eating plan that can help lower your blood pressure. DASH stands for Dietary Approaches to Stop Hypertension. Hypertension is high blood pressure. The DASH diet focuses on eating foods that are high in calcium, potassium, and magnesium. These nutrients can lower blood pressure. The foods that are highest in these nutrients are fruits, vegetables, low-fat dairy products, nuts, seeds, and legumes. But taking calcium, potassium, and magnesium supplements instead of eating foods that are high in those nutrients does not have the same effect. The DASH diet also includes whole grains, fish, and poultry. The DASH diet is one of several lifestyle changes your doctor may recommend to lower your high blood pressure. Your doctor may also want you to decrease the amount of sodium in your diet. Lowering sodium while following the DASH diet can lower blood pressure even further than just the DASH diet alone. Follow-up care is a key part of your treatment and safety. Be sure to make and go to all appointments, and call your doctor if you are having problems. It's also a good idea to know your test results and keep a list of the medicines you take. How can you care for yourself at home? Following the DASH diet  · Eat 4 to 5 servings of fruit each day. A serving is 1 medium-sized piece of fruit, ½ cup chopped or canned fruit, 1/4 cup dried fruit, or 4 ounces (½ cup) of fruit juice. Choose fruit more often than fruit juice. · Eat 4 to 5 servings of vegetables each day. A serving is 1 cup of lettuce or raw leafy vegetables, ½ cup of chopped or cooked vegetables, or 4 ounces (½ cup) of vegetable juice. Choose vegetables more often than vegetable juice. · Get 2 to 3 servings of low-fat and fat-free dairy each day. A serving is 8 ounces of milk, 1 cup of yogurt, or 1 ½ ounces of cheese. · Eat 6 to 8 servings of grains each day.  A serving is 1 slice of bread, 1 ounce of dry cereal, or ½ cup of cooked rice, pasta, or cooked cereal. Try to choose whole-grain products as much as possible. · Limit lean meat, poultry, and fish to 2 servings each day. A serving is 3 ounces, about the size of a deck of cards. · Eat 4 to 5 servings of nuts, seeds, and legumes (cooked dried beans, lentils, and split peas) each week. A serving is 1/3 cup of nuts, 2 tablespoons of seeds, or ½ cup of cooked beans or peas. · Limit fats and oils to 2 to 3 servings each day. A serving is 1 teaspoon of vegetable oil or 2 tablespoons of salad dressing. · Limit sweets and added sugars to 5 servings or less a week. A serving is 1 tablespoon jelly or jam, ½ cup sorbet, or 1 cup of lemonade. · Eat less than 2,300 milligrams (mg) of sodium a day. If you limit your sodium to 1,500 mg a day, you can lower your blood pressure even more. Tips for success  · Start small. Do not try to make dramatic changes to your diet all at once. You might feel that you are missing out on your favorite foods and then be more likely to not follow the plan. Make small changes, and stick with them. Once those changes become habit, add a few more changes. · Try some of the following:  ? Make it a goal to eat a fruit or vegetable at every meal and at snacks. This will make it easy to get the recommended amount of fruits and vegetables each day. ? Try yogurt topped with fruit and nuts for a snack or healthy dessert. ? Add lettuce, tomato, cucumber, and onion to sandwiches. ? Combine a ready-made pizza crust with low-fat mozzarella cheese and lots of vegetable toppings. Try using tomatoes, squash, spinach, broccoli, carrots, cauliflower, and onions. ? Have a variety of cut-up vegetables with a low-fat dip as an appetizer instead of chips and dip. ? Sprinkle sunflower seeds or chopped almonds over salads. Or try adding chopped walnuts or almonds to cooked vegetables.   ? Try some vegetarian meals using beans and peas. Add garbanzo or kidney beans to salads. Make burritos and tacos with mashed day beans or black beans. Where can you learn more? Go to http://viral-zenaida.info/. Enter N846 in the search box to learn more about \"DASH Diet: Care Instructions. \"  Current as of: July 22, 2018  Content Version: 11.9  © 4784-7733 [employer], [employer]. Care instructions adapted under license by [employer] (which disclaims liability or warranty for this information). If you have questions about a medical condition or this instruction, always ask your healthcare professional. [address] any warranty or liability for your use of this information.

## 2019-02-04 DIAGNOSIS — I10 ESSENTIAL HYPERTENSION: Chronic | ICD-10-CM

## 2019-02-04 RX ORDER — HYDRALAZINE HYDROCHLORIDE 100 MG/1
100 TABLET, FILM COATED ORAL 3 TIMES DAILY
Qty: 270 TAB | Refills: 2 | Status: ON HOLD | OUTPATIENT
Start: 2019-02-04 | End: 2019-09-03 | Stop reason: SDUPTHER

## 2019-02-04 NOTE — TELEPHONE ENCOUNTER
This patient contacted office for the following prescriptions to be filled:    Medication requested :   Requested Prescriptions     Pending Prescriptions Disp Refills    hydrALAZINE (APRESOLINE) 100 mg tablet 270 Tab 2     Sig: Take 1 Tab by mouth three (3) times daily.      PCP: Dr. Abhijit Garcia or Print: [address] [place] [address]   Mail order or Local pharmacy: [phone]    Scheduled appointment if not seen by current providers in office: LOV 1/28/19, next appt 3/1/19

## 2019-02-25 ENCOUNTER — HOSPITAL ENCOUNTER (OUTPATIENT)
Dept: LAB | Age: 84
Discharge: HOME | End: 2019-02-25
Payer: MEDICARE

## 2019-02-25 DIAGNOSIS — E11.22 CONTROLLED TYPE 2 DIABETES MELLITUS WITH STAGE 3 CHRONIC KIDNEY DISEASE, WITHOUT LONG-TERM CURRENT USE OF INSULIN (HCC): ICD-10-CM

## 2019-02-25 DIAGNOSIS — N18.30 CONTROLLED TYPE 2 DIABETES MELLITUS WITH STAGE 3 CHRONIC KIDNEY DISEASE, WITHOUT LONG-TERM CURRENT USE OF INSULIN (HCC): ICD-10-CM

## 2019-02-25 LAB
ALBUMIN SERPL-MCNC: 4 G/DL (ref 3.4–5)
ALBUMIN/GLOB SERPL: 1.1 {RATIO} (ref 0.8–1.7)
ALP SERPL-CCNC: 52 U/L (ref 45–117)
ALT SERPL-CCNC: 27 U/L (ref 13–56)
ANION GAP SERPL CALC-SCNC: 6 MMOL/L (ref 3–18)
AST SERPL-CCNC: 35 U/L (ref 15–37)
BASOPHILS # BLD: 0 K/UL (ref 0–0.1)
BASOPHILS NFR BLD: 1 % (ref 0–2)
BILIRUB SERPL-MCNC: 0.5 MG/DL (ref 0.2–1)
BUN SERPL-MCNC: 51 MG/DL (ref 7–18)
BUN/CREAT SERPL: 30 (ref 12–20)
CALCIUM SERPL-MCNC: 9.1 MG/DL (ref 8.5–10.1)
CHLORIDE SERPL-SCNC: 105 MMOL/L (ref 100–108)
CHOLEST SERPL-MCNC: 128 MG/DL
CO2 SERPL-SCNC: 30 MMOL/L (ref 21–32)
CREAT SERPL-MCNC: 1.7 MG/DL (ref 0.6–1.3)
CREAT UR-MCNC: 58.7 MG/DL (ref 30–125)
DIFFERENTIAL METHOD BLD: ABNORMAL
EOSINOPHIL # BLD: 0 K/UL (ref 0–0.4)
EOSINOPHIL NFR BLD: 1 % (ref 0–5)
ERYTHROCYTE [DISTWIDTH] IN BLOOD BY AUTOMATED COUNT: 16.3 % (ref 11.6–14.5)
GLOBULIN SER CALC-MCNC: 3.6 G/DL (ref 2–4)
GLUCOSE SERPL-MCNC: 117 MG/DL (ref 74–99)
HBA1C MFR BLD: 6.6 % (ref 4.2–5.6)
HCT VFR BLD AUTO: 32.4 % (ref 35–45)
HDLC SERPL-MCNC: 69 MG/DL (ref 40–60)
HDLC SERPL: 1.9 {RATIO} (ref 0–5)
HGB BLD-MCNC: 10.6 G/DL (ref 12–16)
LDLC SERPL CALC-MCNC: 41.6 MG/DL (ref 0–100)
LIPID PROFILE,FLP: ABNORMAL
LYMPHOCYTES # BLD: 1.6 K/UL (ref 0.9–3.6)
LYMPHOCYTES NFR BLD: 36 % (ref 21–52)
MCH RBC QN AUTO: 27.9 PG (ref 24–34)
MCHC RBC AUTO-ENTMCNC: 32.7 G/DL (ref 31–37)
MCV RBC AUTO: 85.3 FL (ref 74–97)
MICROALBUMIN UR-MCNC: 32.2 MG/DL (ref 0–3)
MICROALBUMIN/CREAT UR-RTO: 549 MG/G (ref 0–30)
MONOCYTES # BLD: 0.3 K/UL (ref 0.05–1.2)
MONOCYTES NFR BLD: 8 % (ref 3–10)
NEUTS SEG # BLD: 2.4 K/UL (ref 1.8–8)
NEUTS SEG NFR BLD: 54 % (ref 40–73)
PLATELET # BLD AUTO: 216 K/UL (ref 135–420)
PMV BLD AUTO: 10.2 FL (ref 9.2–11.8)
POTASSIUM SERPL-SCNC: 4 MMOL/L (ref 3.5–5.5)
PROT SERPL-MCNC: 7.6 G/DL (ref 6.4–8.2)
RBC # BLD AUTO: 3.8 M/UL (ref 4.2–5.3)
SODIUM SERPL-SCNC: 141 MMOL/L (ref 136–145)
TRIGL SERPL-MCNC: 87 MG/DL (ref ?–150)
VLDLC SERPL CALC-MCNC: 17.4 MG/DL
WBC # BLD AUTO: 4.4 K/UL (ref 4.6–13.2)

## 2019-02-25 PROCEDURE — 85025 COMPLETE CBC W/AUTO DIFF WBC: CPT

## 2019-02-25 PROCEDURE — 80061 LIPID PANEL: CPT

## 2019-02-25 PROCEDURE — 82043 UR ALBUMIN QUANTITATIVE: CPT

## 2019-02-25 PROCEDURE — 36415 COLL VENOUS BLD VENIPUNCTURE: CPT

## 2019-02-25 PROCEDURE — 83036 HEMOGLOBIN GLYCOSYLATED A1C: CPT

## 2019-02-25 PROCEDURE — 80053 COMPREHEN METABOLIC PANEL: CPT

## 2019-03-01 ENCOUNTER — OFFICE VISIT (OUTPATIENT)
Dept: FAMILY MEDICINE CLINIC | Age: 84
End: 2019-03-01

## 2019-03-01 VITALS
DIASTOLIC BLOOD PRESSURE: 60 MMHG | RESPIRATION RATE: 16 BRPM | OXYGEN SATURATION: 100 % | HEART RATE: 66 BPM | SYSTOLIC BLOOD PRESSURE: 122 MMHG | WEIGHT: 149 LBS | HEIGHT: 59 IN | TEMPERATURE: 97.5 F | BODY MASS INDEX: 30.04 KG/M2

## 2019-03-01 DIAGNOSIS — D63.8 ANEMIA OF CHRONIC DISEASE: ICD-10-CM

## 2019-03-01 DIAGNOSIS — R05.8 DRY COUGH: ICD-10-CM

## 2019-03-01 DIAGNOSIS — M10.9 GOUT, UNSPECIFIED CAUSE, UNSPECIFIED CHRONICITY, UNSPECIFIED SITE: ICD-10-CM

## 2019-03-01 DIAGNOSIS — N18.30 CKD (CHRONIC KIDNEY DISEASE) STAGE 3, GFR 30-59 ML/MIN (HCC): ICD-10-CM

## 2019-03-01 DIAGNOSIS — I10 ESSENTIAL HYPERTENSION: Primary | ICD-10-CM

## 2019-03-01 DIAGNOSIS — E78.2 MIXED HYPERLIPIDEMIA: ICD-10-CM

## 2019-03-01 DIAGNOSIS — Z23 ENCOUNTER FOR IMMUNIZATION: ICD-10-CM

## 2019-03-01 RX ORDER — COLCHICINE 0.6 MG/1
0.6 TABLET ORAL DAILY
Qty: 90 TAB | Refills: 2 | Status: SHIPPED | OUTPATIENT
Start: 2019-03-01 | End: 2019-04-05 | Stop reason: SDUPTHER

## 2019-03-01 RX ORDER — LISINOPRIL 10 MG/1
10 TABLET ORAL DAILY
Qty: 90 TAB | Refills: 1 | Status: SHIPPED | OUTPATIENT
Start: 2019-03-01 | End: 2019-06-20 | Stop reason: SDUPTHER

## 2019-03-01 RX ORDER — ASCORBIC ACID 500 MG
500 TABLET ORAL DAILY
COMMUNITY
End: 2019-10-18

## 2019-03-01 RX ORDER — METOPROLOL SUCCINATE 25 MG/1
25 TABLET, EXTENDED RELEASE ORAL DAILY
Qty: 90 TAB | Refills: 0 | Status: SHIPPED | OUTPATIENT
Start: 2019-03-01 | End: 2019-06-20

## 2019-03-01 RX ORDER — CETIRIZINE HCL 10 MG
10 TABLET ORAL DAILY
Qty: 90 TAB | Refills: 0 | Status: SHIPPED | OUTPATIENT
Start: 2019-03-01 | End: 2020-07-02

## 2019-03-01 NOTE — PROGRESS NOTES
Alphonse Whitehead, 80 y.o.,  female SUBJECTIVE Ff-up DM w/ CKD 3-  She Reports FBG still  1teens. Off metformin for several months now, reviewed labs a1c 6.6 Worsening kidney function noted. Creatinine 1.7 HTN- Continues to take lisinopril , hydralazine, hctz, clonidine and norvasc. HL- on statin. Heart murmur- no sob, syncope, chest pain, leg edema. Cardiology following. Echo showing  Aortic valve regurg, monitoring Gout- usually foot swelling and pain, related to seafood. 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 C/o dry cough for 1 week. No fever, nasal congestion, sinus pressure Lives with  who is in his [de-identified]. Performs ADLs without much difficulty. No longer drives ROS: 
See HPI, all others negative Patient Active Problem List  
Diagnosis Code  Essential hypertension I10  
 Mixed hyperlipidemia E78.2  Advanced directives, counseling/discussion Z71.89  
 Controlled type 2 diabetes mellitus with stage 3 chronic kidney disease, without long-term current use of insulin ([facility]) E11.22, N18.3 Current Outpatient Prescriptions Medication Sig Dispense Refill  cholecalciferol, VITAMIN D3, (VITAMIN D3) 5,000 unit tab tablet Take  by mouth daily.  cloNIDine HCl (CATAPRES) 0.2 mg tablet TAKE ONE TABLET BY MOUTH TWICE DAILY 60 Tab 2  
 amLODIPine (NORVASC) 10 mg tablet Take 1 Tab by mouth daily. 90 Tab 1  
 metFORMIN (GLUCOPHAGE) 500 mg tablet Take 1 Tab by mouth two (2) times daily (with meals). 180 Tab 1  
 hydroCHLOROthiazide (HYDRODIURIL) 50 mg tablet Take 1 Tab by mouth daily. 90 Tab 1  potassium chloride SR (KLOR-CON 10) 10 mEq tablet Take 1 Tab by mouth daily. 90 Tab 2  
 hydrALAZINE (APRESOLINE) 100 mg tablet Take 1 Tab by mouth three (3) times daily. 270 Tab 2  
 colchicine (COLCRYS) 0.6 mg tablet Take 1 Tab by mouth daily.  Indications: Gout 90 Tab 2  
  simvastatin (ZOCOR) 40 mg tablet Take 1 Tab by mouth nightly. 90 Tab 2  
 docusate sodium (STOOL SOFTENER) 50 mg capsule Take 50 mg by mouth two (2) times a day.  Aspirin, Buffered 81 mg tab Take 1 tablet by mouth daily.  fish oil-dha-epa 1,200-144-216 mg cap Take 1 tablet by mouth daily. No Known Allergies Past Medical History:  
Diagnosis Date  Anemia  Carotid bruit 12/07/2013 Carotid Duplex: 1. Bilateral 1-49% stenosis of the internal carotid arteries. 2. No significant stenosis in the external carotid arteries bilaterally. 3. Antegrade flow in both vetebral arteries. 4. Normal flow in both subclavian arteries. Plaque Morphology: 1. Hyperechoic plaque in the bulb and right ICA. 2. Hyperechoic plaque in the bulb and left ICA.  CKD (chronic kidney disease) stage 3, GFR 30-59 ml/min  [facility] NIDDM  
 Gastritis 10/27/2014 Duodenum Bx: No Specific Pathologic Abnormality. No Villous Abnormality. 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 No dysplasia or malignancy identified. Bacterial Organisms c/w H. Pylori are present. Z-Line Bx: GE mucosa w/ Acute/Chronic Inflammation & Reactive Changes. Focal Specialized Type Campos Mucosa Identified. No Dysplasia or Malignancy Identified.  Gout 12/10/2009 Elevated Uric Acid Level  Hyperlipidemia  Hypertension  RAMÓN (iron deficiency anemia) Social History Social History  Marital status:  Spouse name: N/A  
 Number of children: N/A  
 Years of education: N/A Occupational History  Not on file. Social History Main Topics  Smoking status: Never Smoker  Smokeless tobacco: Never Used  Alcohol use No  
 Drug use: No  
 Sexual activity: Not Currently Partners: Male Birth control/ protection: None Other Topics Concern  Not on file Social History Narrative Family History Problem Relation Age of Onset  Hypertension Mother  Stroke Mother  Stroke Father OBJECTIVE Physical Exam:  
 
Visit Vitals /60 (BP 1 Location: Left arm, BP Patient Position: Sitting) Pulse 66 Temp 97.5 °F (36.4 °C) (Oral) Resp 16 Ht 4' 11\" (1.499 m) Wt 149 lb (67.6 kg) SpO2 100% BMI 30.09 kg/m² General: alert, well-appearing, AA,  in no apparent distress or pain Neck: supple, no adenopathy palpated CVS: normal rate, regular rhythm, + murmur Lungs:clear to ausculation bilaterally, no crackles, wheezing or rhonchi noted Extremities: trace bipedal edema Skin: warm, no lesions, rashes noted Psych:  mood and affect normal 
 
Results for orders placed or performed during the hospital encounter of 02/25/19 CBC WITH AUTOMATED DIFF Result Value Ref Range WBC 4.4 (L) 4.6 - 13.2 K/uL  
 RBC 3.80 (L) 4.20 - 5.30 M/uL  
 HGB 10.6 (L) 12.0 - 16.0 g/dL HCT 32.4 (L) 35.0 - 45.0 % MCV 85.3 74.0 - 97.0 FL  
 MCH 27.9 24.0 - 34.0 PG  
 MCHC 32.7 31.0 - 37.0 g/dL  
 RDW 16.3 (H) 11.6 - 14.5 % PLATELET 411 178 - 272 K/uL MPV 10.2 9.2 - 11.8 FL  
 NEUTROPHILS 54 40 - 73 % LYMPHOCYTES 36 21 - 52 % MONOCYTES 8 3 - 10 % EOSINOPHILS 1 0 - 5 % BASOPHILS 1 0 - 2 %  
 ABS. NEUTROPHILS 2.4 1.8 - 8.0 K/UL  
 ABS. LYMPHOCYTES 1.6 0.9 - 3.6 K/UL  
 ABS. MONOCYTES 0.3 0.05 - 1.2 K/UL  
 ABS. EOSINOPHILS 0.0 0.0 - 0.4 K/UL  
 ABS. BASOPHILS 0.0 0.0 - 0.1 K/UL  
 DF AUTOMATED METABOLIC PANEL, COMPREHENSIVE Result Value Ref Range Sodium 141 136 - 145 mmol/L Potassium 4.0 3.5 - 5.5 mmol/L Chloride 105 100 - 108 mmol/L  
 CO2 30 21 - 32 mmol/L Anion gap 6 3.0 - 18 mmol/L Glucose 117 (H) 74 - 99 mg/dL BUN 51 (H) 7.0 - 18 MG/DL Creatinine 1.70 (H) 0.6 - 1.3 MG/DL  
 BUN/Creatinine ratio 30 (H) 12 - 20 GFR est AA 35 (L) >60 ml/min/1.73m2 GFR est non-AA 29 (L) >60 ml/min/1.73m2 Calcium 9.1 8.5 - 10.1 MG/DL  Bilirubin, total 0.5 0.2 - 1.0 MG/DL  
 ALT (SGPT) 27 13 - 56 U/L  
 AST (SGOT) 35 15 - 37 U/L Alk. phosphatase 52 45 - 117 U/L Protein, total 7.6 6.4 - 8.2 g/dL Albumin 4.0 3.4 - 5.0 g/dL Globulin 3.6 2.0 - 4.0 g/dL A-G Ratio 1.1 0.8 - 1.7 LIPID PANEL Result Value Ref Range LIPID PROFILE Cholesterol, total 128 <200 MG/DL Triglyceride 87 <150 MG/DL  
 HDL Cholesterol 69 (H) 40 - 60 MG/DL  
 LDL, calculated 41.6 0 - 100 MG/DL VLDL, calculated 17.4 MG/DL  
 CHOL/HDL Ratio 1.9 0 - 5.0 HEMOGLOBIN A1C W/O EAG Result Value Ref Range Hemoglobin A1c 6.6 (H) 4.2 - 5.6 % MICROALBUMIN, UR, RAND W/ MICROALB/CREAT RATIO Result Value Ref Range Microalbumin,urine random 32.20 (H) 0 - 3.0 MG/DL Creatinine, urine 58.70 30 - 125 mg/dL Microalbumin/Creat ratio (mg/g creat) 549 (H) 0 - 30 mg/g ASSESSMENT/PLAN Diagnoses and all orders for this visit: 
 
Controlled type 2 diabetes mellitus with stage 3 chronic kidney disease, without long-term current use of insulin ([facility] Now diet controlled, off metformin a1c 6.6 Monitoring Essential hypertension Controlled Increasing creatinine level Hold hctz/KCl Start metoprolol XL 25 mg once daily Cont  norvasc, clonidine, hydralazine, lisinopril 10 mg  
 
 Mixed hyperlipidemia Good range LDL <100, on zocor Gout of foot, unspecified cause, unspecified chronicity, unspecified laterality On prophylactic colchicine, consider switching to allopurinol Anemia of chronic disease Stable, Baseline 10's Monitoring BMI 27 Encourage wt loss Heart murmur Aortic regurg Asymptomatic Monitoring, following cardiolgoy CKD 3 See above Recheck BMP in 3 weeks off hctz Monitoring Dry cough Start zyrtec 10 mg qday Encounter for vaccine PCV 13 today Follow-up Disposition: 
Return in about 4 weeks or sooner prn Patient understands plan of care. Patient has provided input and agrees with goals.

## 2019-03-01 NOTE — PROGRESS NOTES
1. Have you been to the ER, urgent care clinic since your last visit? Hospitalized since your last visit? No 
 
2. Have you seen or consulted any other health care providers outside of the [address] [place] [address] since your last visit? Include any pap smears or colon screening. No 
 
Last pneumonia vaccine 2/2018

## 2019-03-01 NOTE — PATIENT INSTRUCTIONS
DASH Diet: Care Instructions Your Care Instructions The DASH diet is an eating plan that can help lower your blood pressure. DASH stands for Dietary Approaches to Stop Hypertension. Hypertension is high blood pressure. The DASH diet focuses on eating foods that are high in calcium, potassium, and magnesium. These nutrients can lower blood pressure. The foods that are highest in these nutrients are fruits, vegetables, low-fat dairy products, nuts, seeds, and legumes. But taking calcium, potassium, and magnesium supplements instead of eating foods that are high in those nutrients does not have the same effect. The DASH diet also includes whole grains, fish, and poultry. The DASH diet is one of several lifestyle changes your doctor may recommend to lower your high blood pressure. Your doctor may also want you to decrease the amount of sodium in your diet. Lowering sodium while following the DASH diet can lower blood pressure even further than just the DASH diet alone. Follow-up care is a key part of your treatment and safety. Be sure to make and go to all appointments, and call your doctor if you are having problems. It's also a good idea to know your test results and keep a list of the medicines you take. How can you care for yourself at home? Following the DASH diet · Eat 4 to 5 servings of fruit each day. A serving is 1 medium-sized piece of fruit, ½ cup chopped or canned fruit, 1/4 cup dried fruit, or 4 ounces (½ cup) of fruit juice. Choose fruit more often than fruit juice. · Eat 4 to 5 servings of vegetables each day. A serving is 1 cup of lettuce or raw leafy vegetables, ½ cup of chopped or cooked vegetables, or 4 ounces (½ cup) of vegetable juice. Choose vegetables more often than vegetable juice. · Get 2 to 3 servings of low-fat and fat-free dairy each day. A serving is 8 ounces of milk, 1 cup of yogurt, or 1 ½ ounces of cheese. · Eat 6 to 8 servings of grains each day. A serving is 1 slice of bread, 1 ounce of dry cereal, or ½ cup of cooked rice, pasta, or cooked cereal. Try to choose whole-grain products as much as possible. · Limit lean meat, poultry, and fish to 2 servings each day. A serving is 3 ounces, about the size of a deck of cards. · Eat 4 to 5 servings of nuts, seeds, and legumes (cooked dried beans, lentils, and split peas) each week. A serving is 1/3 cup of nuts, 2 tablespoons of seeds, or ½ cup of cooked beans or peas. · Limit fats and oils to 2 to 3 servings each day. A serving is 1 teaspoon of vegetable oil or 2 tablespoons of salad dressing. · Limit sweets and added sugars to 5 servings or less a week. A serving is 1 tablespoon jelly or jam, ½ cup sorbet, or 1 cup of lemonade. · Eat less than 2,300 milligrams (mg) of sodium a day. If you limit your sodium to 1,500 mg a day, you can lower your blood pressure even more. Tips for success · Start small. Do not try to make dramatic changes to your diet all at once. You might feel that you are missing out on your favorite foods and then be more likely to not follow the plan. Make small changes, and stick with them. Once those changes become habit, add a few more changes. · Try some of the following: ? Make it a goal to eat a fruit or vegetable at every meal and at snacks. This will make it easy to get the recommended amount of fruits and vegetables each day. ? Try yogurt topped with fruit and nuts for a snack or healthy dessert. ? Add lettuce, tomato, cucumber, and onion to sandwiches. ? Combine a ready-made pizza crust with low-fat mozzarella cheese and lots of vegetable toppings. Try using tomatoes, squash, spinach, broccoli, carrots, cauliflower, and onions. ? Have a variety of cut-up vegetables with a low-fat dip as an appetizer instead of chips and dip. ? Sprinkle sunflower seeds or chopped almonds over salads.  Or try adding chopped walnuts or almonds to cooked vegetables. ? Try some vegetarian meals using beans and peas. Add garbanzo or kidney beans to salads. Make burritos and tacos with mashed day beans or black beans. Where can you learn more? Go to http://viral-zenaida.info/. Enter Y781 in the search box to learn more about \"DASH Diet: Care Instructions. \" Current as of: July 22, 2018 Content Version: 11.9 © 3547-5024 [employer]. Care instructions adapted under license by [employer] (which disclaims liability or warranty for this information). If you have questions about a medical condition or this instruction, always ask your healthcare professional. [address] any warranty or liability for your use of this information.

## 2019-04-05 ENCOUNTER — OFFICE VISIT (OUTPATIENT)
Dept: FAMILY MEDICINE CLINIC | Age: 84
End: 2019-04-05

## 2019-04-05 VITALS
SYSTOLIC BLOOD PRESSURE: 130 MMHG | HEIGHT: 59 IN | WEIGHT: 154 LBS | BODY MASS INDEX: 31.04 KG/M2 | TEMPERATURE: 98.3 F | OXYGEN SATURATION: 98 % | DIASTOLIC BLOOD PRESSURE: 82 MMHG | HEART RATE: 62 BPM | RESPIRATION RATE: 16 BRPM

## 2019-04-05 DIAGNOSIS — R60.0 LEG EDEMA: ICD-10-CM

## 2019-04-05 DIAGNOSIS — R01.1 HEART MURMUR: ICD-10-CM

## 2019-04-05 DIAGNOSIS — E11.22 CONTROLLED TYPE 2 DIABETES MELLITUS WITH STAGE 3 CHRONIC KIDNEY DISEASE, WITHOUT LONG-TERM CURRENT USE OF INSULIN (HCC): Primary | ICD-10-CM

## 2019-04-05 DIAGNOSIS — R05.8 DRY COUGH: ICD-10-CM

## 2019-04-05 DIAGNOSIS — M10.9 GOUT, UNSPECIFIED CAUSE, UNSPECIFIED CHRONICITY, UNSPECIFIED SITE: ICD-10-CM

## 2019-04-05 DIAGNOSIS — E78.2 MIXED HYPERLIPIDEMIA: ICD-10-CM

## 2019-04-05 DIAGNOSIS — N18.30 CKD (CHRONIC KIDNEY DISEASE) STAGE 3, GFR 30-59 ML/MIN (HCC): ICD-10-CM

## 2019-04-05 DIAGNOSIS — N18.30 CONTROLLED TYPE 2 DIABETES MELLITUS WITH STAGE 3 CHRONIC KIDNEY DISEASE, WITHOUT LONG-TERM CURRENT USE OF INSULIN (HCC): Primary | ICD-10-CM

## 2019-04-05 DIAGNOSIS — I10 ESSENTIAL HYPERTENSION: ICD-10-CM

## 2019-04-05 RX ORDER — COLCHICINE 0.6 MG/1
0.6 TABLET ORAL DAILY
Qty: 90 TAB | Refills: 2 | Status: SHIPPED | OUTPATIENT
Start: 2019-04-05 | End: 2020-01-28 | Stop reason: SDUPTHER

## 2019-04-05 RX ORDER — POTASSIUM CHLORIDE 750 MG/1
TABLET, EXTENDED RELEASE ORAL
Qty: 90 TAB | Refills: 0 | Status: SHIPPED | OUTPATIENT
Start: 2019-04-05 | End: 2019-06-20

## 2019-04-05 RX ORDER — FUROSEMIDE 20 MG/1
TABLET ORAL
Qty: 90 TAB | Refills: 0 | Status: SHIPPED | OUTPATIENT
Start: 2019-04-05 | End: 2019-09-09 | Stop reason: SDUPTHER

## 2019-04-05 NOTE — PROGRESS NOTES
Chief Complaint   Patient presents with    Diabetes    Hypertension    Cholesterol Problem    Anemia     1. Have you been to the ER, urgent care clinic since your last visit? Hospitalized since your last visit? No    2. Have you seen or consulted any other health care providers outside of the [address] [place] [address] since your last visit? Include any pap smears or colon screening.  No

## 2019-04-05 NOTE — PATIENT INSTRUCTIONS
DASH Diet: Care Instructions  Your Care Instructions    The DASH diet is an eating plan that can help lower your blood pressure. DASH stands for Dietary Approaches to Stop Hypertension. Hypertension is high blood pressure. The DASH diet focuses on eating foods that are high in calcium, potassium, and magnesium. These nutrients can lower blood pressure. The foods that are highest in these nutrients are fruits, vegetables, low-fat dairy products, nuts, seeds, and legumes. But taking calcium, potassium, and magnesium supplements instead of eating foods that are high in those nutrients does not have the same effect. The DASH diet also includes whole grains, fish, and poultry. The DASH diet is one of several lifestyle changes your doctor may recommend to lower your high blood pressure. Your doctor may also want you to decrease the amount of sodium in your diet. Lowering sodium while following the DASH diet can lower blood pressure even further than just the DASH diet alone. Follow-up care is a key part of your treatment and safety. Be sure to make and go to all appointments, and call your doctor if you are having problems. It's also a good idea to know your test results and keep a list of the medicines you take. How can you care for yourself at home? Following the DASH diet  · Eat 4 to 5 servings of fruit each day. A serving is 1 medium-sized piece of fruit, ½ cup chopped or canned fruit, 1/4 cup dried fruit, or 4 ounces (½ cup) of fruit juice. Choose fruit more often than fruit juice. · Eat 4 to 5 servings of vegetables each day. A serving is 1 cup of lettuce or raw leafy vegetables, ½ cup of chopped or cooked vegetables, or 4 ounces (½ cup) of vegetable juice. Choose vegetables more often than vegetable juice. · Get 2 to 3 servings of low-fat and fat-free dairy each day. A serving is 8 ounces of milk, 1 cup of yogurt, or 1 ½ ounces of cheese. · Eat 6 to 8 servings of grains each day.  A serving is 1 slice of bread, 1 ounce of dry cereal, or ½ cup of cooked rice, pasta, or cooked cereal. Try to choose whole-grain products as much as possible. · Limit lean meat, poultry, and fish to 2 servings each day. A serving is 3 ounces, about the size of a deck of cards. · Eat 4 to 5 servings of nuts, seeds, and legumes (cooked dried beans, lentils, and split peas) each week. A serving is 1/3 cup of nuts, 2 tablespoons of seeds, or ½ cup of cooked beans or peas. · Limit fats and oils to 2 to 3 servings each day. A serving is 1 teaspoon of vegetable oil or 2 tablespoons of salad dressing. · Limit sweets and added sugars to 5 servings or less a week. A serving is 1 tablespoon jelly or jam, ½ cup sorbet, or 1 cup of lemonade. · Eat less than 2,300 milligrams (mg) of sodium a day. If you limit your sodium to 1,500 mg a day, you can lower your blood pressure even more. Tips for success  · Start small. Do not try to make dramatic changes to your diet all at once. You might feel that you are missing out on your favorite foods and then be more likely to not follow the plan. Make small changes, and stick with them. Once those changes become habit, add a few more changes. · Try some of the following:  ? Make it a goal to eat a fruit or vegetable at every meal and at snacks. This will make it easy to get the recommended amount of fruits and vegetables each day. ? Try yogurt topped with fruit and nuts for a snack or healthy dessert. ? Add lettuce, tomato, cucumber, and onion to sandwiches. ? Combine a ready-made pizza crust with low-fat mozzarella cheese and lots of vegetable toppings. Try using tomatoes, squash, spinach, broccoli, carrots, cauliflower, and onions. ? Have a variety of cut-up vegetables with a low-fat dip as an appetizer instead of chips and dip. ? Sprinkle sunflower seeds or chopped almonds over salads. Or try adding chopped walnuts or almonds to cooked vegetables.   ? Try some vegetarian meals using beans and peas. Add garbanzo or kidney beans to salads. Make burritos and tacos with mashed day beans or black beans. Where can you learn more? Go to http://viral-zenaida.info/. Enter P494 in the search box to learn more about \"DASH Diet: Care Instructions. \"  Current as of: July 22, 2018  Content Version: 11.9  © 6855-0040 [employer]. Care instructions adapted under license by [employer] (which disclaims liability or warranty for this information). If you have questions about a medical condition or this instruction, always ask your healthcare professional. [address] any warranty or liability for your use of this information.

## 2019-04-05 NOTE — PROGRESS NOTES
Pierre Eastman, 80 y.o.,  female    SUBJECTIVE  Ff-up      HTN- adjusted BP medication with increasing creatinine levels. Added BB, and discontinued hctz. Continues to take lisinopril hydralazine, clonidine and norvasc. She did not get repeat BMP done yet. She reports increasing bilateral ankle swelling, reports low salt intake, no pain, no sob. She says leg swelling end of day, worse with prolonged sitting. Improved dry cough on zyrtec    DM w/ CKD 3-  She Reports FBG still  1teens. HL- on statin. Heart murmur- no sob, syncope, chest pain, leg edema. Cardiology following. Echo showing  Aortic valve regurg, monitoring    Gout- usually foot swelling and pain, related to seafood. 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 Lives with  who is in his [de-identified]. Performs ADLs without much difficulty. No longer drives    ROS:  See HPI, all others negative        Patient Active Problem List   Diagnosis Code    Essential hypertension I10    Mixed hyperlipidemia E78.2    Advanced directives, counseling/discussion Z71.89    Controlled type 2 diabetes mellitus with stage 3 chronic kidney disease, without long-term current use of insulin ([facility]) E11.22, N18.3       Current Outpatient Prescriptions   Medication Sig Dispense Refill    cholecalciferol, VITAMIN D3, (VITAMIN D3) 5,000 unit tab tablet Take  by mouth daily.  cloNIDine HCl (CATAPRES) 0.2 mg tablet TAKE ONE TABLET BY MOUTH TWICE DAILY 60 Tab 2    amLODIPine (NORVASC) 10 mg tablet Take 1 Tab by mouth daily. 90 Tab 1    metFORMIN (GLUCOPHAGE) 500 mg tablet Take 1 Tab by mouth two (2) times daily (with meals). 180 Tab 1    hydroCHLOROthiazide (HYDRODIURIL) 50 mg tablet Take 1 Tab by mouth daily. 90 Tab 1    potassium chloride SR (KLOR-CON 10) 10 mEq tablet Take 1 Tab by mouth daily. 90 Tab 2    hydrALAZINE (APRESOLINE) 100 mg tablet Take 1 Tab by mouth three (3) times daily.  270 Tab 2    colchicine (COLCRYS) 0.6 mg tablet Take 1 Tab by mouth daily. Indications: Gout 90 Tab 2    simvastatin (ZOCOR) 40 mg tablet Take 1 Tab by mouth nightly. 90 Tab 2    docusate sodium (STOOL SOFTENER) 50 mg capsule Take 50 mg by mouth two (2) times a day.  Aspirin, Buffered 81 mg tab Take 1 tablet by mouth daily.  fish oil-dha-epa 1,200-144-216 mg cap Take 1 tablet by mouth daily. No Known Allergies    Past Medical History:   Diagnosis Date    Anemia     Carotid bruit 12/07/2013    Carotid Duplex: 1. Bilateral 1-49% stenosis of the internal carotid arteries. 2. No significant stenosis in the external carotid arteries bilaterally. 3. Antegrade flow in both vetebral arteries. 4. Normal flow in both subclavian arteries. Plaque Morphology: 1. Hyperechoic plaque in the bulb and right ICA. 2. Hyperechoic plaque in the bulb and left ICA.  CKD (chronic kidney disease) stage 3, GFR 30-59 ml/min     Diabetes (HCC)     NIDDM    Gastritis 10/27/2014    Duodenum Bx: No Specific Pathologic Abnormality. No Villous Abnormality. 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 No dysplasia or malignancy identified. Bacterial Organisms c/w H. Pylori are present. Z-Line Bx: GE mucosa w/ Acute/Chronic Inflammation & Reactive Changes. Focal Specialized Type Campos Mucosa Identified. No Dysplasia or Malignancy Identified.  Gout 12/10/2009    Elevated Uric Acid Level    Hyperlipidemia     Hypertension     RAMÓN (iron deficiency anemia)        Social History     Social History    Marital status:      Spouse name: N/A    Number of children: N/A    Years of education: N/A     Occupational History    Not on file.      Social History Main Topics    Smoking status: Never Smoker    Smokeless tobacco: Never Used    Alcohol use No    Drug use: No    Sexual activity: Not Currently     Partners: Male     Birth control/ protection: None     Other Topics Concern    Not on file     Social History Narrative       Family History   Problem Relation Age of Onset    Hypertension Mother     Stroke Mother     Stroke Father          OBJECTIVE    Physical Exam:     Visit Vitals  /82 (BP 1 Location: Left arm, BP Patient Position: Sitting)   Pulse 62   Temp 98.3 °F (36.8 °C) (Oral)   Resp 16   Ht 4' 11\" (1.499 m)   Wt 154 lb (69.9 kg)   SpO2 98%   BMI 31.10 kg/m²       General: alert, well-appearing, AA,  in no apparent distress or pain  HEENT: throat and pharynx clear, eac patent, tm intact  Neck: supple, no adenopathy palpated  CVS: normal rate, regular rhythm, + murmur  Lungs:clear to ausculation bilaterally, no crackles, wheezing or rhonchi noted  Extremities:G1  bipedal edema- worse than last visit  Skin: warm, no lesions, rashes noted  Psych:  mood and affect normal    ASSESSMENT/PLAN  Diagnoses and all orders for this visit:    Controlled type 2 diabetes mellitus with stage 3 chronic kidney disease, without long-term current use of insulin (HCC)  Now diet controlled, off metformin  a1c 6.6  Monitoring    Essential hypertension  Controlled  Cont metoprolol,  norvasc, clonidine, hydralazine, lisinopril 10 mg   Will add low dose lasix/kcl prn for leg edeme    Leg edema  Advised compression stockings   Low salt diet,   Prn lasix/kcl     Mixed hyperlipidemia  Good range LDL <100, on zocor     Gout of foot, unspecified cause, unspecified chronicity, unspecified laterality  On prophylactic colchicine    Anemia of chronic disease  Stable, Baseline 10's  Monitoring  BMI 27  Encourage wt loss    Heart murmur  Aortic regurg  Asymptomatic  Monitoring, following cardiolgoy    CKD 3  See above  Recheck BMP in  2 weeks  Monitoring    Dry cough  Improved, cont zyrtec 10 mg qday    Follow-up Disposition:  Return in about 3 months, plan for [facility] then    Patient understands plan of care. Patient has provided input and agrees with goals.

## 2019-06-06 ENCOUNTER — OFFICE VISIT (OUTPATIENT)
Dept: FAMILY MEDICINE CLINIC | Age: 84
End: 2019-06-06

## 2019-06-06 ENCOUNTER — TELEPHONE (OUTPATIENT)
Dept: FAMILY MEDICINE CLINIC | Age: 84
End: 2019-06-06

## 2019-06-06 ENCOUNTER — HOSPITAL ENCOUNTER (EMERGENCY)
Age: 84
Discharge: HOME | End: 2019-06-06
Attending: EMERGENCY MEDICINE
Payer: MEDICARE

## 2019-06-06 VITALS
BODY MASS INDEX: 30.04 KG/M2 | WEIGHT: 149 LBS | TEMPERATURE: 98.1 F | DIASTOLIC BLOOD PRESSURE: 70 MMHG | HEART RATE: 36 BPM | SYSTOLIC BLOOD PRESSURE: 150 MMHG | HEIGHT: 59 IN | OXYGEN SATURATION: 97 % | RESPIRATION RATE: 16 BRPM

## 2019-06-06 VITALS
RESPIRATION RATE: 17 BRPM | HEART RATE: 34 BPM | SYSTOLIC BLOOD PRESSURE: 136 MMHG | DIASTOLIC BLOOD PRESSURE: 51 MMHG | OXYGEN SATURATION: 100 %

## 2019-06-06 DIAGNOSIS — N18.30 CKD (CHRONIC KIDNEY DISEASE) STAGE 3, GFR 30-59 ML/MIN (HCC): ICD-10-CM

## 2019-06-06 DIAGNOSIS — R00.1 BRADYCARDIA, DRUG INDUCED: Primary | ICD-10-CM

## 2019-06-06 DIAGNOSIS — E11.22 CONTROLLED TYPE 2 DIABETES MELLITUS WITH STAGE 3 CHRONIC KIDNEY DISEASE, WITHOUT LONG-TERM CURRENT USE OF INSULIN (HCC): ICD-10-CM

## 2019-06-06 DIAGNOSIS — R00.1 BRADYCARDIA: Primary | ICD-10-CM

## 2019-06-06 DIAGNOSIS — T50.905A BRADYCARDIA, DRUG INDUCED: Primary | ICD-10-CM

## 2019-06-06 DIAGNOSIS — R01.1 HEART MURMUR: ICD-10-CM

## 2019-06-06 DIAGNOSIS — M10.9 GOUT, UNSPECIFIED CAUSE, UNSPECIFIED CHRONICITY, UNSPECIFIED SITE: ICD-10-CM

## 2019-06-06 DIAGNOSIS — I10 ESSENTIAL HYPERTENSION: ICD-10-CM

## 2019-06-06 DIAGNOSIS — N18.30 CONTROLLED TYPE 2 DIABETES MELLITUS WITH STAGE 3 CHRONIC KIDNEY DISEASE, WITHOUT LONG-TERM CURRENT USE OF INSULIN (HCC): ICD-10-CM

## 2019-06-06 DIAGNOSIS — E78.2 MIXED HYPERLIPIDEMIA: ICD-10-CM

## 2019-06-06 DIAGNOSIS — J20.9 ACUTE BRONCHITIS, UNSPECIFIED ORGANISM: ICD-10-CM

## 2019-06-06 DIAGNOSIS — D63.8 ANEMIA OF CHRONIC DISEASE: ICD-10-CM

## 2019-06-06 LAB
ANION GAP SERPL CALC-SCNC: 7 MMOL/L (ref 3–18)
ATRIAL RATE: 48 BPM
BASOPHILS # BLD: 0 K/UL (ref 0–0.1)
BASOPHILS NFR BLD: 1 % (ref 0–2)
BUN SERPL-MCNC: 53 MG/DL (ref 7–18)
BUN/CREAT SERPL: 29 (ref 12–20)
CALCIUM SERPL-MCNC: 8.9 MG/DL (ref 8.5–10.1)
CALCULATED P AXIS, ECG09: 47 DEGREES
CALCULATED R AXIS, ECG10: -42 DEGREES
CALCULATED T AXIS, ECG11: 57 DEGREES
CHLORIDE SERPL-SCNC: 106 MMOL/L (ref 100–108)
CO2 SERPL-SCNC: 28 MMOL/L (ref 21–32)
CREAT SERPL-MCNC: 1.83 MG/DL (ref 0.6–1.3)
DIAGNOSIS, 93000: NORMAL
DIFFERENTIAL METHOD BLD: ABNORMAL
EOSINOPHIL # BLD: 0.1 K/UL (ref 0–0.4)
EOSINOPHIL NFR BLD: 2 % (ref 0–5)
ERYTHROCYTE [DISTWIDTH] IN BLOOD BY AUTOMATED COUNT: 16.5 % (ref 11.6–14.5)
GLUCOSE SERPL-MCNC: 136 MG/DL (ref 74–99)
HCT VFR BLD AUTO: 31.8 % (ref 35–45)
HGB BLD-MCNC: 10.1 G/DL (ref 12–16)
LYMPHOCYTES # BLD: 1.8 K/UL (ref 0.9–3.6)
LYMPHOCYTES NFR BLD: 49 % (ref 21–52)
MAGNESIUM SERPL-MCNC: 2.6 MG/DL (ref 1.6–2.6)
MCH RBC QN AUTO: 28.1 PG (ref 24–34)
MCHC RBC AUTO-ENTMCNC: 31.8 G/DL (ref 31–37)
MCV RBC AUTO: 88.3 FL (ref 74–97)
MONOCYTES # BLD: 0.3 K/UL (ref 0.05–1.2)
MONOCYTES NFR BLD: 9 % (ref 3–10)
NEUTS SEG # BLD: 1.4 K/UL (ref 1.8–8)
NEUTS SEG NFR BLD: 39 % (ref 40–73)
P-R INTERVAL, ECG05: 212 MS
PLATELET # BLD AUTO: 185 K/UL (ref 135–420)
PMV BLD AUTO: 10.9 FL (ref 9.2–11.8)
POTASSIUM SERPL-SCNC: 5.1 MMOL/L (ref 3.5–5.5)
Q-T INTERVAL, ECG07: 524 MS
QRS DURATION, ECG06: 94 MS
QTC CALCULATION (BEZET), ECG08: 468 MS
RBC # BLD AUTO: 3.6 M/UL (ref 4.2–5.3)
SODIUM SERPL-SCNC: 141 MMOL/L (ref 136–145)
VENTRICULAR RATE, ECG03: 48 BPM
WBC # BLD AUTO: 3.6 K/UL (ref 4.6–13.2)

## 2019-06-06 PROCEDURE — 80048 BASIC METABOLIC PNL TOTAL CA: CPT

## 2019-06-06 PROCEDURE — 83735 ASSAY OF MAGNESIUM: CPT

## 2019-06-06 PROCEDURE — 99284 EMERGENCY DEPT VISIT MOD MDM: CPT

## 2019-06-06 PROCEDURE — 93005 ELECTROCARDIOGRAM TRACING: CPT

## 2019-06-06 PROCEDURE — 85025 COMPLETE CBC W/AUTO DIFF WBC: CPT

## 2019-06-06 RX ORDER — BENZONATATE 100 MG/1
CAPSULE ORAL
Refills: 0 | COMMUNITY
Start: 2019-06-03 | End: 2019-06-20

## 2019-06-06 RX ORDER — AZITHROMYCIN 250 MG/1
TABLET, FILM COATED ORAL
Qty: 6 TAB | Refills: 0 | Status: SHIPPED | OUTPATIENT
Start: 2019-06-06 | End: 2019-06-20

## 2019-06-06 RX ORDER — FLUTICASONE PROPIONATE 50 MCG
2 SPRAY, SUSPENSION (ML) NASAL DAILY
Qty: 1 BOTTLE | Refills: 0 | Status: SHIPPED | OUTPATIENT
Start: 2019-06-06 | End: 2019-06-20

## 2019-06-06 NOTE — ED PROVIDER NOTES
HPI patient was seen in her family practice office earlier this morning for a routine follow-up visit. During that visit she was noticed to have a slow heart rate 30s. At this point her family practice doctor sent her to the emergency room for further evaluation. Patient has been on beta-blockers for years to control her high blood pressure. Currently she denies any symptoms denied specifically denies any chest pains palpitations dizziness or shortness of breath. She states she has not taken her blood pressure medicines yet this morning. Past Medical History:   Diagnosis Date    Anemia     Carotid bruit 12/07/2013    Carotid Duplex: 1. Bilateral 1-49% stenosis of the internal carotid arteries. 2. No significant stenosis in the external carotid arteries bilaterally. 3. Antegrade flow in both vetebral arteries. 4. Normal flow in both subclavian arteries. Plaque Morphology: 1. Hyperechoic plaque in the bulb and right ICA. 2. Hyperechoic plaque in the bulb and left ICA.  CKD (chronic kidney disease) stage 3, GFR 30-59 ml/min ([facility])     Diabetes ([facility] [address]     NIDDM    Gastritis 10/27/2014    Duodenum Bx: No Specific Pathologic Abnormality. No Villous Abnormality. 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 No dysplasia or malignancy identified. Bacterial Organisms c/w H. Pylori are present. Z-Line Bx: GE mucosa w/ Acute/Chronic Inflammation & Reactive Changes. Focal Specialized Type Campos Mucosa Identified. No Dysplasia or Malignancy Identified.      Gout 12/10/2009    Elevated Uric Acid Level    Hyperlipidemia     Hypertension     RAMÓN (iron deficiency anemia)        Past Surgical History:   Procedure Laterality Date    HX COLONOSCOPY  10/27/2014    Dr. Jan Panda HX ENDOSCOPY  10/27/2014    Dr. Claude Dimmer          Family History:   Problem Relation Age of Onset    Hypertension Mother     Stroke Mother     Stroke Father        Social History Socioeconomic History    Marital status:      Spouse name: Not on file    Number of children: Not on file    Years of education: Not on file    Highest education level: Not on file   Occupational History    Not on file   Social Needs    Financial resource strain: Not on file    Food insecurity:     Worry: Not on file     Inability: Not on file    Transportation needs:     Medical: Not on file     Non-medical: Not on file   Tobacco Use    Smoking status: Never Smoker    Smokeless tobacco: Never Used   Substance and Sexual Activity    Alcohol use: No    Drug use: No    Sexual activity: Not Currently     Partners: Male     Birth control/protection: None   Lifestyle    Physical activity:     Days per week: Not on file     Minutes per session: Not on file    Stress: Not on file   Relationships    Social connections:     Talks on phone: Not on file     Gets together: Not on file     Attends Congregation service: Not on file     Active member of club or organization: Not on file     Attends meetings of clubs or organizations: Not on file     Relationship status: Not on file    Intimate partner violence:     Fear of current or ex partner: Not on file     Emotionally abused: Not on file     Physically abused: Not on file     Forced sexual activity: Not on file   Other Topics Concern    Not on file   Social History Narrative    Not on file         ALLERGIES: Patient has no known allergies. Review of Systems   Constitutional: Negative. HENT: Negative. Respiratory: Negative. Cardiovascular: Negative. Gastrointestinal: Negative. Genitourinary: Negative. Musculoskeletal: Negative. Skin: Negative. Neurological: Negative. Psychiatric/Behavioral: Negative. Vitals:    06/06/19 0919   BP: 152/53   Pulse: (!) 41   Resp: 22   SpO2: 97%            Physical Exam   Constitutional: She is oriented to person, place, and time. She appears well-developed.    HENT:   Head: Normocephalic and atraumatic. Mouth/Throat: Oropharynx is clear and moist.   Eyes: Pupils are equal, round, and reactive to light. Conjunctivae and EOM are normal.   Neck: Normal range of motion. Neck supple. Cardiovascular: Normal rate and regular rhythm. Pulmonary/Chest: Effort normal and breath sounds normal.   Abdominal: Soft. Musculoskeletal: Normal range of motion. Neurological: She is alert and oriented to person, place, and time. Skin: Skin is warm and dry. Psychiatric: She has a normal mood and affect. Nursing note and vitals reviewed. MDM  Number of Diagnoses or Management Options  Bradycardia, drug induced: An EKG was read by myself at 9:12 AM showed sinus bradycardia with a first-degree AV block and a ventricular rate of 48 no acute changes noted. Procedures      I reviewed the results with the patient and her family. Patient has asymptomatic bradycardia due to her beta-blocker use. Her blood pressures remained normal while she is here in the emergency department with systolics in the 1 05-0 30 range and diastolics in the mid to high 70 range. Patient again denies any symptoms or complaints. We will send her home with follow-up with her PCP as discussed. Patient understands and agrees with this plan.

## 2019-06-06 NOTE — PROGRESS NOTES
Zainab Bustamante, 80 y.o.,  female    SUBJECTIVE  Cough x 1 week    Pt c/o nasal congestion, productive cough, difficulty sleeping due to cough. No fever, sob or wheezing. [facility]. Reviewed note CXR neg, CBC normal WBC, hgb 10.3 she was given tessalon perles without improvement of symptoms. On exam her HR is 36. She has been on metoprolol for several months usually baseline HR 66. She denies chest pain, shortness of breath, fatigue, lightheadedness. She has h/o well controlled DM, CKD3, HL, gout. She has known aortic valve regurgitation. Lives with  who is in his [de-identified]. Performs ADLs without much difficulty. No longer drives    ROS:  See HPI, all others negative        Patient Active Problem List   Diagnosis Code    Essential hypertension I10    Mixed hyperlipidemia E78.2    Advanced directives, counseling/discussion Z71.89    Controlled type 2 diabetes mellitus with stage 3 chronic kidney disease, without long-term current use of insulin ([facility]) E11.22, N18.3       Current Outpatient Prescriptions   Medication Sig Dispense Refill    cholecalciferol, VITAMIN D3, (VITAMIN D3) 5,000 unit tab tablet Take  by mouth daily.  cloNIDine HCl (CATAPRES) 0.2 mg tablet TAKE ONE TABLET BY MOUTH TWICE DAILY 60 Tab 2    amLODIPine (NORVASC) 10 mg tablet Take 1 Tab by mouth daily. 90 Tab 1    metFORMIN (GLUCOPHAGE) 500 mg tablet Take 1 Tab by mouth two (2) times daily (with meals). 180 Tab 1    hydroCHLOROthiazide (HYDRODIURIL) 50 mg tablet Take 1 Tab by mouth daily. 90 Tab 1    potassium chloride SR (KLOR-CON 10) 10 mEq tablet Take 1 Tab by mouth daily. 90 Tab 2    hydrALAZINE (APRESOLINE) 100 mg tablet Take 1 Tab by mouth three (3) times daily. 270 Tab 2    colchicine (COLCRYS) 0.6 mg tablet Take 1 Tab by mouth daily. Indications: Gout 90 Tab 2    simvastatin (ZOCOR) 40 mg tablet Take 1 Tab by mouth nightly.  90 Tab 2    docusate sodium (STOOL SOFTENER) 50 mg capsule Take 50 mg by mouth two (2) times a day.  Aspirin, Buffered 81 mg tab Take 1 tablet by mouth daily.  fish oil-dha-epa 1,200-144-216 mg cap Take 1 tablet by mouth daily. No Known Allergies    Past Medical History:   Diagnosis Date    Anemia     Carotid bruit 12/07/2013    Carotid Duplex: 1. Bilateral 1-49% stenosis of the internal carotid arteries. 2. No significant stenosis in the external carotid arteries bilaterally. 3. Antegrade flow in both vetebral arteries. 4. Normal flow in both subclavian arteries. Plaque Morphology: 1. Hyperechoic plaque in the bulb and right ICA. 2. Hyperechoic plaque in the bulb and left ICA.  CKD (chronic kidney disease) stage 3, GFR 30-59 ml/min     Diabetes ([facility])     NIDDM    Gastritis 10/27/2014    Duodenum Bx: No Specific Pathologic Abnormality. No Villous Abnormality. 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 No dysplasia or malignancy identified. Bacterial Organisms c/w H. Pylori are present. Z-Line Bx: GE mucosa w/ Acute/Chronic Inflammation & Reactive Changes. Focal Specialized Type Campos Mucosa Identified. No Dysplasia or Malignancy Identified.  Gout 12/10/2009    Elevated Uric Acid Level    Hyperlipidemia     Hypertension     RAMÓN (iron deficiency anemia)        Social History     Social History    Marital status:      Spouse name: N/A    Number of children: N/A    Years of education: N/A     Occupational History    Not on file.      Social History Main Topics    Smoking status: Never Smoker    Smokeless tobacco: Never Used    Alcohol use No    Drug use: No    Sexual activity: Not Currently     Partners: Male     Birth control/ protection: None     Other Topics Concern    Not on file     Social History Narrative       Family History   Problem Relation Age of Onset    Hypertension Mother     Stroke Mother     Stroke Father          OBJECTIVE    Physical Exam:     Visit Vitals  /70 (BP 1 Location: Left arm, BP Patient Position: Sitting)   Pulse (!) 36   Temp 98.1 °F (36.7 °C) (Oral)   Resp 16   Ht 4' 11\" (1.499 m)   Wt 149 lb (67.6 kg)   SpO2 97%   BMI 30.09 kg/m²       General: alert, well-appearing, AA,  in no apparent distress or pain  HEENT: throat and pharynx clear, eac patent, tm intact, boggy nasal mucosa  Neck: supple, no adenopathy palpated  CVS: + markedly bradycardic,  regular rhythm, + murmur  Lungs:clear to ausculation bilaterally, no crackles, wheezing or rhonchi noted  Extremities: trace bipedal edema- chronic  Skin: warm, no lesions, rashes noted  Psych:  mood and affect normal    ASSESSMENT/PLAN  Diagnoses and all orders for this visit:    Bradycardia  Asymptomatic, Hemodynamically stable   However HR 30's. Will send to ER for evaluation, spoke to [facility] charge nurse regarding her case as attending physician in the rooms. Escorted by [facility]id to ED  Stop metoprolol    Acute bronchitis  Given zpack and flonse    Controlled type 2 diabetes mellitus with stage 3 chronic kidney disease, without long-term current use of insulin (HCC)  diet controlled  a1c 6.6  Monitoring    Essential hypertension  Stop metoprolol with marked bradycardia  Cont  norvasc, clonidine, hydralazine, lisinopril 10 mg     Leg edema  Advised compression stockings   Low salt diet,   Prn lasix/kcl     Mixed hyperlipidemia  Good range LDL <100, on zocor     Gout of foot, unspecified cause, unspecified chronicity, unspecified laterality  On prophylactic colchicine    Anemia of chronic disease  Stable, Baseline 10's  Monitoring  BMI 27  Encourage wt loss    Heart murmur  Aortic regurg  Asymptomatic  Monitoring, following cardiolgoy    CKD 3  See above  Recheck BMP in  2 weeks  Monitoring      Follow-up Disposition:  Return depending on ED visit    Patient understands plan of care. Patient has provided input and agrees with goals.

## 2019-06-06 NOTE — DISCHARGE INSTRUCTIONS
Patient Education        Learning About Rate-Control Medicines  Introduction    Rate-control medicines are used if your heart beats too fast. These drugs can slow down your heart rate. They can also ease the symptoms of a fast heart rate. When your heart beats too fast, you may feel dizzy or pass out. You may have chest pain. Also, your heart may race or pound. These drugs can be used to treat problems such as atrial fibrillation. There are three common types of these drugs. They are beta-blockers, calcium channel blockers, and digoxin. Examples  Beta-blockers  · Atenolol (Tenormin)  · Carvedilol (Coreg)  · Metoprolol (Lopressor, Toprol)  · Propranolol (Inderal)  Calcium channel blockers  · Diltiazem (Cardizem, Taztia)  · Verapamil (Calan, Verelan)  Digoxin  · Digoxin (Lanoxin)  Possible side effects  Common side effects of beta-blockers include:  · Feeling dizzy or lightheaded. · Feeling tired. · Trouble sleeping. Common side effects of calcium channel blockers include:  · Stomach problems. You may be constipated. Or you may have diarrhea. · Feeling tired. You may have other side effects or reactions not listed here. Check the information that comes with your medicine. What to know about taking this medicine  · Too much digoxin can poison you. This happens when there is too much of the drug in your body. Call your doctor if:  ? You lose your appetite. ? You have stomach problems. You may feel sick to your stomach. Or you may vomit or have diarrhea.  ? Your vision changes. ? You are confused. ? Your heartbeat is not normal.  ? You passed out. · Take your medicines exactly as prescribed. Call your doctor if you think you are having a problem with your medicine. · Check with your doctor or pharmacist before you use any other medicines. These include over-the-counter medicines. Make sure your doctor knows all of the medicines, vitamins, herbs, and supplements you take.  Taking some medicines together can cause problems. Where can you learn more? Go to http://viral-zenaida.info/. Enter Y040 in the search box to learn more about \"Learning About Rate-Control Medicines. \"  Current as of: July 22, 2018  Content Version: 11.9  © 9581-4943 [employer]. Care instructions adapted under license by [employer] (which disclaims liability or warranty for this information). If you have questions about a medical condition or this instruction, always ask your healthcare professional. [address] any warranty or liability for your use of this information.

## 2019-06-06 NOTE — TELEPHONE ENCOUNTER
Called patient to remind her that she is ti stop her metoprolol per verbal order from Dr. Enedelia Shea. Also need to schedule 2 week follow up.

## 2019-06-06 NOTE — ED NOTES
Dr. José Miguel Coleman aware of patient heart rate being in low 30's. Patient is to hold her betablocker.

## 2019-06-06 NOTE — ED NOTES
Dotty Flores is a 80 y.o. female that was discharged in good condition. The patients diagnosis, condition and treatment were explained to  patient and aftercare instructions were given. The patient verbalized understanding. Patient armband removed and shredded. Patient denies dizziness. Patient was instructed by Dr. Givens Shed not take her betablocker.

## 2019-06-06 NOTE — PROGRESS NOTES
Chief Complaint   Patient presents with    URI     FU from [facility] going on for a week now    Cough     1. Have you been to the ER, urgent care clinic since your last visit? Hospitalized since your last visit? Yes When: 6/3/19 Where: [facility] Reason for visit: URI    2. Have you seen or consulted any other health care providers outside of the [address] [place] [address] since your last visit? Include any pap smears or colon screening.  No

## 2019-06-18 NOTE — PATIENT INSTRUCTIONS
Detail Level: Zone PLS GO TO THE EMERGENCY ROOM TO HAVE HEART RATE CHECKED  FF-UP DEPENDING ON ER VISIT

## 2019-06-20 ENCOUNTER — OFFICE VISIT (OUTPATIENT)
Dept: FAMILY MEDICINE CLINIC | Age: 84
End: 2019-06-20

## 2019-06-20 VITALS
OXYGEN SATURATION: 98 % | RESPIRATION RATE: 16 BRPM | HEART RATE: 56 BPM | BODY MASS INDEX: 29.84 KG/M2 | HEIGHT: 59 IN | WEIGHT: 148 LBS | SYSTOLIC BLOOD PRESSURE: 139 MMHG | DIASTOLIC BLOOD PRESSURE: 80 MMHG | TEMPERATURE: 98.1 F

## 2019-06-20 DIAGNOSIS — N18.30 CKD (CHRONIC KIDNEY DISEASE) STAGE 3, GFR 30-59 ML/MIN (HCC): ICD-10-CM

## 2019-06-20 DIAGNOSIS — R00.1 BRADYCARDIA: Primary | ICD-10-CM

## 2019-06-20 DIAGNOSIS — I10 ESSENTIAL HYPERTENSION: ICD-10-CM

## 2019-06-20 DIAGNOSIS — D63.8 ANEMIA OF CHRONIC DISEASE: ICD-10-CM

## 2019-06-20 DIAGNOSIS — E11.22 CONTROLLED TYPE 2 DIABETES MELLITUS WITH STAGE 3 CHRONIC KIDNEY DISEASE, WITHOUT LONG-TERM CURRENT USE OF INSULIN (HCC): ICD-10-CM

## 2019-06-20 DIAGNOSIS — N18.30 CONTROLLED TYPE 2 DIABETES MELLITUS WITH STAGE 3 CHRONIC KIDNEY DISEASE, WITHOUT LONG-TERM CURRENT USE OF INSULIN (HCC): ICD-10-CM

## 2019-06-20 DIAGNOSIS — E78.2 MIXED HYPERLIPIDEMIA: ICD-10-CM

## 2019-06-20 RX ORDER — LISINOPRIL 10 MG/1
10 TABLET ORAL DAILY
Qty: 90 TAB | Refills: 0 | Status: SHIPPED | OUTPATIENT
Start: 2019-06-20 | End: 2019-09-03

## 2019-06-20 NOTE — PROGRESS NOTES
Kenyon Villa, 80 y.o.,  female    SUBJECTIVE  Ff-up    Bradycardia- she was found to have asymptomatic bradycardia (HR 30's) during an acute visit for bronchitis last week. She was evaluated in the ED and showed sinus judith with 1st deg AV block, instructed to stop metoprolol. However she continued to take medication. She does not feel any different. HTN/CKD 3-   She continues to takehydralazine, clonidine and norvasc. Says she has not been taking lisinopril, unclear reason. DM w/ CKD 3-  She Reports FBG still  1teens. HL- on statin. Heart murmur- no sob, syncope, chest pain, leg edema. Cardiology following. Echo showing  Aortic valve regurg, monitoring    Gout- usually foot swelling and pain, related to seafood. 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 Lives with  who is in his [de-identified]. Performs ADLs without much difficulty. No longer drives    ROS:  See HPI, all others negative        Patient Active Problem List   Diagnosis Code    Essential hypertension I10    Mixed hyperlipidemia E78.2    Advanced directives, counseling/discussion Z71.89    Controlled type 2 diabetes mellitus with stage 3 chronic kidney disease, without long-term current use of insulin (HCC) E11.22, N18.3       Current Outpatient Prescriptions   Medication Sig Dispense Refill    cholecalciferol, VITAMIN D3, (VITAMIN D3) 5,000 unit tab tablet Take  by mouth daily.  cloNIDine HCl (CATAPRES) 0.2 mg tablet TAKE ONE TABLET BY MOUTH TWICE DAILY 60 Tab 2    amLODIPine (NORVASC) 10 mg tablet Take 1 Tab by mouth daily. 90 Tab 1    metFORMIN (GLUCOPHAGE) 500 mg tablet Take 1 Tab by mouth two (2) times daily (with meals). 180 Tab 1    hydroCHLOROthiazide (HYDRODIURIL) 50 mg tablet Take 1 Tab by mouth daily. 90 Tab 1    potassium chloride SR (KLOR-CON 10) 10 mEq tablet Take 1 Tab by mouth daily. 90 Tab 2    hydrALAZINE (APRESOLINE) 100 mg tablet Take 1 Tab by mouth three (3) times daily.  [address] Tab 2    colchicine (COLCRYS) 0.6 mg tablet Take 1 Tab by mouth daily. Indications: Gout 90 Tab 2    simvastatin (ZOCOR) 40 mg tablet Take 1 Tab by mouth nightly. 90 Tab 2    docusate sodium (STOOL SOFTENER) 50 mg capsule Take 50 mg by mouth two (2) times a day.  Aspirin, Buffered 81 mg tab Take 1 tablet by mouth daily.  fish oil-dha-epa 1,200-144-216 mg cap Take 1 tablet by mouth daily. No Known Allergies    Past Medical History:   Diagnosis Date    Anemia     Carotid bruit 12/07/2013    Carotid Duplex: 1. Bilateral 1-49% stenosis of the internal carotid arteries. 2. No significant stenosis in the external carotid arteries bilaterally. 3. Antegrade flow in both vetebral arteries. 4. Normal flow in both subclavian arteries. Plaque Morphology: 1. Hyperechoic plaque in the bulb and right ICA. 2. Hyperechoic plaque in the bulb and left ICA.  CKD (chronic kidney disease) stage 3, GFR 30-59 ml/min     Diabetes ([facility])     NIDDM    Gastritis 10/27/2014    Duodenum Bx: No Specific Pathologic Abnormality. No Villous Abnormality. 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 No dysplasia or malignancy identified. Bacterial Organisms c/w H. Pylori are present. Z-Line Bx: GE mucosa w/ Acute/Chronic Inflammation & Reactive Changes. Focal Specialized Type Campos Mucosa Identified. No Dysplasia or Malignancy Identified.  Gout 12/10/2009    Elevated Uric Acid Level    Hyperlipidemia     Hypertension     RAMÓN (iron deficiency anemia)        Social History     Social History    Marital status:      Spouse name: N/A    Number of children: N/A    Years of education: N/A     Occupational History    Not on file.      Social History Main Topics    Smoking status: Never Smoker    Smokeless tobacco: Never Used    Alcohol use No    Drug use: No    Sexual activity: Not Currently     Partners: Male     Birth control/ protection: None     Other Topics Concern  Not on file     Social History Narrative       Family History   Problem Relation Age of Onset    Hypertension Mother     Stroke Mother     Stroke Father          OBJECTIVE    Physical Exam:     Visit Vitals  /80 (BP 1 Location: Left arm, BP Patient Position: Sitting)   Pulse (!) 56   Temp 98.1 °F (36.7 °C) (Oral)   Resp 16   Ht 4' 11\" (1.499 m)   Wt 148 lb (67.1 kg)   SpO2 98%   BMI 29.89 kg/m²       General: alert, well-appearing, AA,  in no apparent distress or pain  HEENT: throat and pharynx clear, eac patent, tm intact  Neck: supple, no adenopathy palpated  CVS: + mild bradycardia, regular rhythm, + murmur  Lungs:clear to ausculation bilaterally, no crackles, wheezing or rhonchi noted  Extremities: Trace bipedal edema  Skin: warm, no lesions, rashes noted  Psych:  mood and affect normal    ASSESSMENT/PLAN  Diagnoses and all orders for this visit:    Bradycardia  Advised to stop metoprolol    Controlled type 2 diabetes mellitus with stage 3 chronic kidney disease, without long-term current use of insulin (HCC)  Now diet controlled, off metformin  a1c 6.6  Monitoring    Essential hypertension  Controlled  Stop BB with bradycardia, resume lisinopril 10 mg  Cont   norvasc, clonidine, hydralazine   Will add low dose lasix/kcl prn for leg edema    Leg edema  Advised compression stockings   Low salt diet,   Prn lasix/kcl     Mixed hyperlipidemia  Good range LDL <100, on zocor     Gout of foot, unspecified cause, unspecified chronicity, unspecified laterality  On prophylactic colchicine    Anemia of chronic disease  Stable, Baseline 10's  Monitoring  BMI 27  Encourage wt loss    Heart murmur  Aortic regurg  Asymptomatic  Monitoring, following cardiolgoy    CKD 3  See above  Recheck BMP in  2 weeks  Monitoring    Follow-up Disposition:  Keep appt in July 25    Patient understands plan of care. Patient has provided input and agrees with goals.

## 2019-06-20 NOTE — PATIENT INSTRUCTIONS
DASH Diet: Care Instructions Your Care Instructions The DASH diet is an eating plan that can help lower your blood pressure. DASH stands for Dietary Approaches to Stop Hypertension. Hypertension is high blood pressure. The DASH diet focuses on eating foods that are high in calcium, potassium, and magnesium. These nutrients can lower blood pressure. The foods that are highest in these nutrients are fruits, vegetables, low-fat dairy products, nuts, seeds, and legumes. But taking calcium, potassium, and magnesium supplements instead of eating foods that are high in those nutrients does not have the same effect. The DASH diet also includes whole grains, fish, and poultry. The DASH diet is one of several lifestyle changes your doctor may recommend to lower your high blood pressure. Your doctor may also want you to decrease the amount of sodium in your diet. Lowering sodium while following the DASH diet can lower blood pressure even further than just the DASH diet alone. Follow-up care is a key part of your treatment and safety. Be sure to make and go to all appointments, and call your doctor if you are having problems. It's also a good idea to know your test results and keep a list of the medicines you take. How can you care for yourself at home? Following the DASH diet · Eat 4 to 5 servings of fruit each day. A serving is 1 medium-sized piece of fruit, ½ cup chopped or canned fruit, 1/4 cup dried fruit, or 4 ounces (½ cup) of fruit juice. Choose fruit more often than fruit juice. · Eat 4 to 5 servings of vegetables each day. A serving is 1 cup of lettuce or raw leafy vegetables, ½ cup of chopped or cooked vegetables, or 4 ounces (½ cup) of vegetable juice. Choose vegetables more often than vegetable juice. · Get 2 to 3 servings of low-fat and fat-free dairy each day. A serving is 8 ounces of milk, 1 cup of yogurt, or 1 ½ ounces of cheese. · Eat 6 to 8 servings of grains each day. A serving is 1 slice of bread, 1 ounce of dry cereal, or ½ cup of cooked rice, pasta, or cooked cereal. Try to choose whole-grain products as much as possible. · Limit lean meat, poultry, and fish to 2 servings each day. A serving is 3 ounces, about the size of a deck of cards. · Eat 4 to 5 servings of nuts, seeds, and legumes (cooked dried beans, lentils, and split peas) each week. A serving is 1/3 cup of nuts, 2 tablespoons of seeds, or ½ cup of cooked beans or peas. · Limit fats and oils to 2 to 3 servings each day. A serving is 1 teaspoon of vegetable oil or 2 tablespoons of salad dressing. · Limit sweets and added sugars to 5 servings or less a week. A serving is 1 tablespoon jelly or jam, ½ cup sorbet, or 1 cup of lemonade. · Eat less than 2,300 milligrams (mg) of sodium a day. If you limit your sodium to 1,500 mg a day, you can lower your blood pressure even more. Tips for success · Start small. Do not try to make dramatic changes to your diet all at once. You might feel that you are missing out on your favorite foods and then be more likely to not follow the plan. Make small changes, and stick with them. Once those changes become habit, add a few more changes. · Try some of the following: ? Make it a goal to eat a fruit or vegetable at every meal and at snacks. This will make it easy to get the recommended amount of fruits and vegetables each day. ? Try yogurt topped with fruit and nuts for a snack or healthy dessert. ? Add lettuce, tomato, cucumber, and onion to sandwiches. ? Combine a ready-made pizza crust with low-fat mozzarella cheese and lots of vegetable toppings. Try using tomatoes, squash, spinach, broccoli, carrots, cauliflower, and onions. ? Have a variety of cut-up vegetables with a low-fat dip as an appetizer instead of chips and dip. ? Sprinkle sunflower seeds or chopped almonds over salads.  Or try adding chopped walnuts or almonds to cooked vegetables. ? Try some vegetarian meals using beans and peas. Add garbanzo or kidney beans to salads. Make burritos and tacos with mashed day beans or black beans. Where can you learn more? Go to http://viral-zenaida.info/. Enter B894 in the search box to learn more about \"DASH Diet: Care Instructions. \" Current as of: July 22, 2018 Content Version: 11.9 © 8074-2869 [employer]. Care instructions adapted under license by [employer] (which disclaims liability or warranty for this information). If you have questions about a medical condition or this instruction, always ask your healthcare professional. [address] any warranty or liability for your use of this information.

## 2019-06-20 NOTE — PROGRESS NOTES
1. Have you been to the ER, urgent care clinic since your last visit? Hospitalized since your last visit? No    2. Have you seen or consulted any other health care providers outside of the [address] [place] [address] since your last visit? Include any pap smears or colon screening.  No

## 2019-07-22 ENCOUNTER — HOSPITAL ENCOUNTER (OUTPATIENT)
Dept: LAB | Age: 84
Discharge: HOME | End: 2019-07-22
Payer: MEDICARE

## 2019-07-22 DIAGNOSIS — N18.30 CKD (CHRONIC KIDNEY DISEASE) STAGE 3, GFR 30-59 ML/MIN (HCC): ICD-10-CM

## 2019-07-22 LAB
ANION GAP SERPL CALC-SCNC: 7 MMOL/L (ref 3–18)
BUN SERPL-MCNC: 48 MG/DL (ref 7–18)
BUN/CREAT SERPL: 32 (ref 12–20)
CALCIUM SERPL-MCNC: 9.1 MG/DL (ref 8.5–10.1)
CHLORIDE SERPL-SCNC: 107 MMOL/L (ref 100–111)
CO2 SERPL-SCNC: 28 MMOL/L (ref 21–32)
CREAT SERPL-MCNC: 1.5 MG/DL (ref 0.6–1.3)
GLUCOSE SERPL-MCNC: 126 MG/DL (ref 74–99)
POTASSIUM SERPL-SCNC: 4 MMOL/L (ref 3.5–5.5)
SODIUM SERPL-SCNC: 142 MMOL/L (ref 136–145)

## 2019-07-22 PROCEDURE — 36415 COLL VENOUS BLD VENIPUNCTURE: CPT

## 2019-07-22 PROCEDURE — 80048 BASIC METABOLIC PNL TOTAL CA: CPT

## 2019-07-25 ENCOUNTER — OFFICE VISIT (OUTPATIENT)
Dept: FAMILY MEDICINE CLINIC | Age: 84
End: 2019-07-25

## 2019-07-25 VITALS
HEART RATE: 48 BPM | BODY MASS INDEX: 29.84 KG/M2 | WEIGHT: 148 LBS | TEMPERATURE: 97.3 F | HEIGHT: 59 IN | DIASTOLIC BLOOD PRESSURE: 60 MMHG | OXYGEN SATURATION: 99 % | SYSTOLIC BLOOD PRESSURE: 110 MMHG | RESPIRATION RATE: 16 BRPM

## 2019-07-25 DIAGNOSIS — R60.0 LEG EDEMA: ICD-10-CM

## 2019-07-25 DIAGNOSIS — R00.1 SINUS BRADYCARDIA: ICD-10-CM

## 2019-07-25 DIAGNOSIS — Z71.89 ADVANCE CARE PLANNING: ICD-10-CM

## 2019-07-25 DIAGNOSIS — N18.30 CKD (CHRONIC KIDNEY DISEASE) STAGE 3, GFR 30-59 ML/MIN (HCC): ICD-10-CM

## 2019-07-25 DIAGNOSIS — E11.22 CONTROLLED TYPE 2 DIABETES MELLITUS WITH STAGE 3 CHRONIC KIDNEY DISEASE, WITHOUT LONG-TERM CURRENT USE OF INSULIN (HCC): Primary | ICD-10-CM

## 2019-07-25 DIAGNOSIS — N18.30 CONTROLLED TYPE 2 DIABETES MELLITUS WITH STAGE 3 CHRONIC KIDNEY DISEASE, WITHOUT LONG-TERM CURRENT USE OF INSULIN (HCC): Primary | ICD-10-CM

## 2019-07-25 DIAGNOSIS — D63.8 ANEMIA OF CHRONIC DISEASE: ICD-10-CM

## 2019-07-25 DIAGNOSIS — Z00.00 MEDICARE ANNUAL WELLNESS VISIT, SUBSEQUENT: ICD-10-CM

## 2019-07-25 DIAGNOSIS — I10 ESSENTIAL HYPERTENSION: Chronic | ICD-10-CM

## 2019-07-25 DIAGNOSIS — E78.2 MIXED HYPERLIPIDEMIA: ICD-10-CM

## 2019-07-25 DIAGNOSIS — M10.9 GOUT, UNSPECIFIED CAUSE, UNSPECIFIED CHRONICITY, UNSPECIFIED SITE: ICD-10-CM

## 2019-07-25 NOTE — PROGRESS NOTES
This is the Subsequent Medicare Annual Wellness Exam, performed 12 months or more after the Initial AWV or the last Subsequent AWV    I have reviewed the patient's medical history in detail and updated the computerized patient record. History     Past Medical History:   Diagnosis Date    Anemia     Carotid bruit 12/07/2013    Carotid Duplex: 1. Bilateral 1-49% stenosis of the internal carotid arteries. 2. No significant stenosis in the external carotid arteries bilaterally. 3. Antegrade flow in both vetebral arteries. 4. Normal flow in both subclavian arteries. Plaque Morphology: 1. Hyperechoic plaque in the bulb and right ICA. 2. Hyperechoic plaque in the bulb and left ICA.  CKD (chronic kidney disease) stage 3, GFR 30-59 ml/min ([facility])     Diabetes ([facility] [address]     NIDDM    Gastritis 10/27/2014    Duodenum Bx: No Specific Pathologic Abnormality. No Villous Abnormality. 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 No dysplasia or malignancy identified. Bacterial Organisms c/w H. Pylori are present. Z-Line Bx: GE mucosa w/ Acute/Chronic Inflammation & Reactive Changes. Focal Specialized Type Campos Mucosa Identified. No Dysplasia or Malignancy Identified.  Gout 12/10/2009    Elevated Uric Acid Level    Hyperlipidemia     Hypertension     RAMÓN (iron deficiency anemia)       Past Surgical History:   Procedure Laterality Date    HX COLONOSCOPY  10/27/2014    Dr. Howard Drew HX ENDOSCOPY  10/27/2014    Dr. Girish Chowdhury      Current Outpatient Medications   Medication Sig Dispense Refill    lisinopril (PRINIVIL, ZESTRIL) 10 mg tablet Take 1 Tab by mouth daily. 90 Tab 0    furosemide (LASIX) 20 mg tablet Take 1 tablet once daily as needed for edema 90 Tab 0    colchicine (COLCRYS) 0.6 mg tablet Take 1 Tab by mouth daily. Indications: gout 90 Tab 2    ascorbic acid, vitamin C, (VITAMIN C) 500 mg tablet Take 500 mg by mouth daily.       hydrALAZINE (APRESOLINE) 100 mg tablet Take 1 Tab by mouth three (3) times daily. 270 Tab 2    glucose blood VI test strips (PRODIGY NO CODING) strip Check fasting glucose once daily 100 Strip 3    amLODIPine (NORVASC) 10 mg tablet TAKE 1 TABLET BY MOUTH ONCE DAILY 90 Tab 2    cloNIDine HCl (CATAPRES) 0.2 mg tablet TAKE 1 TABLET BY MOUTH TWICE DAILY 180 Tab 2    simvastatin (ZOCOR) 40 mg tablet TAKE ONE TABLET BY MOUTH NIGHTLY 90 Tab 2    cholecalciferol, VITAMIN D3, (VITAMIN D3) 5,000 unit tab tablet Take  by mouth daily.  docusate sodium (STOOL SOFTENER) 50 mg capsule Take 50 mg by mouth two (2) times a day.  Aspirin, Buffered 81 mg tab Take 1 tablet by mouth daily.  fish oil-dha-epa 1,200-144-216 mg cap Take 1 tablet by mouth daily.  cetirizine (ZYRTEC) 10 mg tablet Take 1 Tab by mouth daily.  90 Tab 0    Blood-Glucose Meter (PRODIGY AUTOCODE MONITOR SYST) misc Check fasting glucose once daily 1 Each 0     No Known Allergies  Family History   Problem Relation Age of Onset    Hypertension Mother     Stroke Mother     Stroke Father      Social History     Tobacco Use    Smoking status: Never Smoker    Smokeless tobacco: Never Used   Substance Use Topics    Alcohol use: No     Patient Active Problem List   Diagnosis Code    Essential hypertension I10    Mixed hyperlipidemia E78.2    Advanced directives, counseling/discussion Z71.89    Controlled type 2 diabetes mellitus with stage 3 chronic kidney disease, without long-term current use of insulin ([facility]) E11.22, N18.3    Advance care planning Z71.89    Heart murmur R01.1    Gout M10.9    CKD (chronic kidney disease) stage 3, GFR 30-59 ml/min ([facility]) N18.3    Anemia of chronic disease D63.8       Depression Risk Factor Screening:     3 most recent PHQ Screens 7/25/2019   Little interest or pleasure in doing things Not at all   Feeling down, depressed, irritable, or hopeless Not at all   Total Score PHQ 2 0   Trouble falling or staying asleep, or sleeping too much -   Poor appetite, weight loss, or overeating -   Feeling bad about yourself - or that you are a failure or have let yourself or your family down -   Trouble concentrating on things such as school, work, reading, or watching TV -   Moving or speaking so slowly that other people could have noticed; or the opposite being so fidgety that others notice -   Thoughts of being better off dead, or hurting yourself in some way -     Alcohol Risk Factor Screening: You do not drink alcohol or very rarely. Functional Ability and Level of Safety:   Hearing Loss  Hearing is good. Activities of Daily Living  The home contains: no safety equipment. Patient does total self care    Fall Risk  Fall Risk Assessment, last 12 mths 7/25/2019   Able to walk? Yes   Fall in past 12 months? No       Abuse Screen  Patient is not abused    Cognitive Screening   Evaluation of Cognitive Function:  Has your family/caregiver stated any concerns about your memory: no      Patient Care Team   Patient Care Team:  Marah Miner MD as PCP - General (Family Practice)  Erin Kelsey MD (Ophthalmology)    Assessment/Plan   Education and counseling provided:  Are appropriate based on today's review and evaluation  End-of-Life planning (with patient's consent)-discussed, provided form  Pneumococcal Vaccine- 13 and 23 completed  Bone mass measurement (DEXA) 10/2018    Diagnoses and all orders for this visit:     Medicare annual wellness visit, subsequent      Health Maintenance Due   Topic Date Due    DTaP/Tdap/Td series (1 - Tdap) 08/27/1954    Shingrix Vaccine Age 50> (1 of 2) 08/27/1983    FOOT EXAM Q1  06/25/2019    MEDICARE YEARLY EXAM  07/26/2019    HEMOGLOBIN A1C Q6M  08/25/2019         Savi An, 80 y.o.,  female    SUBJECTIVE  Ff-up    Bradycardia- she is off of BB , and has no symptoms of lightheadedness, fatigue. However her HR today is still high mid to high 40's.     Last month, she was found to have asymptomatic bradycardia (HR 30's) during an acute visit for bronchitis. She was evaluated in the ED and showed sinus judith with 1st deg AV block. She does not feel any different. HTN/CKD 3-   She continues to take hydralazine, lisinopril,  clonidine and norvasc. And prn lasix few times a week for leg edema. DM w/ CKD 3-  She Reports FBG still  1teens. HL- on statin. Heart murmur- no sob, syncope, chest pain, leg edema. Cardiology following. Echo showing  Aortic valve regurg, monitoring    Gout- usually foot swelling and pain, related to seafood. 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 Lives with  who is in his [de-identified]. Performs ADLs without much difficulty. No longer drives    ROS:  See HPI, all others negative        Patient Active Problem List   Diagnosis Code    Essential hypertension I10    Mixed hyperlipidemia E78.2    Advanced directives, counseling/discussion Z71.89    Controlled type 2 diabetes mellitus with stage 3 chronic kidney disease, without long-term current use of insulin ([facility]) E11.22, N18.3       Current Outpatient Prescriptions   Medication Sig Dispense Refill    cholecalciferol, VITAMIN D3, (VITAMIN D3) 5,000 unit tab tablet Take  by mouth daily.  cloNIDine HCl (CATAPRES) 0.2 mg tablet TAKE ONE TABLET BY MOUTH TWICE DAILY 60 Tab 2    amLODIPine (NORVASC) 10 mg tablet Take 1 Tab by mouth daily. 90 Tab 1    metFORMIN (GLUCOPHAGE) 500 mg tablet Take 1 Tab by mouth two (2) times daily (with meals). 180 Tab 1    hydroCHLOROthiazide (HYDRODIURIL) 50 mg tablet Take 1 Tab by mouth daily. 90 Tab 1    potassium chloride SR (KLOR-CON 10) 10 mEq tablet Take 1 Tab by mouth daily. 90 Tab 2    hydrALAZINE (APRESOLINE) 100 mg tablet Take 1 Tab by mouth three (3) times daily. 270 Tab 2    colchicine (COLCRYS) 0.6 mg tablet Take 1 Tab by mouth daily. Indications: Gout 90 Tab 2    simvastatin (ZOCOR) 40 mg tablet Take 1 Tab by mouth nightly.  80 Tab 2    docusate sodium (STOOL SOFTENER) 50 mg capsule Take 50 mg by mouth two (2) times a day.  Aspirin, Buffered 81 mg tab Take 1 tablet by mouth daily.  fish oil-dha-epa 1,200-144-216 mg cap Take 1 tablet by mouth daily. No Known Allergies    Past Medical History:   Diagnosis Date    Anemia     Carotid bruit 12/07/2013    Carotid Duplex: 1. Bilateral 1-49% stenosis of the internal carotid arteries. 2. No significant stenosis in the external carotid arteries bilaterally. 3. Antegrade flow in both vetebral arteries. 4. Normal flow in both subclavian arteries. Plaque Morphology: 1. Hyperechoic plaque in the bulb and right ICA. 2. Hyperechoic plaque in the bulb and left ICA.  CKD (chronic kidney disease) stage 3, GFR 30-59 ml/min     Diabetes ([facility])     NIDDM    Gastritis 10/27/2014    Duodenum Bx: No Specific Pathologic Abnormality. No Villous Abnormality. 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 No dysplasia or malignancy identified. Bacterial Organisms c/w H. Pylori are present. Z-Line Bx: GE mucosa w/ Acute/Chronic Inflammation & Reactive Changes. Focal Specialized Type Campos Mucosa Identified. No Dysplasia or Malignancy Identified.  Gout 12/10/2009    Elevated Uric Acid Level    Hyperlipidemia     Hypertension     RAMÓN (iron deficiency anemia)        Social History     Social History    Marital status:      Spouse name: N/A    Number of children: N/A    Years of education: N/A     Occupational History    Not on file.      Social History Main Topics    Smoking status: Never Smoker    Smokeless tobacco: Never Used    Alcohol use No    Drug use: No    Sexual activity: Not Currently     Partners: Male     Birth control/ protection: None     Other Topics Concern    Not on file     Social History Narrative       Family History   Problem Relation Age of Onset    Hypertension Mother     Stroke Mother     Stroke Father OBJECTIVE    Physical Exam:     Visit Vitals  /60 (BP 1 Location: Left arm, BP Patient Position: Sitting)   Pulse (!) 48   Temp 97.3 °F (36.3 °C) (Oral)   Resp 16   Ht 4' 11\" (1.499 m)   Wt 148 lb (67.1 kg)   SpO2 99%   BMI 29.89 kg/m²       General: alert, well-appearing, AA,  in no apparent distress or pain  HEENT: throat and pharynx clear, eac patent, tm intact  Neck: supple, no adenopathy palpated  CVS: + mild bradycardia, regular rhythm, + murmur  Lungs:clear to ausculation bilaterally, no crackles, wheezing or rhonchi noted  Extremities: no edema, Feet: no lesions, normal monofilament  Skin: warm, no lesions, rashes noted  Psych:  mood and affect normal    Results for orders placed or performed during the hospital encounter of 11/26/96   METABOLIC PANEL, BASIC   Result Value Ref Range    Sodium 142 136 - 145 mmol/L    Potassium 4.0 3.5 - 5.5 mmol/L    Chloride 107 100 - 111 mmol/L    CO2 28 21 - 32 mmol/L    Anion gap 7 3.0 - 18 mmol/L    Glucose 126 (H) 74 - 99 mg/dL    BUN 48 (H) 7.0 - 18 MG/DL    Creatinine 1.50 (H) 0.6 - 1.3 MG/DL    BUN/Creatinine ratio 32 (H) 12 - 20      GFR est AA 40 (L) >60 ml/min/1.73m2    GFR est non-AA 33 (L) >60 ml/min/1.73m2    Calcium 9.1 8.5 - 10.1 MG/DL       ASSESSMENT/PLAN  Diagnoses and all orders for this visit:      Controlled type 2 diabetes mellitus with stage 3 chronic kidney disease, without long-term current use of insulin (HCC)  Now diet controlled, off metformin  a1c 6.6  Foot exam today  Monitoring  Check CMP, a1c in 3 months prior to next visit    Essential hypertension  Controlled  Cont   norvasc, clonidine, hydralazine, lisinopril  Will add low dose lasix/kcl prn for leg edema    Leg edema  Advised compression stockings   Low salt diet,   Prn lasix/kcl     Mixed hyperlipidemia  Good range LDL <100, on zocor     Gout of foot, unspecified cause, unspecified chronicity, unspecified laterality  On prophylactic colchicine    Anemia of chronic disease  Stable, Baseline 10's  Monitoring  BMI 27  Encourage wt loss    Heart murmur  Aortic regurg  Asymptomatic  Monitoring, following cardiolgoy    CKD 3  Avoid nsaids  monitoring  Monitoring    Sinus bradycardia  Asymptomatic  1st deg AV block  Now off BB  Will consult cardiology to ensure no concerning arrhythmia requiring internvention    Follow-up Disposition:  Ff-up in 3 months or sooner prn    Patient understands plan of care. Patient has provided input and agrees with goals.

## 2019-07-25 NOTE — PATIENT INSTRUCTIONS
Medicare Wellness Visit, Female     The best way to live healthy is to have a lifestyle where you eat a well-balanced diet, exercise regularly, limit alcohol use, and quit all forms of tobacco/nicotine, if applicable. Regular preventive services are another way to keep healthy. Preventive services (vaccines, screening tests, monitoring & exams) can help personalize your care plan, which helps you manage your own care. Screening tests can find health problems at the earliest stages, when they are easiest to treat. [facility] Mccrack[facility] follows the current, evidence-based guidelines published by the [facility] ([facility]) when recommending preventive services for our patients. Because we follow these guidelines, sometimes recommendations change over time as research supports it. (For example, mammograms used to be recommended annually. Even though Medicare will still pay for an annual mammogram, the newer guidelines recommend a mammogram every two years for women of average risk.)  Of course, you and your doctor may decide to screen more often for some diseases, based on your risk and your health status. Preventive services for you include:  - Medicare offers their members a free annual wellness visit, which is time for you and your primary care provider to discuss and plan for your preventive service needs. Take advantage of this benefit every year!  -All adults over the age of 72 should receive the recommended pneumonia vaccines. Current USPSTF guidelines recommend a series of two vaccines for the best pneumonia protection.   -All adults should have a flu vaccine yearly and a tetanus vaccine every 10 years. All adults age 61 and older should receive a shingles vaccine once in their lifetime.    -A bone mass density test is recommended when a woman turns 65 to screen for osteoporosis. This test is only recommended one time, as a screening.  Some providers will use this same test as a disease monitoring tool if you already have osteoporosis. -All adults age 38-68 who are overweight should have a diabetes screening test once every three years.   -Other screening tests and preventive services for persons with diabetes include: an eye exam to screen for diabetic retinopathy, a kidney function test, a foot exam, and stricter control over your cholesterol.   -Cardiovascular screening for adults with routine risk involves an electrocardiogram (ECG) at intervals determined by your doctor.   -Colorectal cancer screenings should be done for adults age 54-65 with no increased risk factors for colorectal cancer. There are a number of acceptable methods of screening for this type of cancer. Each test has its own benefits and drawbacks. Discuss with your doctor what is most appropriate for you during your annual wellness visit. The different tests include: colonoscopy (considered the best screening method), a fecal occult blood test, a fecal DNA test, and sigmoidoscopy. -Breast cancer screenings are recommended every other year for women of normal risk, age 54-69.  -Cervical cancer screenings for women over age 72 are only recommended with certain risk factors.   -All adults born between [facility] should be screened once for Hepatitis C. Here is a list of your current Health Maintenance items (your personalized list of preventive services) with a due date:  Health Maintenance Due   Topic Date Due    DTaP/Tdap/Td  (1 - Tdap) 08/27/1954    Shingles Vaccine (1 of 2) 08/27/1983    Diabetic Foot Care  06/25/2019    Annual Well Visit  07/26/2019    Hemoglobin A1C    08/25/2019         Medicare Wellness Visit, Female     The best way to live healthy is to have a lifestyle where you eat a well-balanced diet, exercise regularly, limit alcohol use, and quit all forms of tobacco/nicotine, if applicable. Regular preventive services are another way to keep healthy.  Preventive services (vaccines, screening tests, monitoring & exams) can help personalize your care plan, which helps you manage your own care. Screening tests can find health problems at the earliest stages, when they are easiest to treat. Denys Mccracken follows the current, evidence-based guidelines published by the [facility] ([facility]) when recommending preventive services for our patients. Because we follow these guidelines, sometimes recommendations change over time as research supports it. (For example, mammograms used to be recommended annually. Even though Medicare will still pay for an annual mammogram, the newer guidelines recommend a mammogram every two years for women of average risk.)  Of course, you and your doctor may decide to screen more often for some diseases, based on your risk and your health status. Preventive services for you include:  - Medicare offers their members a free annual wellness visit, which is time for you and your primary care provider to discuss and plan for your preventive service needs. Take advantage of this benefit every year!  -All adults over the age of 72 should receive the recommended pneumonia vaccines. Current USPSTF guidelines recommend a series of two vaccines for the best pneumonia protection.   -All adults should have a flu vaccine yearly and a tetanus vaccine every 10 years. All adults age 61 and older should receive a shingles vaccine once in their lifetime.    -A bone mass density test is recommended when a woman turns 65 to screen for osteoporosis. This test is only recommended one time, as a screening. Some providers will use this same test as a disease monitoring tool if you already have osteoporosis.   -All adults age 38-68 who are overweight should have a diabetes screening test once every three years.   -Other screening tests and preventive services for persons with diabetes include: an eye exam to screen for diabetic retinopathy, a kidney function test, a foot exam, and stricter control over your cholesterol.   -Cardiovascular screening for adults with routine risk involves an electrocardiogram (ECG) at intervals determined by your doctor.   -Colorectal cancer screenings should be done for adults age 54-65 with no increased risk factors for colorectal cancer. There are a number of acceptable methods of screening for this type of cancer. Each test has its own benefits and drawbacks. Discuss with your doctor what is most appropriate for you during your annual wellness visit. The different tests include: colonoscopy (considered the best screening method), a fecal occult blood test, a fecal DNA test, and sigmoidoscopy. -Breast cancer screenings are recommended every other year for women of normal risk, age 54-69.  -Cervical cancer screenings for women over age 72 are only recommended with certain risk factors.   -All adults born between [facility] should be screened once for Hepatitis C.      Here is a list of your current Health Maintenance items (your personalized list of preventive services) with a due date:  Health Maintenance Due   Topic Date Due    DTaP/Tdap/Td  (1 - Tdap) 08/27/1954    Shingles Vaccine (1 of 2) 08/27/1983    Diabetic Foot Care  06/25/2019    Annual Well Visit  07/26/2019    Hemoglobin A1C    08/25/2019

## 2019-08-01 RX ORDER — CLONIDINE HYDROCHLORIDE 0.2 MG/1
TABLET ORAL
Qty: 180 TAB | Refills: 2 | Status: SHIPPED | OUTPATIENT
Start: 2019-08-01 | End: 2019-09-03

## 2019-08-12 DIAGNOSIS — E78.2 MIXED HYPERLIPIDEMIA: Chronic | ICD-10-CM

## 2019-08-13 RX ORDER — SIMVASTATIN 40 MG/1
TABLET, FILM COATED ORAL
Qty: 90 TAB | Refills: 2 | Status: SHIPPED | OUTPATIENT
Start: 2019-08-13 | End: 2020-05-18

## 2019-08-14 ENCOUNTER — APPOINTMENT (OUTPATIENT)
Dept: GENERAL RADIOLOGY | Age: 84
End: 2019-08-14
Attending: EMERGENCY MEDICINE
Payer: MEDICARE

## 2019-08-14 ENCOUNTER — HOSPITAL ENCOUNTER (EMERGENCY)
Age: 84
Discharge: HOME | End: 2019-08-14
Attending: EMERGENCY MEDICINE
Payer: MEDICARE

## 2019-08-14 VITALS
SYSTOLIC BLOOD PRESSURE: 127 MMHG | HEART RATE: 64 BPM | DIASTOLIC BLOOD PRESSURE: 71 MMHG | TEMPERATURE: 98 F | RESPIRATION RATE: 20 BRPM | OXYGEN SATURATION: 100 %

## 2019-08-14 DIAGNOSIS — K56.41 FECAL IMPACTION (HCC): Primary | ICD-10-CM

## 2019-08-14 LAB
ALBUMIN SERPL-MCNC: 3.9 G/DL (ref 3.4–5)
ALBUMIN/GLOB SERPL: 0.8 {RATIO} (ref 0.8–1.7)
ALP SERPL-CCNC: 76 U/L (ref 45–117)
ALT SERPL-CCNC: 41 U/L (ref 13–56)
ANION GAP SERPL CALC-SCNC: 8 MMOL/L (ref 3–18)
APPEARANCE UR: CLEAR
AST SERPL-CCNC: 62 U/L (ref 10–38)
BACTERIA URNS QL MICRO: ABNORMAL /HPF
BASOPHILS # BLD: 0 K/UL (ref 0–0.1)
BASOPHILS NFR BLD: 0 % (ref 0–2)
BILIRUB SERPL-MCNC: 0.5 MG/DL (ref 0.2–1)
BILIRUB UR QL: NEGATIVE
BUN SERPL-MCNC: 37 MG/DL (ref 7–18)
BUN/CREAT SERPL: 22 (ref 12–20)
CALCIUM SERPL-MCNC: 9.4 MG/DL (ref 8.5–10.1)
CHLORIDE SERPL-SCNC: 103 MMOL/L (ref 100–111)
CO2 SERPL-SCNC: 29 MMOL/L (ref 21–32)
COLOR UR: YELLOW
CREAT SERPL-MCNC: 1.69 MG/DL (ref 0.6–1.3)
DIFFERENTIAL METHOD BLD: ABNORMAL
EOSINOPHIL # BLD: 0 K/UL (ref 0–0.4)
EOSINOPHIL NFR BLD: 0 % (ref 0–5)
EPITH CASTS URNS QL MICRO: ABNORMAL /LPF (ref 0–5)
ERYTHROCYTE [DISTWIDTH] IN BLOOD BY AUTOMATED COUNT: 17.3 % (ref 11.6–14.5)
GLOBULIN SER CALC-MCNC: 4.6 G/DL (ref 2–4)
GLUCOSE SERPL-MCNC: 149 MG/DL (ref 74–99)
GLUCOSE UR STRIP.AUTO-MCNC: NEGATIVE MG/DL
HCT VFR BLD AUTO: 33.7 % (ref 35–45)
HGB BLD-MCNC: 11.2 G/DL (ref 12–16)
HGB UR QL STRIP: NEGATIVE
KETONES UR QL STRIP.AUTO: NEGATIVE MG/DL
LEUKOCYTE ESTERASE UR QL STRIP.AUTO: NEGATIVE
LIPASE SERPL-CCNC: 114 U/L (ref 73–393)
LYMPHOCYTES # BLD: 0.5 K/UL (ref 0.9–3.6)
LYMPHOCYTES NFR BLD: 6 % (ref 21–52)
MCH RBC QN AUTO: 27.8 PG (ref 24–34)
MCHC RBC AUTO-ENTMCNC: 33.2 G/DL (ref 31–37)
MCV RBC AUTO: 83.6 FL (ref 74–97)
MONOCYTES # BLD: 0.4 K/UL (ref 0.05–1.2)
MONOCYTES NFR BLD: 5 % (ref 3–10)
MUCOUS THREADS URNS QL MICRO: ABNORMAL /LPF
NEUTS SEG # BLD: 7.3 K/UL (ref 1.8–8)
NEUTS SEG NFR BLD: 89 % (ref 40–73)
NITRITE UR QL STRIP.AUTO: NEGATIVE
PH UR STRIP: 6.5 [PH] (ref 5–8)
PLATELET # BLD AUTO: 203 K/UL (ref 135–420)
PMV BLD AUTO: 10.4 FL (ref 9.2–11.8)
POTASSIUM SERPL-SCNC: 3.6 MMOL/L (ref 3.5–5.5)
PROT SERPL-MCNC: 8.5 G/DL (ref 6.4–8.2)
PROT UR STRIP-MCNC: 100 MG/DL
RBC # BLD AUTO: 4.03 M/UL (ref 4.2–5.3)
RBC #/AREA URNS HPF: ABNORMAL /HPF (ref 0–5)
SODIUM SERPL-SCNC: 140 MMOL/L (ref 136–145)
SP GR UR REFRACTOMETRY: 1.01 (ref 1–1.03)
UROBILINOGEN UR QL STRIP.AUTO: 0.2 EU/DL (ref 0.2–1)
WBC # BLD AUTO: 8.2 K/UL (ref 4.6–13.2)
WBC URNS QL MICRO: ABNORMAL /HPF (ref 0–4)

## 2019-08-14 PROCEDURE — 74022 RADEX COMPL AQT ABD SERIES: CPT

## 2019-08-14 PROCEDURE — 80053 COMPREHEN METABOLIC PANEL: CPT

## 2019-08-14 PROCEDURE — 85025 COMPLETE CBC W/AUTO DIFF WBC: CPT

## 2019-08-14 PROCEDURE — 81001 URINALYSIS AUTO W/SCOPE: CPT

## 2019-08-14 PROCEDURE — 83690 ASSAY OF LIPASE: CPT

## 2019-08-14 PROCEDURE — 99283 EMERGENCY DEPT VISIT LOW MDM: CPT

## 2019-08-14 RX ORDER — POLYETHYLENE GLYCOL 3350 17 G/17G
17 POWDER, FOR SOLUTION ORAL DAILY
Refills: 0 | OUTPATIENT
Start: 2019-08-14 | End: 2019-08-28

## 2019-08-14 RX ORDER — SODIUM CHLORIDE 9 MG/ML
100 INJECTION, SOLUTION INTRAVENOUS CONTINUOUS
Status: DISCONTINUED | OUTPATIENT
Start: 2019-08-14 | End: 2019-08-14 | Stop reason: HOSPADM

## 2019-08-14 RX ORDER — POLYETHYLENE GLYCOL 3350 17 G/17G
17 POWDER, FOR SOLUTION ORAL DAILY
Qty: 289 G | Refills: 0 | Status: SHIPPED | OUTPATIENT
Start: 2019-08-14 | End: 2020-01-28

## 2019-08-14 NOTE — ED TRIAGE NOTES
Patient reports that she moved her bowels yesterday but has been feeling like she is not completely emptying her bowels feels constipated.

## 2019-08-14 NOTE — ED PROVIDER NOTES
Presents complaining of constipation. She reports that she has had an inability to pass stool today and having pain at her rectal and anal area. She reports having stool that is at the anus and she is unable to pass it. She denies nausea vomiting fevers or chills. She has not had this problem before. She reports that she eats only small amounts and thinks that that is the reason she is having a problem. She denies any other complaints           Past Medical History:   Diagnosis Date    Anemia     Carotid bruit 12/07/2013    Carotid Duplex: 1. Bilateral 1-49% stenosis of the internal carotid arteries. 2. No significant stenosis in the external carotid arteries bilaterally. 3. Antegrade flow in both vetebral arteries. 4. Normal flow in both subclavian arteries. Plaque Morphology: 1. Hyperechoic plaque in the bulb and right ICA. 2. Hyperechoic plaque in the bulb and left ICA.  CKD (chronic kidney disease) stage 3, GFR 30-59 ml/min ([facility])     [facility]     NIDDM    Gastritis 10/27/2014    Duodenum Bx: No Specific Pathologic Abnormality. No Villous Abnormality. 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 No dysplasia or malignancy identified. Bacterial Organisms c/w H. Pylori are present. Z-Line Bx: GE mucosa w/ Acute/Chronic Inflammation & Reactive Changes. Focal Specialized Type Campos Mucosa Identified. No Dysplasia or Malignancy Identified.      Gout 12/10/2009    Elevated Uric Acid Level    Hyperlipidemia     Hypertension     RAMÓN (iron deficiency anemia)        Past Surgical History:   Procedure Laterality Date    HX COLONOSCOPY  10/27/2014    Dr. Alexis Cabrera HX ENDOSCOPY  10/27/2014    Dr. Kip Franklin          Family History:   Problem Relation Age of Onset    Hypertension Mother     Stroke Mother     Stroke Father        Social History     Socioeconomic History    Marital status:      Spouse name: Not on file    Number of children: Not on file    Years of education: Not on file    Highest education level: Not on file   Occupational History    Not on file   Social Needs    Financial resource strain: Not on file    Food insecurity:     Worry: Not on file     Inability: Not on file    Transportation needs:     Medical: Not on file     Non-medical: Not on file   Tobacco Use    Smoking status: Never Smoker    Smokeless tobacco: Never Used   Substance and Sexual Activity    Alcohol use: No    Drug use: No    Sexual activity: Not Currently     Partners: Male     Birth control/protection: None   Lifestyle    Physical activity:     Days per week: Not on file     Minutes per session: Not on file    Stress: Not on file   Relationships    Social connections:     Talks on phone: Not on file     Gets together: Not on file     Attends Jew service: Not on file     Active member of club or organization: Not on file     Attends meetings of clubs or organizations: Not on file     Relationship status: Not on file    Intimate partner violence:     Fear of current or ex partner: Not on file     Emotionally abused: Not on file     Physically abused: Not on file     Forced sexual activity: Not on file   Other Topics Concern    Not on file   Social History Narrative    Not on file         ALLERGIES: Patient has no known allergies. Review of Systems   Constitutional: Negative. Respiratory: Negative. Cardiovascular: Negative. Gastrointestinal: Positive for abdominal pain, constipation and rectal pain. Genitourinary: Negative for difficulty urinating. All other systems reviewed and are negative. Vitals:    08/14/19 0327   BP: 129/72   Pulse: 90   Resp: 18   Temp: 98.3 °F (36.8 °C)   SpO2: 99%            Physical Exam   Constitutional: She appears well-developed and well-nourished. No distress. HENT:   Head: Normocephalic and atraumatic. Eyes: Conjunctivae and EOM are normal.   Neck: Normal range of motion.  Neck supple. Cardiovascular: Regular rhythm. Pulmonary/Chest: Effort normal. No respiratory distress. Abdominal: Soft. There is tenderness in the suprapubic area. Discomfort with palpation in suprapubic area   Nursing note and vitals reviewed. MDM  Number of Diagnoses or Management Options  Fecal impaction [facility]):   Diagnosis management comments: Patient with fecal impaction status post manual disimpaction. Had a bowel movement afterwards and feels better.        Amount and/or Complexity of Data Reviewed  Clinical lab tests: reviewed           Procedures

## 2019-09-01 ENCOUNTER — APPOINTMENT (OUTPATIENT)
Dept: GENERAL RADIOLOGY | Age: 84
DRG: 311 | End: 2019-09-01
Attending: EMERGENCY MEDICINE
Payer: MEDICARE

## 2019-09-01 ENCOUNTER — HOSPITAL ENCOUNTER (INPATIENT)
Age: 84
LOS: 2 days | Discharge: HOME | DRG: 311 | End: 2019-09-03
Attending: EMERGENCY MEDICINE | Admitting: INTERNAL MEDICINE
Payer: MEDICARE

## 2019-09-01 DIAGNOSIS — I10 ESSENTIAL HYPERTENSION: Chronic | ICD-10-CM

## 2019-09-01 PROBLEM — R11.0 NAUSEA: Status: ACTIVE | Noted: 2019-09-01

## 2019-09-01 PROBLEM — R77.8 ELEVATED TROPONIN: Status: ACTIVE | Noted: 2019-09-01

## 2019-09-01 PROBLEM — I50.32 DIASTOLIC CHF, CHRONIC (HCC): Status: ACTIVE | Noted: 2019-09-01

## 2019-09-01 PROBLEM — N18.30 CHRONIC RENAL FAILURE, STAGE 3 (MODERATE) (HCC): Status: ACTIVE | Noted: 2019-09-01

## 2019-09-01 LAB
ALBUMIN SERPL-MCNC: 4.2 G/DL (ref 3.4–5)
ALBUMIN/GLOB SERPL: 1 {RATIO} (ref 0.8–1.7)
ALP SERPL-CCNC: 63 U/L (ref 45–117)
ALT SERPL-CCNC: 38 U/L (ref 13–56)
ANION GAP SERPL CALC-SCNC: 9 MMOL/L (ref 3–18)
APPEARANCE UR: CLEAR
APTT PPP: 34.2 SEC (ref 23–36.4)
AST SERPL-CCNC: 61 U/L (ref 10–38)
BACTERIA URNS QL MICRO: NEGATIVE /HPF
BASOPHILS # BLD: 0 K/UL (ref 0–0.1)
BASOPHILS NFR BLD: 0 % (ref 0–2)
BILIRUB SERPL-MCNC: 0.7 MG/DL (ref 0.2–1)
BILIRUB UR QL: NEGATIVE
BUN SERPL-MCNC: 37 MG/DL (ref 7–18)
BUN/CREAT SERPL: 22 (ref 12–20)
CALCIUM SERPL-MCNC: 9.4 MG/DL (ref 8.5–10.1)
CHLORIDE SERPL-SCNC: 101 MMOL/L (ref 100–111)
CK MB CFR SERPL CALC: 2.1 % (ref 0–4)
CK MB CFR SERPL CALC: 2.1 % (ref 0–4)
CK MB SERPL-MCNC: 5.7 NG/ML (ref 5–25)
CK MB SERPL-MCNC: 5.9 NG/ML (ref 5–25)
CK SERPL-CCNC: 268 U/L (ref 26–192)
CK SERPL-CCNC: 284 U/L (ref 26–192)
CO2 SERPL-SCNC: 31 MMOL/L (ref 21–32)
COLOR UR: YELLOW
CREAT SERPL-MCNC: 1.67 MG/DL (ref 0.6–1.3)
DIFFERENTIAL METHOD BLD: ABNORMAL
EOSINOPHIL # BLD: 0 K/UL (ref 0–0.4)
EOSINOPHIL NFR BLD: 0 % (ref 0–5)
EPITH CASTS URNS QL MICRO: NORMAL /LPF (ref 0–5)
ERYTHROCYTE [DISTWIDTH] IN BLOOD BY AUTOMATED COUNT: 17.7 % (ref 11.6–14.5)
GLOBULIN SER CALC-MCNC: 4.4 G/DL (ref 2–4)
GLUCOSE BLD STRIP.AUTO-MCNC: 200 MG/DL (ref 70–110)
GLUCOSE SERPL-MCNC: 116 MG/DL (ref 74–99)
GLUCOSE UR STRIP.AUTO-MCNC: NEGATIVE MG/DL
HCT VFR BLD AUTO: 32.9 % (ref 35–45)
HGB BLD-MCNC: 10.8 G/DL (ref 12–16)
HGB UR QL STRIP: NEGATIVE
HYALINE CASTS URNS QL MICRO: NORMAL /LPF (ref 0–2)
INR PPP: 1.1 (ref 0.8–1.2)
KETONES UR QL STRIP.AUTO: NEGATIVE MG/DL
LEUKOCYTE ESTERASE UR QL STRIP.AUTO: ABNORMAL
LYMPHOCYTES # BLD: 1.7 K/UL (ref 0.9–3.6)
LYMPHOCYTES NFR BLD: 31 % (ref 21–52)
MCH RBC QN AUTO: 27.2 PG (ref 24–34)
MCHC RBC AUTO-ENTMCNC: 32.8 G/DL (ref 31–37)
MCV RBC AUTO: 82.9 FL (ref 74–97)
MONOCYTES # BLD: 0.5 K/UL (ref 0.05–1.2)
MONOCYTES NFR BLD: 9 % (ref 3–10)
NEUTS SEG # BLD: 3.2 K/UL (ref 1.8–8)
NEUTS SEG NFR BLD: 60 % (ref 40–73)
NITRITE UR QL STRIP.AUTO: NEGATIVE
PH UR STRIP: 5 [PH] (ref 5–8)
PLATELET # BLD AUTO: 290 K/UL (ref 135–420)
PMV BLD AUTO: 10.1 FL (ref 9.2–11.8)
POTASSIUM SERPL-SCNC: 3.8 MMOL/L (ref 3.5–5.5)
PROT SERPL-MCNC: 8.6 G/DL (ref 6.4–8.2)
PROT UR STRIP-MCNC: 100 MG/DL
PROTHROMBIN TIME: 13.6 SEC (ref 11.5–15.2)
RBC # BLD AUTO: 3.97 M/UL (ref 4.2–5.3)
RBC #/AREA URNS HPF: NORMAL /HPF (ref 0–5)
SODIUM SERPL-SCNC: 141 MMOL/L (ref 136–145)
SP GR UR REFRACTOMETRY: 1.01 (ref 1–1.03)
TROPONIN I SERPL-MCNC: 0.06 NG/ML (ref 0–0.04)
TROPONIN I SERPL-MCNC: 0.07 NG/ML (ref 0–0.04)
TROPONIN I SERPL-MCNC: 0.09 NG/ML (ref 0–0.04)
TSH SERPL DL<=0.05 MIU/L-ACNC: 2.15 UIU/ML (ref 0.36–3.74)
UROBILINOGEN UR QL STRIP.AUTO: 0.2 EU/DL (ref 0.2–1)
WBC # BLD AUTO: 5.4 K/UL (ref 4.6–13.2)
WBC URNS QL MICRO: NORMAL /HPF (ref 0–4)

## 2019-09-01 PROCEDURE — 85025 COMPLETE CBC W/AUTO DIFF WBC: CPT

## 2019-09-01 PROCEDURE — 74011250636 HC RX REV CODE- 250/636: Performed by: EMERGENCY MEDICINE

## 2019-09-01 PROCEDURE — 80053 COMPREHEN METABOLIC PANEL: CPT

## 2019-09-01 PROCEDURE — 85730 THROMBOPLASTIN TIME PARTIAL: CPT

## 2019-09-01 PROCEDURE — 74011250636 HC RX REV CODE- 250/636: Performed by: INTERNAL MEDICINE

## 2019-09-01 PROCEDURE — 84484 ASSAY OF TROPONIN QUANT: CPT

## 2019-09-01 PROCEDURE — 74011250637 HC RX REV CODE- 250/637: Performed by: INTERNAL MEDICINE

## 2019-09-01 PROCEDURE — 74022 RADEX COMPL AQT ABD SERIES: CPT

## 2019-09-01 PROCEDURE — 93005 ELECTROCARDIOGRAM TRACING: CPT

## 2019-09-01 PROCEDURE — 83036 HEMOGLOBIN GLYCOSYLATED A1C: CPT

## 2019-09-01 PROCEDURE — 82550 ASSAY OF CK (CPK): CPT

## 2019-09-01 PROCEDURE — 81001 URINALYSIS AUTO W/SCOPE: CPT

## 2019-09-01 PROCEDURE — 84443 ASSAY THYROID STIM HORMONE: CPT

## 2019-09-01 PROCEDURE — 99285 EMERGENCY DEPT VISIT HI MDM: CPT

## 2019-09-01 PROCEDURE — 96374 THER/PROPH/DIAG INJ IV PUSH: CPT

## 2019-09-01 PROCEDURE — 85610 PROTHROMBIN TIME: CPT

## 2019-09-01 PROCEDURE — 65660000000 HC RM CCU STEPDOWN

## 2019-09-01 PROCEDURE — 82962 GLUCOSE BLOOD TEST: CPT

## 2019-09-01 RX ORDER — COLCHICINE 0.6 MG/1
0.6 CAPSULE ORAL DAILY PRN
Status: DISCONTINUED | OUTPATIENT
Start: 2019-09-01 | End: 2019-09-03 | Stop reason: HOSPADM

## 2019-09-01 RX ORDER — DOCUSATE SODIUM 100 MG/1
100 CAPSULE, LIQUID FILLED ORAL
Status: DISCONTINUED | OUTPATIENT
Start: 2019-09-01 | End: 2019-09-03 | Stop reason: HOSPADM

## 2019-09-01 RX ORDER — AMLODIPINE BESYLATE 10 MG/1
10 TABLET ORAL DAILY
Status: DISCONTINUED | OUTPATIENT
Start: 2019-09-02 | End: 2019-09-03

## 2019-09-01 RX ORDER — DEXTROSE 50 % IN WATER (D50W) INTRAVENOUS SYRINGE
25-50 AS NEEDED
Status: DISCONTINUED | OUTPATIENT
Start: 2019-09-01 | End: 2019-09-03 | Stop reason: HOSPADM

## 2019-09-01 RX ORDER — GUAIFENESIN 100 MG/5ML
81 LIQUID (ML) ORAL DAILY
Status: DISCONTINUED | OUTPATIENT
Start: 2019-09-02 | End: 2019-09-03 | Stop reason: HOSPADM

## 2019-09-01 RX ORDER — HYDRALAZINE HYDROCHLORIDE 50 MG/1
100 TABLET, FILM COATED ORAL 3 TIMES DAILY
Status: DISCONTINUED | OUTPATIENT
Start: 2019-09-02 | End: 2019-09-02

## 2019-09-01 RX ORDER — OXYCODONE AND ACETAMINOPHEN 5; 325 MG/1; MG/1
1 TABLET ORAL
Status: DISCONTINUED | OUTPATIENT
Start: 2019-09-01 | End: 2019-09-02

## 2019-09-01 RX ORDER — SIMVASTATIN 40 MG/1
40 TABLET, FILM COATED ORAL
Status: DISCONTINUED | OUTPATIENT
Start: 2019-09-01 | End: 2019-09-03 | Stop reason: HOSPADM

## 2019-09-01 RX ORDER — NALOXONE HYDROCHLORIDE 0.4 MG/ML
0.4 INJECTION, SOLUTION INTRAMUSCULAR; INTRAVENOUS; SUBCUTANEOUS AS NEEDED
Status: DISCONTINUED | OUTPATIENT
Start: 2019-09-01 | End: 2019-09-03 | Stop reason: HOSPADM

## 2019-09-01 RX ORDER — MAGNESIUM SULFATE 100 %
4 CRYSTALS MISCELLANEOUS AS NEEDED
Status: DISCONTINUED | OUTPATIENT
Start: 2019-09-01 | End: 2019-09-03 | Stop reason: HOSPADM

## 2019-09-01 RX ORDER — ONDANSETRON 2 MG/ML
4 INJECTION INTRAMUSCULAR; INTRAVENOUS
Status: COMPLETED | OUTPATIENT
Start: 2019-09-01 | End: 2019-09-01

## 2019-09-01 RX ORDER — CLONIDINE HYDROCHLORIDE 0.1 MG/1
0.2 TABLET ORAL 2 TIMES DAILY
Status: DISCONTINUED | OUTPATIENT
Start: 2019-09-02 | End: 2019-09-02

## 2019-09-01 RX ORDER — ONDANSETRON 2 MG/ML
4 INJECTION INTRAMUSCULAR; INTRAVENOUS
Status: DISCONTINUED | OUTPATIENT
Start: 2019-09-01 | End: 2019-09-03 | Stop reason: HOSPADM

## 2019-09-01 RX ORDER — ENOXAPARIN SODIUM 100 MG/ML
60 INJECTION SUBCUTANEOUS ONCE
Status: COMPLETED | OUTPATIENT
Start: 2019-09-02 | End: 2019-09-01

## 2019-09-01 RX ORDER — ACETAMINOPHEN 325 MG/1
650 TABLET ORAL
Status: DISCONTINUED | OUTPATIENT
Start: 2019-09-01 | End: 2019-09-03 | Stop reason: HOSPADM

## 2019-09-01 RX ORDER — INSULIN LISPRO 100 [IU]/ML
INJECTION, SOLUTION INTRAVENOUS; SUBCUTANEOUS
Status: DISCONTINUED | OUTPATIENT
Start: 2019-09-02 | End: 2019-09-03 | Stop reason: HOSPADM

## 2019-09-01 RX ADMIN — ENOXAPARIN SODIUM 60 MG: 60 INJECTION SUBCUTANEOUS at 23:47

## 2019-09-01 RX ADMIN — ONDANSETRON 4 MG: 2 INJECTION INTRAMUSCULAR; INTRAVENOUS at 17:09

## 2019-09-01 RX ADMIN — SIMVASTATIN 40 MG: 40 TABLET, FILM COATED ORAL at 23:40

## 2019-09-01 RX ADMIN — ONDANSETRON 4 MG: 2 INJECTION INTRAMUSCULAR; INTRAVENOUS at 23:51

## 2019-09-01 NOTE — ED TRIAGE NOTES
The patient presents for evaluation of nausea that began Friday. Denies vomiting, abdominal pain, or diarrhea.

## 2019-09-01 NOTE — ED NOTES
Pt tunred over to me Dr Mera Carlson. She is an 70-year-old female who presents with nausea since last night. She denies any pain denies any chest pain shortness of breath no diarrhea no fevers no chills no dysuria. Patient states her nausea has improved but has not resolved. No vomiting. No abdominal pain. Labs reviewed white counts fairly normal mild anemia, UA is noted for small leukocyte esterase with 2-5 white cells. She denies dysuria. Chemistry is unremarkable she is at baseline renal dysfunction creatinine of 1.67 unchanged from 1.69 prior with an initial troponin of 0.06. She is currently pending a repeat troponin. I think that is sufficient evaluation. If that is negative I will have her follow-up with her outpatient doctor for continued evaluation or return for any worsening symptoms. Chest x-ray shows no acute pulmonary disease. Upright abdominal exam shows no evidence of obstruction. To supine abdominal exams are unremarkable for acute process requiring intervention at this time. General; AOX3. Pulmonary; CTA-B. Cardiac: RRR no MRG; Abd S/NT/ND. Plan:  Second trop    Second stroke was rising so I ordered a third which is also rising. Discussed with the hospitalist will admit.

## 2019-09-01 NOTE — ED NOTES
Bedside and Verbal shift change report given to Azell Landau (oncoming nurse) by Katerina Cruz RN (offgoing nurse). Report included the following information SBAR, ED Summary, MAR and Recent Results.

## 2019-09-01 NOTE — ED PROVIDER NOTES
EMERGENCY DEPARTMENT HISTORY AND PHYSICAL EXAM    5:09 PM      Date: 9/1/2019  Patient Name: Iqra Walters    History of Presenting Illness     Chief Complaint   Patient presents with    Nausea         History Provided By: Patient and Patient's     Additional History (Context): Iqra Walters is a 80 y.o. female with diabetes, hypertension, hyperlipidemia and renal insufficiency who presents with nausea. Patient has had nausea since last night. She states that she was able to eat breakfast this morning. She had grits and eggs. She denies any vomiting or diarrhea. Patient denies a fever or abdominal pain or chest pain. Patient denies having this nausea in the past patient additionally states that she has chills and feels cold. Patient denies any cough. Patient denies smoking alcohol or any recreational drug use. Sherrell Aleaxnder PCP: Helen Langston MD      Current Outpatient Medications   Medication Sig Dispense Refill    polyethylene glycol (MIRALAX) 17 gram/dose powder Take 17 g by mouth daily. 1 tablespoon with 8 oz of water daily 289 g 0    simvastatin (ZOCOR) 40 mg tablet TAKE 1 TABLET BY MOUTH NIGHTLY 90 Tab 2    cloNIDine HCl (CATAPRES) 0.2 mg tablet TAKE 1 TABLET BY MOUTH TWICE DAILY 180 Tab 2    lisinopril (PRINIVIL, ZESTRIL) 10 mg tablet Take 1 Tab by mouth daily. 90 Tab 0    furosemide (LASIX) 20 mg tablet Take 1 tablet once daily as needed for edema 90 Tab 0    colchicine (COLCRYS) 0.6 mg tablet Take 1 Tab by mouth daily. Indications: gout 90 Tab 2    ascorbic acid, vitamin C, (VITAMIN C) 500 mg tablet Take 500 mg by mouth daily.  cetirizine (ZYRTEC) 10 mg tablet Take 1 Tab by mouth daily. 90 Tab 0    hydrALAZINE (APRESOLINE) 100 mg tablet Take 1 Tab by mouth three (3) times daily.  270 Tab 2    glucose blood VI test strips (PRODIGY NO CODING) strip Check fasting glucose once daily 100 Strip 3    Blood-Glucose Meter (PRODIGY AUTOCODE MONITOR SYST) misc Check fasting glucose once daily 1 Each 0    amLODIPine (NORVASC) 10 mg tablet TAKE 1 TABLET BY MOUTH ONCE DAILY 90 Tab 2    cholecalciferol, VITAMIN D3, (VITAMIN D3) 5,000 unit tab tablet Take  by mouth daily.  docusate sodium (STOOL SOFTENER) 50 mg capsule Take 50 mg by mouth two (2) times a day.  Aspirin, Buffered 81 mg tab Take 1 tablet by mouth daily.  fish oil-dha-epa 1,200-144-216 mg cap Take 1 tablet by mouth daily. Past History     Past Medical History:  Past Medical History:   Diagnosis Date    Anemia     Carotid bruit 12/07/2013    Carotid Duplex: 1. Bilateral 1-49% stenosis of the internal carotid arteries. 2. No significant stenosis in the external carotid arteries bilaterally. 3. Antegrade flow in both vetebral arteries. 4. Normal flow in both subclavian arteries. Plaque Morphology: 1. Hyperechoic plaque in the bulb and right ICA. 2. Hyperechoic plaque in the bulb and left ICA.  CKD (chronic kidney disease) stage 3, GFR 30-59 ml/min ([facility])     Diabetes [facility]     NIDDM    Gastritis 10/27/2014    Duodenum Bx: No Specific Pathologic Abnormality. No Villous Abnormality. 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 No dysplasia or malignancy identified. Bacterial Organisms c/w H. Pylori are present. Z-Line Bx: GE mucosa w/ Acute/Chronic Inflammation & Reactive Changes. Focal Specialized Type Campos Mucosa Identified. No Dysplasia or Malignancy Identified.      Gout 12/10/2009    Elevated Uric Acid Level    Hyperlipidemia     Hypertension     RAMÓN (iron deficiency anemia)        Past Surgical History:  Past Surgical History:   Procedure Laterality Date    HX COLONOSCOPY  10/27/2014    Dr. Sofia Souza HX ENDOSCOPY  10/27/2014    Dr. Helena Reyes        Family History:  Family History   Problem Relation Age of Onset    Hypertension Mother     Stroke Mother     Stroke Father        Social History:  Social History     Tobacco Use    Smoking status: Never Smoker    Smokeless tobacco: Never Used   Substance Use Topics    Alcohol use: No    Drug use: No       Allergies:  No Known Allergies      Review of Systems       Review of Systems   Constitutional: Positive for chills. Negative for diaphoresis and fever. HENT: Negative. Negative for congestion, rhinorrhea and sore throat. Eyes: Negative. Negative for pain, discharge and redness. Respiratory: Negative. Negative for cough, chest tightness, shortness of breath and wheezing. Cardiovascular: Negative. Negative for chest pain. Gastrointestinal: Positive for nausea. Negative for abdominal pain, constipation, diarrhea and vomiting. Genitourinary: Negative. Negative for dysuria, flank pain, frequency, hematuria and urgency. Musculoskeletal: Negative. Negative for back pain and neck pain. Skin: Negative. Negative for rash. Neurological: Negative. Negative for syncope, weakness, numbness and headaches. Psychiatric/Behavioral: Negative. All other systems reviewed and are negative. Physical Exam     Visit Vitals  /85 (BP 1 Location: Left arm, BP Patient Position: At rest)   Pulse 90   Temp 99.4 °F (37.4 °C)   Resp 14   Ht 4' 11\" (1.499 m)   Wt 63.5 kg (140 lb)   SpO2 99%   BMI 28.28 kg/m²         Physical Exam   Constitutional: She is oriented to person, place, and time. She appears well-developed and well-nourished. Non-toxic appearance. She does not have a sickly appearance. She does not appear ill. No distress. HENT:   Head: Normocephalic and atraumatic. Mouth/Throat: Oropharynx is clear and moist. No oropharyngeal exudate. Eyes: Pupils are equal, round, and reactive to light. Conjunctivae and EOM are normal. No scleral icterus. Neck: Trachea normal and normal range of motion. Neck supple. No hepatojugular reflux and no JVD present. No tracheal deviation present. No thyromegaly present.    Cardiovascular: Normal rate, regular rhythm, S1 normal, S2 normal, normal heart sounds, intact distal pulses and normal pulses. Exam reveals no gallop, no S3 and no S4. No murmur heard. Pulses:       Radial pulses are 2+ on the right side, and 2+ on the left side. Dorsalis pedis pulses are 2+ on the right side, and 2+ on the left side. Pulmonary/Chest: Effort normal and breath sounds normal. No respiratory distress. She has no decreased breath sounds. She has no wheezes. She has no rhonchi. She has no rales. Abdominal: Soft. Normal appearance and bowel sounds are normal. She exhibits no distension and no mass. There is no hepatosplenomegaly. There is no tenderness. There is no rigidity, no rebound, no guarding, no CVA tenderness, no tenderness at McBurney's point and negative Simmons's sign. Musculoskeletal: Normal range of motion. She exhibits edema (2+ bilateral ). Strength is 4 out of 5 throughout. Lymphadenopathy:        Head (right side): No submental, no submandibular, no preauricular and no occipital adenopathy present. Head (left side): No submental, no submandibular, no preauricular and no occipital adenopathy present. She has no cervical adenopathy. Right: No supraclavicular adenopathy present. Left: No supraclavicular adenopathy present. Neurological: She is alert and oriented to person, place, and time. She has normal strength and normal reflexes. She is not disoriented. No cranial nerve deficit or sensory deficit. Coordination and gait normal. GCS eye subscore is 4. GCS verbal subscore is 5. GCS motor subscore is 6. Grossly intact. Skin: Skin is warm, dry and intact. No rash noted. She is not diaphoretic. Psychiatric: She has a normal mood and affect. Her speech is normal and behavior is normal. Judgment and thought content normal. Cognition and memory are normal.   Nursing note and vitals reviewed.         Diagnostic Study Results     Labs -  Recent Results (from the past 12 hour(s))   EKG, 12 LEAD, INITIAL    Collection Time: 09/01/19  4:39 PM   Result Value Ref Range    Ventricular Rate 88 BPM    Atrial Rate 88 BPM    P-R Interval 194 ms    QRS Duration 88 ms    Q-T Interval 388 ms    QTC Calculation (Bezet) 469 ms    Calculated P Axis 22 degrees    Calculated R Axis -41 degrees    Calculated T Axis 70 degrees    Diagnosis       Normal sinus rhythm  Possible Left atrial enlargement  Left axis deviation  Abnormal ECG  When compared with ECG of 06-JUN-2019 09:12,  Vent. rate has increased BY  40 BPM     CBC WITH AUTOMATED DIFF    Collection Time: 09/01/19  4:50 PM   Result Value Ref Range    WBC 5.4 4.6 - 13.2 K/uL    RBC 3.97 (L) 4.20 - 5.30 M/uL    HGB 10.8 (L) 12.0 - 16.0 g/dL    HCT 32.9 (L) 35.0 - 45.0 %    MCV 82.9 74.0 - 97.0 FL    MCH 27.2 24.0 - 34.0 PG    MCHC 32.8 31.0 - 37.0 g/dL    RDW 17.7 (H) 11.6 - 14.5 %    PLATELET 498 861 - 490 K/uL    MPV 10.1 9.2 - 11.8 FL    NEUTROPHILS 60 40 - 73 %    LYMPHOCYTES 31 21 - 52 %    MONOCYTES 9 3 - 10 %    EOSINOPHILS 0 0 - 5 %    BASOPHILS 0 0 - 2 %    ABS. NEUTROPHILS 3.2 1.8 - 8.0 K/UL    ABS. LYMPHOCYTES 1.7 0.9 - 3.6 K/UL    ABS. MONOCYTES 0.5 0.05 - 1.2 K/UL    ABS. EOSINOPHILS 0.0 0.0 - 0.4 K/UL    ABS. BASOPHILS 0.0 0.0 - 0.1 K/UL    DF AUTOMATED         Radiologic Studies -   XR ABD ACUTE W 1 V CHEST    (Results Pending)         Medical Decision Making   Provider Notes (Medical Decision Making):  MDM  Number of Diagnoses or Management Options  Diagnosis management comments: DDX: Gastritis, gerd, peptic ulcer disease, cholecystitis, pancreatitis, gastroenteritis, hepatitis, constipation related pain, appendicitis pain, diverticulitis, urinary tract infection, obstruction, abdominal wall pain, atypical cardiac (ami or anginal pain), referred pain from pulmonary process (pneumonia, empyema), or combination of the above versus many other processes. I am the first provider for this patient.     I reviewed the vital signs, available nursing notes, past medical history, past surgical history, family history and social history. Vital Signs-Reviewed the patient's vital signs. Records Reviewed: Nursing Notes (Time of Review: 5:09 PM)    ED Course: Progress Notes, Reevaluation, and Consults:    Labs essentially normal with the exception of troponin of 0.06. Chest X-Ray showed No acute process. EKG showed NSR with rate of 88 bpm. With no ST elevations or depression and non specific T wave changes. 5:09 PM 9/1/2019    Repeat Cardiac enzyme at 2000.      6:54 PM : Pt care transferred to Dr. Luz Maria Traylor provider. History of patient complaint(s), available diagnostic reports and current treatment plan has been discussed thoroughly. Bedside rounding on patient occured : no . Intended disposition of patient : Home   Pending diagnostics reports and/or labs (please list): Repeat enzyme      Diagnosis       Clinical Impression: Pending   Disposition: Pending       Attestation        Provider Attestation:     I personally performed the services described in the documentation, reviewed the documentation and it accurately and completely records my words and actions utilizing the [address] [employer] September 01, 2019 at 5:09 PM - Christiano Erickson. It is dictated using utilizing voice recognition software. Unfortunately this leads to occasional typographical errors. I apologize in advance if the situation occurs. If questions arise please do not hesitate to contact me or call our department.

## 2019-09-02 ENCOUNTER — APPOINTMENT (OUTPATIENT)
Dept: NON INVASIVE DIAGNOSTICS | Age: 84
DRG: 311 | End: 2019-09-02
Attending: INTERNAL MEDICINE
Payer: MEDICARE

## 2019-09-02 LAB
ANION GAP SERPL CALC-SCNC: 7 MMOL/L (ref 3–18)
ATRIAL RATE: 70 BPM
ATRIAL RATE: 88 BPM
BASOPHILS # BLD: 0 K/UL (ref 0–0.1)
BASOPHILS NFR BLD: 1 % (ref 0–2)
BUN SERPL-MCNC: 32 MG/DL (ref 7–18)
BUN/CREAT SERPL: 20 (ref 12–20)
CALCIUM SERPL-MCNC: 8.9 MG/DL (ref 8.5–10.1)
CALCULATED P AXIS, ECG09: 22 DEGREES
CALCULATED P AXIS, ECG09: 36 DEGREES
CALCULATED R AXIS, ECG10: -41 DEGREES
CALCULATED R AXIS, ECG10: -49 DEGREES
CALCULATED T AXIS, ECG11: 30 DEGREES
CALCULATED T AXIS, ECG11: 70 DEGREES
CHLORIDE SERPL-SCNC: 99 MMOL/L (ref 100–111)
CK MB CFR SERPL CALC: 1.4 % (ref 0–4)
CK MB CFR SERPL CALC: 1.8 % (ref 0–4)
CK MB SERPL-MCNC: 3.8 NG/ML (ref 5–25)
CK MB SERPL-MCNC: 4.7 NG/ML (ref 5–25)
CK SERPL-CCNC: 262 U/L (ref 26–192)
CK SERPL-CCNC: 274 U/L (ref 26–192)
CO2 SERPL-SCNC: 34 MMOL/L (ref 21–32)
CREAT SERPL-MCNC: 1.58 MG/DL (ref 0.6–1.3)
DIAGNOSIS, 93000: NORMAL
DIAGNOSIS, 93000: NORMAL
DIFFERENTIAL METHOD BLD: ABNORMAL
EOSINOPHIL # BLD: 0 K/UL (ref 0–0.4)
EOSINOPHIL NFR BLD: 1 % (ref 0–5)
ERYTHROCYTE [DISTWIDTH] IN BLOOD BY AUTOMATED COUNT: 17.7 % (ref 11.6–14.5)
EST. AVERAGE GLUCOSE BLD GHB EST-MCNC: 143 MG/DL
GLUCOSE BLD STRIP.AUTO-MCNC: 126 MG/DL (ref 70–110)
GLUCOSE BLD STRIP.AUTO-MCNC: 136 MG/DL (ref 70–110)
GLUCOSE BLD STRIP.AUTO-MCNC: 146 MG/DL (ref 70–110)
GLUCOSE BLD STRIP.AUTO-MCNC: 226 MG/DL (ref 70–110)
GLUCOSE SERPL-MCNC: 121 MG/DL (ref 74–99)
HBA1C MFR BLD: 6.6 % (ref 4.2–5.6)
HCT VFR BLD AUTO: 32.8 % (ref 35–45)
HGB BLD-MCNC: 10.6 G/DL (ref 12–16)
LYMPHOCYTES # BLD: 1.2 K/UL (ref 0.9–3.6)
LYMPHOCYTES NFR BLD: 34 % (ref 21–52)
MAGNESIUM SERPL-MCNC: 2 MG/DL (ref 1.6–2.6)
MCH RBC QN AUTO: 27 PG (ref 24–34)
MCHC RBC AUTO-ENTMCNC: 32.3 G/DL (ref 31–37)
MCV RBC AUTO: 83.7 FL (ref 74–97)
MONOCYTES # BLD: 0.5 K/UL (ref 0.05–1.2)
MONOCYTES NFR BLD: 14 % (ref 3–10)
NEUTS SEG # BLD: 1.8 K/UL (ref 1.8–8)
NEUTS SEG NFR BLD: 50 % (ref 40–73)
P-R INTERVAL, ECG05: 194 MS
P-R INTERVAL, ECG05: 194 MS
PHOSPHATE SERPL-MCNC: 3.8 MG/DL (ref 2.5–4.9)
PLATELET # BLD AUTO: 259 K/UL (ref 135–420)
PMV BLD AUTO: 10 FL (ref 9.2–11.8)
POTASSIUM SERPL-SCNC: 3.1 MMOL/L (ref 3.5–5.5)
Q-T INTERVAL, ECG07: 388 MS
Q-T INTERVAL, ECG07: 478 MS
QRS DURATION, ECG06: 86 MS
QRS DURATION, ECG06: 88 MS
QTC CALCULATION (BEZET), ECG08: 469 MS
QTC CALCULATION (BEZET), ECG08: 516 MS
RBC # BLD AUTO: 3.92 M/UL (ref 4.2–5.3)
SODIUM SERPL-SCNC: 140 MMOL/L (ref 136–145)
TROPONIN I SERPL-MCNC: 0.05 NG/ML (ref 0–0.04)
TROPONIN I SERPL-MCNC: 0.07 NG/ML (ref 0–0.04)
URATE SERPL-MCNC: 10.4 MG/DL (ref 2.6–7.2)
VENTRICULAR RATE, ECG03: 70 BPM
VENTRICULAR RATE, ECG03: 88 BPM
WBC # BLD AUTO: 3.6 K/UL (ref 4.6–13.2)

## 2019-09-02 PROCEDURE — 74011636637 HC RX REV CODE- 636/637: Performed by: INTERNAL MEDICINE

## 2019-09-02 PROCEDURE — 93306 TTE W/DOPPLER COMPLETE: CPT

## 2019-09-02 PROCEDURE — 74011250637 HC RX REV CODE- 250/637: Performed by: INTERNAL MEDICINE

## 2019-09-02 PROCEDURE — 93005 ELECTROCARDIOGRAM TRACING: CPT

## 2019-09-02 PROCEDURE — 36415 COLL VENOUS BLD VENIPUNCTURE: CPT

## 2019-09-02 PROCEDURE — 82550 ASSAY OF CK (CPK): CPT

## 2019-09-02 PROCEDURE — 74011250636 HC RX REV CODE- 250/636: Performed by: INTERNAL MEDICINE

## 2019-09-02 PROCEDURE — 74011000258 HC RX REV CODE- 258: Performed by: INTERNAL MEDICINE

## 2019-09-02 PROCEDURE — 84100 ASSAY OF PHOSPHORUS: CPT

## 2019-09-02 PROCEDURE — 80048 BASIC METABOLIC PNL TOTAL CA: CPT

## 2019-09-02 PROCEDURE — 65270000029 HC RM PRIVATE

## 2019-09-02 PROCEDURE — 82962 GLUCOSE BLOOD TEST: CPT

## 2019-09-02 PROCEDURE — 83735 ASSAY OF MAGNESIUM: CPT

## 2019-09-02 PROCEDURE — 84550 ASSAY OF BLOOD/URIC ACID: CPT

## 2019-09-02 PROCEDURE — 85025 COMPLETE CBC W/AUTO DIFF WBC: CPT

## 2019-09-02 RX ORDER — ALLOPURINOL 100 MG/1
100 TABLET ORAL 2 TIMES DAILY
Status: DISCONTINUED | OUTPATIENT
Start: 2019-09-02 | End: 2019-09-03

## 2019-09-02 RX ORDER — SODIUM CHLORIDE 9 MG/ML
100 INJECTION, SOLUTION INTRAVENOUS CONTINUOUS
Status: DISPENSED | OUTPATIENT
Start: 2019-09-02 | End: 2019-09-02

## 2019-09-02 RX ORDER — CLONIDINE HYDROCHLORIDE 0.1 MG/1
0.1 TABLET ORAL 2 TIMES DAILY
Status: DISCONTINUED | OUTPATIENT
Start: 2019-09-02 | End: 2019-09-03

## 2019-09-02 RX ORDER — POTASSIUM CHLORIDE 20 MEQ/1
20 TABLET, EXTENDED RELEASE ORAL
Status: COMPLETED | OUTPATIENT
Start: 2019-09-02 | End: 2019-09-02

## 2019-09-02 RX ORDER — HYDRALAZINE HYDROCHLORIDE 50 MG/1
50 TABLET, FILM COATED ORAL 3 TIMES DAILY
Status: DISCONTINUED | OUTPATIENT
Start: 2019-09-02 | End: 2019-09-03 | Stop reason: HOSPADM

## 2019-09-02 RX ORDER — PANTOPRAZOLE SODIUM 40 MG/1
40 TABLET, DELAYED RELEASE ORAL
Status: DISCONTINUED | OUTPATIENT
Start: 2019-09-03 | End: 2019-09-03 | Stop reason: HOSPADM

## 2019-09-02 RX ADMIN — CLONIDINE HYDROCHLORIDE 0.2 MG: 0.1 TABLET ORAL at 09:48

## 2019-09-02 RX ADMIN — CLONIDINE HYDROCHLORIDE 0.1 MG: 0.1 TABLET ORAL at 18:20

## 2019-09-02 RX ADMIN — ASPIRIN 81 MG 81 MG: 81 TABLET ORAL at 09:48

## 2019-09-02 RX ADMIN — HYDRALAZINE HYDROCHLORIDE 100 MG: 50 TABLET, FILM COATED ORAL at 09:48

## 2019-09-02 RX ADMIN — POTASSIUM CHLORIDE: 2 INJECTION, SOLUTION, CONCENTRATE INTRAVENOUS at 09:43

## 2019-09-02 RX ADMIN — SODIUM CHLORIDE 100 ML/HR: 900 INJECTION, SOLUTION INTRAVENOUS at 15:43

## 2019-09-02 RX ADMIN — DOCUSATE SODIUM 100 MG: 100 CAPSULE, LIQUID FILLED ORAL at 18:20

## 2019-09-02 RX ADMIN — ALLOPURINOL 100 MG: 100 TABLET ORAL at 18:20

## 2019-09-02 RX ADMIN — SIMVASTATIN 40 MG: 40 TABLET, FILM COATED ORAL at 22:29

## 2019-09-02 RX ADMIN — POTASSIUM CHLORIDE 20 MEQ: 20 TABLET, EXTENDED RELEASE ORAL at 15:40

## 2019-09-02 RX ADMIN — POTASSIUM CHLORIDE: 2 INJECTION, SOLUTION, CONCENTRATE INTRAVENOUS at 08:00

## 2019-09-02 RX ADMIN — HYDRALAZINE HYDROCHLORIDE 50 MG: 50 TABLET, FILM COATED ORAL at 15:40

## 2019-09-02 RX ADMIN — INSULIN LISPRO 4 UNITS: 100 INJECTION, SOLUTION INTRAVENOUS; SUBCUTANEOUS at 12:50

## 2019-09-02 RX ADMIN — ALLOPURINOL 100 MG: 100 TABLET ORAL at 09:48

## 2019-09-02 RX ADMIN — AMLODIPINE BESYLATE 10 MG: 10 TABLET ORAL at 09:48

## 2019-09-02 NOTE — ROUTINE PROCESS
Bedside and Verbal shift change report given to LOC Cordon (oncoming nurse) by Esau Juares RN (offgoing nurse). Report included the following information SBAR, Kardex and MAR.

## 2019-09-02 NOTE — PHYSICIAN ADVISORY
[facility]     Physician Advisor Recommendation      The information in this document is a recommendation to be used for utilization review and utilization management purposes only. This recommendation is not an order. The recommendation is made based on the information reviewed at the time of the referral, is pursuant to the [place] [facility] Conditions of Participation (42 CFR Part 482), and is neither a judgment nor an assessment with regard to the appropriateness or quality of clinical care. Nothing in this document may be used to limit, alter, or affect clinical services provided to the patient named below. The provider of services is ultimately responsible for the submission of a claim that has met all requirements for correct coding, billing, and reimbursement. Letter of Status Determination: Current Status   INPATIENT is Appropriate         Pt Name:  Beti Jimenez   MR#  [id number]   [facility]#   [id number]   [address] [place] [address]01  @ [facility]   Hospitalization date  9/1/2019  4:29 PM   Current Attending Physician  Demar Hernandez MD   Principal diagnosis  Elevated troponin    Clinicals  80 y.o. female hospitalized with diabetes, hypertension, hyperlipidemia and renal insufficiency who presents with nausea. Patient has had nausea since last night. She exhibits edema (2+ bilateral ). Monitor Pt was found to have mildly elevated troponin that kept edging slightly up w/out chest pain or any acute changes in EKG which shows Lt Axis deviation and borderline tachycardia. Pt is on clonidine   Assess Bp 160/59, 165/99, temp 99.4, trop elevated 0.09 , 0.07  Evaluate Holding Lisinopril and Lasix for now Cardiology consult  Treat is ongoing and is expected to cross two midnights , cardiology awaited , elderly diabetic pt with recent med changes will need close monitoring.          STATUS DETERMINATION  On the basis of clinical data, available documentation, we believe that the current status of this patient as INPATIENT is Appropriate On the basis of above clinical data, this patient's care in acute hospital care setting is expected to exceed two midnights. Additional comments     Insurance  Payor: [place] MED[place]ARE / Plan: [place] MEDICARE PART A & B / Product Type: Medicare /      No current facility-administered medications on file prior to encounter. Current Outpatient Medications on File Prior to Encounter   Medication Sig Dispense Refill    polyethylene glycol (MIRALAX) 17 gram/dose powder Take 17 g by mouth daily. 1 tablespoon with 8 oz of water daily 289 g 0    simvastatin (ZOCOR) 40 mg tablet TAKE 1 TABLET BY MOUTH NIGHTLY 90 Tab 2    cloNIDine HCl (CATAPRES) 0.2 mg tablet TAKE 1 TABLET BY MOUTH TWICE DAILY 180 Tab 2    lisinopril (PRINIVIL, ZESTRIL) 10 mg tablet Take 1 Tab by mouth daily. 90 Tab 0    furosemide (LASIX) 20 mg tablet Take 1 tablet once daily as needed for edema 90 Tab 0    colchicine (COLCRYS) 0.6 mg tablet Take 1 Tab by mouth daily. Indications: gout 90 Tab 2    ascorbic acid, vitamin C, (VITAMIN C) 500 mg tablet Take 500 mg by mouth daily.  cetirizine (ZYRTEC) 10 mg tablet Take 1 Tab by mouth daily. 90 Tab 0    hydrALAZINE (APRESOLINE) 100 mg tablet Take 1 Tab by mouth three (3) times daily. 270 Tab 2    Blood-Glucose Meter (PRODIGY AUTOCODE MONITOR SYST) misc Check fasting glucose once daily 1 Each 0    amLODIPine (NORVASC) 10 mg tablet TAKE 1 TABLET BY MOUTH ONCE DAILY 90 Tab 2    cholecalciferol, VITAMIN D3, (VITAMIN D3) 5,000 unit tab tablet Take  by mouth daily.  docusate sodium (STOOL SOFTENER) 50 mg capsule Take 50 mg by mouth two (2) times a day.  Aspirin, Buffered 81 mg tab Take 1 tablet by mouth daily.  fish oil-dha-epa 1,200-144-216 mg cap Take 1 tablet by mouth daily.       glucose blood VI test strips (PRODIGY NO CODING) strip Check fasting glucose once daily 100 Strip 3      10:40 AM 9/2/2019       Dr. Kristin Arevalo MBA Physician Dave Quezada, [address] [place] [address]  C: [phone]  [email or web address]

## 2019-09-02 NOTE — ED NOTES
Bedside and verbal shift report given by Britney Naqvi RN (off going nurse) to Elizabeth Dutton RN (oncoming nurse). Report included the following information SBAR and ED Summary. Patient connected to cardiac monitoring at this time.

## 2019-09-02 NOTE — PROGRESS NOTES
Spoke to Dr. Lisa Drummond regarding patients troponin level of 0.07 which is down from 0.09. Also informed him that the pt's potassium was 3.2 and uric acid was 10.4. Provider gave new orders for potassium replacementt and allopurinol.

## 2019-09-02 NOTE — PROGRESS NOTES
Bedside shift change report given to Terral Brunner, RN (oncoming nurse) by Lukas Fields (offgoing nurse). Report included the following information SBAR and Kardex.

## 2019-09-02 NOTE — ROUTINE PROCESS
TRANSFER - OUT REPORT:    Verbal report given to receiving nurse on [place]  being transferred to [facility] for routine progression of care       Report consisted of patients Situation, Background, Assessment and   Recommendations(SBAR). Information from the following report(s) SBAR, ED Summary, Intake/Output, MAR and Cardiac Rhythm NSR was reviewed with the receiving nurse. Lines:   Peripheral IV 09/01/19 Right Antecubital (Active)   Site Assessment Clean, dry, & intact 9/1/2019  4:50 PM   Phlebitis Assessment 0 9/1/2019  4:50 PM   Infiltration Assessment 0 9/1/2019  4:50 PM   Dressing Status Clean, dry, & intact 9/1/2019  4:50 PM   Dressing Type Tape;Transparent 9/1/2019  4:50 PM   Hub Color/Line Status Pink;Flushed;Patent 9/1/2019  4:50 PM   Action Taken Blood drawn 9/1/2019  4:50 PM        Opportunity for questions and clarification was provided.       Patient transported with:   Registered Nurse

## 2019-09-02 NOTE — PROGRESS NOTES
SUBJECTIVE:    Feeling better. Wants to eat. No nausea since last evening. No vomiting at all. Denies for abdominal pain. Had some epigastric discomfort yesterday. No chest pain or SOB or cough or dizziness. OBJECTIVE:    /59 (BP 1 Location: Left arm, BP Patient Position: At rest)   Pulse 61   Temp 97.9 °F (36.6 °C)   Resp 18   Ht 4' 11\" (1.499 m)   Wt 63.5 kg (140 lb)   SpO2 97%   Breastfeeding? No   BMI 28.28 kg/m²     General appearance - alert, well appearing, and in no distress  Eyes - sclera anicteric  Nose - no nasal discharge. Neck - supple, no JVD, trachea is midline  Chest - Good air entry noted in bases, no wheezes  Heart - S1 and S2 normal  Abdomen - soft, nontender, nondistended, Bowel sounds present  Neurological - alert, oriented, normal speech, no focal findings noted while in bed  Musculoskeletal - no joint tenderness or swelling of knees  Extremities - no pedal edema noted    ASSESSMENT:    1. Mildly elevated troponin due to demand ischemia sec to nausea in setting of CKD. No ACS  2. H/o HTN  3. Nausea, resolved  4. GERD  5. DM  6. Dyslipidemia  7. Chronic diastolic CHF, compensated.      PLAN:    Continue current management  Change BP medications with holding parameters  D/w dr. Doug Jacobs - no plan for any procedures, can eat  Add PPI  If no issues overnight, can be discharged home tomorrow  D/w patient and daughter    BMP:   Lab Results   Component Value Date/Time     09/02/2019 04:39 AM    K 3.1 (L) 09/02/2019 04:39 AM    CL 99 (L) 09/02/2019 04:39 AM    CO2 34 (H) 09/02/2019 04:39 AM    AGAP 7 09/02/2019 04:39 AM     (H) 09/02/2019 04:39 AM    BUN 32 (H) 09/02/2019 04:39 AM    CREA 1.58 (H) 09/02/2019 04:39 AM    GFRAA 38 (L) 09/02/2019 04:39 AM    GFRNA 31 (L) 09/02/2019 04:39 AM     CBC:   Lab Results   Component Value Date/Time    WBC 3.6 (L) 09/02/2019 04:39 AM    HGB 10.6 (L) 09/02/2019 04:39 AM    HCT 32.8 (L) 09/02/2019 04:39 AM     09/02/2019 04:39 AM

## 2019-09-02 NOTE — H&P
History & Physical    Patient: Jack Lozada MRN: [id number]  CSN: [id number]    YOB: 1933  Age: 80 y.o. Sex: female      DOA: 9/1/2019    Chief Complaint:   Chief Complaint   Patient presents with    Nausea          HPI:     Jack Lozada is a 80 y.o.  female who has PMH of Chronic diastolic HF grade II, HTN, DM and CRF   Pt presented to Er with a CC of on and off nausea episodes since Friday w/out vomiting. In Er, Pt was found to have mildly elevated troponin that kept edging slightly up w/out chest pain or any acute changes in EKG which shows Lt Axis deviation and borderline tachycardia. Pt is on clonidine   Denies CP, SOB, fever, chills, nausea, vomiting, back pain, neck pain, HA, dizziness, lightheadedness, or any other associated sx. No other complaints or concerns in the ED. Pt is on multiple meds for HTN and On lisinopril mainly for DM. Had few changes in her PO Diabetes meds lately       Past Medical History:   Diagnosis Date    Anemia     Carotid bruit 12/07/2013    Carotid Duplex: 1. Bilateral 1-49% stenosis of the internal carotid arteries. 2. No significant stenosis in the external carotid arteries bilaterally. 3. Antegrade flow in both vetebral arteries. 4. Normal flow in both subclavian arteries. Plaque Morphology: 1. Hyperechoic plaque in the bulb and right ICA. 2. Hyperechoic plaque in the bulb and left ICA.  CKD (chronic kidney disease) stage 3, GFR 30-59 ml/min ([facility])     Diabetes ([facility] [address]     NIDDM    Gastritis 10/27/2014    Duodenum Bx: No Specific Pathologic Abnormality. No Villous Abnormality. 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 No dysplasia or malignancy identified. Bacterial Organisms c/w H. Pylori are present. Z-Line Bx: GE mucosa w/ Acute/Chronic Inflammation & Reactive Changes. Focal Specialized Type Campos Mucosa Identified. No Dysplasia or Malignancy Identified.      Gout 12/10/2009    Elevated Uric Acid Level    Hyperlipidemia     Hypertension     RAMÓN (iron deficiency anemia)        Past Surgical History:   Procedure Laterality Date    HX COLONOSCOPY  10/27/2014    Dr. Alex Tomlinson HX ENDOSCOPY  10/27/2014    Dr. Katia Ruiz        Family History   Problem Relation Age of Onset    Hypertension Mother     Stroke Mother     Stroke Father        Social History     Socioeconomic History    Marital status:      Spouse name: Not on file    Number of children: Not on file    Years of education: Not on file    Highest education level: Not on file   Tobacco Use    Smoking status: Never Smoker    Smokeless tobacco: Never Used   Substance and Sexual Activity    Alcohol use: No    Drug use: No    Sexual activity: Not Currently     Partners: Male     Birth control/protection: None       Prior to Admission medications    Medication Sig Start Date End Date Taking? Authorizing Provider   polyethylene glycol (MIRALAX) 17 gram/dose powder Take 17 g by mouth daily. 1 tablespoon with 8 oz of water daily 8/14/19   Izaiah Stevens MD   simvastatin (ZOCOR) 40 mg tablet TAKE 1 TABLET BY MOUTH NIGHTLY 8/13/19   Helen Class, MD   cloNIDine HCl (CATAPRES) 0.2 mg tablet TAKE 1 TABLET BY MOUTH TWICE DAILY 8/1/19   Helen Class, MD   lisinopril (PRINIVIL, ZESTRIL) 10 mg tablet Take 1 Tab by mouth daily. 6/20/19   Helen Class, MD   furosemide (LASIX) 20 mg tablet Take 1 tablet once daily as needed for edema 4/5/19   Felt Class, MD   colchicine (COLCRYS) 0.6 mg tablet Take 1 Tab by mouth daily. Indications: gout 4/5/19   Felt Class, MD   ascorbic acid, vitamin C, (VITAMIN C) 500 mg tablet Take 500 mg by mouth daily. Provider, Historical   cetirizine (ZYRTEC) 10 mg tablet Take 1 Tab by mouth daily. 3/1/19   Helen Class, MD   hydrALAZINE (APRESOLINE) 100 mg tablet Take 1 Tab by mouth three (3) times daily.  2/4/19   Felt Class, MD   glucose blood VI test strips (PRODIGY NO CODING) strip Check fasting glucose once daily 19   Rex Wei MD   Blood-Glucose Meter (PRODIGY AUTOCODE MONITOR SYST) misc Check fasting glucose once daily 18   Rex Wei MD   amLODIPine (NORVASC) 10 mg tablet TAKE 1 TABLET BY MOUTH ONCE DAILY 18   Rex Wei MD   cholecalciferol, VITAMIN D3, (VITAMIN D3) 5,000 unit tab tablet Take  by mouth daily. Provider, Historical   docusate sodium (STOOL SOFTENER) 50 mg capsule Take 50 mg by mouth two (2) times a day. Provider, Historical   Aspirin, Buffered 81 mg tab Take 1 tablet by mouth daily. Provider, Historical   fish oil-dha-epa 1,200-144-216 mg cap Take 1 tablet by mouth daily. Provider, Historical       No Known Allergies      Review of Systems  GENERAL: Patient alert, awake and oriented times 3, able to communicate full sentences and not in distress. HEENT: No change in vision, no earache, tinnitus, sore throat or sinus congestion. NECK: No pain or stiffness. PULMONARY: No shortness of breath, cough or wheeze. Cardiovascular: no pnd / orthopnea, no CP  GASTROINTESTINAL: No abdominal pain, + nausea, No vomiting or diarrhea, melena or bright red blood per rectum. GENITOURINARY: No urinary frequency, urgency, hesitancy or dysuria. MUSCULOSKELETAL: No joint or muscle pain, no back pain, no recent trauma. DERMATOLOGIC: No rash, no itching, no lesions. ENDOCRINE: No polyuria, polydipsia, no heat or cold intolerance. No recent change in weight. HEMATOLOGICAL: No anemia or easy bruising or bleeding. NEUROLOGIC: No headache, seizures, numbness, tingling or weakness. Physical Exam:     Physical Exam:  Visit Vitals  /59   Pulse 79   Temp 99.4 °F (37.4 °C)   Resp 19   Ht 4' 11\" (1.499 m)   Wt 63.5 kg (140 lb)   SpO2 96%   BMI 28.28 kg/m²      O2 Device: Room air    Temp (24hrs), Av.4 °F (37.4 °C), Min:99.4 °F (37.4 °C), Max:99.4 °F (37.4 °C)    No intake/output data recorded. No intake/output data recorded. General:  Alert, cooperative, no distress, appears stated age. Head: Normocephalic, without obvious abnormality, atraumatic. Eyes:  Conjunctivae/corneas clear. PERRL, EOMs intact. Nose: Nares normal. No drainage or sinus tenderness. Neck: Supple, symmetrical, trachea midline, no adenopathy, thyroid: no enlargement, no carotid bruit and no JVD. Lungs:   Clear to auscultation bilaterally. Heart:  Regular rate and rhythm, S1, S2 normal. + murmur     Abdomen: Soft, non-tender. Bowel sounds normal.    Extremities: Extremities normal, atraumatic, no cyanosis or edema. Pulses: 2+ and symmetric all extremities. Skin:  No rashes or lesions   Neurologic: AAOx3, No focal motor or sensory deficit. Labs Reviewed:    Lab results reviewed. For significant abnormal values and values requiring intervention, see assessment and plan.   CXR and EKG    Procedures/imaging: see electronic medical records for all procedures/Xrays and details which were not copied into this note but were reviewed prior to creation of Plan      Assessment/Plan     Principal Problem:    Elevated troponin (9/1/2019)    Active Problems:    Essential hypertension (5/2/2017)      Mixed hyperlipidemia (5/2/2017)      Controlled type 2 diabetes mellitus with stage 3 chronic kidney disease, without long-term current use of insulin ([facility] [address] (8/2/2017)      Nausea (9/1/2019)      Chronic renal failure, stage 3 (moderate) (HCC) (2/4/0574)      Diastolic CHF, chronic (HCC) (9/1/2019)       Pt is being admitted for on and off nausea episodes with elevated troponin   No Chest pain or EKG changes   Multiple risk factors including age, DM, HTN on multiple meds and CRF  DHF w/out exacerbation     Series of cardiac enzymes   Will give Lovenox therapeutic dose x 1   Will continue Home meds for HTN >> amlodipine , clonidine, hydralazine   Will get TSH :>>> states that she feels sleepy more than usual   SSI and hold PO meds   Holding Lisinopril and Lasix for now   Cardiology consult is assigned         DVT/GI Prophylaxis: Lovenox    Plan of care is discussed in details with Patient/Family at bedside and agreed upon    Ilia Alexander MD  9/1/2019 10:58 PM

## 2019-09-02 NOTE — CONSULTS
[facility] - Consult Note    Consultation request by Morgan Suggs MD for advice/opinion related to evaluating Nausea [R11.0]; Elevated troponin [R74.8]    Date of  Admission: 9/1/2019  4:29 PM   Primary Care Physician:  Samir Mcdonald MD     Assessment:     Patient Active Problem List   Diagnosis Code    Essential hypertension I10    Mixed hyperlipidemia E78.2    Advanced directives, counseling/discussion Z71.89    Controlled type 2 diabetes mellitus with stage 3 chronic kidney disease, without long-term current use of insulin ([facility]) E11.22, N18.3    Advance care planning Z71.89    Heart murmur R01.1    Gout M10.9    CKD (chronic kidney disease) stage 3, GFR 30-59 ml/min ([facility]) N18.3    Anemia of chronic disease D63.8    Leg edema R60.0    Nausea R11.0    Elevated troponin R74.8    Chronic renal failure, stage 3 (moderate) ([facility]) B73.7    Diastolic CHF, chronic ([facility]) I50.32     --Indeterminate troponin elevation. Patient's troponin level is only minimally elevated and her repeat have been essentially unchanged. Her EKG does not have any acute ST-T abnormalities. This is unlikely related to an acute coronary syndrome. --Chronic diastolic heart failure. Patient's volume status appears to be reasonable. She does not appear to be volume overloaded and she is euvolemic on exam.  She takes Lasix as needed at home. She last underwent an echocardiogram in September 2018 which showed preserved LV systolic function, moderate concentric LVH and evidence of diastolic dysfunction. --Aortic insufficiency. This was in the moderate range on echocardiogram 1 year ago. --Essential hypertension. Patient's blood pressure is mildly elevated. She was restarted on her outpatient antihypertensive regimen. --Chronic kidney disease, stage III. Her renal function appears to be at baseline. --Nausea. Suspect this may be GI in nature. Do not suspect cardiac cause. Plan:      We will repeat an EKG today. No need for additional troponin levels. Repeat echocardiogram this admission to reevaluate her LV function, assess for any new regional wall motion abnormalities and reassess her aortic valve. Can add long-acting nitrates if her blood pressure remains elevated. No need for diuretics at this time. Okay to eat today from a cardiac standpoint. History of Present Illness: This is a 80 y.o. female admitted for Nausea [R11.0]; Elevated troponin [R74.8]. Patient complains nausea, chills and overall does not feel well for the past 2 to 3 days. Patient presented to the emergency room with the above complaints. She was incidentally found to have a minimally elevated troponin level which was repeated several times and remained  essentially unchanged. The patient was seen by me once in the office last year for evaluation of a heart murmur and lower extremity swelling. She underwent an echocardiogram which was consistent with hypertensive cardiovascular disease. She had preserved LV systolic function, EF 56 to 6% with moderate concentric LVH and grade 2 diastolic dysfunction and moderate pulmonary hypertension likely due to chronic left-sided diastolic dysfunction. She also had moderate aortic insufficiency. Patient denies any worsening leg swelling. She does take Lasix 20 mg as needed. She has not experienced new shortness of breath on exertion or at rest.  No orthopnea or PND. No chest pain, heart palpitations, dizziness or syncope.       Cardiac risk factors: dyslipidemia, sedentary life style, hypertension      Review of Symptoms:  Except as stated above include:  Constitutional:  negative  Respiratory:  negative  Cardiovascular:  negative  Gastrointestinal: negative  Genitourinary:  negative  Musculoskeletal:  Negative  Neurological:  Negative  Dermatological:  Negative  Endocrinological: Negative  Psychological:  Negative    A comprehensive review of systems was negative except for that written in the HPI. Past Medical History:     Past Medical History:   Diagnosis Date    Anemia     Carotid bruit 12/07/2013    Carotid Duplex: 1. Bilateral 1-49% stenosis of the internal carotid arteries. 2. No significant stenosis in the external carotid arteries bilaterally. 3. Antegrade flow in both vetebral arteries. 4. Normal flow in both subclavian arteries. Plaque Morphology: 1. Hyperechoic plaque in the bulb and right ICA. 2. Hyperechoic plaque in the bulb and left ICA.  CKD (chronic kidney disease) stage 3, GFR 30-59 ml/min ([facility])     Diabetes ([facility] [address]     NIDDM    Gastritis 10/27/2014    Duodenum Bx: No Specific Pathologic Abnormality. No Villous Abnormality. 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 No dysplasia or malignancy identified. Bacterial Organisms c/w H. Pylori are present. Z-Line Bx: GE mucosa w/ Acute/Chronic Inflammation & Reactive Changes. Focal Specialized Type Campos Mucosa Identified. No Dysplasia or Malignancy Identified.      Gout 12/10/2009    Elevated Uric Acid Level    Hyperlipidemia     Hypertension     RAMÓN (iron deficiency anemia)          Social History:     Social History     Socioeconomic History    Marital status:      Spouse name: Not on file    Number of children: Not on file    Years of education: Not on file    Highest education level: Not on file   Tobacco Use    Smoking status: Never Smoker    Smokeless tobacco: Never Used   Substance and Sexual Activity    Alcohol use: No    Drug use: No    Sexual activity: Not Currently     Partners: Male     Birth control/protection: None        Family History:     Family History   Problem Relation Age of Onset    Hypertension Mother     Stroke Mother     Stroke Father         Medications:   No Known Allergies     Current Facility-Administered Medications   Medication Dose Route Frequency    allopurinol (ZYLOPRIM) tablet 100 mg  100 mg Oral BID    amLODIPine (NORVASC) tablet 10 mg  10 mg Oral DAILY    aspirin chewable tablet 81 mg  81 mg Oral DAILY    cloNIDine HCl (CATAPRES) tablet 0.2 mg  0.2 mg Oral BID    colchicine (MITIGARE) capsule 0.6 mg  0.6 mg Oral DAILY PRN    hydrALAZINE (APRESOLINE) tablet 100 mg  100 mg Oral TID    simvastatin (ZOCOR) tablet 40 mg  40 mg Oral QHS    acetaminophen (TYLENOL) tablet 650 mg  650 mg Oral Q6H PRN    oxyCODONE-acetaminophen (PERCOCET) 5-325 mg per tablet 1 Tab  1 Tab Oral Q6H PRN    naloxone (NARCAN) injection 0.4 mg  0.4 mg IntraVENous PRN    ondansetron (ZOFRAN) injection 4 mg  4 mg IntraVENous Q6H PRN    docusate sodium (COLACE) capsule 100 mg  100 mg Oral BID PRN    insulin lispro (HUMALOG) injection   SubCUTAneous AC&HS    glucose chewable tablet 16 g  4 Tab Oral PRN    glucagon (GLUCAGEN) injection 1 mg  1 mg IntraMUSCular PRN    dextrose (D50W) injection syrg 12.5-25 g  25-50 mL IntraVENous PRN         Physical Exam:     Visit Vitals  /79 (BP 1 Location: Left arm, BP Patient Position: At rest)   Pulse 71   Temp 97.8 °F (36.6 °C)   Resp 20   Ht 4' 11\" (1.499 m)   Wt 63.5 kg (140 lb)   SpO2 95%   Breastfeeding? No   BMI 28.28 kg/m²     BP Readings from Last 3 Encounters:   09/02/19 152/79   08/14/19 127/71   07/25/19 110/60     Pulse Readings from Last 3 Encounters:   09/02/19 71   08/14/19 64   07/25/19 (!) 48     Wt Readings from Last 3 Encounters:   09/01/19 63.5 kg (140 lb)   07/25/19 67.1 kg (148 lb)   06/20/19 67.1 kg (148 lb)       General:  alert, cooperative, no distress, appears stated age  Neck:  no carotid bruit, no JVD  Lungs:  clear to auscultation bilaterally  Heart:  regular rate and rhythm, no gallop, soft diastolic murmur  Abdomen:  abdomen is soft without significant tenderness, masses, organomegaly or guarding  Extremities:  extremities normal, atraumatic, no cyanosis or edema  Skin: Warm and dry.  no hyperpigmentation, vitiligo, or suspicious lesions  Neuro: alert, oriented x3, affect appropriate, no focal neurological deficits, moves all extremities well, no involuntary movements  Psych: non focal     Data Review:     Recent Labs     09/02/19  0439 09/01/19  1650   WBC 3.6* 5.4   HGB 10.6* 10.8*   HCT 32.8* 32.9*    290     Recent Labs     09/02/19  0439 09/01/19  2245 09/01/19  1650     --  141   K 3.1*  --  3.8   CL 99*  --  101   CO2 34*  --  31   *  --  116*   BUN 32*  --  37*   CREA 1.58*  --  1.67*   CA 8.9  --  9.4   MG 2.0  --   --    PHOS 3.8  --   --    ALB  --   --  4.2   SGOT  --   --  61*   ALT  --   --  38   INR  --  1.1  --        Results for orders placed or performed during the hospital encounter of 09/01/19   EKG, 12 LEAD, INITIAL   Result Value Ref Range    Ventricular Rate 88 BPM    Atrial Rate 88 BPM    P-R Interval 194 ms    QRS Duration 88 ms    Q-T Interval 388 ms    QTC Calculation (Bezet) 469 ms    Calculated P Axis 22 degrees    Calculated R Axis -41 degrees    Calculated T Axis 70 degrees    Diagnosis       Normal sinus rhythm  Possible Left atrial enlargement  Left axis deviation  Abnormal ECG  When compared with ECG of 06-JUN-2019 09:12,  Vent. rate has increased BY  40 BPM     Results for orders placed or performed in visit on 09/17/18   AMB POC EKG ROUTINE W/ 12 LEADS, INTER & REP    Impression    See progress note.        All Cardiac Markers in the last 24 hours:    Lab Results   Component Value Date/Time     (H) 09/02/2019 10:10 AM     (H) 09/02/2019 04:39 AM     (H) 09/01/2019 09:55 PM     (H) 09/01/2019 07:45 PM    CKMB 3.8 (H) 09/02/2019 10:10 AM    CKMB 4.7 (H) 09/02/2019 04:39 AM    CKMB 5.9 (H) 09/01/2019 09:55 PM    CKMB 5.7 (H) 09/01/2019 07:45 PM    CKND1 1.4 09/02/2019 10:10 AM    CKND1 1.8 09/02/2019 04:39 AM    CKND1 2.1 09/01/2019 09:55 PM    CKND1 2.1 09/01/2019 07:45 PM    TROIQ 0.05 (H) 09/02/2019 10:10 AM    TROIQ 0.07 (H) 09/02/2019 04:39 AM    TROIQ 0.09 (H) 09/01/2019 09:55 PM    TROIQ 0.07 (H) 09/01/2019 07:45 PM    TROIQ 0.06 (H) 09/01/2019 04:50 PM       Last Lipid:    Lab Results   Component Value Date/Time    Cholesterol, total 128 02/25/2019 10:46 AM    HDL Cholesterol 69 (H) 02/25/2019 10:46 AM    LDL, calculated 41.6 02/25/2019 10:46 AM    Triglyceride 87 02/25/2019 10:46 AM    CHOL/HDL Ratio 1.9 02/25/2019 10:46 AM       Signed By: Ranjan Starr MD     September 2, 2019

## 2019-09-02 NOTE — PROGRESS NOTES
NUTRITION    Nutrition Consult: General Nutrition Management & Supplements     RECOMMENDATIONS / PLAN:     - Continue current nutrition interventions  - Okay for family to provide food if pt desires, staying consistent with diet order  - Continue RD inpatient monitoring and evaluation. NUTRITION INTERVENTIONS & DIAGNOSIS:     [x] Meals/snacks: modified composition    Nutrition Diagnosis:  Inadequate oral intake related to decreased appetite due to N/V as evidenced by pt not eating anything x 1-2 days PTA    ASSESSMENT:     Per pt and family report, pt usually eats small meals; did not eat x past 2 days. Was NPO due to having N/V; none today and pt was asking to eat. Diet order per MD. Pt consumed 85% of lunch per niece. Discussed adding nutrition supplement due to report of inadequate meal intake PTA; pt refused all options, dislikes nutrition supplements. Denied having any food preferences. Discussed with pt and family okay for them to provide food if pt desires, staying consistent with diet order; family verbalized understanding. Meal intake encouraged    Average po intake adequate to meet patients estimated nutritional needs:   [] Yes     [] No   [x] Unable to determine at this time    Diet: DIET CARDIAC Regular      Food Allergies:  None known   Current Appetite:   [] Good     [x] Fair     [] Poor     [] Other:  Appetite/meal intake prior to admission:   [] Good     [x] Fair     [x] Poor     [x] Other: per family report, pt usually eats small meals.  Per pt, no po intake x 2 days PTA  Feeding Limitations:  [] Swallowing difficulty    [] Chewing difficulty    [] Other:  Current Meal Intake:   Patient Vitals for the past 100 hrs:   % Diet Eaten   09/02/19 0818 0 %       BM: PTA  Skin Integrity:  No pressure injury or wound noted  Edema:   [x] No     [] Yes   Pertinent Medications: Reviewed: NS at 100 mL/hr, colace, SSI, zofran, KCl. protonix (ordered)    Recent Labs     09/02/19  0439 09/01/19  1650    141   K 3.1* 3.8   CL 99* 101   CO2 34* 31   * 116*   BUN 32* 37*   CREA 1.58* 1.67*   CA 8.9 9.4   MG 2.0  --    PHOS 3.8  --    ALB  --  4.2   SGOT  --  61*   ALT  --  38       Intake/Output Summary (Last 24 hours) at 9/2/2019 1319  Last data filed at 9/2/2019 1001  Gross per 24 hour   Intake 0 ml   Output 900 ml   Net -900 ml       Anthropometrics:  Ht Readings from Last 1 Encounters:   09/01/19 4' 11\" (1.499 m)     Last 3 Recorded Weights in this Encounter    09/01/19 1628   Weight: 63.5 kg (140 lb)     Body mass index is 28.28 kg/m². Weight History:  -8 lb, 5.4% x 1.5 month PTA per chart hx. Noted weight fluctuations PTA. Pt reported UBW of 140 lb, then 130 lb. Weight Metrics 9/1/2019 7/25/2019 6/20/2019 6/6/2019 4/5/2019 3/1/2019 1/28/2019   Weight 140 lb 148 lb 148 lb 149 lb 154 lb 149 lb 150 lb 3.2 oz   BMI 28.28 kg/m2 29.89 kg/m2 29.89 kg/m2 30.09 kg/m2 31.1 kg/m2 30.09 kg/m2 30.34 kg/m2        Admitting Diagnosis: Nausea [R11.0]  Elevated troponin [R74.8]  Pertinent PMHx: anemia, CKD stage III, DM, gastritis, gout, HLD, HTN    Education Needs:        [x] None identified  [] Identified - Not appropriate at this time  []  Identified and addressed - refer to education log  Learning Limitations:   [x] None identified  [] Identified    Cultural, Samaritan & ethnic food preferences:  [x] None identified    [] Identified and addressed     ESTIMATED NUTRITION NEEDS:     Calories: 7749-3230 kcal (25-30 kcal/kg) based on  [] Actual BW      [x] SBW: 62 kg   Protein: 50-62 gm (0.8-1 gm/kg) based on  [] Actual BW      [x] SBW   Fluid: 1 mL/kcal     MONITORING & EVALUATION:     Nutrition Goal(s):   1. Po intake of meals will meet >75% of patient estimated nutritional needs within the next 7 days.   Outcome:  [] Met/Ongoing    [] Progressing towards goal    [] Not progressing towards goal    [x] New/Initial goal     Monitoring:   [x] Food and beverage intake   [x] Diet order   [x] Nutrition-focused physical findings   [x] Treatment/therapy   [] Weight   [] Enteral nutrition intake        Previous Recommendations (for follow-up assessments only):     []   Implemented       []   Not Implemented (RD to address)      [] No Longer Appropriate     [] No Recommendation Made     Discharge Planning: No nutritional discharge needs at this time.     [x] Participated in care planning, discharge planning, & interdisciplinary rounds as appropriate      Joslyn Blevins, [address] [place] [address]   Pager: [phone]

## 2019-09-03 VITALS
SYSTOLIC BLOOD PRESSURE: 136 MMHG | BODY MASS INDEX: 28.22 KG/M2 | TEMPERATURE: 97.3 F | DIASTOLIC BLOOD PRESSURE: 70 MMHG | HEART RATE: 60 BPM | RESPIRATION RATE: 18 BRPM | WEIGHT: 140 LBS | OXYGEN SATURATION: 97 % | HEIGHT: 59 IN

## 2019-09-03 LAB
ANION GAP SERPL CALC-SCNC: 6 MMOL/L (ref 3–18)
BUN SERPL-MCNC: 38 MG/DL (ref 7–18)
BUN SERPL-MCNC: 40 MG/DL (ref 7–18)
BUN/CREAT SERPL: 17 (ref 12–20)
CALCIUM SERPL-MCNC: 8.5 MG/DL (ref 8.5–10.1)
CHLORIDE SERPL-SCNC: 100 MMOL/L (ref 100–111)
CO2 SERPL-SCNC: 30 MMOL/L (ref 21–32)
CREAT SERPL-MCNC: 2.06 MG/DL (ref 0.6–1.3)
CREAT SERPL-MCNC: 2.19 MG/DL (ref 0.6–1.3)
ECHO AO ROOT DIAM: 2.82 CM
ECHO LA AREA 4C: 21.3 CM2
ECHO LA VOL 2C: 55.33 ML (ref 22–52)
ECHO LA VOL 4C: 56.57 ML (ref 22–52)
ECHO LA VOL BP: 63.53 ML (ref 22–52)
ECHO LA VOL/BSA BIPLANE: 40.09 ML/M2 (ref 16–28)
ECHO LA VOLUME INDEX A2C: 34.92 ML/M2 (ref 16–28)
ECHO LA VOLUME INDEX A4C: 35.7 ML/M2 (ref 16–28)
ECHO LV E' LATERAL VELOCITY: 3.63 CM/S
ECHO LV E' SEPTAL VELOCITY: 3.44 CM/S
ECHO LV GLOBAL LONGITUDINAL STRAIN (GLS): -19.7 %
ECHO LV INTERNAL DIMENSION DIASTOLIC: 4.44 CM (ref 3.9–5.3)
ECHO LV INTERNAL DIMENSION SYSTOLIC: 2.82 CM
ECHO LV IVSD: 1.35 CM (ref 0.6–0.9)
ECHO LV MASS 2D: 270.4 G (ref 67–162)
ECHO LV MASS INDEX 2D: 170.6 G/M2 (ref 43–95)
ECHO LV POSTERIOR WALL DIASTOLIC: 1.33 CM (ref 0.6–0.9)
ECHO LVOT DIAM: 1.8 CM
ECHO LVOT PEAK GRADIENT: 3.8 MMHG
ECHO LVOT PEAK VELOCITY: 98.01 CM/S
ECHO LVOT VTI: 23.22 CM
ECHO MV A VELOCITY: 91.45 CM/S
ECHO MV E DECELERATION TIME (DT): 244 MS
ECHO MV E VELOCITY: 58.36 CM/S
ECHO MV E/A RATIO: 0.64
ECHO MV E/E' LATERAL: 16.08
ECHO MV E/E' RATIO (AVERAGED): 16.52
ECHO MV E/E' SEPTAL: 16.97
ECHO PULMONARY ARTERY SYSTOLIC PRESSURE (PASP): 38 MMHG
ECHO RV TAPSE: 2.33 CM (ref 1.5–2)
ECHO TV REGURGITANT MAX VELOCITY: 275.52 CM/S
ECHO TV REGURGITANT PEAK GRADIENT: 30.4 MMHG
GLUCOSE BLD STRIP.AUTO-MCNC: 131 MG/DL (ref 70–110)
GLUCOSE BLD STRIP.AUTO-MCNC: 140 MG/DL (ref 70–110)
GLUCOSE BLD STRIP.AUTO-MCNC: 204 MG/DL (ref 70–110)
GLUCOSE SERPL-MCNC: 143 MG/DL (ref 74–99)
POTASSIUM SERPL-SCNC: 4 MMOL/L (ref 3.5–5.5)
SODIUM SERPL-SCNC: 136 MMOL/L (ref 136–145)

## 2019-09-03 PROCEDURE — 82962 GLUCOSE BLOOD TEST: CPT

## 2019-09-03 PROCEDURE — 74011250637 HC RX REV CODE- 250/637: Performed by: INTERNAL MEDICINE

## 2019-09-03 PROCEDURE — 80048 BASIC METABOLIC PNL TOTAL CA: CPT

## 2019-09-03 PROCEDURE — 97161 PT EVAL LOW COMPLEX 20 MIN: CPT

## 2019-09-03 PROCEDURE — 74011636637 HC RX REV CODE- 636/637: Performed by: INTERNAL MEDICINE

## 2019-09-03 PROCEDURE — 36415 COLL VENOUS BLD VENIPUNCTURE: CPT

## 2019-09-03 PROCEDURE — 84520 ASSAY OF UREA NITROGEN: CPT

## 2019-09-03 PROCEDURE — 97165 OT EVAL LOW COMPLEX 30 MIN: CPT

## 2019-09-03 PROCEDURE — 74011250636 HC RX REV CODE- 250/636: Performed by: INTERNAL MEDICINE

## 2019-09-03 RX ORDER — HYDRALAZINE HYDROCHLORIDE 100 MG/1
50 TABLET, FILM COATED ORAL 3 TIMES DAILY
Qty: 270 TAB | Refills: 2 | Status: SHIPPED
Start: 2019-09-03 | End: 2019-10-18 | Stop reason: SDUPTHER

## 2019-09-03 RX ORDER — ALLOPURINOL 100 MG/1
200 TABLET ORAL DAILY
Qty: 60 TAB | Refills: 0 | Status: SHIPPED | OUTPATIENT
Start: 2019-09-04 | End: 2020-01-28

## 2019-09-03 RX ORDER — SODIUM CHLORIDE 9 MG/ML
150 INJECTION, SOLUTION INTRAVENOUS CONTINUOUS
Status: DISPENSED | OUTPATIENT
Start: 2019-09-03 | End: 2019-09-03

## 2019-09-03 RX ORDER — ALLOPURINOL 100 MG/1
200 TABLET ORAL DAILY
Status: DISCONTINUED | OUTPATIENT
Start: 2019-09-04 | End: 2019-09-03 | Stop reason: HOSPADM

## 2019-09-03 RX ORDER — ALLOPURINOL 100 MG/1
200 TABLET ORAL DAILY
Qty: 60 TAB | Refills: 0 | Status: SHIPPED | OUTPATIENT
Start: 2019-09-04 | End: 2019-09-03 | Stop reason: SDUPTHER

## 2019-09-03 RX ORDER — AMLODIPINE BESYLATE 5 MG/1
5 TABLET ORAL DAILY
Status: DISCONTINUED | OUTPATIENT
Start: 2019-09-03 | End: 2019-09-03 | Stop reason: HOSPADM

## 2019-09-03 RX ADMIN — AMLODIPINE BESYLATE 5 MG: 5 TABLET ORAL at 10:02

## 2019-09-03 RX ADMIN — SODIUM CHLORIDE 150 ML/HR: 900 INJECTION, SOLUTION INTRAVENOUS at 10:16

## 2019-09-03 RX ADMIN — PANTOPRAZOLE SODIUM 40 MG: 40 TABLET, DELAYED RELEASE ORAL at 10:00

## 2019-09-03 RX ADMIN — DOCUSATE SODIUM 100 MG: 100 CAPSULE, LIQUID FILLED ORAL at 14:33

## 2019-09-03 RX ADMIN — ALLOPURINOL 100 MG: 100 TABLET ORAL at 10:01

## 2019-09-03 RX ADMIN — HYDRALAZINE HYDROCHLORIDE 50 MG: 50 TABLET, FILM COATED ORAL at 10:02

## 2019-09-03 RX ADMIN — INSULIN LISPRO 4 UNITS: 100 INJECTION, SOLUTION INTRAVENOUS; SUBCUTANEOUS at 09:59

## 2019-09-03 RX ADMIN — CLONIDINE HYDROCHLORIDE 0.1 MG: 0.1 TABLET ORAL at 10:02

## 2019-09-03 RX ADMIN — HYDRALAZINE HYDROCHLORIDE 50 MG: 50 TABLET, FILM COATED ORAL at 17:49

## 2019-09-03 RX ADMIN — ASPIRIN 81 MG 81 MG: 81 TABLET ORAL at 10:01

## 2019-09-03 NOTE — PROGRESS NOTES
Discharge teaching provided to patient. Patient stated verbal understanding. Patient discharged home via wheelchair with discharge paperwork, prescriptions, personal belongings, and  present. Patient is a+ox4, stable, and denies having any pain or distress. PIV removed and continuous IV fluids discontinued. Armband removed and shredded.

## 2019-09-03 NOTE — PROGRESS NOTES
Problem: Mobility Impaired (Adult and Pediatric)  Goal: *Acute Goals and Plan of Care (Insert Text)  Outcome: Resolved/Met   PHYSICAL THERAPY EVALUATION AND DISCHARGE    Patient: Sandra Gage (80 y.o. female)  Date: 9/3/2019  Primary Diagnosis: Nausea [R11.0]  Elevated troponin [R74.8]        Precautions:        PLOF: Pt lives with her  in a 1 story home with 3 stairs to enter, bilateral railings. Pt walks around her block daily for exercise and performs gardening and housework independently. ASSESSMENT :  Pt was cleared by nursing to participate in therapy. Pt was received semi-recline in bed , agreeable to work with PT . Pt performed supine-sit transfer independently and displayed good sitting balance on EOB. Pt then performed sit-stand transfer independently and displayed good standing balance during donning of gown. B LE strength was [employer] [facility] [employer]. Pt ambulated around unit x 200 feet with no AD independently, no LOB, no shortness of breath and no c/o pain or fatigue. At conclusion of session, pt was left resting comfortably in bed, needs met nursing notified, call bell in reach. Patient does not require further skilled intervention at this level of care. PLAN :  Recommendations and Planned Interventions:   No formal PT needs identified at this time. Discharge Recommendations: None  Further Equipment Recommendations for Discharge: N/A     SUBJECTIVE:   Patient stated Zulema Ruts been wanting to get up.     OBJECTIVE DATA SUMMARY:     Past Medical History:   Diagnosis Date    Anemia     Carotid bruit 12/07/2013    Carotid Duplex: 1. Bilateral 1-49% stenosis of the internal carotid arteries. 2. No significant stenosis in the external carotid arteries bilaterally. 3. Antegrade flow in both vetebral arteries. 4. Normal flow in both subclavian arteries. Plaque Morphology: 1. Hyperechoic plaque in the bulb and right ICA. 2. Hyperechoic plaque in the bulb and left ICA.      CKD (chronic kidney disease) stage 3, GFR 30-59 ml/min (HCC)     Diabetes (HCC)     NIDDM    Gastritis 10/27/2014    Duodenum Bx: No Specific Pathologic Abnormality. No Villous Abnormality. 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 No dysplasia or malignancy identified. Bacterial Organisms c/w H. Pylori are present. Z-Line Bx: GE mucosa w/ Acute/Chronic Inflammation & Reactive Changes. Focal Specialized Type Campos Mucosa Identified. No Dysplasia or Malignancy Identified. Gout 12/10/2009    Elevated Uric Acid Level    Hyperlipidemia     Hypertension     RAMÓN (iron deficiency anemia)      Past Surgical History:   Procedure Laterality Date    HX COLONOSCOPY  10/27/2014    Dr. Serina Denis     HX ENDOSCOPY  10/27/2014    Dr. Serina Denis      Barriers to Learning/Limitations: None  Compensate with: N/A  Home Situation:   Home Situation  Home Environment: Private residence  # Steps to Enter: 3  Rails to Enter: Yes  Hand Rails : Bilateral  One/Two Story Residence: One story  Living Alone: No  Support Systems: Family member(s)  Patient Expects to be Discharged to[de-identified] Private residence  Current DME Used/Available at Home: None  Critical Behavior:  Neurologic State: Alert  Orientation Level: Oriented X4  Cognition: Follows commands    Strength:    Strength: Within functional limits    Tone & Sensation:   Tone: Normal    Sensation: Intact    Range Of Motion:  AROM: Within functional limits    Functional Mobility:  Bed Mobility:  Supine to Sit: Independent    Transfers:  Sit to Stand: Independent  Stand to Sit: Independent    Balance:   Sitting: Intact  Standing: Intact    Ambulation/Gait Training:  Distance (ft): 200 Feet (ft)  Assistive Device: (no AD)  Ambulation - Level of Assistance: Independent     Gait Description (WDL): Exceptions to WDL      Pain:  Pain level pre-treatment: 0/10   Pain level post-treatment: 0/10  Pain Intervention(s): Medication (see MAR);  Rest,  Repositioning   Response to intervention: Nurse notified, See doc flow    Activity Tolerance:   Good  Please refer to the flowsheet for vital signs taken during this treatment. After treatment:   ?         Patient left in no apparent distress sitting up in chair  ? Patient left in no apparent distress in bed  ? Call bell left within reach  ? Nursing notified  ? Caregiver present  ? Bed alarm activated  ? SCDs applied    COMMUNICATION/EDUCATION:   ?         Role of Physical Therapy in the acute care setting. ?         Fall prevention education was provided and the patient/caregiver indicated understanding. ? Patient/family have participated as able in goal setting and plan of care. ?         Patient/family agree to work toward stated goals and plan of care. ?         Patient understands intent and goals of therapy, but is neutral about his/her participation. ? Patient is unable to participate in goal setting/plan of care: ongoing with therapy staff. ?         Other:     Thank you for this referral.  Juancho Cornejo, PT   Time Calculation: 10 mins      Eval Complexity: History: LOW Complexity : Zero comorbidities / personal factors that will impact the outcome / POCExam:LOW Complexity : 1-2 Standardized tests and measures addressing body structure, function, activity limitation and / or participation in recreation  Presentation: LOW Complexity : Stable, uncomplicated  Clinical Decision Making:Low Complexity    Overall Complexity:LOW

## 2019-09-03 NOTE — PROGRESS NOTES
Problem: Diabetes Self-Management  Goal: *Disease process and treatment process  Description  Define diabetes and identify own type of diabetes; list 3 options for treating diabetes. Outcome: Progressing Towards Goal  Goal: *Incorporating nutritional management into lifestyle  Description  Describe effect of type, amount and timing of food on blood glucose; list 3 methods for planning meals. Outcome: Progressing Towards Goal  Goal: *Incorporating physical activity into lifestyle  Description  State effect of exercise on blood glucose levels. Outcome: Progressing Towards Goal  Goal: *Developing strategies to promote health/change behavior  Description  Define the ABC's of diabetes; identify appropriate screenings, schedule and personal plan for screenings. Outcome: Progressing Towards Goal  Goal: *Using medications safely  Description  State effect of diabetes medications on diabetes; name diabetes medication taking, action and side effects. Outcome: Progressing Towards Goal  Goal: *Monitoring blood glucose, interpreting and using results  Description  Identify recommended blood glucose targets  and personal targets. Outcome: Progressing Towards Goal  Goal: *Prevention, detection, treatment of acute complications  Description  List symptoms of hyper- and hypoglycemia; describe how to treat low blood sugar and actions for lowering  high blood glucose level. Outcome: Progressing Towards Goal  Goal: *Prevention, detection and treatment of chronic complications  Description  Define the natural course of diabetes and describe the relationship of blood glucose levels to long term complications of diabetes.   Outcome: Progressing Towards Goal  Goal: *Developing strategies to address psychosocial issues  Description  Describe feelings about living with diabetes; identify support needed and support network  Outcome: Progressing Towards Goal  Goal: *Sick day guidelines  Outcome: Progressing Towards Goal  Goal: *Patient Specific Goal (EDIT GOAL, INSERT TEXT)  Outcome: Progressing Towards Goal     Problem: Patient Education: Go to Patient Education Activity  Goal: Patient/Family Education  Outcome: Progressing Towards Goal     Problem: Falls - Risk of  Goal: *Absence of Falls  Description  Document Marek Fells Fall Risk and appropriate interventions in the flowsheet.   Outcome: Progressing Towards Goal  Note:   Fall Risk Interventions:  Mobility Interventions: Bed/chair exit alarm, Patient to call before getting OOB         Medication Interventions: Bed/chair exit alarm, Patient to call before getting OOB    Elimination Interventions: Bed/chair exit alarm, Call light in reach              Problem: Patient Education: Go to Patient Education Activity  Goal: Patient/Family Education  Outcome: Progressing Towards Goal     Problem: Nutrition Deficit  Goal: *Optimize nutritional status  Outcome: Progressing Towards Goal

## 2019-09-03 NOTE — PROGRESS NOTES
Problem: Self Care Deficits Care Plan (Adult)  Goal: *Acute Goals and Plan of Care (Insert Text)  Outcome: Resolved/Met   OCCUPATIONAL THERAPY EVALUATION/DISCHARGE    Patient: Rajendra Gomez (80 y.o. female)  Date: 9/3/2019  Primary Diagnosis: Nausea [R11.0]  Elevated troponin [R74.8]        Precautions:      PLOF: Pt reports she lives with her  in a [facility] with 3STE. Pt was (I) with basic self-care/ADLs, IADLs, and ambulated without AD PTA. ASSESSMENT AND RECOMMENDATIONS:  Pt cleared to participate in OT evaluation by Rn. Upon entering room, pt supine with HOB elevated, alert, and agreeable to therapy session. Based on the objective data described below, the patient presents she is (I) with basic self-care/ADLs. Pt is able to perform LB dressing task of donning socks and UB dressing, donning hospital gown over back independently. Pt declined to use the bathroom, but ambulated down the hallway ~4 minutes independently, to simulate bathroom mobility for toilet transfers, and IADLs (ie. Grocery shopping, kitchen mobility for simple meal prep). Pt returned to locked recliner. Educated pt on the role of Ot, evaluation process, and if there is ever a change in functional performance to notify physician to re-order Ot, if needed. Pt demonstrated good understanding. As pt presents she is independent with self-care, skilled occupational therapy is not indicated at this time. Discharge Recommendations: None  Further Equipment Recommendations for Discharge: Pt reports she has a shower chair, grab bars, and BSC over toilet in home environment. SUBJECTIVE:   Patient stated \"I stay cold    OBJECTIVE DATA SUMMARY:     Past Medical History:   Diagnosis Date    Anemia     Carotid bruit 12/07/2013    Carotid Duplex: 1. Bilateral 1-49% stenosis of the internal carotid arteries. 2. No significant stenosis in the external carotid arteries bilaterally. 3. Antegrade flow in both vetebral arteries.  4. Normal flow in both subclavian arteries. Plaque Morphology: 1. Hyperechoic plaque in the bulb and right ICA. 2. Hyperechoic plaque in the bulb and left ICA. CKD (chronic kidney disease) stage 3, GFR 30-59 ml/min ([facility])     Diabetes ([facility] [address]     NIDDM    Gastritis 10/27/2014    Duodenum Bx: No Specific Pathologic Abnormality. No Villous Abnormality. 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 No dysplasia or malignancy identified. Bacterial Organisms c/w H. Pylori are present. Z-Line Bx: GE mucosa w/ Acute/Chronic Inflammation & Reactive Changes. Focal Specialized Type Campos Mucosa Identified. No Dysplasia or Malignancy Identified. Gout 12/10/2009    Elevated Uric Acid Level    Hyperlipidemia     Hypertension     RAMÓN (iron deficiency anemia)      Past Surgical History:   Procedure Laterality Date    HX COLONOSCOPY  10/27/2014    Dr. Katia Ruiz     HX ENDOSCOPY  10/27/2014    Dr. Katia Ruiz      Barriers to Learning/Limitations: None  Compensate with: visual, verbal, tactile, kinesthetic cues/model    Home Situation:   Home Situation  Home Environment: Private residence  # Steps to Enter: 3  Rails to Enter: Yes  Hand Rails : Bilateral  One/Two Story Residence: One story  Living Alone: No  Support Systems: Family member(s)  Patient Expects to be Discharged to[de-identified] Private residence  Current DME Used/Available at Home: None  Tub or Shower Type: Tub/Shower combination  ? Right hand dominant   ? Left hand dominant    Cognitive/Behavioral Status:  Neurologic State: Alert  Orientation Level: Oriented X4  Cognition: Appropriate decision making; Follows commands  Safety/Judgement: Fall prevention    Skin: Visible skin appeared intact  Edema: None noted      Coordination: BUE  Coordination: Within functional limits  Fine Motor Skills-Upper: Left Intact; Right Intact    Gross Motor Skills-Upper: Left Intact; Right Intact    Balance:  Sitting: Intact  Standing: Intact    Strength: BUE  Strength: Within functional limits    Tone & Sensation: BUE  Tone: Normal  Sensation: Intact    Range of Motion: BUE  AROM: Within functional limits         Functional Mobility and Transfers for ADLs:  Bed Mobility:  Supine to Sit: Independent  Transfers:  Sit to Stand: Independent  Stand to Sit: Independent      ADL Assessment:  Feeding: Independent    Oral Facial Hygiene/Grooming: Independent    Bathing: Modified independent    Upper Body Dressing: Independent    Lower Body Dressing: Independent    Toileting: Modified independent      ADL Intervention:  Lower Body Dressing Assistance  Dressing Assistance: Independent  Leg Crossed Method Used: Yes  Position Performed: Seated in chair    Cognitive Retraining  Safety/Judgement: Fall prevention    Pain:  Pain level pre-treatment: 0/10   Pain level post-treatment: 0/10   Pain Intervention(s): Medication (see MAR); Rest, Ice, Repositioning  Response to intervention: Nurse notified, See doc flow    Activity Tolerance:   Good    Please refer to the flowsheet for vital signs taken during this treatment. After treatment:   ?  Patient left in no apparent distress sitting up in chair  ? Patient left in no apparent distress in bed  ? Call bell left within reach  ? Nursing notified  ? Caregiver present  ? Bed alarm activated    COMMUNICATION/EDUCATION:   ?      Role of Occupational Therapy in the acute care setting  ? Home safety education was provided and the patient/caregiver indicated understanding. ? Patient/family have participated as able and agree with findings and recommendations. ?      Patient is unable to participate in plan of care at this time. Thank you for this referral.  Gypsy Sandoval MS, OTR/L  Time Calculation: 10 mins      Eval Complexity: History: MEDIUM Complexity : Expanded review of history including physical, cognitive and psychosocial  history ;    Examination: LOW Complexity : 1-3 performance deficits relating to physical, cognitive , or psychosocial skils that result in activity limitations and / or participation restrictions ;    Decision Making:LOW Complexity : No comorbidities that affect functional and no verbal or physical assistance needed to complete eval tasks

## 2019-09-03 NOTE — PROGRESS NOTES
Reason for Admission:   Nausea [R11.0]  Elevated troponin [R74.8]               RRAT Score:     36             Resources/supports as identified by patient/family:       Top Challenges facing patient (as identified by patient/family and CM): Patient no longer drives but well supported at home    Finances/Medication cost?     none  Transportation       drives  Support system or lack thereof?    Living arrangements? With spouse in home   Self-care/ADLs/Cognition? Oriented and independent        Current Advanced Directive/Advance Care Plan:   no                          Plan for utilizing home health:    no                      Likelihood of readmission:   HIGH    Transition of Care Plan:                    Initial assessment completed with patient. Cognitive status of patient: oriented to time, place, person and situation. Face sheet information confirmed:  yes. The patient designates , Samson Trinidad, to participate in her discharge plan and to receive any needed information. This patient lives in a single family home with patient and spouse. Patient is able to navigate steps as needed. Prior to hospitalization, patient was considered to be independent with ADLs/IADLS : yes . Patient has a current ACP document on file: no  The patient and spouse will be available to transport patient home upon discharge. The patient already has none reported medical equipment available in the home. Patient is not currently active with home health. Patient has not stayed in a skilled nursing facility or rehab. This patient is on dialysis :no    Patient states she walks the block daily and does not need help at home. Cash of choice signed: no. Currently, the discharge plan is Home. The patient states that she can obtain her medications from the pharmacy, and take her medications as directed. Patient's current insurance is Medicare/[employer].       Care Management Interventions  PCP Verified by CM:  Yes  Mode of Transport at Discharge: Self  Current Support Network: Lives with Spouse  Confirm Follow Up Transport: Family  Plan discussed with Pt/Family/Caregiver: Yes  Discharge Location  Discharge Placement: [address] RN BSN  Case Management [phone]

## 2019-09-03 NOTE — PROGRESS NOTES
conducted an initial consultation and Spiritual Assessment for Koby, who is a 80 y.o.,female. Patients Primary Language is: [place]. According to the patients EMR Scientology Affiliation is: Religious.     The reason the Patient came to the hospital is:   Patient Active Problem List    Diagnosis Date Noted    Nausea 09/01/2019    Elevated troponin 09/01/2019    Chronic renal failure, stage 3 (moderate) ([facility]) 46/76/5583    Diastolic CHF, chronic ([facility]) 09/01/2019    Leg edema 07/25/2019    Heart murmur 10/25/2018    Gout 10/25/2018    CKD (chronic kidney disease) stage 3, GFR 30-59 ml/min ([facility]) 10/25/2018    Anemia of chronic disease 10/25/2018    Advance care planning 07/25/2018    Controlled type 2 diabetes mellitus with stage 3 chronic kidney disease, without long-term current use of insulin ([facility] [address] 08/02/2017    Advanced directives, counseling/discussion 05/04/2017    Essential hypertension 05/02/2017    Mixed hyperlipidemia 05/02/2017        The  provided the following Interventions:  Initiated a relationship of care and support. Explored issues of martha, spirituality and/or Catholic needs while hospitalized. Listened empathically. Provided information about Spiritual Care Services. Offered prayer and assurance of continued prayers on patient's behalf. Chart reviewed. The following outcomes were achieved:  Patient shared some information about their medical narrative and spiritual journey/beliefs. Patient processed feeling about current hospitalization. Patient expressed gratitude for the 's visit. Assessment:  Patient did not indicate any spiritual or Catholic issues which require Spiritual Care Services interventions at this time. Patient does not have any Catholic/cultural needs that will affect patients preferences in health care.     Plan:  Chaplains will continue to follow and will provide pastoral care on an as needed or requested basis.   recommends bedside caregivers page  on duty if patient shows signs of acute spiritual or emotional distress.     [address] [facility] Department  [phone]

## 2019-09-03 NOTE — PROGRESS NOTES
[facility]  -  Progress Note      Patient: Candi Ortiz MRN: [id number]  SSN: [id number]    YOB: 1933  Age: 80 y.o. Sex: female      Admit Date: 9/1/2019    Assessment:     --Indeterminate troponin elevation. Patient's troponin level is only minimally elevated and her repeat have been essentially unchanged. Her EKG does not have any acute ST-T abnormalities. This is unlikely related to an acute coronary syndrome. --Chronic diastolic heart failure. Patient's volume status appears to be reasonable. She does not appear to be volume overloaded and she is euvolemic on exam.  She takes Lasix as needed at home. Echocardiogram in September 2018 which showed preserved LV systolic function, moderate concentric LVH and evidence of diastolic dysfunction. --Echocardiogram 9/2/19: EF 56-60% with normal LV cavity size, mild concentric LVH, grade 2 diastolic dysfunction, mild tricuspid regurgitation. No significant changes from prior Echo. --Aortic insufficiency, moderate by Echo 9/2/19 (moderate on echocardiogram 1 year ago as well). --Essential hypertension. Patient's blood pressure is mildly elevated. She was restarted on her outpatient antihypertensive regimen. --Acute kidney injury on chronic kidney disease, stage III. Cr up to 2.19 on 9/3/19.  --Nausea. Suspect this may be GI in nature. Do not suspect cardiac cause. Plan:     -Cr noted to rise today, defer to primary team.  -Echo yesterday with normal EF, no RWMA. -No further cardiac evaluation planned. Subjective:     No new complaints. Denies nausea, chest pain or shortness of breath.     Objective:      Patient Vitals for the past 8 hrs:   Temp Pulse Resp BP SpO2   09/03/19 0401 97.8 °F (36.6 °C) (!) 58 18 113/63 96 %   09/02/19 2354 98 °F (36.7 °C) (!) 55 18 118/66 95 %         Patient Vitals for the past 96 hrs:   Weight   09/02/19 1443 140 lb (63.5 kg)   09/01/19 1628 140 lb (63.5 kg) Intake/Output Summary (Last 24 hours) at 9/3/2019 0720  Last data filed at 9/2/2019 1748  Gross per 24 hour   Intake 800 ml   Output 550 ml   Net 250 ml       Physical Exam:  General:  alert, cooperative, no distress, appears stated age  Neck:  No JVD  Lungs:  clear to auscultation bilaterally  Heart:  Regular rate and rhythm  Abdomen:  abdomen is soft without significant tenderness, masses, organomegaly or guarding  Extremities:  Atraumatic, no edema     Data Review:     Labs: Results:       Chemistry Recent Labs     09/03/19  0310 09/02/19  0439 09/01/19  1650   * 121* 116*    140 141   K 4.0 3.1* 3.8    99* 101   CO2 30 34* 31   BUN 38* 32* 37*   CREA 2.19* 1.58* 1.67*   CA 8.5 8.9 9.4   MG  --  2.0  --    PHOS  --  3.8  --    AGAP 6 7 9   BUCR 17 20 22*   AP  --   --  63   TP  --   --  8.6*   ALB  --   --  4.2   GLOB  --   --  4.4*   AGRAT  --   --  1.0      CBC w/Diff Recent Labs     09/02/19  0439 09/01/19  1650   WBC 3.6* 5.4   RBC 3.92* 3.97*   HGB 10.6* 10.8*   HCT 32.8* 32.9*    290   GRANS 50 60   LYMPH 34 31   EOS 1 0      Cardiac Enzymes Lab Results   Component Value Date/Time     (H) 09/02/2019 10:10 AM    CKMB 3.8 (H) 09/02/2019 10:10 AM    CKND1 1.4 09/02/2019 10:10 AM    TROIQ 0.05 (H) 09/02/2019 10:10 AM      Coagulation Recent Labs     09/01/19  2245   PTP 13.6   INR 1.1   APTT 34.2       Lipid Panel Lab Results   Component Value Date/Time    Cholesterol, total 128 02/25/2019 10:46 AM    HDL Cholesterol 69 (H) 02/25/2019 10:46 AM    LDL, calculated 41.6 02/25/2019 10:46 AM    VLDL, calculated 17.4 02/25/2019 10:46 AM    Triglyceride 87 02/25/2019 10:46 AM    CHOL/HDL Ratio 1.9 02/25/2019 10:46 AM      Liver Enzymes Recent Labs     09/01/19  1650   TP 8.6*   ALB 4.2   AP 63   SGOT 61*      Thyroid Studies Lab Results   Component Value Date/Time    TSH 2.15 09/01/2019 10:45 PM

## 2019-09-03 NOTE — PROGRESS NOTES
D/C order noted for today. Orders reviewed. No needs identified at this time. CM remains available if needed.  Bruna Raman RN [phone]

## 2019-09-03 NOTE — HOME CARE
Rounded on this ACO pt - discussed HC with pt and   - left brochure with pt - [address] [employer] will be available if needed - ASHLEY Briggs RN

## 2019-09-03 NOTE — ROUTINE PROCESS
Bedside and Verbal shift change report given to Keegan Ramirez RN (oncoming nurse) by Tom Pereira RN (offgoing nurse). Report included the following information SBAR, Kardex and MAR.

## 2019-09-03 NOTE — PROGRESS NOTES
SUBJECTIVE:    Feeling better. Eating lunch. Denies for nausea or shakiness. No dizziness. States she walked with PT earlier without any issues. No chest or abdominal pain. No SOB or cough. OBJECTIVE:    /72 (BP 1 Location: Left arm, BP Patient Position: At rest)   Pulse 63   Temp 97.7 °F (36.5 °C)   Resp 17   Ht 4' 11\" (1.499 m)   Wt 63.5 kg (140 lb)   SpO2 96%   Breastfeeding? No   BMI 28.28 kg/m²     General appearance - alert, well appearing, and in no distress  Chest - Good air entry noted in bases, no wheezes  Heart - S1 and S2 normal  Abdomen - soft, nontender, nondistended, Bowel sounds present  Neurological - alert, oriented, normal speech, no focal findings noted   Extremities - no pedal edema noted    ASSESSMENT:    1. Mildly elevated troponin due to demand ischemia sec to nausea in setting of CKD. No ACS  2. HTN  3. Nausea, resolved  4. GERD  5. DM  6. Dyslipidemia  7. Chronic diastolic CHF, compensated.    8. Acute renal insufficiancy on stage 3 CKD    PLAN:    Continue current management  Nursing to check BUN/Creatinine as well as BP at 1 pm   If her renal function improves/stabilizes, can be discharge home today    BMP:   Lab Results   Component Value Date/Time     09/03/2019 03:10 AM    K 4.0 09/03/2019 03:10 AM     09/03/2019 03:10 AM    CO2 30 09/03/2019 03:10 AM    AGAP 6 09/03/2019 03:10 AM     (H) 09/03/2019 03:10 AM    BUN 38 (H) 09/03/2019 03:10 AM    CREA 2.19 (H) 09/03/2019 03:10 AM    GFRAA 26 (L) 09/03/2019 03:10 AM    GFRNA 21 (L) 09/03/2019 03:10 AM     CBC:   No results found for: WBC, HGB, HGBEXT, HCT, HCTEXT, PLT, PLTEXT, HGBEXT, HCTEXT, PLTEXT

## 2019-09-03 NOTE — DISCHARGE INSTRUCTIONS
If you experience chest pain call 911. Do not drive yourself. Patient armband removed and shredded      DISCHARGE SUMMARY from Nurse    PATIENT INSTRUCTIONS:    After general anesthesia or intravenous sedation, for 24 hours or while taking prescription Narcotics:  · Limit your activities  · Do not drive and operate hazardous machinery  · Do not make important personal or business decisions  · Do  not drink alcoholic beverages  · If you have not urinated within 8 hours after discharge, please contact your surgeon on call. Report the following to your surgeon:  · Excessive pain, swelling, redness or odor of or around the surgical area  · Temperature over 100.5  · Nausea and vomiting lasting longer than 4 hours or if unable to take medications  · Any signs of decreased circulation or nerve impairment to extremity: change in color, persistent  numbness, tingling, coldness or increase pain  · Any questions    What to do at Home:  Recommended activity: Activity as tolerated. If you experience any of the following symptoms of fever, shortness of breath, and/or chest pain, please follow up with your primary care physician. *  Please give a list of your current medications to your Primary Care Provider. *  Please update this list whenever your medications are discontinued, doses are      changed, or new medications (including over-the-counter products) are added. *  Please carry medication information at all times in case of emergency situations. These are general instructions for a healthy lifestyle:    No smoking/ No tobacco products/ Avoid exposure to second hand smoke  Surgeon General's Warning:  Quitting smoking now greatly reduces serious risk to your health.     Obesity, smoking, and sedentary lifestyle greatly increases your risk for illness    A healthy diet, regular physical exercise & weight monitoring are important for maintaining a healthy lifestyle    You may be retaining fluid if you have a history of heart failure or if you experience any of the following symptoms:  Weight gain of 3 pounds or more overnight or 5 pounds in a week, increased swelling in our hands or feet or shortness of breath while lying flat in bed. Please call your doctor as soon as you notice any of these symptoms; do not wait until your next office visit. The discharge information has been reviewed with the patient and spouse. The patient and spouse verbalized understanding. Discharge medications reviewed with the patient and spouse and appropriate educational materials and side effects teaching were provided.   ___________________________________________________________________________________________________________________________________

## 2019-09-03 NOTE — DISCHARGE SUMMARY
Physician Discharge Summary       Patient: Caleb Luke MRN: [id number]  SSN: [id number]    YOB: 1933  Age: 80 y.o. Sex: female    PCP: Sussy Suazo MD    Allergies: Patient has no known allergies. Admit date: 9/1/2019  Admitting Provider: Eleazar Rodriguez MD    Discharge date: 9/3/2019  Discharging Provider: Jose Armando Coronado MD    * Admission Diagnoses: Nausea [R11.0]  Elevated troponin [R74.8]    * Discharge Diagnoses:      1. Mildly elevated troponin due to demand ischemia sec to nausea in setting of CKD. No ACS  2. HTN  3. Nausea, resolved  4. GERD  5. DM  6. Dyslipidemia  7. Chronic diastolic CHF, compensated. Hospital Problems as of 9/3/2019 Date Reviewed: 9/1/2019          Codes Class Noted - Resolved POA    Nausea ICD-10-CM: R11.0  ICD-9-CM: 787.02  9/1/2019 - Present Unknown        * (Principal) Elevated troponin ICD-10-CM: R74.8  ICD-9-CM: 790.6  9/1/2019 - Present Unknown        Chronic renal failure, stage 3 (moderate) (HCC) ICD-10-CM: N18.3  ICD-9-CM: 585.3  9/1/2019 - Present Unknown        Diastolic CHF, chronic (HCC) ICD-10-CM: I50.32  ICD-9-CM: 428.32, 428.0  9/1/2019 - Present Unknown        Controlled type 2 diabetes mellitus with stage 3 chronic kidney disease, without long-term current use of insulin (HCC) ICD-10-CM: E11.22, N18.3  ICD-9-CM: 250.40, 585.3  8/2/2017 - Present Yes        Essential hypertension (Chronic) ICD-10-CM: I10  ICD-9-CM: 401.9  5/2/2017 - Present Yes        Mixed hyperlipidemia (Chronic) ICD-10-CM: E78.2  ICD-9-CM: 272.2  5/2/2017 - Present Yes              * Hospital Course: The patient came to the hospital with complaints of nausea and feeling uneasy for less than 24 hours. She had a basic work up in ED and found out to have mildly elevated troponin without any EKG changes. She was admitted to hospital and was seen by a cardiologist and had ECHO done.   No further cardiac work up was recommended by a cardiologist.  She did not have anymore nausea and was tolerating diet without any issues. Patient was continued on her home dose of anti-hypertensive medications and was found out to be sinus bradycardic and hypotensive with JOHNNY on stage 3 CKD. Hydralazine was reduced and clonidine was stopped with stabilization of BP and down trending creatinine. Patient also mentioned that she does not take clonidine and I tried to call  but no response. Patient was also started on uric acid due to hyperuricemia. Patient was feeling comfortable going home as she did not have any more symptoms. * Procedures: NONE  * No surgery found *      Consults: Cardiology    Significant Diagnostic Studies:    1. CXR/KUB    2. ECHO Left Ventricle: Normal cavity size and systolic function (ejection fraction normal). Mild concentric hypertrophy. Estimated left ventricular ejection fraction is 56 - 60%. Visually measured ejection fraction. No regional wall motion abnormality noted. Mild (grade 1) left ventricular diastolic dysfunction. Tricuspid Valve: Mild tricuspid valve regurgitation is present. Aortic Valve: Moderate aortic valve leaflet calcification present without reduced excursion. Aortic Valve regurgitation is mild to moderate. Right Atrium: Dilated right atrium. Left Atrium: Mildly dilated left atrium. Pulmonary Artery: Mild pulmonary hypertension. Discharge Exam:    /53 (BP 1 Location: Left arm, BP Patient Position: Sitting)   Pulse (!) 47   Temp 98.7 °F (37.1 °C)   Resp 19   Ht 4' 11\" (1.499 m)   Wt 63.5 kg (140 lb)   SpO2 97%   Breastfeeding? No   BMI 28.28 kg/m²       * Discharge Condition: improved  * Disposition: Home    Discharge Medications:  Current Discharge Medication List      START taking these medications    Details   allopurinol (ZYLOPRIM) 100 mg tablet Take 2 Tabs by mouth daily.  Indications: treatment to prevent acute gout attack  Qty: 60 Tab, Refills: 0      OTHER BMP on 9/6/19 morning  Call report to PCP  Reason: CKD  Qty: 1 Each, Refills: 0         CONTINUE these medications which have CHANGED    Details   hydrALAZINE (APRESOLINE) 100 mg tablet Take 0.5 Tabs by mouth three (3) times daily. Qty: 270 Tab, Refills: 2    Associated Diagnoses: Essential hypertension         CONTINUE these medications which have NOT CHANGED    Details   polyethylene glycol (MIRALAX) 17 gram/dose powder Take 17 g by mouth daily. 1 tablespoon with 8 oz of water daily  Qty: 289 g, Refills: 0      simvastatin (ZOCOR) 40 mg tablet TAKE 1 TABLET BY MOUTH NIGHTLY  Qty: 90 Tab, Refills: 2    Associated Diagnoses: Mixed hyperlipidemia      furosemide (LASIX) 20 mg tablet Take 1 tablet once daily as needed for edema  Qty: 90 Tab, Refills: 0      colchicine (COLCRYS) 0.6 mg tablet Take 1 Tab by mouth daily. Indications: gout  Qty: 90 Tab, Refills: 2      ascorbic acid, vitamin C, (VITAMIN C) 500 mg tablet Take 500 mg by mouth daily. cetirizine (ZYRTEC) 10 mg tablet Take 1 Tab by mouth daily. Qty: 90 Tab, Refills: 0      Blood-Glucose Meter (PRODIGY AUTOCODE MONITOR SYST) misc Check fasting glucose once daily  Qty: 1 Each, Refills: 0      amLODIPine (NORVASC) 10 mg tablet TAKE 1 TABLET BY MOUTH ONCE DAILY  Qty: 90 Tab, Refills: 2    Associated Diagnoses: Essential hypertension      cholecalciferol, VITAMIN D3, (VITAMIN D3) 5,000 unit tab tablet Take  by mouth daily. docusate sodium (STOOL SOFTENER) 50 mg capsule Take 50 mg by mouth two (2) times a day. Aspirin, Buffered 81 mg tab Take 1 tablet by mouth daily. fish oil-dha-epa 1,200-144-216 mg cap Take 1 tablet by mouth daily.       glucose blood VI test strips (PRODIGY NO CODING) strip Check fasting glucose once daily  Qty: 100 Strip, Refills: 3         STOP taking these medications       cloNIDine HCl (CATAPRES) 0.2 mg tablet Comments:   Reason for Stopping:         lisinopril (PRINIVIL, ZESTRIL) 10 mg tablet Comments:   Reason for Stopping:               * Follow-up Care/Patient Instructions: Activity: Activity as tolerated  Diet: Cardiac Diet and Diabetic Diet  Wound Care: None needed    Follow-up Information     Follow up With Specialties Details Why Contact Info    Rosie [address]  [address]  [address] [facility]  [phone]          Follow-up Appointments   Procedures    FOLLOW UP VISIT Appointment in: Other ([facility]) With PCP in 5 days With dr. Janak Ball [cardiologist] in 2-3 weeks     With PCP in 5 days  With dr. Janak Ball [cardiologist] in 2-3 weeks     Standing Status:   Standing     Number of Occurrences:   1     Order Specific Question:   Appointment in     Answer:    Other (Specify)         Signed:  Shruthi Grissom MD  9/3/2019  2:29 PM

## 2019-09-04 ENCOUNTER — PATIENT OUTREACH (OUTPATIENT)
Dept: FAMILY MEDICINE CLINIC | Age: 84
End: 2019-09-04

## 2019-09-04 NOTE — ROUTINE PROCESS
Bedside and Verbal shift change report given to [address] [place] [address] (oncoming nurse) by Madelyn Benitez RN (offgoing nurse). Report included the following information SBAR, Kardex, Intake/Output, MAR and Recent Results.

## 2019-09-05 ENCOUNTER — TELEPHONE (OUTPATIENT)
Dept: CARDIOLOGY CLINIC | Age: 84
End: 2019-09-05

## 2019-09-05 NOTE — TELEPHONE ENCOUNTER
LVM on cell # for patient to contact office. I was unable to [facility] of home # .. Would not accept any new messages. Patient needs to be scheduled for a [facility] appointment with Dr. Ludwin Jimenez in 4-6 weeks. The appointment that she had previously scheduled for 9/11/19 (which was a one year follow up) will be too soon for her to be seen . .. So that appointment has been cancelled (I informed her of that in the message). Jack Lozada  Female, 80 y.o., 8/27/1933  Weight:   63.5 kg (140 lb)  MRN:   [id number]  Phone:   [phone] (H)  PCP:   Hector Mendoza MD  Primary Cvg:   JUAN MIGUEL (MEDICARE)/[facility] B-VA: [place] GBA - ALL OTHER COUNT  Last Appt With Me  Next Appt With Me  None  Next Appt  9/11/19 - Sharmaine Boas, MD - Oumar Liang [facility] [place] follow-up   Received: 2 days ago      Schedule follow-up appointment   Message Contents   YOLANDA Jeronimo Geneva Marc             Good afternoon, Can we please arrange follow-up appointment within next 4-6 weeks?  Thank you!

## 2019-09-06 NOTE — PROGRESS NOTES
Hospital Discharge Follow-Up      Date/Time:  2019  5:01 PM    Patient was admitted to [facility] on 19 and discharged on 9/3/19 for elevated troponin. The physician discharge summary was available at the time of outreach. Patient was contacted within 1 business days of discharge. Top Challenges reviewed with the provider   States weight is down to 136 lbs  PCP appt is 10/25/19, instructed to call office and schedule appt. sooner than this. Method of communication with provider :chart routing    Inpatient RRAT score: 39 - HIGH  Was this a readmission? no   Patient stated reason for the readmission: NA    Nurse Navigator (NN) contacted the patient by telephone to perform post hospital discharge assessment. Verified name and  with patient as identifiers. Provided introduction to self, and explanation of the Nurse Navigator role. Reviewed discharge instructions and red flags with patient who verbalized understanding. Patient given an opportunity to ask questions and does not have any further questions or concerns at this time. The patient agrees to contact the PCP office for questions related to their healthcare. NN provided contact information for future reference. Disease Specific:   N/A    Summary of patient's top problems:  1. Elevated troponin-  19 =  0.06. Denies any chest pain, nausea, shortness of breath  2. CHF - states last weight was 136 lbs, denies any edema at present  3. DM - states last FSBS was [address] orders at discharge: none ordered  [address]: NA  Date of initial visit: NA    Durable Medical Equipment ordered/company: NA  Durable Medical Equipment received: NA    Barriers to care? None noted at present. Asif Ugarte assists as needed    Advance Care Planning:   Does patient have an Advance Directive:  states she has ACP but needs to have it witnessed.  States neice is MPOA     Medication(s):   New Medications at Discharge: yes  allopurinol 100 mg tablet (ZYLOPRIM)  Start taking on: 9/4/2019    Changed Medications at Discharge: yes  hydrALAZINE 100 mg tablet (APRESOLINE)    Discontinued Medications at Discharge: yes  cloNIDine HCl 0.2 mg tablet (CATAPRES)  lisinopril 10 mg tablet (PRINIVIL, ZESTRIL)    Medication reconciliation was performed with patient, who verbalizes understanding of administration of home medications. There were no barriers to obtaining medications identified at this time. Referral to Pharm D needed: no     Current Outpatient Medications   Medication Sig    hydrALAZINE (APRESOLINE) 100 mg tablet Take 0.5 Tabs by mouth three (3) times daily.  allopurinol (ZYLOPRIM) 100 mg tablet Take 2 Tabs by mouth daily. Indications: treatment to prevent acute gout attack    polyethylene glycol (MIRALAX) 17 gram/dose powder Take 17 g by mouth daily. 1 tablespoon with 8 oz of water daily    simvastatin (ZOCOR) 40 mg tablet TAKE 1 TABLET BY MOUTH NIGHTLY    furosemide (LASIX) 20 mg tablet Take 1 tablet once daily as needed for edema    ascorbic acid, vitamin C, (VITAMIN C) 500 mg tablet Take 500 mg by mouth daily.  cetirizine (ZYRTEC) 10 mg tablet Take 1 Tab by mouth daily.  glucose blood VI test strips (PRODIGY NO CODING) strip Check fasting glucose once daily    Blood-Glucose Meter (PRODIGY AUTOCODE MONITOR SYST) misc Check fasting glucose once daily    amLODIPine (NORVASC) 10 mg tablet TAKE 1 TABLET BY MOUTH ONCE DAILY    cholecalciferol, VITAMIN D3, (VITAMIN D3) 5,000 unit tab tablet Take  by mouth daily.  Aspirin, Buffered 81 mg tab Take 1 tablet by mouth daily.  fish oil-dha-epa 1,200-144-216 mg cap Take 1 tablet by mouth daily.  OTHER BMP on 9/6/19 morning  Call report to PCP  Reason: CKD    colchicine (COLCRYS) 0.6 mg tablet Take 1 Tab by mouth daily. Indications: gout    docusate sodium (STOOL SOFTENER) 50 mg capsule Take 50 mg by mouth two (2) times a day.      No current facility-administered medications for this visit. There are no discontinued medications. [facility] follow up appointment(s): States she has cardiology appointment with Dr. Kyler Eddy on 9/11/19  Future Appointments   Date Time Provider Gee Tamezi   10/25/2019  8:30 AM Jayjay Rodriguez MD [facility] JUAN MIGUEL JONES      Non-[facility] follow up appointment(s): RONAK  [facility]:  out of service area       Goals      Prevent complications post hospitalization.       Patient will attend all physician appointments as scheduled    NN will follow patient for at least 4 weeks post hospital discharge

## 2019-09-07 ENCOUNTER — HOSPITAL ENCOUNTER (OUTPATIENT)
Dept: LAB | Age: 84
Discharge: HOME | End: 2019-09-07
Payer: MEDICARE

## 2019-09-07 DIAGNOSIS — E11.22 CONTROLLED TYPE 2 DIABETES MELLITUS WITH STAGE 3 CHRONIC KIDNEY DISEASE, WITHOUT LONG-TERM CURRENT USE OF INSULIN (HCC): ICD-10-CM

## 2019-09-07 DIAGNOSIS — N18.30 CONTROLLED TYPE 2 DIABETES MELLITUS WITH STAGE 3 CHRONIC KIDNEY DISEASE, WITHOUT LONG-TERM CURRENT USE OF INSULIN (HCC): ICD-10-CM

## 2019-09-07 LAB
ALBUMIN SERPL-MCNC: 3.5 G/DL (ref 3.4–5)
ALBUMIN/GLOB SERPL: 1 {RATIO} (ref 0.8–1.7)
ALP SERPL-CCNC: 61 U/L (ref 45–117)
ALT SERPL-CCNC: 60 U/L (ref 13–56)
ANION GAP SERPL CALC-SCNC: 8 MMOL/L (ref 3–18)
AST SERPL-CCNC: 63 U/L (ref 10–38)
BILIRUB SERPL-MCNC: 0.4 MG/DL (ref 0.2–1)
BUN SERPL-MCNC: 55 MG/DL (ref 7–18)
BUN/CREAT SERPL: 26 (ref 12–20)
CALCIUM SERPL-MCNC: 9.3 MG/DL (ref 8.5–10.1)
CHLORIDE SERPL-SCNC: 103 MMOL/L (ref 100–111)
CO2 SERPL-SCNC: 31 MMOL/L (ref 21–32)
CREAT SERPL-MCNC: 2.09 MG/DL (ref 0.6–1.3)
GLOBULIN SER CALC-MCNC: 3.6 G/DL (ref 2–4)
GLUCOSE SERPL-MCNC: 129 MG/DL (ref 74–99)
HBA1C MFR BLD: 6.8 % (ref 4.2–5.6)
POTASSIUM SERPL-SCNC: 4.4 MMOL/L (ref 3.5–5.5)
PROT SERPL-MCNC: 7.1 G/DL (ref 6.4–8.2)
SODIUM SERPL-SCNC: 142 MMOL/L (ref 136–145)

## 2019-09-07 PROCEDURE — 83036 HEMOGLOBIN GLYCOSYLATED A1C: CPT

## 2019-09-07 PROCEDURE — 80053 COMPREHEN METABOLIC PANEL: CPT

## 2019-09-07 PROCEDURE — 36415 COLL VENOUS BLD VENIPUNCTURE: CPT

## 2019-09-09 ENCOUNTER — OFFICE VISIT (OUTPATIENT)
Dept: FAMILY MEDICINE CLINIC | Age: 84
End: 2019-09-09

## 2019-09-09 VITALS
TEMPERATURE: 98 F | DIASTOLIC BLOOD PRESSURE: 78 MMHG | SYSTOLIC BLOOD PRESSURE: 128 MMHG | HEART RATE: 48 BPM | HEIGHT: 59 IN | RESPIRATION RATE: 16 BRPM | WEIGHT: 149 LBS | OXYGEN SATURATION: 98 % | BODY MASS INDEX: 30.04 KG/M2

## 2019-09-09 DIAGNOSIS — D63.8 ANEMIA OF CHRONIC DISEASE: ICD-10-CM

## 2019-09-09 DIAGNOSIS — E78.2 MIXED HYPERLIPIDEMIA: ICD-10-CM

## 2019-09-09 DIAGNOSIS — I10 ESSENTIAL HYPERTENSION: Chronic | ICD-10-CM

## 2019-09-09 DIAGNOSIS — E79.0 HYPERURICEMIA: ICD-10-CM

## 2019-09-09 DIAGNOSIS — E11.9 DIABETES MELLITUS TYPE 2, DIET-CONTROLLED (HCC): ICD-10-CM

## 2019-09-09 DIAGNOSIS — M10.9 GOUT, UNSPECIFIED CAUSE, UNSPECIFIED CHRONICITY, UNSPECIFIED SITE: ICD-10-CM

## 2019-09-09 DIAGNOSIS — R00.1 SINUS BRADYCARDIA: ICD-10-CM

## 2019-09-09 DIAGNOSIS — N18.4 CKD (CHRONIC KIDNEY DISEASE) STAGE 4, GFR 15-29 ML/MIN (HCC): Primary | ICD-10-CM

## 2019-09-09 DIAGNOSIS — R60.0 LEG EDEMA: ICD-10-CM

## 2019-09-09 DIAGNOSIS — R01.1 HEART MURMUR: ICD-10-CM

## 2019-09-09 RX ORDER — CLONIDINE HYDROCHLORIDE 0.2 MG/1
TABLET ORAL
COMMUNITY
Start: 2019-09-08 | End: 2019-09-09

## 2019-09-09 RX ORDER — LISINOPRIL 10 MG/1
TABLET ORAL
Refills: 0 | COMMUNITY
Start: 2019-06-20 | End: 2019-09-09

## 2019-09-09 NOTE — PROGRESS NOTES
Emilia Moy, 80 y.o.,  female    SUBJECTIVE  Ff-up hospitalization    Admitted 9/1/2019- 9/3/2019  Communication with KORIN De Jesus 9/4/2019    Presented to the ER with complaint of nausea, found to have mildly elevated troponin levels, normal EKG. She was admitted for further evaluation. Discharge summary reviewed: trop elevation attributed to demand ischemia secondary to nausea int the setting of CKD. Her medications were adjusted. Lisinopril and clonidine were discontinued and hydralazine dose was reduced. We reviewed med rec/bottles during this visit. Echocardiogram showed EF 56-60%, moderate aortic valve calcification  Glucose readings acceptable range. Creatinine level remained stable in the 2's. She was started on allopurinol for uric acid elevation    She was seen for constipation in august in the ED, says this has improved with daily miralax. She is home feeling well, no other complaints. She denies chest pain, sob specifically. She has known asymptomatic sinus judith with 1st deg AV block. She does not feel any different. She has upcoming appt with cardiology to evaluate this. DM w/ CKD 4-  She Reports FBG still  1teens. HL- on statin. Gout- tolerating allopurinol, prn colchicine effective    Lives with  who is in his [de-identified]. Performs ADLs without much difficulty. No longer drives    ROS:  See HPI, all others negative        Patient Active Problem List   Diagnosis Code    Essential hypertension I10    Mixed hyperlipidemia E78.2    Advanced directives, counseling/discussion Z71.89    Controlled type 2 diabetes mellitus with stage 3 chronic kidney disease, without long-term current use of insulin ([facility]) E11.22, N18.3       Current Outpatient Prescriptions   Medication Sig Dispense Refill    cholecalciferol, VITAMIN D3, (VITAMIN D3) 5,000 unit tab tablet Take  by mouth daily.       cloNIDine HCl (CATAPRES) 0.2 mg tablet TAKE ONE TABLET BY MOUTH TWICE DAILY 60 Tab 2    amLODIPine (NORVASC) 10 mg tablet Take 1 Tab by mouth daily. 90 Tab 1    metFORMIN (GLUCOPHAGE) 500 mg tablet Take 1 Tab by mouth two (2) times daily (with meals). 180 Tab 1    hydroCHLOROthiazide (HYDRODIURIL) 50 mg tablet Take 1 Tab by mouth daily. 90 Tab 1    potassium chloride SR (KLOR-CON 10) 10 mEq tablet Take 1 Tab by mouth daily. 90 Tab 2    hydrALAZINE (APRESOLINE) 100 mg tablet Take 1 Tab by mouth three (3) times daily. 270 Tab 2    colchicine (COLCRYS) 0.6 mg tablet Take 1 Tab by mouth daily. Indications: Gout 90 Tab 2    simvastatin (ZOCOR) 40 mg tablet Take 1 Tab by mouth nightly. 90 Tab 2    docusate sodium (STOOL SOFTENER) 50 mg capsule Take 50 mg by mouth two (2) times a day.  Aspirin, Buffered 81 mg tab Take 1 tablet by mouth daily.  fish oil-dha-epa 1,200-144-216 mg cap Take 1 tablet by mouth daily. No Known Allergies    Past Medical History:   Diagnosis Date    Anemia     Carotid bruit 12/07/2013    Carotid Duplex: 1. Bilateral 1-49% stenosis of the internal carotid arteries. 2. No significant stenosis in the external carotid arteries bilaterally. 3. Antegrade flow in both vetebral arteries. 4. Normal flow in both subclavian arteries. Plaque Morphology: 1. Hyperechoic plaque in the bulb and right ICA. 2. Hyperechoic plaque in the bulb and left ICA.  CKD (chronic kidney disease) stage 3, GFR 30-59 ml/min     Diabetes (HCC)     NIDDM    Gastritis 10/27/2014    Duodenum Bx: No Specific Pathologic Abnormality. No Villous Abnormality. 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 No dysplasia or malignancy identified. Bacterial Organisms c/w H. Pylori are present. Z-Line Bx: GE mucosa w/ Acute/Chronic Inflammation & Reactive Changes. Focal Specialized Type Campos Mucosa Identified. No Dysplasia or Malignancy Identified.      Gout 12/10/2009    Elevated Uric Acid Level    Hyperlipidemia     Hypertension     RAMÓN (iron deficiency anemia) Social History     Social History    Marital status:      Spouse name: N/A    Number of children: N/A    Years of education: N/A     Occupational History    Not on file. Social History Main Topics    Smoking status: Never Smoker    Smokeless tobacco: Never Used    Alcohol use No    Drug use: No    Sexual activity: Not Currently     Partners: Male     Birth control/ protection: None     Other Topics Concern    Not on file     Social History Narrative       Family History   Problem Relation Age of Onset    Hypertension Mother     Stroke Mother     Stroke Father          OBJECTIVE    Physical Exam:     Visit Vitals  /78 (BP 1 Location: Left arm, BP Patient Position: Sitting)   Pulse (!) 48   Temp 98 °F (36.7 °C)   Resp 16   Ht 4' 11\" (1.499 m)   Wt 149 lb (67.6 kg)   SpO2 98%   BMI 30.09 kg/m²       General: alert, well-appearing, AA,  in no apparent distress or pain  HEENT: throat and pharynx clear, eac patent, tm intact  Neck: supple, no adenopathy palpated  CVS: + mild bradycardia, regular rhythm, + murmur  Lungs:clear to ausculation bilaterally, no crackles, wheezing or rhonchi noted  Extremities: no edema  Skin: warm, no lesions, rashes noted  Psych:  mood and affect normal    Results for orders placed or performed during the hospital encounter of 71/92/05   METABOLIC PANEL, COMPREHENSIVE   Result Value Ref Range    Sodium 142 136 - 145 mmol/L    Potassium 4.4 3.5 - 5.5 mmol/L    Chloride 103 100 - 111 mmol/L    CO2 31 21 - 32 mmol/L    Anion gap 8 3.0 - 18 mmol/L    Glucose 129 (H) 74 - 99 mg/dL    BUN 55 (H) 7.0 - 18 MG/DL    Creatinine 2.09 (H) 0.6 - 1.3 MG/DL    BUN/Creatinine ratio 26 (H) 12 - 20      GFR est AA 27 (L) >60 ml/min/1.73m2    GFR est non-AA 22 (L) >60 ml/min/1.73m2    Calcium 9.3 8.5 - 10.1 MG/DL    Bilirubin, total 0.4 0.2 - 1.0 MG/DL    ALT (SGPT) 60 (H) 13 - 56 U/L    AST (SGOT) 63 (H) 10 - 38 U/L    Alk.  phosphatase 61 45 - 117 U/L    Protein, total 7.1 6.4 - 8.2 g/dL    Albumin 3.5 3.4 - 5.0 g/dL    Globulin 3.6 2.0 - 4.0 g/dL    A-G Ratio 1.0 0.8 - 1.7     HEMOGLOBIN A1C W/O EAG   Result Value Ref Range    Hemoglobin A1c 6.8 (H) 4.2 - 5.6 %       ASSESSMENT/PLAN  Diagnoses and all orders for this visit:    CKD (chronic kidney disease) stage 4, GFR 15-29 ml/min ([facility])  -     REFERRAL TO NEPHROLOGY  Worsening, will refer to nephrology   On allopurinol for hyperuricemia  Known anemia of chronic disease- stable  Avoid nsaids  monitoring  Check BMP, uric acid prior to next visit    Hyperuricemia    Diabetes mellitus type 2, diet controlled ([facility]  a1c 6.8  Monitoring    Essential hypertension  Controlled  Cont   norvasc, hydralazine  Cont low dose lasix/kcl prn for leg edema    Leg edema  Advised compression stockings   Low salt diet,   Prn lasix/kcl     Mixed hyperlipidemia  Good range LDL <100, on zocor     Gout of foot, unspecified cause, unspecified chronicity, unspecified laterality  On allopurinol  Prn colchicine    Anemia of chronic disease  Stable, Baseline 10's  Monitoring  BMI 27  Encourage wt loss    Heart murmur  Aortic regurg  Asymptomatic  Monitoring, following cardiolgoy    Sinus bradycardia  Asymptomatic  1st deg AV block  off BB      Follow-up Disposition:  Ff-up in 1 months or sooner prn    Patient understands plan of care. Patient has provided input and agrees with goals.

## 2019-09-09 NOTE — PATIENT INSTRUCTIONS
Diabetic Renal Diet: Care Instructions  Your Care Instructions    You may already be spreading carbohydrate throughout your daily meals. When you also have kidney disease, you need to avoid foods that make your kidneys worse. Keep your blood sugar and blood pressure as near normal as you can to reduce your chance of kidney failure. Your doctor and dietitian will help you make an eating plan. It will be based on your body weight, size, and medical condition. You may need to limit salt, fluids, and protein. You also may need to limit minerals such as potassium and phosphorus. It takes planning, but there are plenty of tasty, healthy foods you can eat. Always talk with your doctor or dietitian before you make changes in your diet. Follow-up care is a key part of your treatment and safety. Be sure to make and go to all appointments, and call your doctor if you are having problems. It's also a good idea to know your test results and keep a list of the medicines you take. How can you care for yourself at home? · Work with your doctor or dietitian to create a food plan that guides your daily food choices. · Eat regular meals. Do not skip meals or go for many hours without eating. To help control your blood sugar, try to eat several small meals during the day, rather than three large ones. · You can use margarine, mayonnaise, and oil to add calories to your diet for energy. The healthiest oils are olive, canola, and safflower oils. · Talk to your dietitian about eating sweets, including honey and sugar. · Be safe with medicines. Take your medicines exactly as prescribed. Call your doctor if you think you are having a problem with your medicine. You will get more details on the specific medicines your doctor prescribes. · Do not take any other medicine without talking to your doctor first. This includes over-the-counter medicines, vitamins, and herbal products. · Limit alcohol to no more than 1 drink per day. Count it as part of your fluid allowance. To get the right amount of protein  · Ask your doctor or dietitian how much protein you can have each day. You need some protein to stay healthy. · Include all sources of protein in your daily protein count. Besides meat, poultry, and fish, protein is found in milk and milk products, beans, peas, lentils, nuts, seeds, tofu, and eggs. Check for protein on the nutrition facts label found on packages of food such as bread and cereal.  To limit salt  · Do not add salt to your food. Avoid foods that list salt, sodium, or MSG as an ingredient. And look for \"reduced salt\" or \"low sodium\" on labels. · Do not use a salt substitute or lite salt unless your doctor says it is okay. (These products are high in potassium.)  · Avoid or use very small amounts of condiments and marinades. These include soy sauce, fish sauce, and barbecue sauce. They are high in sodium. · Avoid salted pretzels, chips, and other salted snacks. · Check food labels to become more aware of the sodium content of foods. Foods that are high in sodium include soups; many canned foods; cured, smoked, or dried meats; and many packaged foods. To control carbohydrate  · Spread carbohydrate throughout the day. This helps to prevent high blood sugar after meals. Ask your dietitian how much carbohydrate you can have. Carbohydrate foods include:  ? Whole-grain and refined breads and cereals, and some vegetables such as peas and beans. ? Fruits, milk, and milk products (except cheese). ? Candy, table sugar, and regular carbonated drinks. To limit fluids  · Know what your fluid allowance is. Fill a pitcher with that amount of water every day. If you drink another fluid (such as coffee) that day, pour an equal amount out of the pitcher. · Foods that are liquid at room temperature count as fluids. These include ice cream and gelatin desserts such as Jell-O.   To limit potassium  · Fruits that are low in potassium include blueberries and raspberries. · Vegetables that are low in potassium include cucumber and radishes. To limit phosphorus  · Follow your doctor's or dietitian's plan for your limit on milk and milk products in your diet. · Avoid nuts, peanut butter, seeds, lentils, beans, organ meats, and sardines. · Avoid cola drinks. · Avoid bran breads or bran cereals. They are high in phosphorus. Where can you learn more? Go to http://viral-zenaida.info/. Enter R225 in the search box to learn more about \"Diabetic Renal Diet: Care Instructions. \"  Current as of: July 25, 2018  Content Version: 12.1  © 1122-1553 [employer]wise, [employer]. Care instructions adapted under license by [employer] (which disclaims liability or warranty for this information). If you have questions about a medical condition or this instruction, always ask your healthcare professional. [address] any warranty or liability for your use of this information.

## 2019-09-09 NOTE — TELEPHONE ENCOUNTER
This patient contacted office for the following prescriptions to be filled:    Medication requested :   Requested Prescriptions     Pending Prescriptions Disp Refills    furosemide (LASIX) 20 mg tablet 90 Tab 0     Sig: Take 1 tablet once daily as needed for edema     [facility] [facility]: [facility]  Mail order or Local pharmacy [facility] Alvarado     Scheduled appointment if not seen by current providers in office:  LOV 9/9/2019 f/u 10/25/2019

## 2019-09-09 NOTE — PROGRESS NOTES
*ATTENTION:  This note has been created by a medical student for educational purposes only. Please do not refer to the content of this note for clinical decision-making, billing, or other purposes. Please see attending physicians note to obtain clinical information on this patient. *       Name: Camila Ashraf  CC: ED Follow Up    HPI: Mrs. Tanisha Stanley is an 80year old female with a history of CKD stage 3 and DM type 2 who presents for a follow up after

## 2019-09-09 NOTE — PROGRESS NOTES
1. Have you been to the ER, urgent care clinic since your last visit? Hospitalized since your last visit? [facility]    2. Have you seen or consulted any other health care providers outside of the [address] [place] [address] since your last visit? Include any pap smears or colon screening.  No

## 2019-09-10 RX ORDER — FUROSEMIDE 20 MG/1
TABLET ORAL
Qty: 90 TAB | Refills: 0 | Status: SHIPPED | OUTPATIENT
Start: 2019-09-10 | End: 2019-10-18 | Stop reason: SDUPTHER

## 2019-09-23 DIAGNOSIS — I10 ESSENTIAL HYPERTENSION: Chronic | ICD-10-CM

## 2019-09-23 RX ORDER — AMLODIPINE BESYLATE 10 MG/1
TABLET ORAL
Qty: 90 TAB | Refills: 2 | Status: SHIPPED | OUTPATIENT
Start: 2019-09-23 | End: 2020-06-15

## 2019-09-25 ENCOUNTER — HOSPITAL ENCOUNTER (OUTPATIENT)
Dept: LAB | Age: 84
Discharge: HOME | End: 2019-09-25
Payer: MEDICARE

## 2019-09-25 DIAGNOSIS — N17.9 ACUTE KIDNEY FAILURE, UNSPECIFIED (HCC): ICD-10-CM

## 2019-09-25 LAB
ANION GAP SERPL CALC-SCNC: 9 MMOL/L (ref 3–18)
BUN SERPL-MCNC: 58 MG/DL (ref 7–18)
BUN/CREAT SERPL: 32 (ref 12–20)
CALCIUM SERPL-MCNC: 9.4 MG/DL (ref 8.5–10.1)
CHLORIDE SERPL-SCNC: 108 MMOL/L (ref 100–111)
CO2 SERPL-SCNC: 26 MMOL/L (ref 21–32)
CREAT SERPL-MCNC: 1.79 MG/DL (ref 0.6–1.3)
GLUCOSE SERPL-MCNC: 133 MG/DL (ref 74–99)
POTASSIUM SERPL-SCNC: 3.5 MMOL/L (ref 3.5–5.5)
SODIUM SERPL-SCNC: 143 MMOL/L (ref 136–145)

## 2019-09-25 PROCEDURE — 80048 BASIC METABOLIC PNL TOTAL CA: CPT

## 2019-09-25 PROCEDURE — 36415 COLL VENOUS BLD VENIPUNCTURE: CPT

## 2019-10-01 ENCOUNTER — HOSPITAL ENCOUNTER (OUTPATIENT)
Dept: LAB | Age: 84
Discharge: HOME | End: 2019-10-01
Payer: MEDICARE

## 2019-10-01 LAB
ANION GAP SERPL CALC-SCNC: 4 MMOL/L (ref 3–18)
BUN SERPL-MCNC: 16 MG/DL (ref 7–18)
BUN/CREAT SERPL: 12 (ref 12–20)
CALCIUM SERPL-MCNC: 9 MG/DL (ref 8.5–10.1)
CHLORIDE SERPL-SCNC: 109 MMOL/L (ref 100–111)
CO2 SERPL-SCNC: 28 MMOL/L (ref 21–32)
CREAT SERPL-MCNC: 1.3 MG/DL (ref 0.6–1.3)
GLUCOSE SERPL-MCNC: 85 MG/DL (ref 74–99)
POTASSIUM SERPL-SCNC: 4.6 MMOL/L (ref 3.5–5.5)
SODIUM SERPL-SCNC: 141 MMOL/L (ref 136–145)
URATE SERPL-MCNC: 6.4 MG/DL (ref 2.6–7.2)

## 2019-10-01 PROCEDURE — 80048 BASIC METABOLIC PNL TOTAL CA: CPT

## 2019-10-01 PROCEDURE — 36415 COLL VENOUS BLD VENIPUNCTURE: CPT

## 2019-10-01 PROCEDURE — 84550 ASSAY OF BLOOD/URIC ACID: CPT

## 2019-10-04 ENCOUNTER — OFFICE VISIT (OUTPATIENT)
Dept: CARDIOLOGY CLINIC | Age: 84
End: 2019-10-04

## 2019-10-04 VITALS
WEIGHT: 151 LBS | HEIGHT: 59 IN | SYSTOLIC BLOOD PRESSURE: 126 MMHG | BODY MASS INDEX: 30.44 KG/M2 | DIASTOLIC BLOOD PRESSURE: 74 MMHG | OXYGEN SATURATION: 97 % | HEART RATE: 66 BPM

## 2019-10-04 DIAGNOSIS — E11.22 CONTROLLED TYPE 2 DIABETES MELLITUS WITH STAGE 3 CHRONIC KIDNEY DISEASE, WITHOUT LONG-TERM CURRENT USE OF INSULIN (HCC): ICD-10-CM

## 2019-10-04 DIAGNOSIS — I10 ESSENTIAL HYPERTENSION: ICD-10-CM

## 2019-10-04 DIAGNOSIS — N18.30 CONTROLLED TYPE 2 DIABETES MELLITUS WITH STAGE 3 CHRONIC KIDNEY DISEASE, WITHOUT LONG-TERM CURRENT USE OF INSULIN (HCC): ICD-10-CM

## 2019-10-04 DIAGNOSIS — R01.1 HEART MURMUR: ICD-10-CM

## 2019-10-04 DIAGNOSIS — I35.1 NONRHEUMATIC AORTIC VALVE INSUFFICIENCY: Primary | ICD-10-CM

## 2019-10-04 NOTE — PROGRESS NOTES
HISTORY OF PRESENT ILLNESS  Candido Frausto is a 80 y.o. female. Hospital Follow Up   Pertinent negatives include no chest pain, no abdominal pain, no headaches and no shortness of breath. Patient presents for a post hospital visit. She was initially referred here for evaluation of a cardiac murmur. She has a long-standing history of essential hypertension, type 2 diabetes mellitus, and dyslipidemia. Patient patient was found to have aortic insufficiency on an echocardiogram back in 2018. She was recently hospitalized at the beginning of September 2019 for worsening renal failure and nausea and vomiting. She was also found to have a minimally elevated troponin, so we are asked to see her in consultation. He denied any chest pain, shortness of breath or other anginal equivalents. She underwent a repeat echocardiogram which showed preserved LV systolic function, EF 56 to 62%, grade 1 diastolic dysfunction, biatrial enlargement, mild to moderate aortic insufficiency, which is relatively unchanged compared to her prior study. Since hospital discharge, she has been feeling back to normal.  No change in her activity level. No leg swelling, no orthopnea, no PND. No shortness of breath at rest with exertion. No new chest pain or pressure. Past Medical History:   Diagnosis Date    Anemia     Carotid bruit 12/07/2013    Carotid Duplex: 1. Bilateral 1-49% stenosis of the internal carotid arteries. 2. No significant stenosis in the external carotid arteries bilaterally. 3. Antegrade flow in both vetebral arteries. 4. Normal flow in both subclavian arteries. Plaque Morphology: 1. Hyperechoic plaque in the bulb and right ICA. 2. Hyperechoic plaque in the bulb and left ICA.  CKD (chronic kidney disease) stage 3, GFR 30-59 ml/min ([facility])     [facility]     NIDDM    Gastritis 10/27/2014    Duodenum Bx: No Specific Pathologic Abnormality. No Villous Abnormality.  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 No dysplasia or malignancy identified. Bacterial Organisms c/w H. Pylori are present. Z-Line Bx: GE mucosa w/ Acute/Chronic Inflammation & Reactive Changes. Focal Specialized Type Campos Mucosa Identified. No Dysplasia or Malignancy Identified.  Gout 12/10/2009    Elevated Uric Acid Level    Hyperlipidemia     Hypertension     RAMÓN (iron deficiency anemia)      Current Outpatient Medications   Medication Sig Dispense Refill    amLODIPine (NORVASC) 10 mg tablet TAKE 1 TABLET BY MOUTH ONCE DAILY 90 Tab 2    furosemide (LASIX) 20 mg tablet Take 1 tablet once daily as needed for edema 90 Tab 0    hydrALAZINE (APRESOLINE) 100 mg tablet Take 0.5 Tabs by mouth three (3) times daily. 270 Tab 2    allopurinol (ZYLOPRIM) 100 mg tablet Take 2 Tabs by mouth daily. Indications: treatment to prevent acute gout attack 60 Tab 0    OTHER BMP on 9/6/19 morning  Call report to PCP  Reason: CKD 1 Each 0    polyethylene glycol (MIRALAX) 17 gram/dose powder Take 17 g by mouth daily. 1 tablespoon with 8 oz of water daily 289 g 0    simvastatin (ZOCOR) 40 mg tablet TAKE 1 TABLET BY MOUTH NIGHTLY 90 Tab 2    colchicine (COLCRYS) 0.6 mg tablet Take 1 Tab by mouth daily. Indications: gout 90 Tab 2    ascorbic acid, vitamin C, (VITAMIN C) 500 mg tablet Take 500 mg by mouth daily.  cetirizine (ZYRTEC) 10 mg tablet Take 1 Tab by mouth daily. 90 Tab 0    glucose blood VI test strips (PRODIGY NO CODING) strip Check fasting glucose once daily 100 Strip 3    Blood-Glucose Meter (PRODIGY AUTOCODE MONITOR SYST) misc Check fasting glucose once daily 1 Each 0    cholecalciferol, VITAMIN D3, (VITAMIN D3) 5,000 unit tab tablet Take  by mouth daily.  docusate sodium (STOOL SOFTENER) 50 mg capsule Take 50 mg by mouth two (2) times a day.  Aspirin, Buffered 81 mg tab Take 1 tablet by mouth daily.  fish oil-dha-epa 1,200-144-216 mg cap Take 1 tablet by mouth daily. No Known Allergies     Social History     Tobacco Use    Smoking status: Never Smoker    Smokeless tobacco: Never Used   Substance Use Topics    Alcohol use: No    Drug use: No     Family History   Problem Relation Age of Onset    Hypertension Mother     Stroke Mother     Stroke Father          Review of Systems   Constitutional: Negative for chills, fever, malaise/fatigue and weight loss. HENT: Negative for nosebleeds. Eyes: Negative for blurred vision and double vision. Respiratory: Negative for cough, shortness of breath and wheezing. Cardiovascular: Negative for chest pain, palpitations, orthopnea, claudication, leg swelling and PND. Gastrointestinal: Negative for abdominal pain, heartburn, nausea and vomiting. Genitourinary: Negative for dysuria and hematuria. Musculoskeletal: Negative for falls and myalgias. Skin: Negative for rash. Neurological: Negative for dizziness, focal weakness and headaches. Endo/Heme/Allergies: Does not bruise/bleed easily. Psychiatric/Behavioral: Negative for substance abuse. Visit Vitals  /74 (BP 1 Location: Right arm, BP Patient Position: Sitting)   Pulse 66   Ht 4' 11\" (1.499 m)   Wt 68.5 kg (151 lb)   SpO2 97%   BMI 30.50 kg/m²       Physical Exam   Constitutional: She is oriented to person, place, and time. She appears well-developed and well-nourished. HENT:   Head: Normocephalic and atraumatic. Eyes: Conjunctivae are normal.   Neck: Neck supple. No JVD present. Carotid bruit is not present. Cardiovascular: Normal rate, regular rhythm, S1 normal, S2 normal and normal pulses. Exam reveals no gallop. Murmur heard. Midsystolic murmur is present with a grade of 1/6 at the lower right sternal border. High-pitched blowing decrescendo early diastolic murmur is present with a grade of 1/6 at the upper right sternal border radiating to the apex. Pulmonary/Chest: Effort normal and breath sounds normal. She has no wheezes.  She has no rales. Abdominal: Soft. Bowel sounds are normal. There is no tenderness. Musculoskeletal: She exhibits no edema, tenderness or deformity. Neurological: She is alert and oriented to person, place, and time. Skin: Skin is warm and dry. Psychiatric: She has a normal mood and affect. Her behavior is normal. Thought content normal.     EKG: Normal sinus rhythm, left axis deviation, poor R-wave progression, no ST or T-wave abnormalities concerning for ischemia. Normal QTc interval.  Occasional PVCs. Compared to the previous EKG, PVCs are now present, otherwise unchanged. ASSESSMENT and PLAN    Aortic insufficiency. This was in the mild to moderate range on a recent echocardiogram from September 2019. This is likely just due to calcific aortic valve disease. This is not significant change compared to an echocardiogram from the year prior. This can be reassessed every 2 to 3 years. Essential hypertension. Patient's blood pressure appears well-controlled on her current medical regimen all of which I would continue. Dyslipidemia. Patient has been treated with simvastatin. This is followed by her PCP. .    Diabetes mellitus, type II. Patient is managed with oral agents. From a cardiac standpoint for her hemoglobin A1c to be less than 7.     Follow-up annually, sooner if needed

## 2019-10-04 NOTE — PROGRESS NOTES
Brittni Gar presents today for   Chief Complaint   Patient presents with   [facility] Follow Up     4-6 week follow up - no cardiac complaints        Brittni Gar preferred language for health care discussion is english/other. Is someone accompanying this pt? no    Is the patient using any DME equipment during [address]? no    Depression Screening:  3 most recent PHQ Screens 7/25/2019   Little interest or pleasure in doing things Not at all   Feeling down, depressed, irritable, or hopeless Not at all   Total Score PHQ 2 0   Trouble falling or staying asleep, or sleeping too much -   Poor appetite, weight loss, or overeating -   Feeling bad about yourself - or that you are a failure or have let yourself or your family down -   Trouble concentrating on things such as school, work, reading, or watching TV -   Moving or speaking so slowly that other people could have noticed; or the opposite being so fidgety that others notice -   Thoughts of being better off dead, or hurting yourself in some way -       Learning Assessment:  Learning Assessment 3/1/2019   PRIMARY LEARNER Patient   HIGHEST LEVEL OF EDUCATION - PRIMARY LEARNER  DID NOT GRADUATE [facility] PRIMARY LEARNER NONE   CO-LEARNER CAREGIVER No   PRIMARY LANGUAGE ENGLISH    NEED No   LEARNER PREFERENCE PRIMARY READING   LEARNING SPECIAL TOPICS No   ANSWERED BY patient   RELATIONSHIP SELF       Abuse Screening:  Abuse Screening Questionnaire 3/1/2019   Do you ever feel afraid of your partner? N   Are you in a relationship with someone who physically or mentally threatens you? N   Is it safe for you to go home? Y       Fall Risk  Fall Risk Assessment, last 12 mths 7/25/2019   Able to walk? Yes   Fall in past 12 months? No       Pt currently taking Anticoagulant therapy? ASA 81mg every day     Coordination of Care:  1. Have you been to the ER, urgent care clinic since your last visit? Hospitalized since your last visit?  9/1 - 9/3 for elevated trop     2. Have you seen or consulted any other health care providers outside of the [address] [place] [address] since your last visit? Include any pap smears or colon screening.  no

## 2019-10-07 ENCOUNTER — HOSPITAL ENCOUNTER (OUTPATIENT)
Dept: ULTRASOUND IMAGING | Age: 84
Discharge: HOME | End: 2019-10-07
Attending: INTERNAL MEDICINE
Payer: MEDICARE

## 2019-10-07 DIAGNOSIS — N18.30 CHRONIC KIDNEY DISEASE, STAGE III (MODERATE) (HCC): ICD-10-CM

## 2019-10-07 PROCEDURE — 76770 US EXAM ABDO BACK WALL COMP: CPT

## 2019-10-09 ENCOUNTER — TELEPHONE (OUTPATIENT)
Dept: FAMILY MEDICINE CLINIC | Age: 84
End: 2019-10-09

## 2019-10-09 NOTE — TELEPHONE ENCOUNTER
Patient's god daughter, Kathleen Suh called and states the patient  is having more falls recently and is requesting Dr. Porter Marcelino to order home health. She has an appt on 10/18. Eric Perrin know that she is not on the patient's permission to release so I let her know I can take this information but we can not discuss health info with her. She understood. If there is anything to e done sooner we will need to call the patient directly, I told Kathleen Suh that Ms. Ze Bernabe would need to  add her to have permission for us to speak with her going forward but I would pass this information and concern along to Dr. Porter Marcelino and her nurse.

## 2019-10-09 NOTE — TELEPHONE ENCOUNTER
Advise to have trinity accompany during visit, so we can add to HIPPA and address concerns together.

## 2019-10-18 ENCOUNTER — HOME HEALTH ADMISSION (OUTPATIENT)
Dept: HOME HEALTH SERVICES | Facility: HOME HEALTH | Age: 84
End: 2019-10-18

## 2019-10-18 ENCOUNTER — OFFICE VISIT (OUTPATIENT)
Dept: FAMILY MEDICINE CLINIC | Age: 84
End: 2019-10-18

## 2019-10-18 VITALS
SYSTOLIC BLOOD PRESSURE: 130 MMHG | HEIGHT: 59 IN | DIASTOLIC BLOOD PRESSURE: 70 MMHG | BODY MASS INDEX: 28.63 KG/M2 | OXYGEN SATURATION: 97 % | HEART RATE: 56 BPM | WEIGHT: 142 LBS | TEMPERATURE: 97.8 F | RESPIRATION RATE: 16 BRPM

## 2019-10-18 DIAGNOSIS — I10 ESSENTIAL HYPERTENSION: Chronic | ICD-10-CM

## 2019-10-18 DIAGNOSIS — Z23 ENCOUNTER FOR IMMUNIZATION: ICD-10-CM

## 2019-10-18 DIAGNOSIS — N18.30 CKD (CHRONIC KIDNEY DISEASE) STAGE 3, GFR 30-59 ML/MIN (HCC): ICD-10-CM

## 2019-10-18 DIAGNOSIS — R00.1 SINUS BRADYCARDIA: ICD-10-CM

## 2019-10-18 DIAGNOSIS — R01.1 HEART MURMUR: ICD-10-CM

## 2019-10-18 DIAGNOSIS — D63.8 ANEMIA OF CHRONIC DISEASE: ICD-10-CM

## 2019-10-18 DIAGNOSIS — E11.22 TYPE 2 DIABETES MELLITUS WITH CHRONIC KIDNEY DISEASE, WITHOUT LONG-TERM CURRENT USE OF INSULIN, UNSPECIFIED CKD STAGE (HCC): Primary | ICD-10-CM

## 2019-10-18 DIAGNOSIS — E78.2 MIXED HYPERLIPIDEMIA: ICD-10-CM

## 2019-10-18 DIAGNOSIS — R29.6 FREQUENT FALLS: ICD-10-CM

## 2019-10-18 DIAGNOSIS — E11.9 DIABETES MELLITUS TYPE 2, DIET-CONTROLLED (HCC): ICD-10-CM

## 2019-10-18 DIAGNOSIS — R60.0 LEG EDEMA: ICD-10-CM

## 2019-10-18 RX ORDER — HYDRALAZINE HYDROCHLORIDE 100 MG/1
50 TABLET, FILM COATED ORAL 3 TIMES DAILY
Qty: 270 TAB | Refills: 2 | Status: SHIPPED | OUTPATIENT
Start: 2019-10-18 | End: 2020-01-28 | Stop reason: SDUPTHER

## 2019-10-18 RX ORDER — LISINOPRIL 10 MG/1
10 TABLET ORAL DAILY
COMMUNITY
End: 2019-10-18 | Stop reason: SDUPTHER

## 2019-10-18 RX ORDER — FUROSEMIDE 20 MG/1
TABLET ORAL
Qty: 90 TAB | Refills: 1 | Status: SHIPPED | OUTPATIENT
Start: 2019-10-18 | End: 2020-01-28 | Stop reason: SDUPTHER

## 2019-10-18 RX ORDER — CLONIDINE HYDROCHLORIDE 0.2 MG/1
0.2 TABLET ORAL 2 TIMES DAILY
COMMUNITY
End: 2020-04-27

## 2019-10-18 RX ORDER — LISINOPRIL 10 MG/1
10 TABLET ORAL DAILY
Qty: 90 TAB | Refills: 2 | Status: SHIPPED | OUTPATIENT
Start: 2019-10-18 | End: 2020-01-28 | Stop reason: ALTCHOICE

## 2019-10-18 RX ORDER — LATANOPROST 50 UG/ML
1 SOLUTION/ DROPS OPHTHALMIC DAILY
COMMUNITY
Start: 2019-10-17

## 2019-10-18 NOTE — PROGRESS NOTES
Patient presents for the following vaccines: Influenza (FLUAD) Vaccine. Patient consent obtained by patient. Patient tolerated procedure well at left deltoid. Patient remained in exam room, during vaccine documentation, for period of 10 minutes post injection to ensure no reaction. VIS was given to patient.     Lot # [id number]  Exp: 6/30/2020  MFG: [employer].  [address]: [phone]

## 2019-10-18 NOTE — PROGRESS NOTES
1. Have you been to the ER, urgent care clinic since your last visit? Hospitalized since your last visit? [facility] 9/01/19    2. Have you seen or consulted any other health care providers outside of the [address] [place] [address] since your last visit? Include any pap smears or colon screening.  Dr. Karol Urbina LOV: 9/2019    Chief Complaint   Patient presents with    Chronic Kidney Disease    Diabetes    Hypertension    Leg Swelling

## 2019-10-18 NOTE — PATIENT INSTRUCTIONS
DASH Diet: Care Instructions  Your Care Instructions    The DASH diet is an eating plan that can help lower your blood pressure. DASH stands for Dietary Approaches to Stop Hypertension. Hypertension is high blood pressure. The DASH diet focuses on eating foods that are high in calcium, potassium, and magnesium. These nutrients can lower blood pressure. The foods that are highest in these nutrients are fruits, vegetables, low-fat dairy products, nuts, seeds, and legumes. But taking calcium, potassium, and magnesium supplements instead of eating foods that are high in those nutrients does not have the same effect. The DASH diet also includes whole grains, fish, and poultry. The DASH diet is one of several lifestyle changes your doctor may recommend to lower your high blood pressure. Your doctor may also want you to decrease the amount of sodium in your diet. Lowering sodium while following the DASH diet can lower blood pressure even further than just the DASH diet alone. Follow-up care is a key part of your treatment and safety. Be sure to make and go to all appointments, and call your doctor if you are having problems. It's also a good idea to know your test results and keep a list of the medicines you take. How can you care for yourself at home? Following the DASH diet  · Eat 4 to 5 servings of fruit each day. A serving is 1 medium-sized piece of fruit, ½ cup chopped or canned fruit, 1/4 cup dried fruit, or 4 ounces (½ cup) of fruit juice. Choose fruit more often than fruit juice. · Eat 4 to 5 servings of vegetables each day. A serving is 1 cup of lettuce or raw leafy vegetables, ½ cup of chopped or cooked vegetables, or 4 ounces (½ cup) of vegetable juice. Choose vegetables more often than vegetable juice. · Get 2 to 3 servings of low-fat and fat-free dairy each day. A serving is 8 ounces of milk, 1 cup of yogurt, or 1 ½ ounces of cheese. · Eat 6 to 8 servings of grains each day.  A serving is 1 slice of bread, 1 ounce of dry cereal, or ½ cup of cooked rice, pasta, or cooked cereal. Try to choose whole-grain products as much as possible. · Limit lean meat, poultry, and fish to 2 servings each day. A serving is 3 ounces, about the size of a deck of cards. · Eat 4 to 5 servings of nuts, seeds, and legumes (cooked dried beans, lentils, and split peas) each week. A serving is 1/3 cup of nuts, 2 tablespoons of seeds, or ½ cup of cooked beans or peas. · Limit fats and oils to 2 to 3 servings each day. A serving is 1 teaspoon of vegetable oil or 2 tablespoons of salad dressing. · Limit sweets and added sugars to 5 servings or less a week. A serving is 1 tablespoon jelly or jam, ½ cup sorbet, or 1 cup of lemonade. · Eat less than 2,300 milligrams (mg) of sodium a day. If you limit your sodium to 1,500 mg a day, you can lower your blood pressure even more. Tips for success  · Start small. Do not try to make dramatic changes to your diet all at once. You might feel that you are missing out on your favorite foods and then be more likely to not follow the plan. Make small changes, and stick with them. Once those changes become habit, add a few more changes. · Try some of the following:  ? Make it a goal to eat a fruit or vegetable at every meal and at snacks. This will make it easy to get the recommended amount of fruits and vegetables each day. ? Try yogurt topped with fruit and nuts for a snack or healthy dessert. ? Add lettuce, tomato, cucumber, and onion to sandwiches. ? Combine a ready-made pizza crust with low-fat mozzarella cheese and lots of vegetable toppings. Try using tomatoes, squash, spinach, broccoli, carrots, cauliflower, and onions. ? Have a variety of cut-up vegetables with a low-fat dip as an appetizer instead of chips and dip. ? Sprinkle sunflower seeds or chopped almonds over salads. Or try adding chopped walnuts or almonds to cooked vegetables.   ? Try some vegetarian meals using beans and peas. Add garbanzo or kidney beans to salads. Make burritos and tacos with mashed day beans or black beans. Where can you learn more? Go to http://viral-zenaida.info/. Enter E843 in the search box to learn more about \"DASH Diet: Care Instructions. \"  Current as of: April 9, 2019  Content Version: 12.2  © 6601-8273 [employer]. Care instructions adapted under license by [employer] (which disclaims liability or warranty for this information). If you have questions about a medical condition or this instruction, always ask your healthcare professional. [address] any warranty or liability for your use of this information.

## 2019-10-18 NOTE — PROGRESS NOTES
Lea Gottlieb, 80 y.o.,  female    SUBJECTIVE  Ff-up      HTN/CKD 3-   She reports resuming all of previous BP medications after these were temporarily held during admission fo acute vomiting/dehydration/JOHNNY episode in septmber. She brings her med bottles and able to go over med rec in detail. continues to take hydralazine, lisinopril,  clonidine and norvasc. And prn lasix few times a week for leg edema. DM w/ CKD 3- continues to be diet controlled, she was evaluated by dr. Vickie Manzano, nephrology, reviewed notes, pre renal azotemia, and advised increase hydration. She has upcoming appt, her creatinine level is back to normal range. She Reports FBG still  1teens. HL- on zocor,    Aortic regurtiation /bradycardia- evaluated by cardiology Dr. Bianca Mayers,  She is asymptomatic and recommend recheck in about 2-3 years. Gout- usually foot swelling and pain, related to seafood. Says taking colchicine/allopurinol daily. Her repeat uric acid level is 6. Lives with  who is in his [de-identified]. Performs ADLs without much difficulty. No longer drives. She reports to have fallen few times the past month at home, says she tripped on her own feet when quickly turning. She denies hitting her head or lightheadedness/chest pain prior. She denies any memory difficulty, says able to do groceries/manage bills without difficulty. She endorses poor reading skills as they had limited education, but able to get by day to day activities without much trouble. She says she has a niece yadira parmar, who checks on them regularly. I have advised to add her to their HIPPA.      ROS:  See HPI, all others negative        Patient Active Problem List   Diagnosis Code    Essential hypertension I10    Mixed hyperlipidemia E78.2    Advanced directives, counseling/discussion Z71.89    Controlled type 2 diabetes mellitus with stage 3 chronic kidney disease, without long-term current use of insulin (HCC) E11.22, N18.3       Current Outpatient Prescriptions   Medication Sig Dispense Refill    cholecalciferol, VITAMIN D3, (VITAMIN D3) 5,000 unit tab tablet Take  by mouth daily.  cloNIDine HCl (CATAPRES) 0.2 mg tablet TAKE ONE TABLET BY MOUTH TWICE DAILY 60 Tab 2    amLODIPine (NORVASC) 10 mg tablet Take 1 Tab by mouth daily. 90 Tab 1    metFORMIN (GLUCOPHAGE) 500 mg tablet Take 1 Tab by mouth two (2) times daily (with meals). 180 Tab 1    hydroCHLOROthiazide (HYDRODIURIL) 50 mg tablet Take 1 Tab by mouth daily. 90 Tab 1    potassium chloride SR (KLOR-CON 10) 10 mEq tablet Take 1 Tab by mouth daily. 90 Tab 2    hydrALAZINE (APRESOLINE) 100 mg tablet Take 1 Tab by mouth three (3) times daily. 270 Tab 2    colchicine (COLCRYS) 0.6 mg tablet Take 1 Tab by mouth daily. Indications: Gout 90 Tab 2    simvastatin (ZOCOR) 40 mg tablet Take 1 Tab by mouth nightly. 90 Tab 2    docusate sodium (STOOL SOFTENER) 50 mg capsule Take 50 mg by mouth two (2) times a day.  Aspirin, Buffered 81 mg tab Take 1 tablet by mouth daily.  fish oil-dha-epa 1,200-144-216 mg cap Take 1 tablet by mouth daily. No Known Allergies    Past Medical History:   Diagnosis Date    Anemia     Carotid bruit 12/07/2013    Carotid Duplex: 1. Bilateral 1-49% stenosis of the internal carotid arteries. 2. No significant stenosis in the external carotid arteries bilaterally. 3. Antegrade flow in both vetebral arteries. 4. Normal flow in both subclavian arteries. Plaque Morphology: 1. Hyperechoic plaque in the bulb and right ICA. 2. Hyperechoic plaque in the bulb and left ICA.  CKD (chronic kidney disease) stage 3, GFR 30-59 ml/min     Diabetes (HCC)     NIDDM    Gastritis 10/27/2014    Duodenum Bx: No Specific Pathologic Abnormality. No Villous Abnormality. 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 No dysplasia or malignancy identified. Bacterial Organisms c/w H. Pylori are present.  Z-Line Bx: GE mucosa w/ Acute/Chronic Inflammation & Reactive Changes. Focal Specialized Type Campos Mucosa Identified. No Dysplasia or Malignancy Identified.  Gout 12/10/2009    Elevated Uric Acid Level    Hyperlipidemia     Hypertension     RAMÓN (iron deficiency anemia)        Social History     Social History    Marital status:      Spouse name: N/A    Number of children: N/A    Years of education: N/A     Occupational History    Not on file.      Social History Main Topics    Smoking status: Never Smoker    Smokeless tobacco: Never Used    Alcohol use No    Drug use: No    Sexual activity: Not Currently     Partners: Male     Birth control/ protection: None     Other Topics Concern    Not on file     Social History Narrative       Family History   Problem Relation Age of Onset    Hypertension Mother     Stroke Mother     Stroke Father          OBJECTIVE    Physical Exam:     Visit Vitals  /70 (BP 1 Location: Right arm, BP Patient Position: Sitting)   Pulse (!) 56   Temp 97.8 °F (36.6 °C) (Oral)   Resp 16   Ht 4' 11\" (1.499 m)   Wt 142 lb (64.4 kg)   SpO2 97%   BMI 28.68 kg/m²         General: alert, well-appearing, AA,  in no apparent distress or pain  HEENT: throat and pharynx clear, eac patent, tm intact  Neck: supple, no adenopathy palpated  CVS: + mild bradycardia, regular rhythm, + murmur  Lungs:clear to ausculation bilaterally, no crackles, wheezing or rhonchi noted  Skin: warm, no lesions, rashes noted  Psych:  mood and affect normal    ASSESSMENT/PLAN  Diagnoses and all orders for this visit:      Controlled type 2 diabetes mellitus with stage 3 chronic kidney disease, without long-term current use of insulin (HCC)  Continue so tp be diet controlled  September 2019 admission for JOHNNY- she has seen dr. Romero Strong recommending increased hydration, her creatinine level has improved  a1c 6.6  Monitoring  Check CMP, a1c, lipid panel, microalbumin in 3 months prior to next visit    Diabetes mellitus type 2, diet controlled ([facility] [address]    Essential hypertension  Controlled  Cont   norvasc, clonidine, hydralazine, lisinopril  Cont  low dose lasix/kcl prn for leg edema    Leg edema  Advised compression stockings   Low salt diet,   Prn lasix/kcl     Mixed hyperlipidemia  Good range LDL <100, on zocor     Gout of foot, unspecified cause, unspecified chronicity, unspecified laterality  Improving uric acid level  Cont allopurinol for prophylaxis and prn colchicine for acute episodes    Anemia of chronic disease  Stable, Baseline 10's  Monitoring  BMI 27  Encourage wt loss    Heart murmur  Aortic regurg  Asymptomatic  Monitoring, following cards recommending recheck in 2-3 years    CKD 3  Avoid nsaids, keep hydrated  monitoring  Following dr. Rashaun zaman    Sinus bradycardia  Asymptomatic  1st deg AV block  Advised against BB    Frequent falls  Sounds to be mechanical falls, however she lives independently with elderly  and would recommend home health safety evaluation, referral to home health placed  Encounter for immunization    Encounter for immunization  High dose flu vaccine given    Follow-up Disposition:  Ff-up in 3 months or sooner prn    Patient understands plan of care. Patient has provided input and agrees with goals.

## 2019-10-21 ENCOUNTER — HOME CARE VISIT (OUTPATIENT)
Dept: HOME HEALTH SERVICES | Facility: HOME HEALTH | Age: 84
End: 2019-10-21

## 2019-10-26 NOTE — PROGRESS NOTES
1. Have you been to the ER, urgent care clinic since your last visit? Hospitalized since your last visit? No    2. Have you seen or consulted any other health care providers outside of the [address] [place] [address] since your last visit? Include any pap smears or colon screening. Eye Dr. Timmons Mean 10/16/18    Chief Complaint   Patient presents with    Hypertension    Cholesterol Problem    Diabetes    Immunization/Injection    Fatigue     times 1 year     Physician order obtained. Patient completed adult immunization consent form. Allergies, contraindications and recommendations reviewed with patient. High dose seasonal influenza vaccine administered IM RT deltoid. Patient tolerated well. Patient remained in office for 15 minutes after injection and no adverse reactions were noted.   Lot#[id number]  Exp:05/31/2019  [facility]#[id number] Physician Documentation                                                                           

 [facility]                                                                 

Name: Kendell Glover                                                                                 

Age: 23 yrs                                                                                       

Sex: Male                                                                                         

: 1996                                                                                   

MRN: [id number]                                                                                   

Arrival Date: 10/26/2019                                                                          

Time: 16:52                                                                                       

Account#: [id number]                                                                            

Bed 14                                                                                            

Private MD:                                                                                       

ED Physician Amado Goodwin                                                                         

HPI:                                                                                              

10/26                                                                                             

17:25 This 23 yrs old  Male presents to ER via Ambulatory with complaints of Sore    pm1 

      Throat, Mouth Swelling.                                                                     

18:17 The patient presents with sore throat. The patient describes throat pain as raw,        pm1 

      scratchy. Onset: The symptoms/episode began/occurred 3 day(s) ago. Severity of              

      symptoms: in the emergency department the symptoms are actually worse, swelling of gums     

      and pain in teeth. Modifying factors: The symptoms are alleviated by nothing, the           

      symptoms are aggravated by foods, swallowing, Denies contact with similarly ill             

      indivduals. Associated signs and symptoms: Pertinent negatives chills, cough, fever,        

      flu-like symptoms, headache. The patient has been recently seen by a physician: with        

      similar presenting complaints, at a clinic and was diagnosed with strep throat without      

      a swab. Was given Augmentin. Today reports pain and swelling to gums and pain to teeth      

      with eating.                                                                                

                                                                                                  

Historical:                                                                                       

- Allergies:                                                                                      

16:56 No Known Allergies;                                                                     sv  

- PMHx:                                                                                           

16:56 None;                                                                                   sv  

- PSHx:                                                                                           

16:56 None;                                                                                   sv  

                                                                                                  

- Immunization history:: Adult Immunizations up to date.                                          

- Social history:: Smoking status: Patient/guardian denies using tobacco.                         

- Ebola Screening: : Patient negative for fever greater than or equal to 101.5 degrees            

  Fahrenheit, and additional compatible Ebola Virus Disease symptoms Patient denies               

  exposure to infectious person Patient denies travel to an Ebola-affected area in the            

  21 days before illness onset No symptoms or risks identified at this time.                      

                                                                                                  

                                                                                                  

ROS:                                                                                              

18:17 Constitutional: Negative for fever, chills, and weight loss, Eyes: Negative for injury, pm1 

      pain, redness, and discharge.                                                               

18:17 Neck: Negative for injury, pain, and swelling, Cardiovascular: Negative for chest pain,     

      palpitations, and edema, Respiratory: Negative for shortness of breath, cough,              

      wheezing, and pleuritic chest pain, Abdomen/GI: Negative for abdominal pain, nausea,        

      vomiting, diarrhea, and constipation, Back: Negative for injury and pain, MS/Extremity:     

      Negative for injury and deformity, Skin: Negative for injury, rash, and discoloration,      

      Neuro: Negative for headache, weakness, numbness, tingling, and seizure.                    

18:17 ENT: Positive for dental pain, Gum pain sore throat.                                        

                                                                                                  

Exam:                                                                                             

18:17 Constitutional:  This is a well developed, well nourished patient who is awake, alert,  pm1 

      and in no acute distress. Head/Face:  Normocephalic, atraumatic. Eyes:  Pupils equal        

      round and reactive to light, extra-ocular motions intact.  Lids and lashes normal.          

      Conjunctiva and sclera are non-icteric and not injected.  Cornea within normal limits.      

      Periorbital areas with no swelling, redness, or edema.                                      

18:17 Chest/axilla:  Normal chest wall appearance and motion.  Nontender with no deformity.       

      No lesions are appreciated. Cardiovascular:  Regular rate and rhythm with a normal S1       

      and S2.  No gallops, murmurs, or rubs.  Normal PMI, no JVD.  No pulse deficits.             

18:17 Respiratory:  Lungs have equal breath sounds bilaterally, clear to auscultation and         

      percussion.  No rales, rhonchi or wheezes noted.  No increased work of breathing, no        

      retractions or nasal flaring. Back:  No spinal tenderness.  No costovertebral               

      tenderness.  Full range of motion. Skin:  Warm, dry with normal turgor.  Normal color       

      with no rashes, no lesions, and no evidence of cellulitis. MS/ Extremity:  Pulses           

      equal, no cyanosis.  Neurovascular intact.  Full, normal range of motion.                   

18:17 ENT: External ear(s): are unremarkable, Ear canal(s): TM's: are normal, Nose: is            

      normal, Mouth: Gums: reddened, swollen, with heavy amounts of plaque diffusely across       

      all teeth consistent with gingivitis, Tongue: displays thrush, Dental exam: dental          

      caries, that is mild, diffusely.                                                            

18:17 Neck: External neck: is normal, Lymph nodes: lymphadenopathy is appreciated.                

18:17 Neuro: Orientation: is normal, Motor: moves all fours.                                      

                                                                                                  

Vital Signs:                                                                                      

16:56  / 74; Pulse 72; Resp 18; Temp 99.6(O); Pulse Ox 100% ; Weight 77.11 kg; Height 6 sv  

      ft. 2 in. (187.96 cm);                                                                      

16:56 Body Mass Index 21.83 (77.11 kg, 187.96 cm)                                             sv  

                                                                                                  

MDM:                                                                                              

17:08 Patient medically screened.                                                             pm1 

18:05 Data reviewed: vital signs. Data interpreted: Pulse oximetry: on room air is 100 %.     pm1 

      Interpretation: normal. Counseling: I had a detailed discussion with the patient and/or     

      guardian regarding: the historical points, exam findings, and any diagnostic results        

      supporting the discharge/admit diagnosis.                                                   

                                                                                                  

10/26                                                                                             

17:21 Order name: Strep; Complete Time: 18:06                                                 pm1 

10/26                                                                                             

18:03 Order name: Throat Culture                                                              EDMS

                                                                                                  

Administered Medications:                                                                         

17:42 Drug: Ibuprofen 600 mg Route: PO;                                                       em  

18:33 Follow up: Response: No adverse reaction; Pain is decreased                             em  

17:43 Not Given (Patient Refused): NS 0.9% 1000 ml IV at 1000 ml once                         em  

                                                                                                  

                                                                                                  

Disposition:                                                                                      

19:00 Co-signature as Attending Physician, Amado Goodwin MD.                                    rn  

                                                                                                  

Disposition:                                                                                      

10/26/19 18:08 Discharged to Home. Impression: Acute pharyngitis, Gingivitis and periodontal      

  diseases, Candidiasis.                                                                          

- Condition is Stable.                                                                            

- Discharge Instructions: Gingivitis, Pharyngitis, Thrush, Adult.                                 

- Prescriptions for Nystatin 100,000 unit/mL Oral Suspension - take 5 milliliter by               

  ORAL route every 6 hours; 120 milliliter. Tramadol 50 mg Oral Tablet - take 1 tablet            

  by ORAL route every 8 hours as needed; 12 tablet.                                               

- Medication Reconciliation Form, Thank You Letter, Antibiotic Education, Prescription            

  Opioid Use form.                                                                                

- Follow up: Emergency Department; When: As needed; Reason: Worsening of condition.               

  Follow up: Private Physician; When: 2 - 3 days; Reason: Recheck today's complaints,             

  Continuance of care, Re-evaluation by your physician.                                           

- Problem is new.                                                                                 

- Symptoms have improved.                                                                         

                                                                                                  

                                                                                                  

                                                                                                  

Signatures:                                                                                       

Dispatcher [employer]                           EDMS                                                 

Anni Montes RN                    RN   Tony Durand, LVN                       LVN  Amado Rivera MD MD rn Marinas, Patrick, ADELINE                    NP   pm1                                                  

                                                                                                  

Corrections: (The following items were deleted from the chart)                                    

17:37 17:22 BASIC METABOLIC PANEL+C.LAB.BRZ ordered. EDMS                                     EDMS

17:37 17:22 Group A Streptococcus Rapid Sc+BA.LAB.BRZ ordered. EDMS                           EDMS

17:37 17:22 CBC+H.LAB.BRZ ordered. EDMS                                                       EDMS

17:43 17:21 IV Saline Lock ordered. pm1                                                       em  

18:09 18:08 10/26/2019 18:08 Discharged to Home. Impression: Acute pharyngitis; Gingivitis    pm1 

      and periodontal diseases. Condition is Stable. Forms are Medication Reconciliation          

      Form, Thank You Letter, Antibiotic Education, Prescription Opioid Use. pm1                  

18:15 18:09 10/26/2019 18:08 Discharged to Home. Impression: Acute pharyngitis; Gingivitis    pm1 

      and periodontal diseases. Condition is Stable. Forms are Medication Reconciliation          

      Form, Thank You Letter, Antibiotic Education, Prescription Opioid Use. Follow up:           

      Emergency Department; When: As needed; Reason: Worsening of condition. Follow up:           

      Private Physician; When: 2 - 3 days; Reason: Recheck today's complaints, Continuance of     

      care, Re-evaluation by your physician. Problem is new. Symptoms have improved. pm1          

18:33 18:15 10/26/2019 18:08 Discharged to Home. Impression: Acute pharyngitis; Gingivitis    em  

      and periodontal diseases; Candidiasis. Condition is Stable. Forms are Medication            

      Reconciliation Form, Thank You Letter, Antibiotic Education, Prescription Opioid Use.       

      Follow up: Emergency Department; When: As needed; Reason: Worsening of condition.           

      Follow up: Private Physician; When: 2 - 3 days; Reason: Recheck today's complaints,         

      Continuance of care, Re-evaluation by your physician. Problem is new. Symptoms have         

      improved. pm1                                                                               

                                                                                                  

**************************************************************************************************

## 2019-10-28 ENCOUNTER — PATIENT OUTREACH (OUTPATIENT)
Dept: FAMILY MEDICINE CLINIC | Age: 84
End: 2019-10-28

## 2019-10-28 NOTE — PROGRESS NOTES
Patient has graduated from the Transitions of Care Coordination  program on 10/28/19. Patient's symptoms are stable at this time. Patient/family has the ability to self-manage. Care management goals have been completed at this time. No further nurse navigator follow up scheduled. Goals Addressed                 This Visit's Progress     COMPLETED: Prevent complications post hospitalization. Patient will attend all physician appointments as scheduled    NN will follow patient for at least 4 weeks post hospital discharge            Pt has nurse navigator's contact information for any further questions, concerns, or needs.   Patients upcoming visits:    Future Appointments   Date Time Provider Gee Montes   1/27/2020  9:30 AM Julio Robertson MD [facility] MIGUEL JONES   10/5/2020  9:00 AM Jodie Cabrera MD [address] [place] [address]

## 2020-01-28 ENCOUNTER — OFFICE VISIT (OUTPATIENT)
Dept: FAMILY MEDICINE CLINIC | Age: 85
End: 2020-01-28

## 2020-01-28 VITALS
TEMPERATURE: 98.9 F | RESPIRATION RATE: 16 BRPM | OXYGEN SATURATION: 98 % | HEART RATE: 61 BPM | DIASTOLIC BLOOD PRESSURE: 64 MMHG | WEIGHT: 150 LBS | BODY MASS INDEX: 30.24 KG/M2 | HEIGHT: 59 IN | SYSTOLIC BLOOD PRESSURE: 116 MMHG

## 2020-01-28 DIAGNOSIS — R01.1 HEART MURMUR: ICD-10-CM

## 2020-01-28 DIAGNOSIS — I10 ESSENTIAL HYPERTENSION: Chronic | ICD-10-CM

## 2020-01-28 DIAGNOSIS — E11.9 DIABETES MELLITUS TYPE 2, DIET-CONTROLLED (HCC): ICD-10-CM

## 2020-01-28 DIAGNOSIS — N18.30 CKD (CHRONIC KIDNEY DISEASE) STAGE 3, GFR 30-59 ML/MIN (HCC): Primary | ICD-10-CM

## 2020-01-28 DIAGNOSIS — R60.0 LEG EDEMA: ICD-10-CM

## 2020-01-28 DIAGNOSIS — E11.22 TYPE 2 DIABETES MELLITUS WITH CHRONIC KIDNEY DISEASE, WITHOUT LONG-TERM CURRENT USE OF INSULIN, UNSPECIFIED CKD STAGE (HCC): ICD-10-CM

## 2020-01-28 DIAGNOSIS — E78.2 MIXED HYPERLIPIDEMIA: ICD-10-CM

## 2020-01-28 DIAGNOSIS — M10.9 GOUT, UNSPECIFIED CAUSE, UNSPECIFIED CHRONICITY, UNSPECIFIED SITE: ICD-10-CM

## 2020-01-28 RX ORDER — COLCHICINE 0.6 MG/1
0.6 TABLET ORAL DAILY
Qty: 90 TAB | Refills: 2 | Status: SHIPPED | OUTPATIENT
Start: 2020-01-28 | End: 2020-04-28 | Stop reason: SDUPTHER

## 2020-01-28 RX ORDER — FUROSEMIDE 20 MG/1
TABLET ORAL
Qty: 90 TAB | Refills: 1 | Status: SHIPPED | OUTPATIENT
Start: 2020-01-28 | End: 2020-04-28 | Stop reason: SDUPTHER

## 2020-01-28 RX ORDER — HYDRALAZINE HYDROCHLORIDE 100 MG/1
50 TABLET, FILM COATED ORAL 3 TIMES DAILY
Qty: 270 TAB | Refills: 2 | Status: SHIPPED | OUTPATIENT
Start: 2020-01-28 | End: 2020-02-04

## 2020-01-28 NOTE — PROGRESS NOTES
Chyna Canales, 80 y.o.,  female    SUBJECTIVE  Ff-up      HTN/CKD 3-  She continues to take clonidine, hydralazine and norvasc. Previous JOHNNY is resolving. She is following dr. Vadim Livingston who recommended to continue to be off lisinopril and will monitor for proteinuria. She is on prn lasix few times a week for leg edema. She has h/o AV block and advised against BB. DM w/ CKD 3- continues to be diet controlled, She Reports FBG still  1teens. She forgot to have labs drawn prior to this visit, reminded/reprinted    HL- on zocor    Aortic regurtiation /bradycardia- evaluated by cardiology Dr. Tomás Aranda,  She is asymptomatic and recommend recheck in about 2-3 years. Gout- usually foot swelling and pain, related to seafood. Says taking colchicine daily. ROS:  See HPI, all others negative        Patient Active Problem List   Diagnosis Code    Essential hypertension I10    Mixed hyperlipidemia E78.2    Advanced directives, counseling/discussion Z71.89    Controlled type 2 diabetes mellitus with stage 3 chronic kidney disease, without long-term current use of insulin ([facility]) E11.22, N18.3         Current Outpatient Medications:     furosemide (LASIX) 20 mg tablet, Take 1 tablet once daily as needed for edema, Disp: 90 Tab, Rfl: 1    colchicine (COLCRYS) 0.6 mg tablet, Take 1 Tab by mouth daily. Indications: a type of joint disorder due to excess uric acid in the blood called gout, Disp: 90 Tab, Rfl: 2    hydrALAZINE (APRESOLINE) 100 mg tablet, Take 0.5 Tabs by mouth three (3) times daily. , Disp: 270 Tab, Rfl: 2    latanoprost (XALATAN) 0.005 % ophthalmic solution, , Disp: , Rfl:     cloNIDine HCl (CATAPRES) 0.2 mg tablet, Take 0.2 mg by mouth two (2) times a day., Disp: , Rfl:     amLODIPine (NORVASC) 10 mg tablet, TAKE 1 TABLET BY MOUTH ONCE DAILY, Disp: 90 Tab, Rfl: 2    simvastatin (ZOCOR) 40 mg tablet, TAKE 1 TABLET BY MOUTH NIGHTLY, Disp: 90 Tab, Rfl: 2    glucose blood VI test strips (PRODIGY NO CODING) strip, Check fasting glucose once daily, Disp: 100 Strip, Rfl: 3    Blood-Glucose Meter (PRODIGY AUTOCODE MONITOR SYST) misc, Check fasting glucose once daily, Disp: 1 Each, Rfl: 0    Aspirin, Buffered 81 mg tab, Take 1 tablet by mouth daily. , Disp: , Rfl:     fish oil-dha-epa 1,200-144-216 mg cap, Take 1 tablet by mouth daily. , Disp: , Rfl:     OTHER, BMP on 9/6/19 morning Call report to PCP Reason: CKD, Disp: 1 Each, Rfl: 0    cetirizine (ZYRTEC) 10 mg tablet, Take 1 Tab by mouth daily. , Disp: 90 Tab, Rfl: 0    cholecalciferol, VITAMIN D3, (VITAMIN D3) 5,000 unit tab tablet, Take  by mouth daily. , Disp: , Rfl:     No Known Allergies    Past Medical History:   Diagnosis Date    Anemia     Carotid bruit 12/07/2013    Carotid Duplex: 1. Bilateral 1-49% stenosis of the internal carotid arteries. 2. No significant stenosis in the external carotid arteries bilaterally. 3. Antegrade flow in both vetebral arteries. 4. Normal flow in both subclavian arteries. Plaque Morphology: 1. Hyperechoic plaque in the bulb and right ICA. 2. Hyperechoic plaque in the bulb and left ICA.  CKD (chronic kidney disease) stage 3, GFR 30-59 ml/min     Diabetes ([facility])     NIDDM    Gastritis 10/27/2014    Duodenum Bx: No Specific Pathologic Abnormality. No Villous Abnormality. 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 No dysplasia or malignancy identified. Bacterial Organisms c/w H. Pylori are present. Z-Line Bx: GE mucosa w/ Acute/Chronic Inflammation & Reactive Changes. Focal Specialized Type Campos Mucosa Identified. No Dysplasia or Malignancy Identified.  Gout 12/10/2009    Elevated Uric Acid Level    Hyperlipidemia     Hypertension     RAMÓN (iron deficiency anemia)        Social History     Social History    Marital status:      Spouse name: N/A    Number of children: N/A    Years of education: N/A     Occupational History    Not on file.      Social History Main Topics    Smoking status: Never Smoker    Smokeless tobacco: Never Used    Alcohol use No    Drug use: No    Sexual activity: Not Currently     Partners: Male     Birth control/ protection: None     Other Topics Concern    Not on file     Social History Narrative       Family History   Problem Relation Age of Onset    Hypertension Mother     Stroke Mother     Stroke Father          OBJECTIVE    Physical Exam:     Visit Vitals  /64 (BP 1 Location: Left arm, BP Patient Position: Sitting)   Pulse 61   Temp 98.9 °F (37.2 °C) (Oral)   Resp 16   Ht 4' 11\" (1.499 m)   Wt 150 lb (68 kg)   SpO2 98%   BMI 30.30 kg/m²         General: alert, well-appearing, AA,  in no apparent distress or pain  Neck: supple, no adenopathy palpated  CVS: normal rate,  regular rhythm, + murmur  Lungs:clear to ausculation bilaterally, no crackles, wheezing or rhonchi noted  Skin: warm, no lesions, rashes noted  Psych:  mood and affect normal    ASSESSMENT/PLAN  Diagnoses and all orders for this visit:    Controlled type 2 diabetes mellitus with stage 3 chronic kidney disease, without long-term current use of insulin (HCC)   diet controlled  September 2019 admission for JOHNNY- this has improved, she has seen dr. Yuliet Castro recommending to continue to be off lisinopril and will monitor proteinuria  a1c 6.6  Check CMP, a1c, lipid panel, microalbumin  soon    Diabetes mellitus type 2, diet controlled (HCC)    Essential hypertension  Controlled  Cont   norvasc, clonidine, hydralazine  Cont  low dose lasix/kcl prn for leg edema  Avoid bb with h/o 1st deg av block    Leg edema  Advised compression stockings   Low salt diet,   Prn lasix/kcl     Mixed hyperlipidemia  Good range LDL <100, on zocor     Gout of foot, unspecified cause, unspecified chronicity, unspecified laterality  On daily colchicine    Anemia of chronic disease  Stable, Baseline 10's  Monitoring  BMI 27  Encourage wt loss    Heart murmur  Aortic regurg  Asymptomatic  Monitoring, following cards recommending recheck in 2-3 years    CKD 3  Avoid nsaids, keep hydrated  monitoring  Following dr. Yandel zaman      Follow-up Disposition:  Ff-up in 3 months or sooner prn    Patient understands plan of care. Patient has provided input and agrees with goals.

## 2020-01-28 NOTE — PATIENT INSTRUCTIONS
DASH Diet: Care Instructions Your Care Instructions The DASH diet is an eating plan that can help lower your blood pressure. DASH stands for Dietary Approaches to Stop Hypertension. Hypertension is high blood pressure. The DASH diet focuses on eating foods that are high in calcium, potassium, and magnesium. These nutrients can lower blood pressure. The foods that are highest in these nutrients are fruits, vegetables, low-fat dairy products, nuts, seeds, and legumes. But taking calcium, potassium, and magnesium supplements instead of eating foods that are high in those nutrients does not have the same effect. The DASH diet also includes whole grains, fish, and poultry. The DASH diet is one of several lifestyle changes your doctor may recommend to lower your high blood pressure. Your doctor may also want you to decrease the amount of sodium in your diet. Lowering sodium while following the DASH diet can lower blood pressure even further than just the DASH diet alone. Follow-up care is a key part of your treatment and safety. Be sure to make and go to all appointments, and call your doctor if you are having problems. It's also a good idea to know your test results and keep a list of the medicines you take. How can you care for yourself at home? Following the DASH diet · Eat 4 to 5 servings of fruit each day. A serving is 1 medium-sized piece of fruit, ½ cup chopped or canned fruit, 1/4 cup dried fruit, or 4 ounces (½ cup) of fruit juice. Choose fruit more often than fruit juice. · Eat 4 to 5 servings of vegetables each day. A serving is 1 cup of lettuce or raw leafy vegetables, ½ cup of chopped or cooked vegetables, or 4 ounces (½ cup) of vegetable juice. Choose vegetables more often than vegetable juice. · Get 2 to 3 servings of low-fat and fat-free dairy each day. A serving is 8 ounces of milk, 1 cup of yogurt, or 1 ½ ounces of cheese. · Eat 6 to 8 servings of grains each day. A serving is 1 slice of bread, 1 ounce of dry cereal, or ½ cup of cooked rice, pasta, or cooked cereal. Try to choose whole-grain products as much as possible. · Limit lean meat, poultry, and fish to 2 servings each day. A serving is 3 ounces, about the size of a deck of cards. · Eat 4 to 5 servings of nuts, seeds, and legumes (cooked dried beans, lentils, and split peas) each week. A serving is 1/3 cup of nuts, 2 tablespoons of seeds, or ½ cup of cooked beans or peas. · Limit fats and oils to 2 to 3 servings each day. A serving is 1 teaspoon of vegetable oil or 2 tablespoons of salad dressing. · Limit sweets and added sugars to 5 servings or less a week. A serving is 1 tablespoon jelly or jam, ½ cup sorbet, or 1 cup of lemonade. · Eat less than 2,300 milligrams (mg) of sodium a day. If you limit your sodium to 1,500 mg a day, you can lower your blood pressure even more. Tips for success · Start small. Do not try to make dramatic changes to your diet all at once. You might feel that you are missing out on your favorite foods and then be more likely to not follow the plan. Make small changes, and stick with them. Once those changes become habit, add a few more changes. · Try some of the following: ? Make it a goal to eat a fruit or vegetable at every meal and at snacks. This will make it easy to get the recommended amount of fruits and vegetables each day. ? Try yogurt topped with fruit and nuts for a snack or healthy dessert. ? Add lettuce, tomato, cucumber, and onion to sandwiches. ? Combine a ready-made pizza crust with low-fat mozzarella cheese and lots of vegetable toppings. Try using tomatoes, squash, spinach, broccoli, carrots, cauliflower, and onions. ? Have a variety of cut-up vegetables with a low-fat dip as an appetizer instead of chips and dip. ? Sprinkle sunflower seeds or chopped almonds over salads.  Or try adding chopped walnuts or almonds to cooked vegetables. ? Try some vegetarian meals using beans and peas. Add garbanzo or kidney beans to salads. Make burritos and tacos with mashed day beans or black beans. Where can you learn more? Go to http://viral-zenaida.info/. Enter T172 in the search box to learn more about \"DASH Diet: Care Instructions. \" Current as of: April 9, 2019 Content Version: 12.2 © 5934-0491 [employer]. Care instructions adapted under license by [employer] (which disclaims liability or warranty for this information). If you have questions about a medical condition or this instruction, always ask your healthcare professional. [address] any warranty or liability for your use of this information.

## 2020-01-28 NOTE — PROGRESS NOTES
1. Have you been to the ER, urgent care clinic since your last visit? Hospitalized since your last visit? No    2. Have you seen or consulted any other health care providers outside of the [address] [place] [address] since your last visit? Include any pap smears or colon screening.  Dr. Jann[facility]

## 2020-03-17 ENCOUNTER — HOSPITAL ENCOUNTER (OUTPATIENT)
Dept: LAB | Age: 85
Discharge: HOME | End: 2020-03-17
Payer: MEDICARE

## 2020-03-17 DIAGNOSIS — N18.30 CHRONIC KIDNEY DISEASE, STAGE III (MODERATE) (HCC): ICD-10-CM

## 2020-03-17 LAB
ALBUMIN SERPL-MCNC: 3.7 G/DL (ref 3.4–5)
ANION GAP SERPL CALC-SCNC: 7 MMOL/L (ref 3–18)
BUN SERPL-MCNC: 70 MG/DL (ref 7–18)
BUN/CREAT SERPL: 40 (ref 12–20)
CALCIUM SERPL-MCNC: 9.3 MG/DL (ref 8.5–10.1)
CALCIUM SERPL-MCNC: 9.6 MG/DL (ref 8.5–10.1)
CHLORIDE SERPL-SCNC: 104 MMOL/L (ref 100–111)
CO2 SERPL-SCNC: 31 MMOL/L (ref 21–32)
CREAT SERPL-MCNC: 1.75 MG/DL (ref 0.6–1.3)
CREAT UR-MCNC: 90 MG/DL (ref 30–125)
ERYTHROCYTE [DISTWIDTH] IN BLOOD BY AUTOMATED COUNT: 18.3 % (ref 11.6–14.5)
GLUCOSE SERPL-MCNC: 130 MG/DL (ref 74–99)
HCT VFR BLD AUTO: 35.4 % (ref 35–45)
HGB BLD-MCNC: 11.3 G/DL (ref 12–16)
MCH RBC QN AUTO: 27 PG (ref 24–34)
MCHC RBC AUTO-ENTMCNC: 31.9 G/DL (ref 31–37)
MCV RBC AUTO: 84.5 FL (ref 74–97)
MICROALBUMIN UR-MCNC: 29.8 MG/DL (ref 0–3)
MICROALBUMIN/CREAT UR-RTO: 331 MG/G (ref 0–30)
PHOSPHATE SERPL-MCNC: 3.8 MG/DL (ref 2.5–4.9)
PLATELET # BLD AUTO: 232 K/UL (ref 135–420)
PMV BLD AUTO: 10.5 FL (ref 9.2–11.8)
POTASSIUM SERPL-SCNC: 3.6 MMOL/L (ref 3.5–5.5)
PTH-INTACT SERPL-MCNC: 63 PG/ML (ref 18.4–88)
RBC # BLD AUTO: 4.19 M/UL (ref 4.2–5.3)
SODIUM SERPL-SCNC: 142 MMOL/L (ref 136–145)
WBC # BLD AUTO: 4.9 K/UL (ref 4.6–13.2)

## 2020-03-17 PROCEDURE — 82043 UR ALBUMIN QUANTITATIVE: CPT

## 2020-03-17 PROCEDURE — 36415 COLL VENOUS BLD VENIPUNCTURE: CPT

## 2020-03-17 PROCEDURE — 80069 RENAL FUNCTION PANEL: CPT

## 2020-03-17 PROCEDURE — 85027 COMPLETE CBC AUTOMATED: CPT

## 2020-03-17 PROCEDURE — 83970 ASSAY OF PARATHORMONE: CPT

## 2020-04-22 ENCOUNTER — TELEPHONE (OUTPATIENT)
Dept: FAMILY MEDICINE CLINIC | Age: 85
End: 2020-04-22

## 2020-04-22 NOTE — TELEPHONE ENCOUNTER
Please call pt to see if dr Concha Rudd want her to come in due to she only has a phlip phone. Pt did do her labs. Please call pt at your earliest convenience.

## 2020-04-28 ENCOUNTER — VIRTUAL VISIT (OUTPATIENT)
Dept: FAMILY MEDICINE CLINIC | Age: 85
End: 2020-04-28

## 2020-04-28 DIAGNOSIS — M10.9 GOUT, UNSPECIFIED CAUSE, UNSPECIFIED CHRONICITY, UNSPECIFIED SITE: ICD-10-CM

## 2020-04-28 DIAGNOSIS — E78.2 MIXED HYPERLIPIDEMIA: ICD-10-CM

## 2020-04-28 DIAGNOSIS — E11.9 DIABETES MELLITUS TYPE 2, DIET-CONTROLLED (HCC): ICD-10-CM

## 2020-04-28 DIAGNOSIS — D63.8 ANEMIA OF CHRONIC DISEASE: ICD-10-CM

## 2020-04-28 DIAGNOSIS — R60.0 LEG EDEMA: ICD-10-CM

## 2020-04-28 DIAGNOSIS — I10 ESSENTIAL HYPERTENSION: Primary | Chronic | ICD-10-CM

## 2020-04-28 RX ORDER — COLCHICINE 0.6 MG/1
0.6 TABLET ORAL DAILY
Qty: 90 TAB | Refills: 2 | Status: SHIPPED | OUTPATIENT
Start: 2020-04-28 | End: 2021-02-23

## 2020-04-28 RX ORDER — FUROSEMIDE 20 MG/1
TABLET ORAL
Qty: 90 TAB | Refills: 1 | Status: SHIPPED | OUTPATIENT
Start: 2020-04-28 | End: 2020-07-02 | Stop reason: SDUPTHER

## 2020-04-28 NOTE — PATIENT INSTRUCTIONS
DASH Diet: Care Instructions Your Care Instructions The DASH diet is an eating plan that can help lower your blood pressure. DASH stands for Dietary Approaches to Stop Hypertension. Hypertension is high blood pressure. The DASH diet focuses on eating foods that are high in calcium, potassium, and magnesium. These nutrients can lower blood pressure. The foods that are highest in these nutrients are fruits, vegetables, low-fat dairy products, nuts, seeds, and legumes. But taking calcium, potassium, and magnesium supplements instead of eating foods that are high in those nutrients does not have the same effect. The DASH diet also includes whole grains, fish, and poultry. The DASH diet is one of several lifestyle changes your doctor may recommend to lower your high blood pressure. Your doctor may also want you to decrease the amount of sodium in your diet. Lowering sodium while following the DASH diet can lower blood pressure even further than just the DASH diet alone. Follow-up care is a key part of your treatment and safety. Be sure to make and go to all appointments, and call your doctor if you are having problems. It's also a good idea to know your test results and keep a list of the medicines you take. How can you care for yourself at home? Following the DASH diet · Eat 4 to 5 servings of fruit each day. A serving is 1 medium-sized piece of fruit, ½ cup chopped or canned fruit, 1/4 cup dried fruit, or 4 ounces (½ cup) of fruit juice. Choose fruit more often than fruit juice. · Eat 4 to 5 servings of vegetables each day. A serving is 1 cup of lettuce or raw leafy vegetables, ½ cup of chopped or cooked vegetables, or 4 ounces (½ cup) of vegetable juice. Choose vegetables more often than vegetable juice. · Get 2 to 3 servings of low-fat and fat-free dairy each day. A serving is 8 ounces of milk, 1 cup of yogurt, or 1 ½ ounces of cheese. · Eat 6 to 8 servings of grains each day. A serving is 1 slice of bread, 1 ounce of dry cereal, or ½ cup of cooked rice, pasta, or cooked cereal. Try to choose whole-grain products as much as possible. · Limit lean meat, poultry, and fish to 2 servings each day. A serving is 3 ounces, about the size of a deck of cards. · Eat 4 to 5 servings of nuts, seeds, and legumes (cooked dried beans, lentils, and split peas) each week. A serving is 1/3 cup of nuts, 2 tablespoons of seeds, or ½ cup of cooked beans or peas. · Limit fats and oils to 2 to 3 servings each day. A serving is 1 teaspoon of vegetable oil or 2 tablespoons of salad dressing. · Limit sweets and added sugars to 5 servings or less a week. A serving is 1 tablespoon jelly or jam, ½ cup sorbet, or 1 cup of lemonade. · Eat less than 2,300 milligrams (mg) of sodium a day. If you limit your sodium to 1,500 mg a day, you can lower your blood pressure even more. Tips for success · Start small. Do not try to make dramatic changes to your diet all at once. You might feel that you are missing out on your favorite foods and then be more likely to not follow the plan. Make small changes, and stick with them. Once those changes become habit, add a few more changes. · Try some of the following: ? Make it a goal to eat a fruit or vegetable at every meal and at snacks. This will make it easy to get the recommended amount of fruits and vegetables each day. ? Try yogurt topped with fruit and nuts for a snack or healthy dessert. ? Add lettuce, tomato, cucumber, and onion to sandwiches. ? Combine a ready-made pizza crust with low-fat mozzarella cheese and lots of vegetable toppings. Try using tomatoes, squash, spinach, broccoli, carrots, cauliflower, and onions. ? Have a variety of cut-up vegetables with a low-fat dip as an appetizer instead of chips and dip. ? Sprinkle sunflower seeds or chopped almonds over salads.  Or try adding chopped walnuts or almonds to cooked vegetables. ? Try some vegetarian meals using beans and peas. Add garbanzo or kidney beans to salads. Make burritos and tacos with mashed day beans or black beans. Where can you learn more? Go to http://viral-zenaida.info/ Enter X352 in the search box to learn more about \"DASH Diet: Care Instructions. \" Current as of: December 15, 2019Content Version: 12.4 © 8939-2477 [employer]. Care instructions adapted under license by [employer] (which disclaims liability or warranty for this information). If you have questions about a medical condition or this instruction, always ask your healthcare professional. [address] any warranty or liability for your use of this information.

## 2020-04-28 NOTE — PROGRESS NOTES
Erin Colbert is a 80 y.o. female evaluated via telephone on 4/28/2020. Consent:  She and/or health care decision maker is aware that she may receive a bill for this telephone service, depending on her insurance coverage, and has provided verbal consent to proceed: Yes      Documentation:  I communicated with the patient and/or health care decision maker about routine ff-up, chronic illness  No new concerns    Ff-up      HTN/CKD 3-  She continues to take clonidine, hydralazine and norvasc. She is following dr. Zack Gonzales, reviewed note CKD creatinine 1.75. who recommended to continue to be off lisinopril. She is on prn lasix few times a week for leg edema. She has h/o AV block and advised against BB.      DM w/ CKD 3- continues to be diet controlled, She reports FBG low 100's. reviewed labs    HL- on zocor     Aortic regurtiation /bradycardia- evaluated by cardiology Dr. Kashif Thompson,  She is asymptomatic and recommend recheck in about 2-3 years.      Gout- no recent flares. usually foot swelling and pain, related to seafood. Says taking colchicine daily.      ROS:  See HPI, all others negative                Patient Active Problem List   Diagnosis Code    Essential hypertension I10    Mixed hyperlipidemia E78.2    Advanced directives, counseling/discussion Z71.89    Controlled type 2 diabetes mellitus with stage 3 chronic kidney disease, without long-term current use of insulin (HCC) E11.22, N18.3            Current Outpatient Medications:     furosemide (LASIX) 20 mg tablet, Take 1 tablet once daily as needed for edema, Disp: 90 Tab, Rfl: 1    colchicine (COLCRYS) 0.6 mg tablet, Take 1 Tab by mouth daily. Indications: a type of joint disorder due to excess uric acid in the blood called gout, Disp: 90 Tab, Rfl: 2    hydrALAZINE (APRESOLINE) 100 mg tablet, Take 0.5 Tabs by mouth three (3) times daily. , Disp: 270 Tab, Rfl: 2    latanoprost (XALATAN) 0.005 % ophthalmic solution, , Disp: , Rfl:    cloNIDine HCl (CATAPRES) 0.2 mg tablet, Take 0.2 mg by mouth two (2) times a day., Disp: , Rfl:     amLODIPine (NORVASC) 10 mg tablet, TAKE 1 TABLET BY MOUTH ONCE DAILY, Disp: 90 Tab, Rfl: 2    simvastatin (ZOCOR) 40 mg tablet, TAKE 1 TABLET BY MOUTH NIGHTLY, Disp: 90 Tab, Rfl: 2    glucose blood VI test strips (PRODIGY NO CODING) strip, Check fasting glucose once daily, Disp: 100 Strip, Rfl: 3    Blood-Glucose Meter (PRODIGY AUTOCODE MONITOR SYST) misc, Check fasting glucose once daily, Disp: 1 Each, Rfl: 0    Aspirin, Buffered 81 mg tab, Take 1 tablet by mouth daily. , Disp: , Rfl:     fish oil-dha-epa 1,200-144-216 mg cap, Take 1 tablet by mouth daily. , Disp: , Rfl:     OTHER, BMP on 9/6/19 morning Call report to PCP Reason: CKD, Disp: 1 Each, Rfl: 0    cetirizine (ZYRTEC) 10 mg tablet, Take 1 Tab by mouth daily. , Disp: 90 Tab, Rfl: 0    cholecalciferol, VITAMIN D3, (VITAMIN D3) 5,000 unit tab tablet, Take  by mouth daily. , Disp: , Rfl:      No Known Allergies          Past Medical History:   Diagnosis Date    Anemia      Carotid bruit 12/07/2013     Carotid Duplex: 1. Bilateral 1-49% stenosis of the internal carotid arteries. 2. No significant stenosis in the external carotid arteries bilaterally. 3. Antegrade flow in both vetebral arteries. 4. Normal flow in both subclavian arteries. Plaque Morphology: 1. Hyperechoic plaque in the bulb and right ICA. 2. Hyperechoic plaque in the bulb and left ICA.  CKD (chronic kidney disease) stage 3, GFR 30-59 ml/min      Diabetes ([facility] [address]       NIDDM    Gastritis 10/27/2014     Duodenum Bx: No Specific Pathologic Abnormality. No Villous Abnormality. 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 No dysplasia or malignancy identified. Bacterial Organisms c/w H. Pylori are present. Z-Line Bx: GE mucosa w/ Acute/Chronic Inflammation & Reactive Changes. Focal Specialized Type Campos Mucosa Identified.  No Dysplasia or Malignancy Identified.      Gout 12/10/2009     Elevated Uric Acid Level    Hyperlipidemia      Hypertension      RAMÓN (iron deficiency anemia)           Social History            Social History    Marital status:        Spouse name: N/A    Number of children: N/A    Years of education: N/A          Occupational History    Not on file.            Social History Main Topics    Smoking status: Never Smoker    Smokeless tobacco: Never Used    Alcohol use No    Drug use: No    Sexual activity: Not Currently       Partners: Male       Birth control/ protection: None           Other Topics Concern    Not on file      Social History Narrative               Family History   Problem Relation Age of Onset    Hypertension Mother      Stroke Mother      Stroke Father             General: alert, well-appearing, AA,  in no apparent distress or pain  Neck: supple, no adenopathy palpated  CVS: normal rate,  regular rhythm, + murmur  Lungs:clear to ausculation bilaterally, no crackles, wheezing or rhonchi noted  Skin: warm, no lesions, rashes noted  Psych:  mood and affect normal     ASSESSMENT/PLAN  Diagnoses and all orders for this visit:   Details of this discussion including any medical advice provided:    Controlled type 2 diabetes mellitus with stage 3 chronic kidney disease, without long-term current use of insulin ([address]   diet controlled  September 2019 admission for JOHNNY- this has improved, she has seen dr. Steph Gustafson recommending to continue to be off lisinopril and will monitor proteinuria  a1c 6.6  Check CMP, a1c, lipid panel, microalbumin, uric acid in 3 months prior to next visit      Diabetes mellitus type 2, diet controlled ([address]     Essential hypertension  Controlled  Cont   norvasc, clonidine, hydralazine  Cont  low dose lasix/kcl prn for leg edema  Avoid bb with h/o 1st deg av block  Avoid Ace-I with ckd     Leg edema  Advised compression stockings   Low salt diet,   Prn lasix/kcl      Mixed hyperlipidemia  Good range LDL <100, on zocor      Gout of foot, unspecified cause, unspecified chronicity, unspecified laterality  On daily colchicine   check uric acid level prior to next visit    Anemia of chronic disease  Stable, Baseline 10's  Monitoring   recheck cbc prior to next visit    Heart murmur  Aortic regurg  Asymptomatic  Monitoring, following cards recommending recheck in 2-3 years    CKD 3  Avoid nsaids, keep hydrated  monitoring  Following dr. Rita zaman        Follow-up Disposition:  Ff-up in 3 months or sooner prn         I affirm this is a Patient Initiated Episode with an Established Patient who has not had a related appointment within my department in the past 7 days or scheduled within the next 24 hours.     Total Time: minutes: 11-20 minutes    Note: not billable if this call serves to triage the patient into an appointment for the relevant concern      Andreia Elias MD

## 2020-05-18 DIAGNOSIS — E78.2 MIXED HYPERLIPIDEMIA: Chronic | ICD-10-CM

## 2020-05-18 RX ORDER — SIMVASTATIN 40 MG/1
TABLET, FILM COATED ORAL
Qty: 90 TAB | Refills: 0 | Status: SHIPPED | OUTPATIENT
Start: 2020-05-18 | End: 2020-07-02 | Stop reason: SDUPTHER

## 2020-06-15 DIAGNOSIS — I10 ESSENTIAL HYPERTENSION: Chronic | ICD-10-CM

## 2020-06-15 RX ORDER — AMLODIPINE BESYLATE 10 MG/1
TABLET ORAL
Qty: 90 TAB | Refills: 0 | Status: SHIPPED | OUTPATIENT
Start: 2020-06-15 | End: 2020-07-02 | Stop reason: SDUPTHER

## 2020-07-01 ENCOUNTER — TELEPHONE (OUTPATIENT)
Dept: FAMILY MEDICINE CLINIC | Age: 85
End: 2020-07-01

## 2020-07-01 NOTE — TELEPHONE ENCOUNTER
Have you been diagnosed with, tested for, or told that you are suspected of having COVID-19 (coronovirus)? No  Have you had a fever or taken medication to treat a fever in the past 72 hours? No   Have you had a cough, SOB, or flu-like symptoms within the past 3 days? No   Have you had direct contact with someone who tested positive for COVID-19 within the past 14 days? No   Do you have a household member with flu-like symptoms, including fever, cough, or SOB? No   Do you currently have flu-like symptoms including fever, cough, or SOB?  No

## 2020-07-02 ENCOUNTER — OFFICE VISIT (OUTPATIENT)
Dept: FAMILY MEDICINE CLINIC | Age: 85
End: 2020-07-02

## 2020-07-02 ENCOUNTER — HOSPITAL ENCOUNTER (OUTPATIENT)
Dept: LAB | Age: 85
Discharge: HOME | End: 2020-07-02
Payer: MEDICARE

## 2020-07-02 VITALS
RESPIRATION RATE: 16 BRPM | TEMPERATURE: 97.4 F | BODY MASS INDEX: 28.02 KG/M2 | HEART RATE: 54 BPM | DIASTOLIC BLOOD PRESSURE: 82 MMHG | HEIGHT: 59 IN | SYSTOLIC BLOOD PRESSURE: 124 MMHG | WEIGHT: 139 LBS | OXYGEN SATURATION: 98 %

## 2020-07-02 DIAGNOSIS — Z71.89 ADVANCE CARE PLANNING: ICD-10-CM

## 2020-07-02 DIAGNOSIS — D63.8 ANEMIA OF CHRONIC DISEASE: ICD-10-CM

## 2020-07-02 DIAGNOSIS — M10.9 GOUT, UNSPECIFIED CAUSE, UNSPECIFIED CHRONICITY, UNSPECIFIED SITE: ICD-10-CM

## 2020-07-02 DIAGNOSIS — R63.4 WEIGHT LOSS: ICD-10-CM

## 2020-07-02 DIAGNOSIS — E11.9 DIABETES MELLITUS TYPE 2, DIET-CONTROLLED (HCC): ICD-10-CM

## 2020-07-02 DIAGNOSIS — R01.1 HEART MURMUR: ICD-10-CM

## 2020-07-02 DIAGNOSIS — E78.2 MIXED HYPERLIPIDEMIA: Chronic | ICD-10-CM

## 2020-07-02 DIAGNOSIS — E11.22 TYPE 2 DIABETES MELLITUS WITH CHRONIC KIDNEY DISEASE, WITHOUT LONG-TERM CURRENT USE OF INSULIN, UNSPECIFIED CKD STAGE (HCC): Primary | ICD-10-CM

## 2020-07-02 DIAGNOSIS — Z00.00 MEDICARE ANNUAL WELLNESS VISIT, SUBSEQUENT: ICD-10-CM

## 2020-07-02 DIAGNOSIS — N18.30 CKD (CHRONIC KIDNEY DISEASE) STAGE 3, GFR 30-59 ML/MIN (HCC): ICD-10-CM

## 2020-07-02 DIAGNOSIS — I10 ESSENTIAL HYPERTENSION: Chronic | ICD-10-CM

## 2020-07-02 DIAGNOSIS — R60.0 LEG EDEMA: ICD-10-CM

## 2020-07-02 LAB
ALBUMIN SERPL-MCNC: 3.9 G/DL (ref 3.4–5)
ALBUMIN/GLOB SERPL: 1 {RATIO} (ref 0.8–1.7)
ALP SERPL-CCNC: 85 U/L (ref 45–117)
ALT SERPL-CCNC: 83 U/L (ref 13–56)
ANION GAP SERPL CALC-SCNC: 8 MMOL/L (ref 3–18)
AST SERPL-CCNC: 216 U/L (ref 10–38)
BASOPHILS # BLD: 0 K/UL (ref 0–0.1)
BASOPHILS NFR BLD: 0 % (ref 0–2)
BILIRUB SERPL-MCNC: 0.6 MG/DL (ref 0.2–1)
BUN SERPL-MCNC: 63 MG/DL (ref 7–18)
BUN/CREAT SERPL: 33 (ref 12–20)
CALCIUM SERPL-MCNC: 9.2 MG/DL (ref 8.5–10.1)
CHLORIDE SERPL-SCNC: 103 MMOL/L (ref 100–111)
CHOLEST SERPL-MCNC: 98 MG/DL
CO2 SERPL-SCNC: 29 MMOL/L (ref 21–32)
CREAT SERPL-MCNC: 1.91 MG/DL (ref 0.6–1.3)
CREAT UR-MCNC: 88 MG/DL (ref 30–125)
DIFFERENTIAL METHOD BLD: ABNORMAL
EOSINOPHIL # BLD: 0 K/UL (ref 0–0.4)
EOSINOPHIL NFR BLD: 1 % (ref 0–5)
ERYTHROCYTE [DISTWIDTH] IN BLOOD BY AUTOMATED COUNT: 18.1 % (ref 11.6–14.5)
GLOBULIN SER CALC-MCNC: 4 G/DL (ref 2–4)
GLUCOSE SERPL-MCNC: 115 MG/DL (ref 74–99)
HBA1C MFR BLD: 7.5 % (ref 4.2–5.6)
HCT VFR BLD AUTO: 36.1 % (ref 35–45)
HDLC SERPL-MCNC: 42 MG/DL (ref 40–60)
HDLC SERPL: 2.3 {RATIO} (ref 0–5)
HGB BLD-MCNC: 12 G/DL (ref 12–16)
LDLC SERPL CALC-MCNC: 29.4 MG/DL (ref 0–100)
LIPID PROFILE,FLP: NORMAL
LYMPHOCYTES # BLD: 1.4 K/UL (ref 0.9–3.6)
LYMPHOCYTES NFR BLD: 29 % (ref 21–52)
MCH RBC QN AUTO: 28.4 PG (ref 24–34)
MCHC RBC AUTO-ENTMCNC: 33.2 G/DL (ref 31–37)
MCV RBC AUTO: 85.3 FL (ref 74–97)
MICROALBUMIN UR-MCNC: 26.9 MG/DL (ref 0–3)
MICROALBUMIN/CREAT UR-RTO: 306 MG/G (ref 0–30)
MONOCYTES # BLD: 0.5 K/UL (ref 0.05–1.2)
MONOCYTES NFR BLD: 9 % (ref 3–10)
NEUTS SEG # BLD: 2.9 K/UL (ref 1.8–8)
NEUTS SEG NFR BLD: 61 % (ref 40–73)
PLATELET # BLD AUTO: 248 K/UL (ref 135–420)
PMV BLD AUTO: 11.4 FL (ref 9.2–11.8)
POTASSIUM SERPL-SCNC: 3.3 MMOL/L (ref 3.5–5.5)
PROT SERPL-MCNC: 7.9 G/DL (ref 6.4–8.2)
RBC # BLD AUTO: 4.23 M/UL (ref 4.2–5.3)
SODIUM SERPL-SCNC: 140 MMOL/L (ref 136–145)
TRIGL SERPL-MCNC: 133 MG/DL (ref ?–150)
TSH SERPL DL<=0.05 MIU/L-ACNC: 2.98 UIU/ML (ref 0.36–3.74)
URATE SERPL-MCNC: 11.8 MG/DL (ref 2.6–7.2)
VLDLC SERPL CALC-MCNC: 26.6 MG/DL
WBC # BLD AUTO: 4.9 K/UL (ref 4.6–13.2)

## 2020-07-02 PROCEDURE — 82043 UR ALBUMIN QUANTITATIVE: CPT

## 2020-07-02 PROCEDURE — 83036 HEMOGLOBIN GLYCOSYLATED A1C: CPT

## 2020-07-02 PROCEDURE — 80061 LIPID PANEL: CPT

## 2020-07-02 PROCEDURE — 36415 COLL VENOUS BLD VENIPUNCTURE: CPT

## 2020-07-02 PROCEDURE — 84550 ASSAY OF BLOOD/URIC ACID: CPT

## 2020-07-02 PROCEDURE — 80053 COMPREHEN METABOLIC PANEL: CPT

## 2020-07-02 PROCEDURE — 84443 ASSAY THYROID STIM HORMONE: CPT

## 2020-07-02 PROCEDURE — 85025 COMPLETE CBC W/AUTO DIFF WBC: CPT

## 2020-07-02 RX ORDER — SIMVASTATIN 40 MG/1
TABLET, FILM COATED ORAL
Qty: 90 TAB | Refills: 0 | Status: SHIPPED | OUTPATIENT
Start: 2020-07-02 | End: 2020-09-24 | Stop reason: SDUPTHER

## 2020-07-02 RX ORDER — FUROSEMIDE 20 MG/1
TABLET ORAL
Qty: 90 TAB | Refills: 1 | Status: SHIPPED | OUTPATIENT
Start: 2020-07-02 | End: 2020-08-26

## 2020-07-02 RX ORDER — AMLODIPINE BESYLATE 10 MG/1
TABLET ORAL
Qty: 90 TAB | Refills: 1 | Status: SHIPPED | OUTPATIENT
Start: 2020-07-02 | End: 2021-04-15 | Stop reason: SDUPTHER

## 2020-07-02 NOTE — PATIENT INSTRUCTIONS
Medicare Wellness Visit, Female The best way to live healthy is to have a lifestyle where you eat a well-balanced diet, exercise regularly, limit alcohol use, and quit all forms of tobacco/nicotine, if applicable. Regular preventive services are another way to keep healthy. Preventive services (vaccines, screening tests, monitoring & exams) can help personalize your care plan, which helps you manage your own care. Screening tests can find health problems at the earliest stages, when they are easiest to treat. [facility] follows the current, evidence-based guidelines published by the [facility] (RUSTSTF) when recommending preventive services for our patients. Because we follow these guidelines, sometimes recommendations change over time as research supports it. (For example, mammograms used to be recommended annually. Even though Medicare will still pay for an annual mammogram, the newer guidelines recommend a mammogram every two years for women of average risk). Of course, you and your doctor may decide to screen more often for some diseases, based on your risk and your co-morbidities (chronic disease you are already diagnosed with). Preventive services for you include: - Medicare offers their members a free annual wellness visit, which is time for you and your primary care provider to discuss and plan for your preventive service needs. Take advantage of this benefit every year! 
-All adults over the age of 72 should receive the recommended pneumonia vaccines. Current USPSTF guidelines recommend a series of two vaccines for the best pneumonia protection.  
-All adults should have a flu vaccine yearly and a tetanus vaccine every 10 years.  
-All adults age 48 and older should receive the shingles vaccines (series of two vaccines). -All adults age 38-68 who are overweight should have a diabetes screening test once every three years. -All adults born between [birth year] and 1965 should be screened once for Hepatitis C. 
-Other screening tests and preventive services for persons with diabetes include: an eye exam to screen for diabetic retinopathy, a kidney function test, a foot exam, and stricter control over your cholesterol.  
-Cardiovascular screening for adults with routine risk involves an electrocardiogram (ECG) at intervals determined by your doctor.  
-Colorectal cancer screenings should be done for adults age 54-65 with no increased risk factors for colorectal cancer. There are a number of acceptable methods of screening for this type of cancer. Each test has its own benefits and drawbacks. Discuss with your doctor what is most appropriate for you during your annual wellness visit. The different tests include: colonoscopy (considered the best screening method), a fecal occult blood test, a fecal DNA test, and sigmoidoscopy. 
 
-A bone mass density test is recommended when a woman turns 65 to screen for osteoporosis. This test is only recommended one time, as a screening. Some providers will use this same test as a disease monitoring tool if you already have osteoporosis. -Breast cancer screenings are recommended every other year for women of normal risk, age 54-69. 
-Cervical cancer screenings for women over age 72 are only recommended with certain risk factors. Here is a list of your current Health Maintenance items (your personalized list of preventive services) with a due date: 
Health Maintenance Due Topic Date Due  
 DTaP/Tdap/Td  (1 - Tdap) [birth year]/1954  Shingles Vaccine (1 of 2) 08/27/1983  Cholesterol Test   02/25/2020 Conor Dao Annual Well Visit  07/25/2020  Diabetic Foot Care  07/25/2020

## 2020-07-02 NOTE — PROGRESS NOTES
This is the Subsequent Medicare Annual Wellness Exam, performed 12 months or more after the Initial AWV or the last Subsequent AWV    I have reviewed the patient's medical history in detail and updated the computerized patient record. History     Patient Active Problem List   Diagnosis Code    Essential hypertension I10    Mixed hyperlipidemia E78.2    Advanced directives, counseling/discussion Z71.89    Diabetes mellitus type 2, diet-controlled ([facility] E11.9    Advance care planning Z71.89    Heart murmur R01.1    Gout M10.9    CKD (chronic kidney disease) stage 3, GFR 30-59 ml/min ([facility]) N18.3    Anemia of chronic disease D63.8    Leg edema R60.0    Nausea R11.0    Elevated troponin R79.89    Chronic renal failure, stage 3 (moderate) ([facility]) L54.0    Diastolic CHF, chronic ([facility]) I50.32    Sinus bradycardia R00.1    CKD (chronic kidney disease) stage 4, GFR 15-29 ml/min ([facility]) N18.4    Type 2 diabetes mellitus with chronic kidney disease, without long-term current use of insulin ([facility]) E11.22     Past Medical History:   Diagnosis Date    Anemia     Carotid bruit 12/07/2013    Carotid Duplex: 1. Bilateral 1-49% stenosis of the internal carotid arteries. 2. No significant stenosis in the external carotid arteries bilaterally. 3. Antegrade flow in both vetebral arteries. 4. Normal flow in both subclavian arteries. Plaque Morphology: 1. Hyperechoic plaque in the bulb and right ICA. 2. Hyperechoic plaque in the bulb and left ICA.  CKD (chronic kidney disease) stage 3, GFR 30-59 ml/min ([facility])     Diabetes ([facility]     NIDDM    Gastritis 10/27/2014    Duodenum Bx: No Specific Pathologic Abnormality. No Villous Abnormality. 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 No dysplasia or malignancy identified. Bacterial Organisms c/w H. Pylori are present. Z-Line Bx: GE mucosa w/ Acute/Chronic Inflammation & Reactive Changes.  Focal Specialized Type Campos Mucosa Identified. No Dysplasia or Malignancy Identified.  Gout 12/10/2009    Elevated Uric Acid Level    Hyperlipidemia     Hypertension     RAMÓN (iron deficiency anemia)       Past Surgical History:   Procedure Laterality Date    HX COLONOSCOPY  10/27/2014    Dr. Davis Varma HX ENDOSCOPY  10/27/2014    Dr. Janna Wilhelm      Current Outpatient Medications   Medication Sig Dispense Refill    amLODIPine (NORVASC) 10 mg tablet Take 1 tablet by mouth once daily 90 Tab 0    simvastatin (ZOCOR) 40 mg tablet Take 1 tablet by mouth nightly 90 Tab 0    furosemide (LASIX) 20 mg tablet Take 1 tablet once daily as needed for edema 90 Tab 1    colchicine (Colcrys) 0.6 mg tablet Take 1 Tab by mouth daily. Indications: a type of joint disorder due to excess uric acid in the blood called gout 90 Tab 2    cloNIDine HCL (CATAPRES) 0.2 mg tablet Take 1 tablet by mouth twice daily 180 Tab 1    hydrALAZINE (APRESOLINE) 100 mg tablet TAKE 1 TABLET BY MOUTH THREE TIMES DAILY 270 Tab 3    latanoprost (XALATAN) 0.005 % ophthalmic solution       OTHER BMP on 9/6/19 morning  Call report to PCP  Reason: CKD 1 Each 0    cetirizine (ZYRTEC) 10 mg tablet Take 1 Tab by mouth daily. 90 Tab 0    glucose blood VI test strips (PRODIGY NO CODING) strip Check fasting glucose once daily 100 Strip 3    Blood-Glucose Meter (PRODIGY AUTOCODE MONITOR SYST) misc Check fasting glucose once daily 1 Each 0    cholecalciferol, VITAMIN D3, (VITAMIN D3) 5,000 unit tab tablet Take  by mouth daily.  Aspirin, Buffered 81 mg tab Take 1 tablet by mouth daily.  fish oil-dha-epa 1,200-144-216 mg cap Take 1 tablet by mouth daily.        No Known Allergies    Family History   Problem Relation Age of Onset    Hypertension Mother     Stroke Mother     Stroke Father      Social History     Tobacco Use    Smoking status: Never Smoker    Smokeless tobacco: Never Used   Substance Use Topics    Alcohol use: No       Depression Risk Factor Screening:     3 most recent PHQ Screens 7/2/2020   Little interest or pleasure in doing things Not at all   Feeling down, depressed, irritable, or hopeless Not at all   Total Score PHQ 2 0   Trouble falling or staying asleep, or sleeping too much -   Poor appetite, weight loss, or overeating -   Feeling bad about yourself - or that you are a failure or have let yourself or your family down -   Trouble concentrating on things such as school, work, reading, or watching TV -   Moving or speaking so slowly that other people could have noticed; or the opposite being so fidgety that others notice -   Thoughts of being better off dead, or hurting yourself in some way -       Alcohol Risk Factor Screening:   Do you average 1 drink per night or more than 7 drinks a week:  No    On any one occasion in the past three months have you have had more than 3 drinks containing alcohol:  No      Functional Ability and Level of Safety:   Hearing: Hearing is good. Activities of Daily Living: The home contains: no safety equipment. Patient does total self care     Ambulation: with no difficulty     Fall Risk:  Fall Risk Assessment, last 12 mths 7/2/2020   Able to walk? Yes   Fall in past 12 months?  No     Abuse Screen:  Patient is not abused       Cognitive Screening   Has your family/caregiver stated any concerns about your memory: no         Patient Care Team   Patient Care Team:  Giselle Hubbard MD as PCP - General (Family Practice)  Giselle Hubbard MD as PCP - [facility] Empaneled Provider  Farhana Núñez MD (Ophthalmology)  Gustavo Barcenas MD (Nephrology)    Assessment/Plan   Education and counseling provided:  Are appropriate based on today's review and evaluation  End-of-Life planning (with patient's consent)- discussed, reviewed existing ACD  Pneumococcal Vaccine- 13/23 completed  Influenza Vaccine- annually  Bone mass measurement (DEXA) 2018 normal, recheck in 3-5 years  Screening for glaucoma- 10/2018    Diagnoses and all orders for this visit:    1. Medicare annual wellness visit, subsequent        Health Maintenance Due   Topic Date Due    DTaP/Tdap/Td series (1 - Tdap) 08/27/1954    Shingrix Vaccine Age 50> (1 of 2) 08/27/1983    Lipid Screen  02/25/2020    Medicare Yearly Exam  07/25/2020    Foot Exam Q1  07/25/2020       Marcelle Beverly, 80 y.o.,  female    SUBJECTIVE  Ff-up    No new concerns    Noted weight loss since last visit, she denies any new symptoms. She does mention some reduction of appetite, less active. Denies melena, dysphagia, pain. Denies depression symptoms. HTN/CKD 3-  She continues to take clonidine, hydralazine and norvasc. Previous JOHNNY is resolving. She is following dr. Ernesto Lance who recommended to continue to be off lisinopril and will monitor for proteinuria. She is on prn lasix few times a week for leg edema. She has h/o AV block and advised against BB. Following dr. Thu Brown nephrology    DM w/ CKD 3-  diet controlled, She Reports [facility]  1teens. She forgot to have labs drawn prior to this visit, reminded/reprinted she intends to do today    HL- on zocor    Aortic regurtiation /bradycardia- evaluated by cardiology Dr. 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 Gout- usually foot swelling and pain, related to seafood. Says taking colchicine daily.      ROS:  See HPI, all others negative        Patient Active Problem List   Diagnosis Code    Essential hypertension I10    Mixed hyperlipidemia E78.2    Advanced directives, counseling/discussion Z71.89    Controlled type 2 diabetes mellitus with stage 3 chronic kidney disease, without long-term current use of insulin ([facility]) E11.22, N18.3         Current Outpatient Medications:     amLODIPine (NORVASC) 10 mg tablet, Take 1 tablet by mouth once daily, Disp: 90 Tab, Rfl: 1    furosemide (LASIX) 20 mg tablet, Take 1 tablet once daily as needed for edema, Disp: 90 Tab, Rfl: 1    simvastatin (ZOCOR) 40 mg tablet, Take 1 tablet by mouth nightly, Disp: 90 Tab, Rfl: 0    colchicine (Colcrys) 0.6 mg tablet, Take 1 Tab by mouth daily. Indications: a type of joint disorder due to excess uric acid in the blood called gout, Disp: 90 Tab, Rfl: 2    cloNIDine HCL (CATAPRES) 0.2 mg tablet, Take 1 tablet by mouth twice daily, Disp: 180 Tab, Rfl: 1    hydrALAZINE (APRESOLINE) 100 mg tablet, TAKE 1 TABLET BY MOUTH THREE TIMES DAILY, Disp: 270 Tab, Rfl: 3    latanoprost (XALATAN) 0.005 % ophthalmic solution, , Disp: , Rfl:     glucose blood VI test strips (PRODIGY NO CODING) strip, Check fasting glucose once daily, Disp: 100 Strip, Rfl: 3    Blood-Glucose Meter (PRODIGY AUTOCODE MONITOR SYST) misc, Check fasting glucose once daily, Disp: 1 Each, Rfl: 0    cholecalciferol, VITAMIN D3, (VITAMIN D3) 5,000 unit tab tablet, Take  by mouth daily. , Disp: , Rfl:     Aspirin, Buffered 81 mg tab, Take 1 tablet by mouth daily. , Disp: , Rfl:     fish oil-dha-epa 1,200-144-216 mg cap, Take 1 tablet by mouth daily. , Disp: , Rfl:     OTHER, BMP on 9/6/19 morning Call report to PCP Reason: CKD, Disp: 1 Each, Rfl: 0    No Known Allergies    Past Medical History:   Diagnosis Date    Anemia     Carotid bruit 12/07/2013    Carotid Duplex: 1. Bilateral 1-49% stenosis of the internal carotid arteries. 2. No significant stenosis in the external carotid arteries bilaterally. 3. Antegrade flow in both vetebral arteries. 4. Normal flow in both subclavian arteries. Plaque Morphology: 1. Hyperechoic plaque in the bulb and right ICA. 2. Hyperechoic plaque in the bulb and left ICA.  CKD (chronic kidney disease) stage 3, GFR 30-59 ml/min     Diabetes (HCC)     NIDDM    Gastritis 10/27/2014    Duodenum Bx: No Specific Pathologic Abnormality. No Villous Abnormality. 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 No dysplasia or malignancy identified.  Bacterial Organisms c/w H. Pylori are present. Z-Line Bx: GE mucosa w/ Acute/Chronic Inflammation & Reactive Changes. Focal Specialized Type Campos Mucosa Identified. No Dysplasia or Malignancy Identified.  Gout 12/10/2009    Elevated Uric Acid Level    Hyperlipidemia     Hypertension     RAMÓN (iron deficiency anemia)        Social History     Social History    Marital status:      Spouse name: N/A    Number of children: N/A    Years of education: N/A     Occupational History    Not on file.      Social History Main Topics    Smoking status: Never Smoker    Smokeless tobacco: Never Used    Alcohol use No    Drug use: No    Sexual activity: Not Currently     Partners: Male     Birth control/ protection: None     Other Topics Concern    Not on file     Social History Narrative       Family History   Problem Relation Age of Onset    Hypertension Mother     Stroke Mother     Stroke Father          OBJECTIVE    Physical Exam:     Visit Vitals  /82 (BP 1 Location: Left arm, BP Patient Position: Sitting)   Pulse (!) 54   Temp 97.4 °F (36.3 °C) (Oral)   Resp 16   Ht 4' 11\" (1.499 m)   Wt 139 lb (63 kg)   SpO2 98%   BMI 28.07 kg/m²         General: alert, well-appearing, AA,  in no apparent distress or pain  Neck: supple, no adenopathy palpated  CVS: normal rate,  regular rhythm, + murmur  Lungs:clear to ausculation bilaterally, no crackles, wheezing or rhonchi noted  Skin: warm, no lesions, rashes noted  Psych:  mood and affect normal    ASSESSMENT/PLAN  Diagnoses and all orders for this visit:    Controlled type 2 diabetes mellitus with stage 3 chronic kidney disease, without long-term current use of insulin ([facility])   diet controlled  September 2019 admission for JOHNNY- this has improved, she has seen dr. Kyra Sewell recommending to continue to be off lisinopril and will monitor proteinuria  a1c 6.6  Check CMP, a1c, lipid panel, microalbumin  soon    Diabetes mellitus type 2, diet controlled ([address]    Essential hypertension  Controlled  Cont   norvasc, clonidine, hydralazine  Cont  low dose lasix/kcl prn for leg edema  Avoid bb with h/o 1st deg av block    Leg edema  Advised compression stockings   Low salt diet,   Prn lasix/kcl     Mixed hyperlipidemia  Good range LDL <100, on zocor     Gout of foot, unspecified cause, unspecified chronicity, unspecified laterality  Controlled  On daily colchicine    Anemia of chronic disease  Stable, Baseline 10's  Monitoring  BMI 27  Encourage wt loss    Heart murmur  Aortic regurg  Asymptomatic  Monitoring, following cards 10/2019 recommending recheck echo in 2-3 years    CKD 3  Avoid nsaids, keep hydrated  monitoring  Following dr. Kassidy zaman    Weight loss  No other symptoms  Will check labs, add TSH test  increase protein/caloric intake  Monitoring     Follow-up Disposition:  Ff-up in 1 months or sooner prn, for weight check    Patient understands plan of care. Patient has provided input and agrees with goals.

## 2020-07-07 DIAGNOSIS — R79.89 ELEVATED LFTS: Primary | ICD-10-CM

## 2020-07-07 DIAGNOSIS — R63.4 UNINTENTIONAL WEIGHT LOSS: ICD-10-CM

## 2020-07-07 NOTE — PROGRESS NOTES
Kidney function is declining and uric acid is elevated, pls have her ff-up with her nephrologist dr. Narayan Nelson sooner than 6 months he recommended from their march visit. DM is fairly good control  LFT is elevated, would advise liver US to further evaluate this, order placed. pls notify pt.

## 2020-07-08 ENCOUNTER — TELEPHONE (OUTPATIENT)
Dept: FAMILY MEDICINE CLINIC | Age: 85
End: 2020-07-08

## 2020-07-08 NOTE — TELEPHONE ENCOUNTER
Patient contact [facility] to schedule an appointment with Dr. Enmanuel Stafford.  Per patient she has been experiencing heaviness in her legs, numbness in her feet, fatigue, and decreased appetite. Patient advised that Dr. Lucero Monteiro is not in the office today and will not be able to see her today as requested. Patient advised that Dr. Ora Orta next available is 7/28/2020 (she already has an appt scheduled for 7/30/2020) and if she is feeling poorly she can go to the ER or UC for evaluation. Per patient she does not want to ER or UC she would like to see her doctor. Patient advised that message will be sent to Dr. Enmanuel Stafford on her behalf.

## 2020-07-09 NOTE — TELEPHONE ENCOUNTER
There is note of weight loss and elevated LFT, plan to start with RUQ US (as her kidney function is worsening) and perhaps CT chest/abdomen pelvis to further evaluate concern.

## 2020-07-09 NOTE — TELEPHONE ENCOUNTER
Patient identified with 2 identifiers (name and ). Spoke with patient and she is aware of LFT's and Dr. Karli Chan wanting her to see Dr. Lulu Granados. Nephrologist. Patient has also been advised of US of RUQ has been ordered. Patient did states she was seen at [facility]. Today.

## 2020-07-21 ENCOUNTER — HOSPITAL ENCOUNTER (OUTPATIENT)
Dept: LAB | Age: 85
Discharge: HOME | End: 2020-07-21
Payer: MEDICARE

## 2020-07-21 DIAGNOSIS — N18.30 CHRONIC KIDNEY DISEASE, STAGE III (MODERATE) (HCC): ICD-10-CM

## 2020-07-21 LAB
ALBUMIN SERPL-MCNC: 4 G/DL (ref 3.4–5)
ANION GAP SERPL CALC-SCNC: 12 MMOL/L (ref 3–18)
BUN SERPL-MCNC: 95 MG/DL (ref 7–18)
BUN/CREAT SERPL: 52 (ref 12–20)
CALCIUM SERPL-MCNC: 9.4 MG/DL (ref 8.5–10.1)
CALCIUM SERPL-MCNC: 9.6 MG/DL (ref 8.5–10.1)
CHLORIDE SERPL-SCNC: 97 MMOL/L (ref 100–111)
CO2 SERPL-SCNC: 31 MMOL/L (ref 21–32)
CREAT SERPL-MCNC: 1.84 MG/DL (ref 0.6–1.3)
ERYTHROCYTE [DISTWIDTH] IN BLOOD BY AUTOMATED COUNT: 18.2 % (ref 11.6–14.5)
GLUCOSE SERPL-MCNC: 128 MG/DL (ref 74–99)
HCT VFR BLD AUTO: 36.1 % (ref 35–45)
HGB BLD-MCNC: 12.2 G/DL (ref 12–16)
MCH RBC QN AUTO: 28.9 PG (ref 24–34)
MCHC RBC AUTO-ENTMCNC: 33.8 G/DL (ref 31–37)
MCV RBC AUTO: 85.5 FL (ref 74–97)
PHOSPHATE SERPL-MCNC: 3.6 MG/DL (ref 2.5–4.9)
PLATELET # BLD AUTO: 242 K/UL (ref 135–420)
PMV BLD AUTO: 11.1 FL (ref 9.2–11.8)
POTASSIUM SERPL-SCNC: 2.9 MMOL/L (ref 3.5–5.5)
PTH-INTACT SERPL-MCNC: 105.2 PG/ML (ref 18.4–88)
RBC # BLD AUTO: 4.22 M/UL (ref 4.2–5.3)
SODIUM SERPL-SCNC: 140 MMOL/L (ref 136–145)
WBC # BLD AUTO: 4.3 K/UL (ref 4.6–13.2)

## 2020-07-21 PROCEDURE — 80069 RENAL FUNCTION PANEL: CPT

## 2020-07-21 PROCEDURE — 85027 COMPLETE CBC AUTOMATED: CPT

## 2020-07-21 PROCEDURE — 83970 ASSAY OF PARATHORMONE: CPT

## 2020-07-21 PROCEDURE — 36415 COLL VENOUS BLD VENIPUNCTURE: CPT

## 2020-07-23 ENCOUNTER — HOSPITAL ENCOUNTER (OUTPATIENT)
Dept: ULTRASOUND IMAGING | Age: 85
Discharge: HOME | End: 2020-07-23
Attending: FAMILY MEDICINE
Payer: MEDICARE

## 2020-07-23 DIAGNOSIS — R63.4 UNINTENTIONAL WEIGHT LOSS: ICD-10-CM

## 2020-07-23 DIAGNOSIS — R79.89 ELEVATED LFTS: ICD-10-CM

## 2020-07-23 PROCEDURE — 76705 ECHO EXAM OF ABDOMEN: CPT

## 2020-07-28 ENCOUNTER — TELEPHONE (OUTPATIENT)
Dept: FAMILY MEDICINE CLINIC | Age: 85
End: 2020-07-28

## 2020-07-28 NOTE — TELEPHONE ENCOUNTER
Have you been diagnosed with, tested for, or told that you are suspected of having COVID-19 (coronovirus)? Tested / neg   Have you had a fever or taken medication to treat a fever in the past 72 hours? no  Have you had a cough, SOB, or flu-like symptoms within the past 3 days? no  Have you had direct contact with someone who tested positive for COVID-19 within the past 14 days? No  Do you have a household member with flu-like symptoms, including fever, cough, or SOB? No   Do you currently have flu-like symptoms including fever, cough, or SOB?

## 2020-07-30 ENCOUNTER — HOSPITAL ENCOUNTER (OUTPATIENT)
Dept: LAB | Age: 85
Discharge: HOME | End: 2020-07-30
Payer: MEDICARE

## 2020-07-30 ENCOUNTER — OFFICE VISIT (OUTPATIENT)
Dept: FAMILY MEDICINE CLINIC | Age: 85
End: 2020-07-30

## 2020-07-30 ENCOUNTER — HOSPITAL ENCOUNTER (OUTPATIENT)
Dept: GENERAL RADIOLOGY | Age: 85
Discharge: HOME | End: 2020-07-30
Payer: MEDICARE

## 2020-07-30 VITALS
WEIGHT: 137 LBS | DIASTOLIC BLOOD PRESSURE: 72 MMHG | BODY MASS INDEX: 27.62 KG/M2 | OXYGEN SATURATION: 99 % | TEMPERATURE: 98.6 F | RESPIRATION RATE: 16 BRPM | HEART RATE: 55 BPM | SYSTOLIC BLOOD PRESSURE: 122 MMHG | HEIGHT: 59 IN

## 2020-07-30 DIAGNOSIS — R20.0 BILATERAL LEG NUMBNESS: ICD-10-CM

## 2020-07-30 DIAGNOSIS — R79.89 ELEVATED LFTS: ICD-10-CM

## 2020-07-30 DIAGNOSIS — N18.4 TYPE 2 DIABETES MELLITUS WITH STAGE 4 CHRONIC KIDNEY DISEASE, WITHOUT LONG-TERM CURRENT USE OF INSULIN (HCC): ICD-10-CM

## 2020-07-30 DIAGNOSIS — R60.0 LEG EDEMA: ICD-10-CM

## 2020-07-30 DIAGNOSIS — I10 ESSENTIAL HYPERTENSION: ICD-10-CM

## 2020-07-30 DIAGNOSIS — R63.4 WEIGHT LOSS: ICD-10-CM

## 2020-07-30 DIAGNOSIS — E87.6 HYPOKALEMIA: ICD-10-CM

## 2020-07-30 DIAGNOSIS — E11.9 DIABETES MELLITUS TYPE 2, DIET-CONTROLLED (HCC): ICD-10-CM

## 2020-07-30 DIAGNOSIS — N18.4 CKD (CHRONIC KIDNEY DISEASE) STAGE 4, GFR 15-29 ML/MIN (HCC): ICD-10-CM

## 2020-07-30 DIAGNOSIS — E11.22 TYPE 2 DIABETES MELLITUS WITH STAGE 4 CHRONIC KIDNEY DISEASE, WITHOUT LONG-TERM CURRENT USE OF INSULIN (HCC): ICD-10-CM

## 2020-07-30 DIAGNOSIS — R20.0 BILATERAL LEG NUMBNESS: Primary | ICD-10-CM

## 2020-07-30 DIAGNOSIS — R63.0 DECREASED APPETITE: ICD-10-CM

## 2020-07-30 LAB
ALBUMIN SERPL-MCNC: 3.8 G/DL (ref 3.4–5)
ALBUMIN/GLOB SERPL: 1 {RATIO} (ref 0.8–1.7)
ALP SERPL-CCNC: 78 U/L (ref 45–117)
ALT SERPL-CCNC: 92 U/L (ref 13–56)
ANION GAP SERPL CALC-SCNC: 8 MMOL/L (ref 3–18)
AST SERPL-CCNC: 142 U/L (ref 10–38)
BILIRUB SERPL-MCNC: 0.9 MG/DL (ref 0.2–1)
BUN SERPL-MCNC: 50 MG/DL (ref 7–18)
BUN/CREAT SERPL: 32 (ref 12–20)
CALCIUM SERPL-MCNC: 9.6 MG/DL (ref 8.5–10.1)
CHLORIDE SERPL-SCNC: 107 MMOL/L (ref 100–111)
CO2 SERPL-SCNC: 28 MMOL/L (ref 21–32)
CREAT SERPL-MCNC: 1.55 MG/DL (ref 0.6–1.3)
FOLATE SERPL-MCNC: 14.9 NG/ML (ref 3.1–17.5)
GLOBULIN SER CALC-MCNC: 3.8 G/DL (ref 2–4)
GLUCOSE SERPL-MCNC: 109 MG/DL (ref 74–99)
POTASSIUM SERPL-SCNC: 4.4 MMOL/L (ref 3.5–5.5)
PROT SERPL-MCNC: 7.6 G/DL (ref 6.4–8.2)
SODIUM SERPL-SCNC: 143 MMOL/L (ref 136–145)
VIT B12 SERPL-MCNC: 626 PG/ML (ref 211–911)

## 2020-07-30 PROCEDURE — 80053 COMPREHEN METABOLIC PANEL: CPT

## 2020-07-30 PROCEDURE — 82607 VITAMIN B-12: CPT

## 2020-07-30 PROCEDURE — 72110 X-RAY EXAM L-2 SPINE 4/>VWS: CPT

## 2020-07-30 PROCEDURE — 82784 ASSAY IGA/IGD/IGG/IGM EACH: CPT

## 2020-07-30 PROCEDURE — 36415 COLL VENOUS BLD VENIPUNCTURE: CPT

## 2020-07-30 RX ORDER — POTASSIUM CHLORIDE 20 MEQ/1
20 TABLET, EXTENDED RELEASE ORAL DAILY
COMMUNITY
Start: 2020-07-22

## 2020-07-30 NOTE — PROGRESS NOTES
Merlene Javier, 80 y.o.,  female    SUBJECTIVE  Ff-up    Over the past few months, just not feeling like her usual self, feeling tired, reduced appetite. Initial work up showing worsening GFR, Elevated LFTs. US without remarkable liver findings. She has seen her nephrologist Dr. Lizzy Saravia, who is continuing medications at this time and monitoring. Noted hypokalemia 2.9 and started on daily kcl 20 meqs. She has known DM/HLHTN/diastolic CHF    ROS:  See HPI, all others negative        Patient Active Problem List   Diagnosis Code    Essential hypertension I10    Mixed hyperlipidemia E78.2    Advanced directives, counseling/discussion Z71.89    Diabetes mellitus type 2, diet-controlled ([facility] [facility] E11.9    Advance care planning Z71.89    Heart murmur R01.1    Gout M10.9    CKD (chronic kidney disease) stage 3, GFR 30-59 ml/min ([facility]) N18.3    Anemia of chronic disease D63.8    Leg edema R60.0    Nausea R11.0    Elevated troponin R79.89    Chronic renal failure, stage 3 (moderate) ([facility]) C71.3    Diastolic CHF, chronic ([facility]) I50.32    Sinus bradycardia R00.1    CKD (chronic kidney disease) stage 4, GFR 15-29 ml/min ([facility]) N18.4    Type 2 diabetes mellitus with chronic kidney disease, without long-term current use of insulin ([facility]) E11.22       Current Outpatient Medications   Medication Sig Dispense Refill    potassium chloride (K-DUR, KLOR-CON) 20 mEq tablet Take 20 mEq by mouth daily.  amLODIPine (NORVASC) 10 mg tablet Take 1 tablet by mouth once daily 90 Tab 1    furosemide (LASIX) 20 mg tablet Take 1 tablet once daily as needed for edema 90 Tab 1    simvastatin (ZOCOR) 40 mg tablet Take 1 tablet by mouth nightly 90 Tab 0    colchicine (Colcrys) 0.6 mg tablet Take 1 Tab by mouth daily.  Indications: a type of joint disorder due to excess uric acid in the blood called gout 90 Tab 2    cloNIDine HCL (CATAPRES) 0.2 mg tablet Take 1 tablet by mouth twice daily 180 Tab 1    hydrALAZINE (APRESOLINE) 100 mg tablet TAKE 1 TABLET BY MOUTH THREE TIMES DAILY 270 Tab 3    latanoprost (XALATAN) 0.005 % ophthalmic solution       cholecalciferol, VITAMIN D3, (VITAMIN D3) 5,000 unit tab tablet Take  by mouth daily.  Aspirin, Buffered 81 mg tab Take 1 tablet by mouth daily.  fish oil-dha-epa 1,200-144-216 mg cap Take 1 tablet by mouth daily.  OTHER BMP on 9/6/19 morning  Call report to PCP  Reason: CKD 1 Each 0    glucose blood VI test strips (PRODIGY NO CODING) strip Check fasting glucose once daily 100 Strip 3    Blood-Glucose Meter (PRODIGY AUTOCODE MONITOR SYST) misc Check fasting glucose once daily 1 Each 0       No Known Allergies    Past Medical History:   Diagnosis Date    Anemia     Carotid bruit 12/07/2013    Carotid Duplex: 1. Bilateral 1-49% stenosis of the internal carotid arteries. 2. No significant stenosis in the external carotid arteries bilaterally. 3. Antegrade flow in both vetebral arteries. 4. Normal flow in both subclavian arteries. Plaque Morphology: 1. Hyperechoic plaque in the bulb and right ICA. 2. Hyperechoic plaque in the bulb and left ICA.  CKD (chronic kidney disease) stage 3, GFR 30-59 ml/min ([facility])     Diabetes ([facility] [address]     NIDDM    Gastritis 10/27/2014    Duodenum Bx: No Specific Pathologic Abnormality. No Villous Abnormality. 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 No dysplasia or malignancy identified. Bacterial Organisms c/w H. Pylori are present. Z-Line Bx: GE mucosa w/ Acute/Chronic Inflammation & Reactive Changes. Focal Specialized Type Campos Mucosa Identified. No Dysplasia or Malignancy Identified.      Gout 12/10/2009    Elevated Uric Acid Level    Hyperlipidemia     Hypertension     RAMÓN (iron deficiency anemia)        Social History     Socioeconomic History    Marital status:      Spouse name: Not on file    Number of children: Not on file    Years of education: Not on file    Highest education level: Not on file   Occupational History    Not on file   Social Needs    Financial resource strain: Not on file    Food insecurity     Worry: Not on file     Inability: Not on file    Transportation needs     Medical: Not on file     Non-medical: Not on file   Tobacco Use    Smoking status: Never Smoker    Smokeless tobacco: Never Used   Substance and Sexual Activity    Alcohol use: No    Drug use: No    Sexual activity: Not Currently     Partners: Male     Birth control/protection: None   Lifestyle    Physical activity     Days per week: Not on file     Minutes per session: Not on file    Stress: Not on file   Relationships    Social connections     Talks on phone: Not on file     Gets together: Not on file     Attends Alevism service: Not on file     Active member of club or organization: Not on file     Attends meetings of clubs or organizations: Not on file     Relationship status: Not on file    Intimate partner violence     Fear of current or ex partner: Not on file     Emotionally abused: Not on file     Physically abused: Not on file     Forced sexual activity: Not on file   Other Topics Concern    Not on file   Social History Narrative    Not on file       Family History   Problem Relation Age of Onset    Hypertension Mother     Stroke Mother     Stroke Father          OBJECTIVE    Physical Exam:     Visit Vitals  /72 (BP 1 Location: Left arm, BP Patient Position: Sitting)   Pulse (!) 55   Temp 98.6 °F (37 °C) (Oral)   Resp 16   Ht 4' 11\" (1.499 m)   Wt 137 lb (62.1 kg)   SpO2 99%   BMI 27.67 kg/m²       General: alert, elderly, AA in no apparent distress or pain  Head: atraumatic.  Non-tender maxillary and frontal sinuses  Eyes: Lids with no discharge, no matting, conjunctivae clear and non injected, full EOMs, PERLLA  Ears: pinna non-tender, external auditory canal patent, TM intact  Mouth/throat:tonsils non enlarged, pharynx non erythematous and no lesion, nasal mucosa normal  Neck: supple, no adenopathy palpated  CVS: normal rate, regular rhythm, distinct S1 and S2  Lungs:clear to ausculation bilaterally, no crackles, wheezing or rhonchi noted  Abdomen: normoactive bowel sounds, soft, non-tender  Extremities: DTRs normal, LE 4/5 bilaterally  Skin: warm, no lesions, rashes noted  Psych:  mood and affect normal        ASSESSMENT/PLAN  Diagnoses and all orders for this visit:    1. Bilateral leg numbness  New onset  Could be due to DM/hypokalemia  She has mild LE weakness, will check for radiculopathy, gammopathy, electorolyte imbalance  -     METABOLIC PANEL, COMPREHENSIVE; Future  -     XR SPINE LUMB MIN 4 V; Future  -     VITAMIN B12 & FOLATE; Future  -     SPEP AND WANDA, SERUM; Future    2. Hypokalemia  Likely due to lasix  Cont kcl 20 meqs daily  recheck  -     METABOLIC PANEL, COMPREHENSIVE; Future    3. Elevated LFTs  Unremarkable US  Refer to GI for further eval w/ wt loss/decreased appetit concern  -     VITAMIN B12 & FOLATE; Future  -     SPEP AND WANDA, SERUM; Future  -     REFERRAL TO GASTROENTEROLOGY    4. Decreased appetite  Could be multifactorial: CKD/ elderly, will r/o gi pathology  -     REFERRAL TO GASTROENTEROLOGY    5. Weight loss  -     REFERRAL TO GASTROENTEROLOGY    6. CKD (chronic kidney disease) stage 4, GFR 15-29 ml/min (HCC)    7. Diabetes mellitus type 2, diet-controlled ([address]    8. Type 2 diabetes mellitus with stage 4 chronic kidney disease, without long-term current use of insulin ([address]    9. Essential hypertension  Controlled    10. Leg edema  Fair control  Cont lasix prn + daily KCL      Follow-up and Dispositions    · Return in about 4 weeks (around 8/27/2020), or if symptoms worsen or fail to improve, for in person visit, ff-up on acute concern. Patient understands plan of care. Patient has provided input and agrees with goals.

## 2020-07-31 DIAGNOSIS — R20.0 BILATERAL LEG NUMBNESS: Primary | ICD-10-CM

## 2020-07-31 NOTE — PROGRESS NOTES
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  pls notify pt.

## 2020-08-03 LAB
ALBUMIN SERPL ELPH-MCNC: 4 G/DL (ref 2.9–4.4)
ALBUMIN/GLOB SERPL: 1.3 {RATIO} (ref 0.7–1.7)
ALPHA1 GLOB SERPL ELPH-MCNC: 0.2 G/DL (ref 0–0.4)
ALPHA2 GLOB SERPL ELPH-MCNC: 0.8 G/DL (ref 0.4–1)
B-GLOBULIN SERPL ELPH-MCNC: 0.9 G/DL (ref 0.7–1.3)
GAMMA GLOB SERPL ELPH-MCNC: 1.4 G/DL (ref 0.4–1.8)
GLOBULIN SER-MCNC: 3.3 G/DL (ref 2.2–3.9)
IGA SERPL-MCNC: 200 MG/DL (ref 64–422)
IGG SERPL-MCNC: 1543 MG/DL (ref 586–1602)
IGM SERPL-MCNC: 107 MG/DL (ref 26–217)
INTERPRETATION SERPL IEP-IMP: NORMAL
M PROTEIN SERPL ELPH-MCNC: NORMAL G/DL
PROT SERPL-MCNC: 7.3 G/DL (ref 6–8.5)

## 2020-08-03 NOTE — PROGRESS NOTES
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

## 2020-08-17 ENCOUNTER — TELEPHONE (OUTPATIENT)
Dept: FAMILY MEDICINE CLINIC | Age: 85
End: 2020-08-17

## 2020-08-17 NOTE — TELEPHONE ENCOUNTER
Patient called to follow up on [facility]. Stated she had not heard from them and wanted their number. When I looked at the referral I saw that the patient had declined appointment with [place] [facility] [address] due to it being too far away from her. I explained to patient and  that this office is very close to [facility]. They verbalized understanding and vianca lwait for a call form [facility] or call them (I provided the number to them).

## 2020-08-20 ENCOUNTER — HOSPITAL ENCOUNTER (OUTPATIENT)
Dept: LAB | Age: 85
Discharge: HOME | End: 2020-08-20
Payer: MEDICARE

## 2020-08-20 DIAGNOSIS — A02.0 INTESTINAL INFECTION DUE TO ARIZONA GROUP OF PARACOLON BACILLI: ICD-10-CM

## 2020-08-20 DIAGNOSIS — K22.70 SHORT-SEGMENT BARRETT'S ESOPHAGUS: ICD-10-CM

## 2020-08-20 DIAGNOSIS — R63.0 ANOREXIA: ICD-10-CM

## 2020-08-20 DIAGNOSIS — R63.4 LOSS OF WEIGHT: ICD-10-CM

## 2020-08-20 DIAGNOSIS — R94.5 NONSPECIFIC ABNORMAL RESULTS OF LIVER FUNCTION STUDY: ICD-10-CM

## 2020-08-20 LAB
ALBUMIN SERPL-MCNC: 3.9 G/DL (ref 3.4–5)
ALBUMIN/GLOB SERPL: 1.1 {RATIO} (ref 0.8–1.7)
ALP SERPL-CCNC: 85 U/L (ref 45–117)
ALT SERPL-CCNC: 93 U/L (ref 13–56)
ANION GAP SERPL CALC-SCNC: 7 MMOL/L (ref 3–18)
AST SERPL-CCNC: 117 U/L (ref 10–38)
BASOPHILS # BLD: 0 K/UL (ref 0–0.1)
BASOPHILS NFR BLD: 0 % (ref 0–2)
BILIRUB SERPL-MCNC: 1 MG/DL (ref 0.2–1)
BUN SERPL-MCNC: 36 MG/DL (ref 7–18)
BUN/CREAT SERPL: 25 (ref 12–20)
CALCIUM SERPL-MCNC: 9.3 MG/DL (ref 8.5–10.1)
CHLORIDE SERPL-SCNC: 110 MMOL/L (ref 100–111)
CO2 SERPL-SCNC: 27 MMOL/L (ref 21–32)
CREAT SERPL-MCNC: 1.43 MG/DL (ref 0.6–1.3)
DIFFERENTIAL METHOD BLD: ABNORMAL
EOSINOPHIL # BLD: 0 K/UL (ref 0–0.4)
EOSINOPHIL NFR BLD: 0 % (ref 0–5)
ERYTHROCYTE [DISTWIDTH] IN BLOOD BY AUTOMATED COUNT: 19.5 % (ref 11.6–14.5)
FERRITIN SERPL-MCNC: 164 NG/ML (ref 8–388)
GLOBULIN SER CALC-MCNC: 3.7 G/DL (ref 2–4)
GLUCOSE SERPL-MCNC: 120 MG/DL (ref 74–99)
HBV SURFACE AB SER QL IA: NEGATIVE
HBV SURFACE AB SERPL IA-ACNC: <3.1 MIU/ML
HBV SURFACE AG SER QL: <0.1 INDEX
HBV SURFACE AG SER QL: NEGATIVE
HCT VFR BLD AUTO: 31.6 % (ref 35–45)
HCV AB SER IA-ACNC: 0.06 INDEX
HCV AB SERPL QL IA: NEGATIVE
HCV COMMENT,HCGAC: NORMAL
HEP BS AB COMMENT,HBSAC: ABNORMAL
HGB BLD-MCNC: 10.4 G/DL (ref 12–16)
INR PPP: 1.1 (ref 0.8–1.2)
IRON SATN MFR SERPL: 18 % (ref 20–50)
IRON SERPL-MCNC: 54 UG/DL (ref 50–175)
LYMPHOCYTES # BLD: 1 K/UL (ref 0.9–3.6)
LYMPHOCYTES NFR BLD: 23 % (ref 21–52)
MCH RBC QN AUTO: 29.5 PG (ref 24–34)
MCHC RBC AUTO-ENTMCNC: 32.9 G/DL (ref 31–37)
MCV RBC AUTO: 89.5 FL (ref 74–97)
MONOCYTES # BLD: 0.2 K/UL (ref 0.05–1.2)
MONOCYTES NFR BLD: 5 % (ref 3–10)
NEUTS SEG # BLD: 3 K/UL (ref 1.8–8)
NEUTS SEG NFR BLD: 72 % (ref 40–73)
PLATELET # BLD AUTO: 237 K/UL (ref 135–420)
PMV BLD AUTO: 10.5 FL (ref 9.2–11.8)
POTASSIUM SERPL-SCNC: 4 MMOL/L (ref 3.5–5.5)
PROT SERPL-MCNC: 7.6 G/DL (ref 6.4–8.2)
PROTHROMBIN TIME: 14.4 SEC (ref 11.5–15.2)
RBC # BLD AUTO: 3.53 M/UL (ref 4.2–5.3)
SODIUM SERPL-SCNC: 144 MMOL/L (ref 136–145)
TIBC SERPL-MCNC: 303 UG/DL (ref 250–450)
WBC # BLD AUTO: 4.2 K/UL (ref 4.6–13.2)

## 2020-08-20 PROCEDURE — 80053 COMPREHEN METABOLIC PANEL: CPT

## 2020-08-20 PROCEDURE — 82105 ALPHA-FETOPROTEIN SERUM: CPT

## 2020-08-20 PROCEDURE — 85610 PROTHROMBIN TIME: CPT

## 2020-08-20 PROCEDURE — 85025 COMPLETE CBC W/AUTO DIFF WBC: CPT

## 2020-08-20 PROCEDURE — 83540 ASSAY OF IRON: CPT

## 2020-08-20 PROCEDURE — 86708 HEPATITIS A ANTIBODY: CPT

## 2020-08-20 PROCEDURE — 36415 COLL VENOUS BLD VENIPUNCTURE: CPT

## 2020-08-20 PROCEDURE — 82390 ASSAY OF CERULOPLASMIN: CPT

## 2020-08-20 PROCEDURE — 86706 HEP B SURFACE ANTIBODY: CPT

## 2020-08-20 PROCEDURE — 86704 HEP B CORE ANTIBODY TOTAL: CPT

## 2020-08-20 PROCEDURE — 84450 TRANSFERASE (AST) (SGOT): CPT

## 2020-08-20 PROCEDURE — 82728 ASSAY OF FERRITIN: CPT

## 2020-08-20 PROCEDURE — 87340 HEPATITIS B SURFACE AG IA: CPT

## 2020-08-20 PROCEDURE — 86803 HEPATITIS C AB TEST: CPT

## 2020-08-21 ENCOUNTER — HOSPITAL ENCOUNTER (OUTPATIENT)
Dept: LAB | Age: 85
Discharge: HOME | End: 2020-08-21
Payer: MEDICARE

## 2020-08-21 LAB
AFP-TM SERPL-MCNC: 8.9 NG/ML (ref 0–8.3)
CERULOPLASMIN SERPL-MCNC: 19.9 MG/DL (ref 19–39)
HAV AB SER QL IA: POSITIVE
HBV CORE AB SERPL QL IA: NEGATIVE

## 2020-08-21 PROCEDURE — 87635 SARS-COV-2 COVID-19 AMP PRB: CPT

## 2020-08-22 LAB
A2 MACROGLOB SERPL-MCNC: 281 MG/DL (ref 110–276)
ALT (SGPT) P5P, 001547: 90 IU/L (ref 0–40)
APO A-I SERPL-MCNC: 133 MG/DL (ref 114–214)
AST SERPL W P-5'-P-CCNC: 124 IU/L (ref 0–40)
BILIRUB SERPL-MCNC: 0.7 MG/DL (ref 0–1.2)
CHOLEST SERPL-MCNC: 101 MG/DL (ref 100–199)
COMMENT:: ABNORMAL
FIBROSIS SCORING:, 550107: ABNORMAL
FIBROSIS STAGE SERPL QL: ABNORMAL
GGT SERPL-CCNC: 150 IU/L (ref 0–60)
GLUCOSE SERPL-MCNC: 128 MG/DL (ref 65–99)
HAPTOGLOB SERPL-MCNC: 132 MG/DL (ref 41–333)
HEIGHT (NASH), 13912: 59
INTERPRETATIONS:, 550143: ABNORMAL
LIVER FIBR SCORE SERPL CALC.FIBROSURE: 0.78 (ref 0–0.21)
NASH SCORING, 550144: ABNORMAL
NECROINFLAMMATORY ACT GRADE SERPL QL: ABNORMAL
NECROINFLAMMATORY ACT SCORE SERPL: 0.75
SARS-COV-2, COV2NT: NOT DETECTED
SERVICE CMNT-IMP: ABNORMAL
STEATOSIS GRADE, 550153: ABNORMAL
STEATOSIS GRADING, 550189: ABNORMAL
STEATOSIS SCORE, 550149: 0.8 (ref 0–0.3)
TRIGL SERPL-MCNC: 87 MG/DL (ref 0–149)
WEIGHT (NASH): 133

## 2020-08-26 ENCOUNTER — ANESTHESIA EVENT (OUTPATIENT)
Dept: ENDOSCOPY | Age: 85
End: 2020-08-26
Payer: MEDICARE

## 2020-08-27 ENCOUNTER — HOSPITAL ENCOUNTER (OUTPATIENT)
Age: 85
Setting detail: OUTPATIENT SURGERY
Discharge: HOME | End: 2020-08-27
Attending: INTERNAL MEDICINE | Admitting: INTERNAL MEDICINE
Payer: MEDICARE

## 2020-08-27 ENCOUNTER — ANESTHESIA (OUTPATIENT)
Dept: ENDOSCOPY | Age: 85
End: 2020-08-27
Payer: MEDICARE

## 2020-08-27 VITALS
HEART RATE: 59 BPM | OXYGEN SATURATION: 99 % | TEMPERATURE: 97 F | BODY MASS INDEX: 26.81 KG/M2 | SYSTOLIC BLOOD PRESSURE: 138 MMHG | DIASTOLIC BLOOD PRESSURE: 67 MMHG | RESPIRATION RATE: 13 BRPM | HEIGHT: 59 IN | WEIGHT: 133 LBS

## 2020-08-27 LAB — GLUCOSE BLD STRIP.AUTO-MCNC: 129 MG/DL (ref 70–110)

## 2020-08-27 PROCEDURE — 76060000031 HC ANESTHESIA FIRST 0.5 HR: Performed by: INTERNAL MEDICINE

## 2020-08-27 PROCEDURE — 74011250637 HC RX REV CODE- 250/637: Performed by: NURSE ANESTHETIST, CERTIFIED REGISTERED

## 2020-08-27 PROCEDURE — 77030008565 HC TBNG SUC IRR ERBE -B: Performed by: INTERNAL MEDICINE

## 2020-08-27 PROCEDURE — 76040000019: Performed by: INTERNAL MEDICINE

## 2020-08-27 PROCEDURE — 82962 GLUCOSE BLOOD TEST: CPT

## 2020-08-27 PROCEDURE — 74011250636 HC RX REV CODE- 250/636: Performed by: NURSE ANESTHETIST, CERTIFIED REGISTERED

## 2020-08-27 PROCEDURE — 88305 TISSUE EXAM BY PATHOLOGIST: CPT

## 2020-08-27 PROCEDURE — 77030019988 HC FCPS ENDOSC DISP BSC -B: Performed by: INTERNAL MEDICINE

## 2020-08-27 PROCEDURE — 74011250636 HC RX REV CODE- 250/636: Performed by: ANESTHESIOLOGY

## 2020-08-27 PROCEDURE — 74011000250 HC RX REV CODE- 250: Performed by: ANESTHESIOLOGY

## 2020-08-27 RX ORDER — PROPOFOL 10 MG/ML
INJECTION, EMULSION INTRAVENOUS AS NEEDED
Status: DISCONTINUED | OUTPATIENT
Start: 2020-08-27 | End: 2020-08-27 | Stop reason: HOSPADM

## 2020-08-27 RX ORDER — INSULIN LISPRO 100 [IU]/ML
INJECTION, SOLUTION INTRAVENOUS; SUBCUTANEOUS ONCE
Status: DISCONTINUED | OUTPATIENT
Start: 2020-08-27 | End: 2020-08-27 | Stop reason: HOSPADM

## 2020-08-27 RX ORDER — SODIUM CHLORIDE, SODIUM LACTATE, POTASSIUM CHLORIDE, CALCIUM CHLORIDE 600; 310; 30; 20 MG/100ML; MG/100ML; MG/100ML; MG/100ML
75 INJECTION, SOLUTION INTRAVENOUS CONTINUOUS
Status: DISCONTINUED | OUTPATIENT
Start: 2020-08-28 | End: 2020-08-27 | Stop reason: HOSPADM

## 2020-08-27 RX ORDER — FAMOTIDINE 20 MG/1
20 TABLET, FILM COATED ORAL ONCE
Status: COMPLETED | OUTPATIENT
Start: 2020-08-27 | End: 2020-08-27

## 2020-08-27 RX ORDER — LIDOCAINE HYDROCHLORIDE 20 MG/ML
INJECTION, SOLUTION EPIDURAL; INFILTRATION; INTRACAUDAL; PERINEURAL AS NEEDED
Status: DISCONTINUED | OUTPATIENT
Start: 2020-08-27 | End: 2020-08-27 | Stop reason: HOSPADM

## 2020-08-27 RX ADMIN — FAMOTIDINE 20 MG: 20 TABLET, FILM COATED ORAL at 09:08

## 2020-08-27 RX ADMIN — PROPOFOL 70 MG: 10 INJECTION, EMULSION INTRAVENOUS at 09:31

## 2020-08-27 RX ADMIN — SODIUM CHLORIDE, SODIUM LACTATE, POTASSIUM CHLORIDE, AND CALCIUM CHLORIDE 75 ML/HR: 600; 310; 30; 20 INJECTION, SOLUTION INTRAVENOUS at 09:07

## 2020-08-27 RX ADMIN — LIDOCAINE HYDROCHLORIDE 100 MG: 20 INJECTION, SOLUTION EPIDURAL; INFILTRATION; INTRACAUDAL; PERINEURAL at 09:31

## 2020-08-27 NOTE — ANESTHESIA POSTPROCEDURE EVALUATION
Procedure(s):  UPPER ENDOSCOPY with gastric bx's, esophageal bx's and dilation. MAC    Anesthesia Post Evaluation      Multimodal analgesia: multimodal analgesia used between 6 hours prior to anesthesia start to PACU discharge  Patient location during evaluation: PACU  Patient participation: complete - patient participated  Level of consciousness: awake  Pain management: adequate  Airway patency: patent  Anesthetic complications: no  Cardiovascular status: acceptable  Respiratory status: acceptable  Hydration status: acceptable  Post anesthesia nausea and vomiting:  none  Final Post Anesthesia Temperature Assessment:  Normothermia (36.0-37.5 degrees C)      INITIAL Post-op Vital signs:   Vitals Value Taken Time   /53 8/27/2020  9:45 AM   Temp 36.3 °C (97.4 °F) 8/27/2020  9:45 AM   Pulse 61 8/27/2020  9:46 AM   Resp 19 8/27/2020  9:46 AM   SpO2 100 % 8/27/2020  9:46 AM   Vitals shown include unvalidated device data.

## 2020-08-27 NOTE — H&P
[email or web address]  [phone]      History and Physical    Patient: Amador Osborne MRN: [id number]  SSN: [id number]    YOB: 1933  Age: 80 y.o. Sex: female      Subjective:      Amador Osborne is a 80 y.o. female who presents with history of BE and decreased appetite. Past Medical History:   Diagnosis Date    Anemia     Carotid bruit 12/07/2013    Carotid Duplex: 1. Bilateral 1-49% stenosis of the internal carotid arteries. 2. No significant stenosis in the external carotid arteries bilaterally. 3. Antegrade flow in both vetebral arteries. 4. Normal flow in both subclavian arteries. Plaque Morphology: 1. Hyperechoic plaque in the bulb and right ICA. 2. Hyperechoic plaque in the bulb and left ICA.  CKD (chronic kidney disease) stage 3, GFR 30-59 ml/min ([facility])     Diabetes ([facility] [address]     NIDDM    Gastritis 10/27/2014    Duodenum Bx: No Specific Pathologic Abnormality. No Villous Abnormality. 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 No dysplasia or malignancy identified. Bacterial Organisms c/w H. Pylori are present. Z-Line Bx: GE mucosa w/ Acute/Chronic Inflammation & Reactive Changes. Focal Specialized Type Campos Mucosa Identified. No Dysplasia or Malignancy Identified.  Gout 12/10/2009    Elevated Uric Acid Level    Hyperlipidemia     Hypertension     RAMÓN (iron deficiency anemia)      Past Surgical History:   Procedure Laterality Date    HX COLONOSCOPY  10/27/2014    Dr. Ailin Smith  10/27/2014    Dr. Esthela Lara       Family History   Problem Relation Age of Onset   Aundra Black Hypertension Mother     Stroke Mother     Stroke Father      Social History     Tobacco Use    Smoking status: Never Smoker    Smokeless tobacco: Never Used   Substance Use Topics    Alcohol use: No      Prior to Admission medications    Medication Sig Start Date End Date Taking?  Authorizing Provider   potassium chloride (K-DUR, KLOR-CON) 20 mEq tablet Take 20 mEq by mouth daily. 7/22/20  Yes Provider, Historical   amLODIPine (NORVASC) 10 mg tablet Take 1 tablet by mouth once daily 7/2/20  Yes Allan Mendoza MD   simvastatin (ZOCOR) 40 mg tablet Take 1 tablet by mouth nightly 7/2/20  Yes Allan Mendoza MD   colchicine (Colcrys) 0.6 mg tablet Take 1 Tab by mouth daily. Indications: a type of joint disorder due to excess uric acid in the blood called gout 4/28/20  Yes Allan Mendoza MD   cloNIDine HCL (CATAPRES) 0.2 mg tablet Take 1 tablet by mouth twice daily 4/27/20  Yes Allan Mendoza MD   hydrALAZINE (APRESOLINE) 100 mg tablet TAKE 1 TABLET BY MOUTH THREE TIMES DAILY 2/4/20  Yes Allan Mendoza MD   latanoprost (XALATAN) 0.005 % ophthalmic solution  10/17/19  Yes Provider, Historical   glucose blood VI test strips (PRODIGY NO CODING) strip Check fasting glucose once daily 1/8/19  Yes Allan Mendoza MD   Blood-Glucose Meter (PRODIGY AUTOCODE MONITOR SYST) misc Check fasting glucose once daily 12/31/18  Yes Allan Mendoza MD   cholecalciferol, VITAMIN D3, (VITAMIN D3) 5,000 unit tab tablet Take  by mouth daily. Yes Provider, Historical   Aspirin, Buffered 81 mg tab Take 1 tablet by mouth daily. Yes Provider, Historical   fish oil-dha-epa 1,200-144-216 mg cap Take 1 tablet by mouth daily. Yes Provider, Historical   OTHER BMP on 9/6/19 morning  Call report to PCP  Reason: CKD 9/3/19   Macey Guo MD        No Known Allergies    Review of Systems:  A comprehensive review of systems was negative except for that written in the History of Present Illness. Objective:     Vitals:    08/26/20 1047   Weight: 60.3 kg (133 lb)   Height: 4' 11\" (1.499 m)        Physical Exam:  GENERAL: alert, cooperative, no distress, appears stated age  LUNG: clear to auscultation bilaterally  HEART: regular rate and rhythm, S1, S2 normal, no murmur, click, rub or gallop  ABDOMEN: soft, non-tender.  Bowel sounds normal. No masses,  no organomegaly  NEUROLOGIC: alert & oriented x 3    Assessment:     1. Campos's esophagus  2. Decreased appetite    Plan:     1. EGD    Signed By: Graeme Lu MD     August 27, 2020      Graeme Lu MD  [facility], [address] [place] [address] - [phone]  [email or web address]

## 2020-08-27 NOTE — PROCEDURES
[email or web address]  [phone]        Brief Procedure Note    Emanuel Cross  8/27/1933  [id number]    Date of Procedure: 8/27/2020    Preoperative diagnosis: Campos's esophagus:  530.85 - K22.70  H pylori infection:  041.86 - A04.8  Decrease in appetite:  783.9 - R63.0  Unexplained weight loss:  783.21 - R63.4      Postoperative diagnosis: gastritis - biopsied, hiatal hernia, esophageal ring- dilated 48 Fr, irregular z-line - biopsied    Description of Findings: same    Sedation/Anesthesia: Monitored Anesthesia Care; See Anesthesia Note    Procedure: Procedure(s):  UPPER ENDOSCOPY with gastric bx's, esophageal bx's and dilation    :  Dr. Graeme Lu MD    Assistant(s): Endoscopy Technician-1: Scott Erwin  Endoscopy RN-1: Eilene Gaucher, RN  Endoscopy RN-2: Aaron Romero RN    EBL:None    Specimens:   ID Type Source Tests Collected by Time Destination   1 : Gastric bx's  Preservative Gastric  Katharina Jay MD 8/27/2020 7938 Pathology   2 : irregular Z-line bx's Preservative Esophagus  Katharina Jay MD 8/27/2020 9577 Pathology       Findings: See printed and scanned procedure note    Complications: None    Tissue Implant Device: None    Dr. Graeme Lu MD  8/27/2020  9:40 AM    Graeme Lu MD  [facility] [address] [phone]  [email or web address]

## 2020-08-27 NOTE — DISCHARGE INSTRUCTIONS
DISCHARGE SUMMARY from Nurse  PATIENT INSTRUCTIONS:  After general anesthesia or intravenous sedation, for 24 hours or while taking prescription Narcotics:  · Limit your activities  · Do not drive and operate hazardous machinery  · Do not make important personal or business decisions  · Do  not drink alcoholic beverages  · If you have not urinated within 8 hours after discharge, please contact your surgeon on call. Report the following to your surgeon:  · Excessive pain, swelling, redness or odor of or around the surgical area  · Temperature over 100.5  · Nausea and vomiting lasting longer than 4 hours or if unable to take medications  · Any signs of decreased circulation or nerve impairment to extremity: change in color, persistent  numbness, tingling, coldness or increase pain  · Any questions    What to do at Home:  Recommended activity: Activity as tolerated and no driving for today. *  Please give a list of your current medications to your Primary Care Provider. *  Please update this list whenever your medications are discontinued, doses are      changed, or new medications (including over-the-counter products) are added. *  Please carry medication information at all times in case of emergency situations. These are general instructions for a healthy lifestyle:    No smoking/ No tobacco products/ Avoid exposure to second hand smoke  Surgeon General's Warning:  Quitting smoking now greatly reduces serious risk to your health. Obesity, smoking, and sedentary lifestyle greatly increases your risk for illness    A healthy diet, regular physical exercise & weight monitoring are important for maintaining a healthy lifestyle    You may be retaining fluid if you have a history of heart failure or if you experience any of the following symptoms:  Weight gain of 3 pounds or more overnight or 5 pounds in a week, increased swelling in our hands or feet or shortness of breath while lying flat in bed.   Please call your doctor as soon as you notice any of these symptoms; do not wait until your next office visit. The discharge information has been reviewed with the patient. The patient verbalized understanding. Discharge medications reviewed with the patient and appropriate educational materials and side effects teaching were provided. ___________________________________________________________________________________________________________________________________    Patient Education   Upper GI Endoscopy: What to Expect at [address] [place] [address] had an upper GI endoscopy. Your doctor used a thin, lighted tube that bends to look at the inside of your esophagus, your stomach, and the first part of the small intestine, called the duodenum. After you have an endoscopy, you will stay at the hospital or clinic for 1 to 2 hours. This will allow the medicine to wear off. You will be able to go home after your doctor or nurse checks to make sure that you're not having any problems. You may have to stay overnight if you had treatment during the test. You may have a sore throat for a day or two after the test.  This care sheet gives you a general idea about what to expect after the test.  How can you care for yourself at home? Activity   · Rest as much as you need to after you go home. · You should be able to go back to your usual activities the day after the test.  Diet   · Follow your doctor's directions for eating after the test.  · Drink plenty of fluids (unless your doctor has told you not to). Medications   · If you have a sore throat the day after the test, use an over-the-counter spray to numb your throat. Follow-up care is a key part of your treatment and safety. Be sure to make and go to all appointments, and call your doctor if you are having problems. It's also a good idea to know your test results and keep a list of the medicines you take. When should you call for help?    [id number] anytime you think you may need emergency care. For example, call if:  · You passed out (loses consciousness). · You have trouble breathing. · You pass maroon or bloody stools. Call your doctor now or seek immediate medical care if:  · You have pain that does not get better after your take pain medicine. · You have new or worse belly pain. · You have blood in your stools. · You are sick to your stomach and cannot keep fluids down. · You have a fever. · You cannot pass stools or gas. Watch closely for changes in your health, and be sure to contact your doctor if:  · Your throat still hurts after a day or two. · You do not get better as expected. Where can you learn more? Go to http://viral-zenaida.info/  Enter J454 in the search box to learn more about \"Upper GI Endoscopy: What to Expect at Home. \"  Current as of: August 12, 2019               Content Version: 12.5  © 1961-8925 [employer]. Care instructions adapted under license by [employer] (which disclaims liability or warranty for this information). If you have questions about a medical condition or this instruction, always ask your healthcare professional. [address] any warranty or liability for your use of this information. Patient Education        Esophageal Dilation: What to Expect at Home  Your Recovery  After you have esophageal dilation, you will stay at the hospital or surgery center for 1 to 2 hours. This will allow the medicine to wear off. You will be able to go home after your doctor or nurse checks to make sure you are not having any problems. This care sheet gives you a general idea about how long it will take for you to recover. But each person recovers at a different pace. Follow the steps below to get better as quickly as possible. How can you care for yourself at home? Activity  · Rest as much as you need to after you go home.   · You should be able to go back to your usual activities the day after the procedure. Diet  · Follow your doctor's directions for eating after the procedure. · Drink plenty of fluids (unless your doctor has told you not to). Medicines  · Your doctor will tell you if and when you can restart your medicines. He or she will also give you instructions about taking any new medicines. · If you take aspirin or some other blood thinner, ask your doctor if and when to start taking it again. Make sure that you understand exactly what your doctor wants you to do. · If you have a sore throat the day after the procedure, use an over-the-counter spray to numb your throat. Sucking on throat lozenges and gargling with warm salt water may also help relieve your symptoms. Follow-up care is a key part of your treatment and safety. Be sure to make and go to all appointments, and call your doctor if you are having problems. It's also a good idea to know your test results and keep a list of the medicines you take. When should you call for help? [id number] anytime you think you may need emergency care. For example, call if:  · You passed out (lost consciousness). · You have trouble breathing. · Your stools are maroon or very bloody  Call your doctor now or seek immediate medical care if:  · You have new or worse belly pain. · You have a fever. · You have new or more blood in your stools. · You are sick to your stomach and cannot drink fluids. · You cannot pass stools or gas. · You have pain that does not get better after you take pain medicine. Watch closely for changes in your health, and be sure to contact your doctor if:  · Your throat still hurts after a day or two. · You do not get better as expected. Where can you learn more? Go to http://viral-zenaida.info/  Enter J014 in the search box to learn more about \"Esophageal Dilation: What to Expect at Home. \"  Current as of: August 12, 2019               Content Version: 12.5  © 9462-5362 Healthwise[employer] Inc[employer]. Care instructions adapted under license by [employer] (which disclaims liability or warranty for this information). If you have questions about a medical condition or this instruction, always ask your healthcare professional. [address] any warranty or liability for your use of this information. Patient Education   Hiatal Hernia: Care Instructions  Your Care Instructions  A hiatal hernia occurs when part of the stomach bulges into the chest cavity. A hiatal hernia may allow stomach acid and juices to back up into the esophagus (acid reflux). This can cause a feeling of burning, warmth, heat, or pain behind the breastbone. This feeling may often occur after you eat, soon after you lie down, or when you bend forward, and it may come and go. You also may have a sour taste in your mouth. These symptoms are commonly known as heartburn or reflux. But not all hiatal hernias cause symptoms. Follow-up care is a key part of your treatment and safety. Be sure to make and go to all appointments, and call your doctor if you are having problems. It's also a good idea to know your test results and keep a list of the medicines you take. How can you care for yourself at home? · Take your medicines exactly as prescribed. Call your doctor if you think you are having a problem with your medicine. · Do not take aspirin or other nonsteroidal anti-inflammatory drugs (NSAIDs), such as ibuprofen (Advil, Motrin) or naproxen (Aleve), unless your doctor says it is okay. Ask your doctor what you can take for pain. · Your doctor may recommend over-the-counter medicine. For mild or occasional indigestion, antacids such as Tums, Gaviscon, Maalox, or Mylanta may help. Your doctor also may recommend over-the-counter acid reducers, such as famotidine (Pepcid AC), cimetidine (Tagamet HB), or omeprazole (Prilosec). Read and follow all instructions on the label.  If you use these medicines often, talk with your doctor. · Change your eating habits. ? It's best to eat several small meals instead of two or three large meals. ? After you eat, wait 2 to 3 hours before you lie down. Late-night snacks aren't a good idea. ? Chocolate, mint, and alcohol can make heartburn worse. They relax the valve between the esophagus and the stomach. ? Spicy foods, foods that have a lot of acid (like tomatoes and oranges), and coffee can make heartburn symptoms worse in some people. If your symptoms are worse after you eat a certain food, you may want to stop eating that food to see if your symptoms get better. · Do not smoke or chew tobacco.  · If you get heartburn at night, raise the head of your bed 6 to 8 inches by putting the frame on blocks or placing a foam wedge under the head of your mattress. (Adding extra pillows does not work.)  · Do not wear tight clothing around your middle. · Lose weight if you need to. Losing just 5 to 10 pounds can help. When should you call for help? Call your doctor now or seek immediate medical care if:  · You have new or worse belly pain. · You are vomiting. Watch closely for changes in your health, and be sure to contact your doctor if:  · You have new or worse symptoms of indigestion. · You have trouble or pain swallowing. · You are losing weight. · You do not get better as expected. Where can you learn more? Go to http://viral-zenaida.info/  Enter T074 in the search box to learn more about \"Hiatal Hernia: Care Instructions. \"  Current as of: August 12, 2019               Content Version: 12.5  © 5520-9425 Healthwise, Incorporated. Care instructions adapted under license by [employer] (which disclaims liability or warranty for this information).  If you have questions about a medical condition or this instruction, always ask your healthcare professional. [place] [address] any warranty or liability for your use of this information. Patient Education   Gastritis: Care Instructions  Your Care Instructions     Gastritis is a sore and upset stomach. It happens when something irritates the stomach lining. Many things can cause it. These include an infection such as the flu or something you ate or drank. Medicines or a sore on the lining of the stomach (ulcer) also can cause it. Your belly may bloat and ache. You may belch, vomit, and feel sick to your stomach. You should be able to relieve the problem by taking medicine. And it may help to change your diet. If gastritis lasts, your doctor may prescribe medicine. Follow-up care is a key part of your treatment and safety. Be sure to make and go to all appointments, and call your doctor if you are having problems. It's also a good idea to know your test results and keep a list of the medicines you take. How can you care for yourself at home? · If your doctor prescribed antibiotics, take them as directed. Do not stop taking them just because you feel better. You need to take the full course of antibiotics. · Be safe with medicines. If your doctor prescribed medicine to decrease stomach acid, take it as directed. Call your doctor if you think you are having a problem with your medicine. · Do not take any other medicine, including over-the-counter pain relievers, without talking to your doctor first.  · If your doctor recommends over-the-counter medicine to reduce stomach acid, such as Pepcid AC (famotidine), Prilosec (omeprazole), or Tagamet HB (cimetidine) follow the directions on the label. · Drink plenty of fluids (enough so that your urine is light yellow or clear like water) to prevent dehydration. Choose water and other caffeine-free clear liquids. If you have kidney, heart, or liver disease and have to limit fluids, talk with your doctor before you increase the amount of fluids you drink. · Limit how much alcohol you drink.   · Avoid coffee, tea, cola drinks, chocolate, and other foods with caffeine. They increase stomach acid. When should you call for help? [id number] anytime you think you may need emergency care. For example, call if:  · You vomit blood or what looks like coffee grounds. · You pass maroon or very bloody stools. Call your doctor now or seek immediate medical care if:  · You start breathing fast and have not produced urine in the last 8 hours. · You cannot keep fluids down. Watch closely for changes in your health, and be sure to contact your doctor if:  · You do not get better as expected. Where can you learn more? Go to http://viral-zenaida.info/  Enter Z536 in the search box to learn more about \"Gastritis: Care Instructions. \"  Current as of: August 12, 2019               Content Version: 12.5  © 1134-4155 Healthwise, Incorporated. Care instructions adapted under license by [employer] (which disclaims liability or warranty for this information). If you have questions about a medical condition or this instruction, always ask your healthcare professional. [address] any warranty or liability for your use of this information.

## 2020-08-27 NOTE — ANESTHESIA PREPROCEDURE EVALUATION
Relevant Problems   No relevant active problems       Anesthetic History   No history of anesthetic complications            Review of Systems / Medical History  Patient summary reviewed and pertinent labs reviewed    Pulmonary  Within defined limits                 Neuro/Psych   Within defined limits           Cardiovascular    Hypertension        Dysrhythmias   Hyperlipidemia    Exercise tolerance: >4 METS     GI/Hepatic/Renal  Within defined limits              Endo/Other    Diabetes: type 2    Arthritis and anemia     Other Findings              Physical Exam    Airway  Mallampati: II  TM Distance: 4 - 6 cm  Neck ROM: normal range of motion   Mouth opening: Normal     Cardiovascular  Regular rate and rhythm,  S1 and S2 normal,  no murmur, click, rub, or gallop  Rhythm: regular  Rate: normal         Dental    Dentition: Upper partial plate and Lower partial plate  Comments: Loose R lower canine    Pulmonary  Breath sounds clear to auscultation               Abdominal  GI exam deferred       Other Findings            Anesthetic Plan    ASA: 3  Anesthesia type: MAC          Induction: Intravenous  Anesthetic plan and risks discussed with: Patient

## 2020-09-02 ENCOUNTER — TELEPHONE (OUTPATIENT)
Dept: FAMILY MEDICINE CLINIC | Age: 85
End: 2020-09-02

## 2020-09-02 NOTE — TELEPHONE ENCOUNTER
Have you been diagnosed with, tested for, or told that you are suspected of having COVID-19 (coronovirus)? TESTed / neg -for endoscopy test   Have you had a fever or taken medication to treat a fever in the past 72 hours? no  Have you had a cough, SOB, or flu-like symptoms within the past 3 days? no  Have you had direct contact with someone who tested positive for COVID-19 within the past 14 days? no  Do you have a household member with flu-like symptoms, including fever, cough, or SOB? no  Do you currently have flu-like symptoms including fever, cough, or SOB?

## 2020-09-03 ENCOUNTER — OFFICE VISIT (OUTPATIENT)
Dept: FAMILY MEDICINE CLINIC | Age: 85
End: 2020-09-03

## 2020-09-03 VITALS
BODY MASS INDEX: 27.01 KG/M2 | TEMPERATURE: 98.2 F | HEART RATE: 60 BPM | RESPIRATION RATE: 14 BRPM | OXYGEN SATURATION: 99 % | HEIGHT: 59 IN | WEIGHT: 134 LBS | DIASTOLIC BLOOD PRESSURE: 72 MMHG | SYSTOLIC BLOOD PRESSURE: 126 MMHG

## 2020-09-03 DIAGNOSIS — R01.1 HEART MURMUR: ICD-10-CM

## 2020-09-03 DIAGNOSIS — G62.9 NEUROPATHY: Primary | ICD-10-CM

## 2020-09-03 DIAGNOSIS — R60.0 LEG EDEMA: ICD-10-CM

## 2020-09-03 DIAGNOSIS — D63.8 ANEMIA OF CHRONIC DISEASE: ICD-10-CM

## 2020-09-03 DIAGNOSIS — E11.21 TYPE 2 DIABETES MELLITUS WITH DIABETIC NEPHROPATHY, WITHOUT LONG-TERM CURRENT USE OF INSULIN (HCC): ICD-10-CM

## 2020-09-03 DIAGNOSIS — I10 ESSENTIAL HYPERTENSION: ICD-10-CM

## 2020-09-03 DIAGNOSIS — K29.50 CHRONIC GASTRITIS WITHOUT BLEEDING, UNSPECIFIED GASTRITIS TYPE: ICD-10-CM

## 2020-09-03 DIAGNOSIS — N18.4 CKD (CHRONIC KIDNEY DISEASE) STAGE 4, GFR 15-29 ML/MIN (HCC): ICD-10-CM

## 2020-09-03 DIAGNOSIS — M10.9 GOUT, UNSPECIFIED CAUSE, UNSPECIFIED CHRONICITY, UNSPECIFIED SITE: ICD-10-CM

## 2020-09-03 DIAGNOSIS — E79.0 HYPERURICEMIA: ICD-10-CM

## 2020-09-03 DIAGNOSIS — E78.2 MIXED HYPERLIPIDEMIA: ICD-10-CM

## 2020-09-03 DIAGNOSIS — E11.9 DIABETES MELLITUS TYPE 2, DIET-CONTROLLED (HCC): ICD-10-CM

## 2020-09-03 PROBLEM — I50.32 DIASTOLIC CHF, CHRONIC (HCC): Status: RESOLVED | Noted: 2019-09-01 | Resolved: 2020-09-03

## 2020-09-03 PROBLEM — E11.40 TYPE 2 DIABETES MELLITUS WITH DIABETIC NEUROPATHY (HCC): Status: ACTIVE | Noted: 2020-09-03

## 2020-09-03 RX ORDER — GABAPENTIN 100 MG/1
100 CAPSULE ORAL 3 TIMES DAILY
Qty: 90 CAP | Refills: 0 | Status: SHIPPED | OUTPATIENT
Start: 2020-09-03 | End: 2020-09-08 | Stop reason: DRUGHIGH

## 2020-09-03 RX ORDER — PANTOPRAZOLE SODIUM 40 MG/1
40 TABLET, DELAYED RELEASE ORAL DAILY
COMMUNITY
Start: 2020-08-27 | End: 2021-07-16

## 2020-09-03 NOTE — PATIENT INSTRUCTIONS
Diet for Chronic Kidney Disease (Before Dialysis): Care Instructions Your Care Instructions When you have chronic kidney disease, you need to change your diet to avoid foods that make your kidneys worse. You may need to limit salt, fluids, and protein. You also may need to limit minerals such as potassium and phosphorus. A diet for chronic kidney disease takes planning. A dietitian who specializes in kidney disease can help you plan meals that meet your needs. These guidelines are for people who are not on dialysis. Talk with your doctor or dietitian to make sure your diet is right for your condition. Do not change your diet without talking to your doctor or dietitian. Follow-up care is a key part of your treatment and safety. Be sure to make and go to all appointments, and call your doctor if you are having problems. It's also a good idea to know your test results and keep a list of the medicines you take. How can you care for yourself at home? · Work with your doctor or dietitian to create a food plan. · Do not skip meals or go for many hours without eating. If you do not feel very hungry, try to eat 4 or 5 small meals instead of 1 or 2 big meals. · If you have a hard time eating enough, talk to your doctor or dietitian about ways you can add calories to your diet. · Do not take any vitamins or minerals, supplements, or herbal products without talking to your doctor first. 
· Check with your doctor about whether it is safe for you to drink alcohol. To get the right amount of protein · Ask your doctor or dietitian how much protein you can have each day. Most people with chronic kidney disease need to limit the amount of protein they eat. But you still need some protein to stay healthy. · Include all sources of protein in your daily protein count. Besides meat, poultry, fish, and eggs, protein is found in milk and milk products, beans and nuts, breads, cereals, and vegetables. To limit salt · Read food labels on cans and food packages. The labels tell you how much sodium is in each serving. Make sure that you look at the serving size. If you eat more than the serving size, you will get more sodium than what is listed on the label. · Do not add salt to your food. Avoid foods that list salt, sodium, or monosodium glutamate (MSG) as an ingredient. · Buy foods that are labeled \"no salt added,\" \"sodium-free,\" or \"low-sodium. \" Foods labeled \"reduced-sodium\" and \"light sodium\" may still have too much sodium. · Limit processed foods, fast food, and restaurant foods. These types of food are very high in sodium. · Avoid salted pretzels, chips, popcorn, and other salted snacks. · Avoid smoked, cured, salted, and canned meat, fish, and poultry. This includes ham, bhagat, hot dogs, and luncheon meats. · You may use lemon, herbs, and spices to flavor your meals. To limit potassium · Choose low-potassium fruits such as applesauce, pineapple, grapes, blueberries, and raspberries. · Choose low-potassium vegetables such as lettuce, green beans, cucumber, and radishes. · Choose low-potassium foods such as hummus, spaghetti, macaroni, rice, tortillas, and bagels. · Limit or avoid high-potassium foods such as milk, bananas, oranges, cantaloupe, potatoes, spinach, tomatoes, broccoli, and sweet potatoes. · Do not use a salt substitute or lite salt unless your doctor says it is okay. Most salt substitutes and lite salts are high in potassium. To limit phosphorus · Follow your food plan to know how much milk and milk products you can have. · Limit nuts, peanut butter, seeds, lentils, beans, organ meats, and sardines. · Avoid cola drinks. · Avoid bran breads or bran cereals. If you need to limit fluids · Know how much fluid you can drink. Every day fill a pitcher with that amount of water. If you drink another fluid (such as coffee) that day, pour an equal amount of water out of the pitcher. · Count foods that are liquid at room temperature, such as gelatin dessert and ice cream, as fluids. Where can you learn more? Go to http://www.gray.com/ Enter H508 in the search box to learn more about \"Diet for Chronic Kidney Disease (Before Dialysis): Care Instructions. \" Current as of: August 22, 2019               Content Version: 12.5 © 1144-8656 [employer]. Care instructions adapted under license by [employer] (which disclaims liability or warranty for this information). If you have questions about a medical condition or this instruction, always ask your healthcare professional. [place] [address] any warranty or liability for your use of this information.

## 2020-09-03 NOTE — PROGRESS NOTES
1. Have you been to the ER, urgent care clinic since your last visit? Hospitalized since your last visit? No    2. Have you seen or consulted any other health care providers outside of the [address] [place] [address] since your last visit? Include any pap smears or colon screening. No       Have you been diagnosed with, tested for, or told that you are suspected of having COVID-19 (coronovirus)? No  Have you had a fever or taken medication to treat a fever in the past 72 hours? No  Have you had a cough, SOB, or flu-like symptoms within the past 3 days? No - has constant SOB due to previous medical condition. Have you had direct contact with someone who tested positive for COVID-19 within the past 14 days? No  Do you have a household member with flu-like symptoms, including fever, cough, or SOB? No  Do you currently have flu-like symptoms including fever, cough, or SOB?  No      Blood sugar this am (121)

## 2020-09-08 ENCOUNTER — OFFICE VISIT (OUTPATIENT)
Dept: ORTHOPEDIC SURGERY | Age: 85
End: 2020-09-08

## 2020-09-08 VITALS
BODY MASS INDEX: 27.01 KG/M2 | HEART RATE: 45 BPM | WEIGHT: 134 LBS | TEMPERATURE: 98.2 F | RESPIRATION RATE: 16 BRPM | HEIGHT: 59 IN | OXYGEN SATURATION: 98 % | DIASTOLIC BLOOD PRESSURE: 52 MMHG | SYSTOLIC BLOOD PRESSURE: 78 MMHG

## 2020-09-08 DIAGNOSIS — M48.062 SPINAL STENOSIS OF LUMBAR REGION WITH NEUROGENIC CLAUDICATION: Primary | ICD-10-CM

## 2020-09-08 DIAGNOSIS — G62.9 NEUROPATHY: ICD-10-CM

## 2020-09-08 RX ORDER — GABAPENTIN 300 MG/1
300 CAPSULE ORAL 3 TIMES DAILY
Qty: 90 CAP | Refills: 2 | Status: SHIPPED | OUTPATIENT
Start: 2020-09-08 | End: 2021-04-15 | Stop reason: SINTOL

## 2020-09-08 NOTE — PROGRESS NOTES
[address]  [address], [address]  [facility], [address]  Phone: [phone]  Fax: [phone]        Bravo Fortune  : 1933  PCP: Lainey Johnston MD  2020    NEW PATIENT      HISTORY OF PRESENT ILLNESS  Hiltno Michaels is a 80 y.o. female c/o numbness in the BLE x 1 month. She has a feeling in her feet like she is walking on something. Her symptoms are worse with walking, and she finds relief with sitting. She notes that she was diagnosed with DM in the past, but she was able to reduce her sugars and discontinue her medications. Pain Score: 0/10. Treatments patient has tried:  Physical therapy:No  Doing HEP: Unknown  Non-opioid medications: Yes  Spinal injections: No  Spinal surgery- No.   Last Lumbar MRI: None. PmHx: HTN, CKD (most recent creatinine level 1.43 ), DM    ASSESSMENT  This is an 80year-old female with numbness in the BLE x 1 month. Her symptoms may be due to lumbar spinal stenosis with neurogenic claudication vs peripheral neuropathy. Physical therapy is not likely to be beneficial.     PLAN  1. Increase Gabapentin to 300 mg TID. 2. Lumbar MRI - BLE paraesthesia worse with standing time, improves with sitting; evaluate lumbar spinal stenosis    Pt will f/u after MRI or sooner if needed. Diagnoses and all orders for this visit:    1. Spinal stenosis of lumbar region with neurogenic claudication  -     MRI LUMB SPINE WO CONT; Future  -     gabapentin (NEURONTIN) 300 mg capsule; Take 1 Cap by mouth three (3) times daily. Max Daily Amount: 900 mg.    2. Neuropathy  -     gabapentin (NEURONTIN) 300 mg capsule; Take 1 Cap by mouth three (3) times daily. Max Daily Amount: 900 mg. Cathleen Walshirn is seen today in consultation at the request of Lainey Johnston MD for complaints of BLE paraesthesia.        PAST MEDICAL HISTORY   Past Medical History:   Diagnosis Date    Anemia     Carotid bruit 12/07/2013    Carotid Duplex: 1. Bilateral 1-49% stenosis of the internal carotid arteries. 2. No significant stenosis in the external carotid arteries bilaterally. 3. Antegrade flow in both vetebral arteries. 4. Normal flow in both subclavian arteries. Plaque Morphology: 1. Hyperechoic plaque in the bulb and right ICA. 2. Hyperechoic plaque in the bulb and left ICA.  CKD (chronic kidney disease) stage 3, GFR 30-59 ml/min ([facility])     Diabetes ([facility] [address]     NIDDM    Gastritis 10/27/2014    Duodenum Bx: No Specific Pathologic Abnormality. No Villous Abnormality. 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 No dysplasia or malignancy identified. Bacterial Organisms c/w H. Pylori are present. Z-Line Bx: GE mucosa w/ Acute/Chronic Inflammation & Reactive Changes. Focal Specialized Type Campos Mucosa Identified. No Dysplasia or Malignancy Identified.  Gout 12/10/2009    Elevated Uric Acid Level    Hyperlipidemia     Hypertension     RAMÓN (iron deficiency anemia)        Past Surgical History:   Procedure Laterality Date    HX COLONOSCOPY  10/27/2014    Dr. Santi Dumont HX ENDOSCOPY  10/27/2014    Dr. Thiago Aviles    Current Outpatient Medications   Medication Sig Dispense Refill    pantoprazole (PROTONIX) 40 mg tablet Take 40 mg by mouth daily.  gabapentin (NEURONTIN) 100 mg capsule Take 1 Cap by mouth three (3) times daily. Max Daily Amount: 300 mg. 90 Cap 0    potassium chloride (K-DUR, KLOR-CON) 20 mEq tablet Take 20 mEq by mouth daily.  amLODIPine (NORVASC) 10 mg tablet Take 1 tablet by mouth once daily 90 Tab 1    simvastatin (ZOCOR) 40 mg tablet Take 1 tablet by mouth nightly 90 Tab 0    colchicine (Colcrys) 0.6 mg tablet Take 1 Tab by mouth daily.  Indications: a type of joint disorder due to excess uric acid in the blood called gout 90 Tab 2    cloNIDine HCL (CATAPRES) 0.2 mg tablet Take 1 tablet by mouth twice daily 180 Tab 1  hydrALAZINE (APRESOLINE) 100 mg tablet TAKE 1 TABLET BY MOUTH THREE TIMES DAILY 270 Tab 3    latanoprost (XALATAN) 0.005 % ophthalmic solution       OTHER BMP on 9/6/19 morning  Call report to PCP  Reason: CKD 1 Each 0    glucose blood VI test strips (PRODIGY NO CODING) strip Check fasting glucose once daily 100 Strip 3    Blood-Glucose Meter (PRODIGY AUTOCODE MONITOR SYST) misc Check fasting glucose once daily 1 Each 0    cholecalciferol, VITAMIN D3, (VITAMIN D3) 5,000 unit tab tablet Take  by mouth daily.  Aspirin, Buffered 81 mg tab Take 1 tablet by mouth daily.  fish oil-dha-epa 1,200-144-216 mg cap Take 1 tablet by mouth daily.          ALLERGIES  No Known Allergies       SOCIAL HISTORY    Social History     Socioeconomic History    Marital status:      Spouse name: Not on file    Number of children: Not on file    Years of education: Not on file    Highest education level: Not on file   Tobacco Use    Smoking status: Never Smoker    Smokeless tobacco: Never Used   Substance and Sexual Activity    Alcohol use: No    Drug use: No    Sexual activity: Not Currently     Partners: Male     Birth control/protection: None       FAMILY HISTORY  Family History   Problem Relation Age of Onset    Hypertension Mother     Stroke Mother     Stroke Father          REVIEW OF SYSTEMS  Review of Systems   Musculoskeletal:        BLE paraesthesia         PHYSICAL EXAMINATION  Visit Vitals  BP (!) 78/52 (BP 1 Location: Left arm, BP Patient Position: Sitting)   Pulse (!) 45   Temp 98.2 °F (36.8 °C) (Skin)   Resp 16   Ht 4' 11\" (1.499 m)   Wt 134 lb (60.8 kg)   SpO2 98%   BMI 27.06 kg/m²         Pain Assessment  9/8/2020   Location of Pain Back;Leg   Location Modifiers Left;Right   Quality of Pain Other (Comment)   Quality of Pain Comment numbness   Duration of Pain Persistent   Frequency of Pain Constant   Aggravating Factors Other (Comment)   Aggravating Factors Comment daily activity Limiting Behavior Yes   Relieving Factors Nothing   Result of Injury No         Constitutional:  Well developed, well nourished, in no acute distress. Psychiatric: Affect and mood are appropriate. HEENT: Normocephalic, atraumatic. Extraocular movements intact. Integumentary: No rashes or abrasions noted on exposed areas. Cardiovascular: Regular rate and rhythm. Pulmonary: Clear to auscultation bilaterally. SPINE/MUSCULOSKELETAL EXAM    Lumbar spine:  No rash, ecchymosis, or gross obliquity. No fasciculations. No focal atrophy is noted. No pain with hip ROM. Full range of motion. No tenderness to palpation. No tenderness to palpation at the sciatic notch. SI joints non-tender. Trochanters non tender. Sensation in the bilateral legs grossly intact to light touch.    +2 Pitting Edema in BLE      MOTOR:      Biceps  Triceps Deltoids Wrist Ext Wrist Flex Hand Intrin   Right 5/5 5/5 5/5 5/5 5/5 5/5   Left 5/5 5/5 5/5 5/5 5/5 5/5             Hip Flex  Quads Hamstrings Ankle DF EHL Ankle PF   Right 5/5 5/5 5/5 5/5 5/5 5/5   Left 5/5 5/5 5/5 5/5 5/5 5/5     DTRs are 1+ biceps, triceps, brachioradialis, patella, and Achilles. Negative Straight Leg raise. Squat not tested. No difficulty with tandem gait. Ambulation without assistive device. FWB. RADIOGRAPHS/DATA  Lumbar XR images taken on 7/30/2020 personally reviewed with patient:  There is mild leftward scoliotic curvature of the lumbar spine. There  is no definite acute fracture or subluxation. There is moderate degenerative  disc disease which is most severe at L3-L4, but more mild at L4-L5 and L5-S1. There are diffuse facet arthrosis changes bilaterally which are most significant  at L4-L5. There is atherosclerosis. Overlying bowel gas is nonobstructive.     IMPRESSION  Impression:  1. Moderate degenerative changes of the spine which are most significant with  degenerative disc disease at L3-L4.      reviewed    Ms. Feli Harden has a reminder for a \"due or due soon\" health maintenance. I have asked that she contact her primary care provider for follow-up on this health maintenance. 15 minutes of face-to-face contact were spent with the patient during today's visit extensively discussing symptoms and treatment plan. All questions were answered. More than half of this visit today was spent on counseling. Written by Pako Dao, as dictated by Dr. Arlette Rizzo. I, Dr. Arlette Rizzo, confirm that all documentation is accurate.

## 2020-09-11 ENCOUNTER — HOSPITAL ENCOUNTER (OUTPATIENT)
Dept: MRI IMAGING | Age: 85
Discharge: HOME | End: 2020-09-11
Attending: PHYSICAL MEDICINE & REHABILITATION

## 2020-09-11 DIAGNOSIS — M48.062 SPINAL STENOSIS OF LUMBAR REGION WITH NEUROGENIC CLAUDICATION: ICD-10-CM

## 2020-09-14 ENCOUNTER — TELEPHONE (OUTPATIENT)
Dept: ORTHOPEDIC SURGERY | Age: 85
End: 2020-09-14

## 2020-09-14 DIAGNOSIS — F41.8 TEST ANXIETY: Primary | ICD-10-CM

## 2020-09-14 RX ORDER — DIAZEPAM 2 MG/1
TABLET ORAL
Qty: 1 TAB | Refills: 0 | Status: SHIPPED | OUTPATIENT
Start: 2020-09-14 | End: 2021-01-15 | Stop reason: ALTCHOICE

## 2020-09-14 NOTE — TELEPHONE ENCOUNTER
Pt could not complete her mri this past Friday. States she needs a sedative to help her through it.     Pharmacy :  [facility]    Pt V#[phone]

## 2020-09-18 ENCOUNTER — HOSPITAL ENCOUNTER (OUTPATIENT)
Dept: MRI IMAGING | Age: 85
Discharge: HOME | End: 2020-09-18
Attending: PHYSICAL MEDICINE & REHABILITATION
Payer: MEDICARE

## 2020-09-18 PROCEDURE — 72148 MRI LUMBAR SPINE W/O DYE: CPT

## 2020-09-22 ENCOUNTER — OFFICE VISIT (OUTPATIENT)
Dept: ORTHOPEDIC SURGERY | Age: 85
End: 2020-09-22
Payer: MEDICARE

## 2020-09-22 VITALS
TEMPERATURE: 98.4 F | HEIGHT: 59 IN | RESPIRATION RATE: 18 BRPM | HEART RATE: 65 BPM | DIASTOLIC BLOOD PRESSURE: 78 MMHG | WEIGHT: 144 LBS | SYSTOLIC BLOOD PRESSURE: 160 MMHG | OXYGEN SATURATION: 100 % | BODY MASS INDEX: 29.03 KG/M2

## 2020-09-22 DIAGNOSIS — M48.062 SPINAL STENOSIS OF LUMBAR REGION WITH NEUROGENIC CLAUDICATION: Primary | ICD-10-CM

## 2020-09-22 PROCEDURE — G8432 DEP SCR NOT DOC, RNG: HCPCS | Performed by: PHYSICAL MEDICINE & REHABILITATION

## 2020-09-22 PROCEDURE — G8417 CALC BMI ABV UP PARAM F/U: HCPCS | Performed by: PHYSICAL MEDICINE & REHABILITATION

## 2020-09-22 PROCEDURE — G8536 NO DOC ELDER MAL SCRN: HCPCS | Performed by: PHYSICAL MEDICINE & REHABILITATION

## 2020-09-22 PROCEDURE — G8427 DOCREV CUR MEDS BY ELIG CLIN: HCPCS | Performed by: PHYSICAL MEDICINE & REHABILITATION

## 2020-09-22 PROCEDURE — 1090F PRES/ABSN URINE INCON ASSESS: CPT | Performed by: PHYSICAL MEDICINE & REHABILITATION

## 2020-09-22 PROCEDURE — 99213 OFFICE O/P EST LOW 20 MIN: CPT | Performed by: PHYSICAL MEDICINE & REHABILITATION

## 2020-09-22 PROCEDURE — 1101F PT FALLS ASSESS-DOCD LE1/YR: CPT | Performed by: PHYSICAL MEDICINE & REHABILITATION

## 2020-09-22 NOTE — PROGRESS NOTES
[address]  [address], [address]  [place]  Phone: [phone]  Fax: [phone]        Sulema Harrell  : 1933  PCP: Giselle Hubbard MD  2020    PROGRESS NOTE      HISTORY OF PRESENT ILLNESS  Merlene Javier is a 80 y.o. female who was seen as a new patient 2020 with c/o numbness in the BLE x 1 month. She had a feeling in her feet like she was walking on something. Her symptoms were worse with walking, and she found relief with sitting. She noted that she was diagnosed with DM in the past, but she was able to reduce her sugars and discontinue her medications. Merlene Javier comes in to the office today for f/u. Pt notes that she has been doing well other than the edema in her BLE. She reports a limited standing tolerance. Lumbar spine MRI dated 2020 reviewed. Per report, L5 S1 demonstrates high grade central canal stenosis. L4-5 demonstrates high-grade central canal stenosis borderline impingement on the exiting right and left L4 nerve roots. L3-4 demonstrates central canal stenosis. She has been tolerant to Gabapentin. Pain Score: 0/10. Treatments patient has tried:  Physical therapy:No  Doing HEP: Unknown  Non-opioid medications: Yes - Gabapentin  Spinal injections: No  Spinal surgery- No.   Last Lumbar MRI: .     PmHx: HTN, CKD (most recent creatinine level 1.43 ), DM    ASSESSMENT  This is an 80year-old female with a limited standing tolerance and numbness in the BLE. Her symptoms were likely due to high-grade central stenosis at L5-S1 and L4-5 with borderline impingement on the exiting bilateral L4 nerve roots. There is also central stenosis at L3-4. We discussed options of: neuromodulators, lumbar YOGESH    PLAN  1. I advised she f/u with PCP in regards to incidental finding on MRI of renal cysts  2. She can call if she needs a refill of Gabapentin 300 mg TID or needs to increase it further.        Pt will f/u in 3 months or sooner as needed. Diagnoses and all orders for this visit:    1. Spinal stenosis of lumbar region with neurogenic claudication        PAST MEDICAL HISTORY   Past Medical History:   Diagnosis Date    Anemia     Carotid bruit 12/07/2013    Carotid Duplex: 1. Bilateral 1-49% stenosis of the internal carotid arteries. 2. No significant stenosis in the external carotid arteries bilaterally. 3. Antegrade flow in both vetebral arteries. 4. Normal flow in both subclavian arteries. Plaque Morphology: 1. Hyperechoic plaque in the bulb and right ICA. 2. Hyperechoic plaque in the bulb and left ICA.  CKD (chronic kidney disease) stage 3, GFR 30-59 ml/min ([facility])     Diabetes ([facility] [address]     NIDDM    Gastritis 10/27/2014    Duodenum Bx: No Specific Pathologic Abnormality. No Villous Abnormality. 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 No dysplasia or malignancy identified. Bacterial Organisms c/w H. Pylori are present. Z-Line Bx: GE mucosa w/ Acute/Chronic Inflammation & Reactive Changes. Focal Specialized Type Capmos Mucosa Identified. No Dysplasia or Malignancy Identified.  Gout 12/10/2009    Elevated Uric Acid Level    Hyperlipidemia     Hypertension     RAMÓN (iron deficiency anemia)        Past Surgical History:   Procedure Laterality Date    HX COLONOSCOPY  10/27/2014    Dr. Montez Distance HX ENDOSCOPY  10/27/2014    Dr. Ester Gamboa    . MEDICATIONS    Current Outpatient Medications   Medication Sig Dispense Refill    diazePAM (VALIUM) 2 mg tablet 1 tab 30 mins prior to MRI    Will need  to and from MRI  Indications: anxious 1 Tab 0    gabapentin (NEURONTIN) 300 mg capsule Take 1 Cap by mouth three (3) times daily. Max Daily Amount: 900 mg. 90 Cap 2    pantoprazole (PROTONIX) 40 mg tablet Take 40 mg by mouth daily.  potassium chloride (K-DUR, KLOR-CON) 20 mEq tablet Take 20 mEq by mouth daily.       amLODIPine (NORVASC) 10 mg tablet Take 1 tablet by mouth once daily 90 Tab 1    simvastatin (ZOCOR) 40 mg tablet Take 1 tablet by mouth nightly 90 Tab 0    colchicine (Colcrys) 0.6 mg tablet Take 1 Tab by mouth daily. Indications: a type of joint disorder due to excess uric acid in the blood called gout 90 Tab 2    cloNIDine HCL (CATAPRES) 0.2 mg tablet Take 1 tablet by mouth twice daily 180 Tab 1    hydrALAZINE (APRESOLINE) 100 mg tablet TAKE 1 TABLET BY MOUTH THREE TIMES DAILY 270 Tab 3    latanoprost (XALATAN) 0.005 % ophthalmic solution       OTHER BMP on 9/6/19 morning  Call report to PCP  Reason: CKD 1 Each 0    glucose blood VI test strips (PRODIGY NO CODING) strip Check fasting glucose once daily 100 Strip 3    Blood-Glucose Meter (PRODIGY AUTOCODE MONITOR SYST) misc Check fasting glucose once daily 1 Each 0    cholecalciferol, VITAMIN D3, (VITAMIN D3) 5,000 unit tab tablet Take  by mouth daily.  Aspirin, Buffered 81 mg tab Take 1 tablet by mouth daily.  fish oil-dha-epa 1,200-144-216 mg cap Take 1 tablet by mouth daily.           ALLERGIES  No Known Allergies       SOCIAL HISTORY    Social History     Socioeconomic History    Marital status:      Spouse name: Not on file    Number of children: Not on file    Years of education: Not on file    Highest education level: Not on file   Tobacco Use    Smoking status: Never Smoker    Smokeless tobacco: Never Used   Substance and Sexual Activity    Alcohol use: No    Drug use: No    Sexual activity: Not Currently     Partners: Male     Birth control/protection: None       FAMILY HISTORY  Family History   Problem Relation Age of Onset    Hypertension Mother     Stroke Mother     Stroke Father          REVIEW OF SYSTEMS  Review of Systems   Musculoskeletal:        BLE paraesthesia          PHYSICAL EXAMINATION  Visit Vitals  BP (!) 160/78   Pulse 65   Temp 98.4 °F (36.9 °C) (Temporal)   Resp 18   Ht 4' 11\" (1.499 m)   Wt 144 lb (65.3 kg) SpO2 100%   BMI 29.08 kg/m²       Pain Assessment  9/8/2020   Location of Pain Back;Leg   Location Modifiers Left;Right   Quality of Pain Other (Comment)   Quality of Pain Comment numbness   Duration of Pain Persistent   Frequency of Pain Constant   Aggravating Factors Other (Comment)   Aggravating Factors Comment daily activity   Limiting Behavior Yes   Relieving Factors Nothing   Result of Injury No           Constitutional:  Well developed, well nourished, in no acute distress. Psychiatric: Affect and mood are appropriate. Integumentary: No rashes or abrasions noted on exposed areas. SPINE/MUSCULOSKELETAL EXAM    Lumbar spine:  No rash, ecchymosis, or gross obliquity. No fasciculations. No focal atrophy is noted. No pain with hip ROM. Full range of motion. No tenderness to palpation. No tenderness to palpation at the sciatic notch. SI joints non-tender. Trochanters non tender. Sensation in the bilateral legs grossly intact to light touch.     +2 Pitting Edema in BLE    MOTOR:      Biceps  Triceps Deltoids Wrist Ext Wrist Flex Hand Intrin   Right 5/5 5/5 5/5 5/5 5/5 5/5   Left 5/5 5/5 5/5 5/5 5/5 5/5             Hip Flex  Quads Hamstrings Ankle DF EHL Ankle PF   Right 5/5 5/5 5/5 5/5 5/5 5/5   Left 5/5 5/5 5/5 5/5 5/5 5/5        DTRs are 1+ biceps, triceps, brachioradialis, patella, and Achilles.     Negative Straight Leg raise. Squat not tested. No difficulty with tandem gait.      Ambulation without assistive device.  FWB.       RADIOGRAPHS/DATA  Lumbar MRI images taken on 9/18/2020 personally reviewed with patient:  Alignment: Intact lordosis  Vertebral body height: Normal  Marrow signal: Unremarkable  Disc spaces: Preserved height and signal intensity  Conus: Terminates at T12-L1     Axial imaging correlation:       L1-2: Patent canal and foramina.     L2-3: Central canal normal the right neuroforamina normal. Left neuroforamina  normal.     L3-4: Central canal stenosis AP dimension 0.8 cm. Right neuroforamina  demonstrates exiting nerve root is contacted by overgrown facet and small focal herniation but not severe compression. Left neuroforamina unremarkable     L4-5: High-grade canal stenosis central canal 0.7 cm right neuroforamina  demonstrates exiting L4 nerve root is contacted by hypertrophied facet but not  severely compressed. Left neuroforamina demonstrates herniation contacting the inferior aspect of the neuroforamina with facet arthropathy contacting and  mildly compressing the exiting left L4 nerve root     L5-S1: High-grade central canal stenosis is present AP dimension maximum under 1 cm and there is also significant lateral stenosis secondary to ligamentous hypertrophy     The right neuroforamina is normal. Left neuroforamen is normal     Appearance of this level is very similar to the CT scan 2016     Other structures: There is evidence for multiple cysts of the kidneys the  largest of these on the right side measuring 3.9 x 3.0 cm largest on the left  side measuring 1.2 x 1.4 cm. No features of malignancy are seen        IMPRESSION  IMPRESSION:     L5 S1 demonstrates high grade central canal stenosis. L4-5 demonstrates high-grade central canal stenosis borderline impingement on the exiting right and left L4 nerve roots. L3-4 demonstrates central canal stenosis.       Lumbar XR images taken on 7/30/2020 personally reviewed with patient:  There is mild leftward scoliotic curvature of the lumbar spine. There  is no definite acute fracture or subluxation. There is moderate degenerative  disc disease which is most severe at L3-L4, but more mild at L4-L5 and L5-S1. There are diffuse facet arthrosis changes bilaterally which are most significant  at L4-L5. There is atherosclerosis.  Overlying bowel gas is nonobstructive.     IMPRESSION  Impression:  1.  Moderate degenerative changes of the spine which are most significant with  degenerative disc disease at L3-L4.    8 minutes of face-to-face contact were spent with the patient during today's visit extensively discussing symptoms and treatment plan. All questions were answered. More than half of this visit today was spent on counseling.      Written by Heena Wills as dictated by Skylar Diggs MD

## 2020-09-23 ENCOUNTER — TELEPHONE (OUTPATIENT)
Dept: FAMILY MEDICINE CLINIC | Age: 85
End: 2020-09-23

## 2020-09-23 NOTE — TELEPHONE ENCOUNTER
Have you been diagnosed with, tested for, or told that you are suspected of having COVID-19 (coronovirus)? Tested 8/21/2020 neg   Have you had a fever or taken medication to treat a fever in the past 72 hours? no  Have you had a cough, SOB, or flu-like symptoms within the past 3 days? no  Have you had direct contact with someone who tested positive for COVID-19 within the past 14 days? No   Do you have a household member with flu-like symptoms, including fever, cough, or SOB? Do you currently have flu-like symptoms including fever, cough, or SOB? no  Are you experiencing new loss of taste or smell?  No           For both pt and

## 2020-09-24 ENCOUNTER — OFFICE VISIT (OUTPATIENT)
Dept: FAMILY MEDICINE CLINIC | Age: 85
End: 2020-09-24
Payer: MEDICARE

## 2020-09-24 VITALS
WEIGHT: 146 LBS | HEART RATE: 63 BPM | SYSTOLIC BLOOD PRESSURE: 140 MMHG | HEIGHT: 59 IN | DIASTOLIC BLOOD PRESSURE: 80 MMHG | OXYGEN SATURATION: 99 % | BODY MASS INDEX: 29.43 KG/M2 | TEMPERATURE: 97.8 F | RESPIRATION RATE: 16 BRPM

## 2020-09-24 DIAGNOSIS — N18.4 TYPE 2 DIABETES MELLITUS WITH STAGE 4 CHRONIC KIDNEY DISEASE, WITHOUT LONG-TERM CURRENT USE OF INSULIN (HCC): ICD-10-CM

## 2020-09-24 DIAGNOSIS — R60.0 LEG EDEMA: ICD-10-CM

## 2020-09-24 DIAGNOSIS — E78.2 MIXED HYPERLIPIDEMIA: Chronic | ICD-10-CM

## 2020-09-24 DIAGNOSIS — N18.4 CKD (CHRONIC KIDNEY DISEASE) STAGE 4, GFR 15-29 ML/MIN (HCC): ICD-10-CM

## 2020-09-24 DIAGNOSIS — N28.1 BILATERAL RENAL CYSTS: ICD-10-CM

## 2020-09-24 DIAGNOSIS — I10 ESSENTIAL HYPERTENSION: Chronic | ICD-10-CM

## 2020-09-24 DIAGNOSIS — M48.062 SPINAL STENOSIS OF LUMBAR REGION WITH NEUROGENIC CLAUDICATION: ICD-10-CM

## 2020-09-24 DIAGNOSIS — Z23 ENCOUNTER FOR IMMUNIZATION: ICD-10-CM

## 2020-09-24 DIAGNOSIS — E11.22 TYPE 2 DIABETES MELLITUS WITH STAGE 4 CHRONIC KIDNEY DISEASE, WITHOUT LONG-TERM CURRENT USE OF INSULIN (HCC): ICD-10-CM

## 2020-09-24 DIAGNOSIS — E11.40 TYPE 2 DIABETES MELLITUS WITH DIABETIC NEUROPATHY, WITHOUT LONG-TERM CURRENT USE OF INSULIN (HCC): ICD-10-CM

## 2020-09-24 DIAGNOSIS — G62.9 NEUROPATHY: Primary | ICD-10-CM

## 2020-09-24 PROCEDURE — G8427 DOCREV CUR MEDS BY ELIG CLIN: HCPCS | Performed by: FAMILY MEDICINE

## 2020-09-24 PROCEDURE — 90653 IIV ADJUVANT VACCINE IM: CPT

## 2020-09-24 PROCEDURE — G8417 CALC BMI ABV UP PARAM F/U: HCPCS | Performed by: FAMILY MEDICINE

## 2020-09-24 PROCEDURE — G8510 SCR DEP NEG, NO PLAN REQD: HCPCS | Performed by: FAMILY MEDICINE

## 2020-09-24 PROCEDURE — 3051F HG A1C>EQUAL 7.0%<8.0%: CPT | Performed by: FAMILY MEDICINE

## 2020-09-24 PROCEDURE — 1101F PT FALLS ASSESS-DOCD LE1/YR: CPT | Performed by: FAMILY MEDICINE

## 2020-09-24 PROCEDURE — 99214 OFFICE O/P EST MOD 30 MIN: CPT | Performed by: FAMILY MEDICINE

## 2020-09-24 PROCEDURE — 1090F PRES/ABSN URINE INCON ASSESS: CPT | Performed by: FAMILY MEDICINE

## 2020-09-24 PROCEDURE — G8536 NO DOC ELDER MAL SCRN: HCPCS | Performed by: FAMILY MEDICINE

## 2020-09-24 PROCEDURE — G0463 HOSPITAL OUTPT CLINIC VISIT: HCPCS | Performed by: FAMILY MEDICINE

## 2020-09-24 PROCEDURE — 90471 IMMUNIZATION ADMIN: CPT | Performed by: FAMILY MEDICINE

## 2020-09-24 RX ORDER — SIMVASTATIN 40 MG/1
TABLET, FILM COATED ORAL
Qty: 90 TAB | Refills: 3 | Status: SHIPPED | OUTPATIENT
Start: 2020-09-24 | End: 2021-07-16

## 2020-09-24 RX ORDER — CLONIDINE HYDROCHLORIDE 0.2 MG/1
TABLET ORAL
Qty: 180 TAB | Refills: 1 | Status: SHIPPED | OUTPATIENT
Start: 2020-09-24 | End: 2021-04-15 | Stop reason: SDUPTHER

## 2020-09-24 RX ORDER — HYDRALAZINE HYDROCHLORIDE 100 MG/1
TABLET, FILM COATED ORAL
Qty: 270 TAB | Refills: 3 | Status: SHIPPED | OUTPATIENT
Start: 2020-09-24 | End: 2021-11-10

## 2020-09-24 NOTE — PROGRESS NOTES
Mykel Bustos, 80 y.o.,  female    SUBJECTIVE  Ff-up    Numbness of legs/feet- some response to gabapentin, tho somewhat sedating. She is currently on 300 mg increased dose by dr. Castillo Major for spine stenosis. MRI incidental finding of benign appearing renal cysts, reviewed c/w US done by dr. Cherylene Perna for CKD. HTN/CKD 3-  She continues to take clonidine, hydralazine and norvasc. She is following dr. Parveen Celis who recommended to continue to be off lisinopril add potassium andl monitoring proteinuria. She is on prn lasix few times a week for leg edema. I advised her to start using compression stockings as well. She has h/o AV block and advised against BB. DM w/ CKD 3-  diet controlled, She Reports [facility]  1teens. HL- on zocor    Aortic regurtiation /bradycardia- evaluated by cardiology Dr. Regi Aleman 2019,  She is asymptomatic, likely due to calcific aortic valve and recommend recheck in about 2-3 years. Gout- usually foot swelling and pain, related to seafood. Says taking colchicine daily. + hyperuricemia     Weight- trending up, previous EGD gastritis/schatzis ring dilated    ROS:  See HPI, all others negative        Patient Active Problem List   Diagnosis Code    Essential hypertension I10    Mixed hyperlipidemia E78.2    Advanced directives, counseling/discussion Z71.89    Controlled type 2 diabetes mellitus with stage 3 chronic kidney disease, without long-term current use of insulin ([facility]) E11.22, N18.3         Current Outpatient Medications:     amLODIPine (NORVASC) 10 mg tablet, Take 1 tablet by mouth once daily, Disp: 90 Tab, Rfl: 1    furosemide (LASIX) 20 mg tablet, Take 1 tablet once daily as needed for edema, Disp: 90 Tab, Rfl: 1    simvastatin (ZOCOR) 40 mg tablet, Take 1 tablet by mouth nightly, Disp: 90 Tab, Rfl: 0    colchicine (Colcrys) 0.6 mg tablet, Take 1 Tab by mouth daily.  Indications: a type of joint disorder due to excess uric acid in the blood called gout, Disp: 90 Tab, Rfl: 2    cloNIDine HCL (CATAPRES) 0.2 mg tablet, Take 1 tablet by mouth twice daily, Disp: 180 Tab, Rfl: 1    hydrALAZINE (APRESOLINE) 100 mg tablet, TAKE 1 TABLET BY MOUTH THREE TIMES DAILY, Disp: 270 Tab, Rfl: 3    latanoprost (XALATAN) 0.005 % ophthalmic solution, , Disp: , Rfl:     glucose blood VI test strips (PRODIGY NO CODING) strip, Check fasting glucose once daily, Disp: 100 Strip, Rfl: 3    Blood-Glucose Meter (PRODIGY AUTOCODE MONITOR SYST) misc, Check fasting glucose once daily, Disp: 1 Each, Rfl: 0    cholecalciferol, VITAMIN D3, (VITAMIN D3) 5,000 unit tab tablet, Take  by mouth daily. , Disp: , Rfl:     Aspirin, Buffered 81 mg tab, Take 1 tablet by mouth daily. , Disp: , Rfl:     fish oil-dha-epa 1,200-144-216 mg cap, Take 1 tablet by mouth daily. , Disp: , Rfl:     OTHER, BMP on 9/6/19 morning Call report to PCP Reason: CKD, Disp: 1 Each, Rfl: 0    No Known Allergies    Past Medical History:   Diagnosis Date    Anemia     Carotid bruit 12/07/2013    Carotid Duplex: 1. Bilateral 1-49% stenosis of the internal carotid arteries. 2. No significant stenosis in the external carotid arteries bilaterally. 3. Antegrade flow in both vetebral arteries. 4. Normal flow in both subclavian arteries. Plaque Morphology: 1. Hyperechoic plaque in the bulb and right ICA. 2. Hyperechoic plaque in the bulb and left ICA.  CKD (chronic kidney disease) stage 3, GFR 30-59 ml/min     Diabetes ([facility])     NIDDM    Gastritis 10/27/2014    Duodenum Bx: No Specific Pathologic Abnormality. No Villous Abnormality. 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 No dysplasia or malignancy identified. Bacterial Organisms c/w H. Pylori are present. Z-Line Bx: GE mucosa w/ Acute/Chronic Inflammation & Reactive Changes. Focal Specialized Type Campos Mucosa Identified. No Dysplasia or Malignancy Identified.      Gout 12/10/2009    Elevated Uric Acid Level    Hyperlipidemia     Hypertension     RAMÓN (iron deficiency anemia)        Social History     Social History    Marital status:      Spouse name: N/A    Number of children: N/A    Years of education: N/A     Occupational History    Not on file.      Social History Main Topics    Smoking status: Never Smoker    Smokeless tobacco: Never Used    Alcohol use No    Drug use: No    Sexual activity: Not Currently     Partners: Male     Birth control/ protection: None     Other Topics Concern    Not on file     Social History Narrative       Family History   Problem Relation Age of Onset    Hypertension Mother     Stroke Mother     Stroke Father          OBJECTIVE    Physical Exam:     Visit Vitals  /82 (BP 1 Location: Left arm, BP Patient Position: Sitting)   Pulse (!) 54   Temp 97.4 °F (36.3 °C) (Oral)   Resp 16   Ht 4' 11\" (1.499 m)   Wt 139 lb (63 kg)   SpO2 98%   BMI 28.07 kg/m²         General: alert, well-appearing, AA,  in no apparent distress or pain  Neck: supple, no adenopathy palpated  CVS: normal rate,  regular rhythm, + murmur  Lungs:clear to ausculation bilaterally, no crackles, wheezing or rhonchi noted  Extremities: + grade 1 bipedal edema  Skin: warm, no lesions, rashes noted  Psych:  mood and affect normal  Results for orders placed or performed during the hospital encounter of 08/27/20   GLUCOSE, POC   Result Value Ref Range    Glucose (POC) 129 (H) 70 - 110 mg/dL       ASSESSMENT/PLAN  Diagnoses and all orders for this visit:    Neuropathy  Cont increased dose of gabapentin 300 mg tid  Lumbar stenosis- following Dr. Gemma Bermudez    Hyperuricemia  Monitoring  Check levels prior to next vsiit    Controlled type 2 diabetes mellitus with stage 3 chronic kidney disease, without long-term current use of insulin ([facility] [address]   diet controlled  September 2019 admission for JOHNNY- this has improved, she has seen dr. Kalyani Carcamo recommending to continue to be off lisinopril and will monitor proteinuria  a1c 6.6    Diabetes mellitus type 2, diet controlled ([facility] [address]    Essential hypertension  Controlled  Cont   norvasc, clonidine, hydralazine  Cont  low dose lasix/kcl prn for leg edema  Avoid bb with h/o 1st deg av block    Leg edema  Advised compression stockings   Low salt diet,   Prn lasix/kcl     Mixed hyperlipidemia  Good range LDL <100, on zocor     Gout of foot, unspecified cause, unspecified chronicity, unspecified laterality  Controlled  On daily colchicine    Anemia of chronic disease  Stable, Baseline 10's  Monitoring  BMI 27  Encourage wt loss    Heart murmur  Aortic regurg  Asymptomatic  Monitoring, following cards 10/2019 recommending recheck echo in 2-3 years    CKD 3  Avoid nsaids, keep hydrated  monitoring  Following dr. Sanchez zaman     Bilateral renal cysts  Benign appearing on MRI, c/w retroperitoneal US  Monitoring    Encounter for immunization  High dose flu vaccine given    Follow-up Disposition:  Ff-up in 3 months or sooner prn    Patient understands plan of care. Patient has provided input and agrees with goals.

## 2020-09-24 NOTE — PROGRESS NOTES
1. Have you been to the ER, urgent care clinic since your last visit? Hospitalized since your last visit? No    2. Have you seen or consulted any other health care providers outside of the [address] [place] [address] since your last visit? Include any pap smears or colon screening.  No    Chief Complaint   Patient presents with    Numbness     numbness in legs and feet - can hardly walk    Leg Swelling     bilat leg and bilat foot swelling    Medication Evaluation     needs to clarify gabapentin dose

## 2020-09-24 NOTE — PROGRESS NOTES
Fluad 0.5 ml given IM in left deltoid. Lot # [id number], exp date 06/30/2021. Patient tolerated injection well. No adverse reaction noted.

## 2020-09-24 NOTE — PATIENT INSTRUCTIONS
Medicare Wellness Visit, Female The best way to live healthy is to have a lifestyle where you eat a well-balanced diet, exercise regularly, limit alcohol use, and quit all forms of tobacco/nicotine, if applicable. Regular preventive services are another way to keep healthy. Preventive services (vaccines, screening tests, monitoring & exams) can help personalize your care plan, which helps you manage your own care. Screening tests can find health problems at the earliest stages, when they are easiest to treat. [facility] follows the current, evidence-based guidelines published by the [facility] ([facility]) when recommending preventive services for our patients. Because we follow these guidelines, sometimes recommendations change over time as research supports it. (For example, mammograms used to be recommended annually. Even though Medicare will still pay for an annual mammogram, the newer guidelines recommend a mammogram every two years for women of average risk). Of course, you and your doctor may decide to screen more often for some diseases, based on your risk and your co-morbidities (chronic disease you are already diagnosed with). Preventive services for you include: - Medicare offers their members a free annual wellness visit, which is time for you and your primary care provider to discuss and plan for your preventive service needs. Take advantage of this benefit every year! 
-All adults over the age of 72 should receive the recommended pneumonia vaccines. Current USPSTF guidelines recommend a series of two vaccines for the best pneumonia protection.  
-All adults should have a flu vaccine yearly and a tetanus vaccine every 10 years.  
-All adults age 48 and older should receive the shingles vaccines (series of two vaccines). -All adults age 38-68 who are overweight should have a diabetes screening test once every three years. -All adults born between [birth year] and 1965 should be screened once for Hepatitis C. 
-Other screening tests and preventive services for persons with diabetes include: an eye exam to screen for diabetic retinopathy, a kidney function test, a foot exam, and stricter control over your cholesterol.  
-Cardiovascular screening for adults with routine risk involves an electrocardiogram (ECG) at intervals determined by your doctor.  
-Colorectal cancer screenings should be done for adults age 54-65 with no increased risk factors for colorectal cancer. There are a number of acceptable methods of screening for this type of cancer. Each test has its own benefits and drawbacks. Discuss with your doctor what is most appropriate for you during your annual wellness visit. The different tests include: colonoscopy (considered the best screening method), a fecal occult blood test, a fecal DNA test, and sigmoidoscopy. 
 
-A bone mass density test is recommended when a woman turns 65 to screen for osteoporosis. This test is only recommended one time, as a screening. Some providers will use this same test as a disease monitoring tool if you already have osteoporosis. -Breast cancer screenings are recommended every other year for women of normal risk, age 54-69. 
-Cervical cancer screenings for women over age 72 are only recommended with certain risk factors. Here is a list of your current Health Maintenance items (your personalized list of preventive services) with a due date: 
Health Maintenance Due Topic Date Due  
 DTaP/Tdap/Td  (1 - Tdap) 08/27/1954  Shingles Vaccine (1 of 2) 08/27/1983  Diabetic Foot Care  07/25/2020  Yearly Flu Vaccine (1) 09/01/2020  Glaucoma Screening   10/16/2020

## 2020-10-16 ENCOUNTER — OFFICE VISIT (OUTPATIENT)
Dept: CARDIOLOGY CLINIC | Age: 85
End: 2020-10-16
Payer: MEDICARE

## 2020-10-16 VITALS
HEART RATE: 69 BPM | WEIGHT: 140 LBS | DIASTOLIC BLOOD PRESSURE: 100 MMHG | HEIGHT: 59 IN | OXYGEN SATURATION: 99 % | BODY MASS INDEX: 28.22 KG/M2 | SYSTOLIC BLOOD PRESSURE: 180 MMHG

## 2020-10-16 DIAGNOSIS — I35.1 NONRHEUMATIC AORTIC VALVE INSUFFICIENCY: Primary | ICD-10-CM

## 2020-10-16 DIAGNOSIS — E78.2 MIXED HYPERLIPIDEMIA: ICD-10-CM

## 2020-10-16 DIAGNOSIS — I10 ESSENTIAL HYPERTENSION: ICD-10-CM

## 2020-10-16 DIAGNOSIS — N18.30 STAGE 3 CHRONIC KIDNEY DISEASE, UNSPECIFIED WHETHER STAGE 3A OR 3B CKD (HCC): ICD-10-CM

## 2020-10-16 PROCEDURE — 93000 ELECTROCARDIOGRAM COMPLETE: CPT | Performed by: INTERNAL MEDICINE

## 2020-10-16 PROCEDURE — G8417 CALC BMI ABV UP PARAM F/U: HCPCS | Performed by: INTERNAL MEDICINE

## 2020-10-16 PROCEDURE — G8510 SCR DEP NEG, NO PLAN REQD: HCPCS | Performed by: INTERNAL MEDICINE

## 2020-10-16 PROCEDURE — G8536 NO DOC ELDER MAL SCRN: HCPCS | Performed by: INTERNAL MEDICINE

## 2020-10-16 PROCEDURE — G8427 DOCREV CUR MEDS BY ELIG CLIN: HCPCS | Performed by: INTERNAL MEDICINE

## 2020-10-16 PROCEDURE — 99214 OFFICE O/P EST MOD 30 MIN: CPT | Performed by: INTERNAL MEDICINE

## 2020-10-16 NOTE — PROGRESS NOTES
HISTORY OF PRESENT ILLNESS  Viktor Cole is a 80 y.o. female. Hospital Follow Up   Pertinent negatives include no chest pain, no abdominal pain, no headaches and no shortness of breath. Follow-up   Pertinent negatives include no chest pain, no abdominal pain, no headaches and no shortness of breath. Patient presents for a follow-up office visit. She was initially referred here for evaluation of a cardiac murmur. She has a long-standing history of essential hypertension, type 2 diabetes mellitus, and dyslipidemia. Patient patient was found to have aortic insufficiency on an echocardiogram back in 2018. She was recently hospitalized at the beginning of September 2019 for worsening renal failure and nausea and vomiting. She underwent a repeat echocardiogram which showed preserved LV systolic function, EF 56 to 85%, grade 1 diastolic dysfunction, biatrial enlargement, mild to moderate aortic insufficiency, which is relatively unchanged compared to her prior study. Patient was last seen in our office approximately 1 year ago. Since last visit she states is been feeling quite well. She denies any chest pain, shortness of breath, leg swelling, orthopnea or PND. No heart palpitations, dizziness or syncope. No major change in her activity level. Past Medical History:   Diagnosis Date    Anemia     Carotid bruit 12/07/2013    Carotid Duplex: 1. Bilateral 1-49% stenosis of the internal carotid arteries. 2. No significant stenosis in the external carotid arteries bilaterally. 3. Antegrade flow in both vetebral arteries. 4. Normal flow in both subclavian arteries. Plaque Morphology: 1. Hyperechoic plaque in the bulb and right ICA. 2. Hyperechoic plaque in the bulb and left ICA.  CKD (chronic kidney disease) stage 3, GFR 30-59 ml/min     Diabetes ([facility])     NIDDM    Gastritis 10/27/2014    Duodenum Bx: No Specific Pathologic Abnormality. No Villous Abnormality.  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 No dysplasia or malignancy identified. Bacterial Organisms c/w H. Pylori are present. Z-Line Bx: GE mucosa w/ Acute/Chronic Inflammation & Reactive Changes. Focal Specialized Type Campos Mucosa Identified. No Dysplasia or Malignancy Identified.  Gout 12/10/2009    Elevated Uric Acid Level    Hyperlipidemia     Hypertension     RAMÓN (iron deficiency anemia)      Current Outpatient Medications   Medication Sig Dispense Refill    hydrALAZINE (APRESOLINE) 100 mg tablet TAKE 1 TABLET BY MOUTH THREE TIMES DAILY 270 Tab 3    simvastatin (ZOCOR) 40 mg tablet Take 1 tablet by mouth nightly 90 Tab 3    cloNIDine HCL (CATAPRES) 0.2 mg tablet Take 1 tablet by mouth twice daily 180 Tab 1    diazePAM (VALIUM) 2 mg tablet 1 tab 30 mins prior to MRI    Will need  to and from MRI  Indications: anxious 1 Tab 0    gabapentin (NEURONTIN) 300 mg capsule Take 1 Cap by mouth three (3) times daily. Max Daily Amount: 900 mg. 90 Cap 2    pantoprazole (PROTONIX) 40 mg tablet Take 40 mg by mouth daily.  potassium chloride (K-DUR, KLOR-CON) 20 mEq tablet Take 20 mEq by mouth daily.  amLODIPine (NORVASC) 10 mg tablet Take 1 tablet by mouth once daily 90 Tab 1    colchicine (Colcrys) 0.6 mg tablet Take 1 Tab by mouth daily. Indications: a type of joint disorder due to excess uric acid in the blood called gout 90 Tab 2    latanoprost (XALATAN) 0.005 % ophthalmic solution       glucose blood VI test strips (PRODIGY NO CODING) strip Check fasting glucose once daily 100 Strip 3    Blood-Glucose Meter (PRODIGY AUTOCODE MONITOR SYST) misc Check fasting glucose once daily 1 Each 0    cholecalciferol, VITAMIN D3, (VITAMIN D3) 5,000 unit tab tablet Take  by mouth daily.  Aspirin, Buffered 81 mg tab Take 1 tablet by mouth daily.  fish oil-dha-epa 1,200-144-216 mg cap Take 1 tablet by mouth daily.        No Known Allergies     Social History     Tobacco Use    Smoking status: Never Smoker    Smokeless tobacco: Never Used   Substance Use Topics    Alcohol use: No    Drug use: No     Family History   Problem Relation Age of Onset    Hypertension Mother     Stroke Mother     Stroke Father          Review of Systems   Constitutional: Negative for chills, fever, malaise/fatigue and weight loss. HENT: Negative for nosebleeds. Eyes: Negative for blurred vision and double vision. Respiratory: Negative for cough, shortness of breath and wheezing. Cardiovascular: Negative for chest pain, palpitations, orthopnea, claudication, leg swelling and PND. Gastrointestinal: Negative for abdominal pain, heartburn, nausea and vomiting. Genitourinary: Negative for dysuria and hematuria. Musculoskeletal: Negative for falls and myalgias. Skin: Negative for rash. Neurological: Negative for dizziness, focal weakness and headaches. Endo/Heme/Allergies: Does not bruise/bleed easily. Psychiatric/Behavioral: Negative for substance abuse. Visit Vitals  BP (!) 180/100 (BP 1 Location: Left arm, BP Patient Position: Sitting)   Pulse 69   Ht 4' 11\" (1.499 m)   Wt 63.5 kg (140 lb)   SpO2 99%   BMI 28.28 kg/m²       Physical Exam   Constitutional: She is oriented to person, place, and time. She appears well-developed and well-nourished. HENT:   Head: Normocephalic and atraumatic. Eyes: Conjunctivae are normal.   Neck: Neck supple. No JVD present. Carotid bruit is not present. Cardiovascular: Normal rate, regular rhythm, S1 normal, S2 normal and normal pulses. Exam reveals no gallop. Murmur heard. Midsystolic murmur is present with a grade of 1/6 at the lower right sternal border. High-pitched blowing decrescendo early diastolic murmur is present with a grade of 1/6 at the upper right sternal border radiating to the apex. Pulmonary/Chest: Effort normal and breath sounds normal. She has no wheezes. She has no rales. Abdominal: Soft.  Bowel sounds are normal. There is no abdominal tenderness. Musculoskeletal:         General: No tenderness, deformity or edema. Neurological: She is alert and oriented to person, place, and time. Skin: Skin is warm and dry. Psychiatric: She has a normal mood and affect. Her behavior is normal. Thought content normal.     EKG: Normal sinus rhythm, left axis deviation, poor R-wave progression, no ST or T-wave abnormalities concerning for ischemia. Normal QTc interval.   Compared to the previous EKG, PVCs are no longer present. ASSESSMENT and PLAN    Aortic insufficiency. This was in the mild to moderate range on a recent echocardiogram from September 2019. This is likely just due to calcific aortic valve disease. This is not significant change compared to an echocardiogram from the year prior. This can be reassessed few years unless new symptoms arise. Essential hypertension. Patient's blood pressure is significantly elevated in the office today. She has yet to take her blood pressure medications today. She states that at home it has been somewhat labile. I recommend she start checking this at least 3-4 times a week and calling us back in 2 weeks with her blood pressure readings to see if her medications need to be adjusted further. Dyslipidemia. Patient has been treated with simvastatin. This is followed by her PCP. .    Diabetes mellitus, type II. Patient is managed with oral agents. From a cardiac standpoint for her hemoglobin A1c to be less than 7.     Follow-up annually, sooner if needed

## 2020-10-16 NOTE — PROGRESS NOTES
Jocelyne Bray presents today for   Chief Complaint   Patient presents with    Follow-up     1 year follow up       Jocelyne Bray preferred language for health care discussion is english/other. Is someone accompanying this pt? no    Is the patient using any DME equipment during [address]? no    Depression Screening:  3 most recent PHQ Screens 10/16/2020   PHQ Not Done -   Little interest or pleasure in doing things Not at all   Feeling down, depressed, irritable, or hopeless Not at all   Total Score PHQ 2 0   Trouble falling or staying asleep, or sleeping too much -   Poor appetite, weight loss, or overeating -   Feeling bad about yourself - or that you are a failure or have let yourself or your family down -   Trouble concentrating on things such as school, work, reading, or watching TV -   Moving or speaking so slowly that other people could have noticed; or the opposite being so fidgety that others notice -   Thoughts of being better off dead, or hurting yourself in some way -       Learning Assessment:  Learning Assessment 3/1/2019   PRIMARY LEARNER Patient   HIGHEST LEVEL OF EDUCATION - PRIMARY LEARNER  DID NOT GRADUATE [facility] PRIMARY LEARNER NONE   CO-LEARNER CAREGIVER No   PRIMARY LANGUAGE ENGLISH    NEED No   LEARNER PREFERENCE PRIMARY READING   LEARNING SPECIAL TOPICS No   ANSWERED BY patient   RELATIONSHIP SELF       Abuse Screening:  Abuse Screening Questionnaire 10/16/2020   Do you ever feel afraid of your partner? N   Are you in a relationship with someone who physically or mentally threatens you? N   Is it safe for you to go home? Y       Fall Risk  Fall Risk Assessment, last 12 mths 10/16/2020   Able to walk? Yes   Fall in past 12 months? No       Pt currently taking Anticoagulant therapy? no    Coordination of Care:  1. Have you been to the ER, urgent care clinic since your last visit? Hospitalized since your last visit? no    2.  Have you seen or consulted any other health care providers outside of the [address] [place] [address] since your last visit? Include any pap smears or colon screening.  no

## 2020-10-28 ENCOUNTER — TELEPHONE (OUTPATIENT)
Dept: FAMILY MEDICINE CLINIC | Age: 85
End: 2020-10-28

## 2020-10-28 NOTE — TELEPHONE ENCOUNTER
Pt would like to know if Dr Zeus Goldman would send something to the pharmacy for her arthritis pain.  She uses [facility] [phone]

## 2020-10-29 RX ORDER — DICLOFENAC SODIUM 10 MG/G
4 GEL TOPICAL
Qty: 1 EACH | Refills: 0 | Status: SHIPPED | OUTPATIENT
Start: 2020-10-29 | End: 2021-01-15 | Stop reason: ALTCHOICE

## 2020-10-29 NOTE — TELEPHONE ENCOUNTER
erx voltaren gel q6h prn. Or tylenol 500 mg q4h prn. Avoid nsaids due to her kidney function  Which joints are most bothersome?  Also offer ortho consult, will provide referral if she wants to proceed

## 2020-10-29 NOTE — TELEPHONE ENCOUNTER
Contacted patient and verified identity using name and date of birth (2- identifiers)  Spoke with patient and she verbalized understanding of Erx of Voltaren gel q6hr PRN and tylenol 500 mg q4hr PRN. Avoid NSAIDS due to kidney functions. Patient is already following Dr. Jack Graves (seen 9/22/20) and has scheduled follow-up I December.

## 2020-12-15 ENCOUNTER — HOSPITAL ENCOUNTER (OUTPATIENT)
Dept: LAB | Age: 85
Discharge: HOME | End: 2020-12-15
Payer: MEDICARE

## 2020-12-15 ENCOUNTER — TRANSCRIBE ORDER (OUTPATIENT)
Dept: REGISTRATION | Age: 85
End: 2020-12-15

## 2020-12-15 DIAGNOSIS — N18.30 CHRONIC KIDNEY DISEASE, STAGE III (MODERATE) (HCC): Primary | ICD-10-CM

## 2020-12-15 DIAGNOSIS — N18.30 CHRONIC KIDNEY DISEASE, STAGE III (MODERATE) (HCC): ICD-10-CM

## 2020-12-15 LAB
ALBUMIN SERPL-MCNC: 3.7 G/DL (ref 3.4–5)
ANION GAP SERPL CALC-SCNC: 9 MMOL/L (ref 3–18)
BUN SERPL-MCNC: 44 MG/DL (ref 7–18)
BUN/CREAT SERPL: 27 (ref 12–20)
CALCIUM SERPL-MCNC: 9.5 MG/DL (ref 8.5–10.1)
CHLORIDE SERPL-SCNC: 107 MMOL/L (ref 100–111)
CO2 SERPL-SCNC: 29 MMOL/L (ref 21–32)
CREAT SERPL-MCNC: 1.62 MG/DL (ref 0.6–1.3)
GLUCOSE SERPL-MCNC: 141 MG/DL (ref 74–99)
PHOSPHATE SERPL-MCNC: 3.1 MG/DL (ref 2.5–4.9)
POTASSIUM SERPL-SCNC: 3.8 MMOL/L (ref 3.5–5.5)
SODIUM SERPL-SCNC: 145 MMOL/L (ref 136–145)

## 2020-12-15 PROCEDURE — 36415 COLL VENOUS BLD VENIPUNCTURE: CPT

## 2020-12-15 PROCEDURE — 80069 RENAL FUNCTION PANEL: CPT

## 2021-01-15 ENCOUNTER — OFFICE VISIT (OUTPATIENT)
Dept: FAMILY MEDICINE CLINIC | Age: 86
End: 2021-01-15
Payer: MEDICARE

## 2021-01-15 VITALS
BODY MASS INDEX: 28.22 KG/M2 | DIASTOLIC BLOOD PRESSURE: 80 MMHG | OXYGEN SATURATION: 99 % | TEMPERATURE: 97.6 F | RESPIRATION RATE: 16 BRPM | SYSTOLIC BLOOD PRESSURE: 148 MMHG | HEIGHT: 59 IN | HEART RATE: 60 BPM | WEIGHT: 140 LBS

## 2021-01-15 DIAGNOSIS — I10 ESSENTIAL HYPERTENSION: ICD-10-CM

## 2021-01-15 DIAGNOSIS — D63.8 ANEMIA OF CHRONIC DISEASE: ICD-10-CM

## 2021-01-15 DIAGNOSIS — E11.40 TYPE 2 DIABETES MELLITUS WITH DIABETIC NEUROPATHY, WITHOUT LONG-TERM CURRENT USE OF INSULIN (HCC): Primary | ICD-10-CM

## 2021-01-15 DIAGNOSIS — E11.9 DIABETES MELLITUS TYPE 2, DIET-CONTROLLED (HCC): ICD-10-CM

## 2021-01-15 DIAGNOSIS — N18.4 TYPE 2 DIABETES MELLITUS WITH STAGE 4 CHRONIC KIDNEY DISEASE, WITHOUT LONG-TERM CURRENT USE OF INSULIN (HCC): ICD-10-CM

## 2021-01-15 DIAGNOSIS — K29.50 CHRONIC GASTRITIS WITHOUT BLEEDING, UNSPECIFIED GASTRITIS TYPE: ICD-10-CM

## 2021-01-15 DIAGNOSIS — N18.4 CKD (CHRONIC KIDNEY DISEASE) STAGE 4, GFR 15-29 ML/MIN (HCC): ICD-10-CM

## 2021-01-15 DIAGNOSIS — I50.32 DIASTOLIC CHF, CHRONIC (HCC): ICD-10-CM

## 2021-01-15 DIAGNOSIS — M10.9 GOUT, UNSPECIFIED CAUSE, UNSPECIFIED CHRONICITY, UNSPECIFIED SITE: ICD-10-CM

## 2021-01-15 DIAGNOSIS — R60.0 LEG EDEMA: ICD-10-CM

## 2021-01-15 DIAGNOSIS — E11.22 TYPE 2 DIABETES MELLITUS WITH STAGE 4 CHRONIC KIDNEY DISEASE, WITHOUT LONG-TERM CURRENT USE OF INSULIN (HCC): ICD-10-CM

## 2021-01-15 PROCEDURE — G0463 HOSPITAL OUTPT CLINIC VISIT: HCPCS | Performed by: FAMILY MEDICINE

## 2021-01-15 PROCEDURE — 1090F PRES/ABSN URINE INCON ASSESS: CPT | Performed by: FAMILY MEDICINE

## 2021-01-15 PROCEDURE — G8510 SCR DEP NEG, NO PLAN REQD: HCPCS | Performed by: FAMILY MEDICINE

## 2021-01-15 PROCEDURE — G8417 CALC BMI ABV UP PARAM F/U: HCPCS | Performed by: FAMILY MEDICINE

## 2021-01-15 PROCEDURE — G8536 NO DOC ELDER MAL SCRN: HCPCS | Performed by: FAMILY MEDICINE

## 2021-01-15 PROCEDURE — G8427 DOCREV CUR MEDS BY ELIG CLIN: HCPCS | Performed by: FAMILY MEDICINE

## 2021-01-15 PROCEDURE — 99214 OFFICE O/P EST MOD 30 MIN: CPT | Performed by: FAMILY MEDICINE

## 2021-01-15 PROCEDURE — 1101F PT FALLS ASSESS-DOCD LE1/YR: CPT | Performed by: FAMILY MEDICINE

## 2021-01-15 NOTE — PROGRESS NOTES
1. Have you been to the ER, urgent care clinic since your last visit? Hospitalized since your last visit? No    2. Have you seen or consulted any other health care providers outside of the [address] [place] [address] since your last visit? Include any pap smears or colon screening.  Dr. Ronald Montano nephrology

## 2021-01-15 NOTE — PROGRESS NOTES
Janell Tate, 80 y.o.,  female    SUBJECTIVE  Ff-up    No new concerns other that osteoarthritis pains (knees/ankles)- not interested in seeing ortho, on prn tylenol    Numbness of legs/feet- some response to gabapentin, tho somewhat sedating. She is currently on 300 mg increased dose by dr. Hina Hernandez for spine stenosis. MRI incidental finding of benign appearing renal cysts, reviewed c/w US done by dr. Kathi Talavera for CKD. HTN/CKD 3-  She continues to take clonidine, hydralazine and norvasc. She is following dr. Yvette Fowler who recommended to continue to be off lisinopril add potassium andl monitoring proteinuria. She is on prn lasix few times a week for leg edema. I advised her to start using compression stockings as well. She has h/o AV block and advised against BB. DM w/ CKD 3-  diet controlled, She Reports FBG  1teens. HL- on zocor    Aortic regurtiation /bradycardia- evaluated by cardiology Dr. Melton Has 2019,  She is asymptomatic, likely due to calcific aortic valve and recommend recheck in about 2-3 years. Gout- usually foot swelling and pain, related to seafood. Colchicine she used to take daily was discontinued recently due to cost, she has not had recurrence of gout flares.   she has h/o hyperuricemia      GERD- EGD gastritis/schatzis ring dilated 9/2020, doing well on protonix    ROS:  See HPI, all others negative        Patient Active Problem List   Diagnosis Code    Essential hypertension I10    Mixed hyperlipidemia E78.2    Advanced directives, counseling/discussion Z71.89    Controlled type 2 diabetes mellitus with stage 3 chronic kidney disease, without long-term current use of insulin ([facility]) E11.22, N18.3         Current Outpatient Medications:     hydrALAZINE (APRESOLINE) 100 mg tablet, TAKE 1 TABLET BY MOUTH THREE TIMES DAILY, Disp: 270 Tab, Rfl: 3    simvastatin (ZOCOR) 40 mg tablet, Take 1 tablet by mouth nightly, Disp: 90 Tab, Rfl: 3    cloNIDine HCL (CATAPRES) 0.2 mg tablet, Take 1 tablet by mouth twice daily, Disp: 180 Tab, Rfl: 1    gabapentin (NEURONTIN) 300 mg capsule, Take 1 Cap by mouth three (3) times daily. Max Daily Amount: 900 mg., Disp: 90 Cap, Rfl: 2    pantoprazole (PROTONIX) 40 mg tablet, Take 40 mg by mouth daily. , Disp: , Rfl:     potassium chloride (K-DUR, KLOR-CON) 20 mEq tablet, Take 20 mEq by mouth daily. , Disp: , Rfl:     amLODIPine (NORVASC) 10 mg tablet, Take 1 tablet by mouth once daily, Disp: 90 Tab, Rfl: 1    colchicine (Colcrys) 0.6 mg tablet, Take 1 Tab by mouth daily. Indications: a type of joint disorder due to excess uric acid in the blood called gout, Disp: 90 Tab, Rfl: 2    latanoprost (XALATAN) 0.005 % ophthalmic solution, , Disp: , Rfl:     glucose blood VI test strips (PRODIGY NO CODING) strip, Check fasting glucose once daily, Disp: 100 Strip, Rfl: 3    Blood-Glucose Meter (PRODIGY AUTOCODE MONITOR SYST) misc, Check fasting glucose once daily, Disp: 1 Each, Rfl: 0    cholecalciferol, VITAMIN D3, (VITAMIN D3) 5,000 unit tab tablet, Take  by mouth daily. , Disp: , Rfl:     Aspirin, Buffered 81 mg tab, Take 1 tablet by mouth daily. , Disp: , Rfl:     fish oil-dha-epa 1,200-144-216 mg cap, Take 1 tablet by mouth daily. , Disp: , Rfl:       No Known Allergies    Past Medical History:   Diagnosis Date    Anemia     Carotid bruit 12/07/2013    Carotid Duplex: 1. Bilateral 1-49% stenosis of the internal carotid arteries. 2. No significant stenosis in the external carotid arteries bilaterally. 3. Antegrade flow in both vetebral arteries. 4. Normal flow in both subclavian arteries. Plaque Morphology: 1. Hyperechoic plaque in the bulb and right ICA. 2. Hyperechoic plaque in the bulb and left ICA.  CKD (chronic kidney disease) stage 3, GFR 30-59 ml/min     Diabetes (HCC)     NIDDM    Gastritis 10/27/2014    Duodenum Bx: No Specific Pathologic Abnormality. No Villous Abnormality.  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 No dysplasia or malignancy identified. Bacterial Organisms c/w H. Pylori are present. Z-Line Bx: GE mucosa w/ Acute/Chronic Inflammation & Reactive Changes. Focal Specialized Type Campos Mucosa Identified. No Dysplasia or Malignancy Identified.  Gout 12/10/2009    Elevated Uric Acid Level    Hyperlipidemia     Hypertension     RAMÓN (iron deficiency anemia)        Social History     Social History    Marital status:      Spouse name: N/A    Number of children: N/A    Years of education: N/A     Occupational History    Not on file.      Social History Main Topics    Smoking status: Never Smoker    Smokeless tobacco: Never Used    Alcohol use No    Drug use: No    Sexual activity: Not Currently     Partners: Male     Birth control/ protection: None     Other Topics Concern    Not on file     Social History Narrative       Family History   Problem Relation Age of Onset    Hypertension Mother     Stroke Mother     Stroke Father          OBJECTIVE    Physical Exam:     Visit Vitals  BP (!) 148/80 (BP 1 Location: Right arm, BP Patient Position: Sitting)   Pulse 60   Temp 97.6 °F (36.4 °C) (Oral)   Resp 16   Ht 4' 11\" (1.499 m)   Wt 140 lb (63.5 kg)   SpO2 99%   BMI 28.28 kg/m²         General: alert, well-appearing, AA,  in no apparent distress or pain  Neck: supple, no adenopathy palpated  CVS: normal rate,  regular rhythm, + murmur  Lungs:clear to ausculation bilaterally, no crackles, wheezing or rhonchi noted  Extremities: + grade 1 bipedal edema  Feet: no lesions  Skin: warm, no lesions, rashes noted  Psych:  mood and affect normal  Results for orders placed or performed during the hospital encounter of 12/15/20   RENAL FUNCTION PANEL   Result Value Ref Range    Sodium 145 136 - 145 mmol/L    Potassium 3.8 3.5 - 5.5 mmol/L    Chloride 107 100 - 111 mmol/L    CO2 29 21 - 32 mmol/L    Anion gap 9 3.0 - 18 mmol/L    Glucose 141 (H) 74 - 99 mg/dL    BUN 44 (H) 7.0 - 18 MG/DL    Creatinine 1.62 (H) 0.6 - 1.3 MG/DL    BUN/Creatinine ratio 27 (H) 12 - 20      GFR est AA 36 (L) >60 ml/min/1.73m2    GFR est non-AA 30 (L) >60 ml/min/1.73m2    Calcium 9.5 8.5 - 10.1 MG/DL    Phosphorus 3.1 2.5 - 4.9 MG/DL    Albumin 3.7 3.4 - 5.0 g/dL       ASSESSMENT/PLAN  Diagnoses and all orders for this visit:  Controlled type 2 diabetes mellitus with stage 3 chronic kidney disease, without long-term current use of insulin ([facility] [address]   diet controlled  September 2019 admission for JOHNNY- this has improved, she has seen dr. Barby Everett recommending to continue to be off lisinopril and will monitor proteinuria  a1c 6.6  Foot exam today1/15/2021- no lesions, following podiatry  Check cmp, a1c, microalbumin prior to next visit    Diabetes mellitus type 2, diet controlled (HCC)  Neuropathy  Cont gabapentin 300 mg tid  Lumbar stenosis- following Dr. Ildefonso Webster    Hyperuricemia  Off colchicine, no increase in gout flares  Monitoring  Check levels prior to next visit      Essential hypertension  Controlled  Cont   norvasc, clonidine, hydralazine  Cont  low dose lasix/kcl prn for leg edema  Avoid bb with h/o 1st deg av block    Leg edema  Advised compression stockings   Low salt diet,   Prn lasix/kcl     Mixed hyperlipidemia  Good range LDL <100, on zocor     Gout of foot, unspecified cause, unspecified chronicity, unspecified laterality  Controlled    Anemia of chronic disease  Stable, Baseline 10's  Cbc prior to next visit  Monitoring    Heart murmur  Aortic regurg  Asymptomatic  Monitoring, following cards     Diastolic chf  Appears compensated  Cont prn lasix  Intolerant to BB    CKD 3-4  Avoid nsaids, keep hydrated  monitoring  Following dr. Yin zaman       Follow-up Disposition:  Ff-up in 3 months or sooner prn    Patient understands plan of care. Patient has provided input and agrees with goals.

## 2021-01-15 NOTE — PATIENT INSTRUCTIONS

## 2021-02-23 ENCOUNTER — TELEPHONE (OUTPATIENT)
Dept: FAMILY MEDICINE CLINIC | Age: 86
End: 2021-02-23

## 2021-02-23 RX ORDER — COLCHICINE 0.6 MG/1
TABLET, FILM COATED ORAL
Qty: 30 TAB | Refills: 0 | Status: SHIPPED | OUTPATIENT
Start: 2021-02-23 | End: 2021-07-16

## 2021-02-23 NOTE — TELEPHONE ENCOUNTER
Fax received from [address] [place] [address] meds stating prior auth is required for the Colcrys 0.6MG Tab . Submit a PA request  1. Go to key. [employer] and click \"Enter a Key\"  2. Patient last name: Lelon Spatz      : 1933      Key: [id number]  3.  Click \"start a PA\", complete the form, and \"send to plan\"

## 2021-02-24 NOTE — TELEPHONE ENCOUNTER
Discussed prior authz with Dr. Niurka John and she want to hold off for now and discuss at patients next appt.

## 2021-03-15 ENCOUNTER — TELEPHONE (OUTPATIENT)
Dept: FAMILY MEDICINE CLINIC | Age: 86
End: 2021-03-15

## 2021-03-15 DIAGNOSIS — M1A.9XX0 CHRONIC GOUT WITHOUT TOPHUS, UNSPECIFIED CAUSE, UNSPECIFIED SITE: Primary | ICD-10-CM

## 2021-03-15 RX ORDER — ALLOPURINOL 100 MG/1
100 TABLET ORAL DAILY
Qty: 90 TAB | Refills: 0 | Status: SHIPPED | OUTPATIENT
Start: 2021-03-15 | End: 2021-04-15 | Stop reason: SDUPTHER

## 2021-03-15 RX ORDER — METHYLPREDNISOLONE 4 MG/1
TABLET ORAL
Qty: 1 DOSE PACK | Refills: 0 | Status: SHIPPED | OUTPATIENT
Start: 2021-03-15 | End: 2021-04-15

## 2021-03-15 NOTE — TELEPHONE ENCOUNTER
erx medrol dose pack for acute attack  Start allopurinol 100 mg OD, after her acute gout flare and check uric acid/CMP prior to April visit with me  jeff notify pt.

## 2021-03-15 NOTE — TELEPHONE ENCOUNTER
Pt called and stated she is having a flair up of gout and her insurance isn't paying or her gout medication anymore. Please call pt with other option . Please call pt at your earliest convenience.

## 2021-04-13 ENCOUNTER — HOSPITAL ENCOUNTER (OUTPATIENT)
Dept: LAB | Age: 86
Discharge: HOME | End: 2021-04-13
Payer: MEDICARE

## 2021-04-13 DIAGNOSIS — E11.40 TYPE 2 DIABETES MELLITUS WITH DIABETIC NEUROPATHY, WITHOUT LONG-TERM CURRENT USE OF INSULIN (HCC): ICD-10-CM

## 2021-04-13 DIAGNOSIS — D63.8 ANEMIA OF CHRONIC DISEASE: ICD-10-CM

## 2021-04-13 DIAGNOSIS — M1A.9XX0 CHRONIC GOUT WITHOUT TOPHUS, UNSPECIFIED CAUSE, UNSPECIFIED SITE: ICD-10-CM

## 2021-04-13 LAB
ALBUMIN SERPL-MCNC: 3.8 G/DL (ref 3.4–5)
ALBUMIN/GLOB SERPL: 0.9 {RATIO} (ref 0.8–1.7)
ALP SERPL-CCNC: 60 U/L (ref 45–117)
ALT SERPL-CCNC: 29 U/L (ref 13–56)
ANION GAP SERPL CALC-SCNC: 8 MMOL/L (ref 3–18)
AST SERPL-CCNC: 42 U/L (ref 10–38)
BASOPHILS # BLD: 0 K/UL (ref 0–0.1)
BASOPHILS NFR BLD: 1 % (ref 0–2)
BILIRUB SERPL-MCNC: 0.7 MG/DL (ref 0.2–1)
BUN SERPL-MCNC: 39 MG/DL (ref 7–18)
BUN/CREAT SERPL: 25 (ref 12–20)
CALCIUM SERPL-MCNC: 9.6 MG/DL (ref 8.5–10.1)
CHLORIDE SERPL-SCNC: 108 MMOL/L (ref 100–111)
CO2 SERPL-SCNC: 26 MMOL/L (ref 21–32)
CREAT SERPL-MCNC: 1.53 MG/DL (ref 0.6–1.3)
DIFFERENTIAL METHOD BLD: ABNORMAL
EOSINOPHIL # BLD: 0.1 K/UL (ref 0–0.4)
EOSINOPHIL NFR BLD: 2 % (ref 0–5)
ERYTHROCYTE [DISTWIDTH] IN BLOOD BY AUTOMATED COUNT: 19 % (ref 11.6–14.5)
EST. AVERAGE GLUCOSE BLD GHB EST-MCNC: 140 MG/DL
GLOBULIN SER CALC-MCNC: 4.2 G/DL (ref 2–4)
GLUCOSE SERPL-MCNC: 113 MG/DL (ref 74–99)
HBA1C MFR BLD: 6.5 % (ref 4.2–5.6)
HCT VFR BLD AUTO: 34.1 % (ref 35–45)
HGB BLD-MCNC: 11 G/DL (ref 12–16)
LYMPHOCYTES # BLD: 1.9 K/UL (ref 0.9–3.6)
LYMPHOCYTES NFR BLD: 33 % (ref 21–52)
MCH RBC QN AUTO: 27 PG (ref 24–34)
MCHC RBC AUTO-ENTMCNC: 32.3 G/DL (ref 31–37)
MCV RBC AUTO: 83.8 FL (ref 74–97)
MONOCYTES # BLD: 0.5 K/UL (ref 0.05–1.2)
MONOCYTES NFR BLD: 9 % (ref 3–10)
NEUTS SEG # BLD: 3.2 K/UL (ref 1.8–8)
NEUTS SEG NFR BLD: 56 % (ref 40–73)
PLATELET # BLD AUTO: 248 K/UL (ref 135–420)
PMV BLD AUTO: 9.5 FL (ref 9.2–11.8)
POTASSIUM SERPL-SCNC: 3.5 MMOL/L (ref 3.5–5.5)
PROT SERPL-MCNC: 8 G/DL (ref 6.4–8.2)
RBC # BLD AUTO: 4.07 M/UL (ref 4.2–5.3)
SODIUM SERPL-SCNC: 142 MMOL/L (ref 136–145)
URATE SERPL-MCNC: 6.4 MG/DL (ref 2.6–7.2)
WBC # BLD AUTO: 5.8 K/UL (ref 4.6–13.2)

## 2021-04-13 PROCEDURE — 85025 COMPLETE CBC W/AUTO DIFF WBC: CPT

## 2021-04-13 PROCEDURE — 84550 ASSAY OF BLOOD/URIC ACID: CPT

## 2021-04-13 PROCEDURE — 36415 COLL VENOUS BLD VENIPUNCTURE: CPT

## 2021-04-13 PROCEDURE — 83036 HEMOGLOBIN GLYCOSYLATED A1C: CPT

## 2021-04-13 PROCEDURE — 80053 COMPREHEN METABOLIC PANEL: CPT

## 2021-04-15 ENCOUNTER — OFFICE VISIT (OUTPATIENT)
Dept: FAMILY MEDICINE CLINIC | Age: 86
End: 2021-04-15
Payer: MEDICARE

## 2021-04-15 VITALS
HEART RATE: 64 BPM | TEMPERATURE: 97.2 F | HEIGHT: 59 IN | DIASTOLIC BLOOD PRESSURE: 62 MMHG | SYSTOLIC BLOOD PRESSURE: 118 MMHG | WEIGHT: 132 LBS | OXYGEN SATURATION: 100 % | BODY MASS INDEX: 26.61 KG/M2 | RESPIRATION RATE: 16 BRPM

## 2021-04-15 DIAGNOSIS — I50.32 DIASTOLIC CHF, CHRONIC (HCC): ICD-10-CM

## 2021-04-15 DIAGNOSIS — D63.8 ANEMIA OF CHRONIC DISEASE: ICD-10-CM

## 2021-04-15 DIAGNOSIS — I10 ESSENTIAL HYPERTENSION: Chronic | ICD-10-CM

## 2021-04-15 DIAGNOSIS — E11.9 DIABETES MELLITUS TYPE 2, DIET-CONTROLLED (HCC): ICD-10-CM

## 2021-04-15 DIAGNOSIS — E11.40 TYPE 2 DIABETES MELLITUS WITH DIABETIC NEUROPATHY, WITHOUT LONG-TERM CURRENT USE OF INSULIN (HCC): ICD-10-CM

## 2021-04-15 DIAGNOSIS — E78.2 MIXED HYPERLIPIDEMIA: ICD-10-CM

## 2021-04-15 DIAGNOSIS — N18.4 CKD (CHRONIC KIDNEY DISEASE) STAGE 4, GFR 15-29 ML/MIN (HCC): Primary | ICD-10-CM

## 2021-04-15 PROCEDURE — 99214 OFFICE O/P EST MOD 30 MIN: CPT | Performed by: FAMILY MEDICINE

## 2021-04-15 PROCEDURE — G0463 HOSPITAL OUTPT CLINIC VISIT: HCPCS | Performed by: FAMILY MEDICINE

## 2021-04-15 PROCEDURE — G8427 DOCREV CUR MEDS BY ELIG CLIN: HCPCS | Performed by: FAMILY MEDICINE

## 2021-04-15 PROCEDURE — 1101F PT FALLS ASSESS-DOCD LE1/YR: CPT | Performed by: FAMILY MEDICINE

## 2021-04-15 PROCEDURE — G8536 NO DOC ELDER MAL SCRN: HCPCS | Performed by: FAMILY MEDICINE

## 2021-04-15 PROCEDURE — 1090F PRES/ABSN URINE INCON ASSESS: CPT | Performed by: FAMILY MEDICINE

## 2021-04-15 PROCEDURE — G8419 CALC BMI OUT NRM PARAM NOF/U: HCPCS | Performed by: FAMILY MEDICINE

## 2021-04-15 PROCEDURE — G8510 SCR DEP NEG, NO PLAN REQD: HCPCS | Performed by: FAMILY MEDICINE

## 2021-04-15 RX ORDER — PANTOPRAZOLE SODIUM 40 MG/1
40 TABLET, DELAYED RELEASE ORAL DAILY
Status: CANCELLED | OUTPATIENT
Start: 2021-04-15

## 2021-04-15 RX ORDER — CLONIDINE HYDROCHLORIDE 0.2 MG/1
TABLET ORAL
Qty: 180 TAB | Refills: 1 | Status: SHIPPED | OUTPATIENT
Start: 2021-04-15 | End: 2021-10-18 | Stop reason: SDUPTHER

## 2021-04-15 RX ORDER — AMLODIPINE BESYLATE 10 MG/1
TABLET ORAL
Qty: 90 TAB | Refills: 1 | Status: SHIPPED | OUTPATIENT
Start: 2021-04-15 | End: 2021-10-18 | Stop reason: SDUPTHER

## 2021-04-15 RX ORDER — ALLOPURINOL 100 MG/1
100 TABLET ORAL DAILY
Qty: 90 TAB | Refills: 1 | Status: SHIPPED | OUTPATIENT
Start: 2021-04-15 | End: 2022-01-27

## 2021-04-15 NOTE — PATIENT INSTRUCTIONS
DASH Diet: Care Instructions Your Care Instructions The DASH diet is an eating plan that can help lower your blood pressure. DASH stands for Dietary Approaches to Stop Hypertension. Hypertension is high blood pressure. The DASH diet focuses on eating foods that are high in calcium, potassium, and magnesium. These nutrients can lower blood pressure. The foods that are highest in these nutrients are fruits, vegetables, low-fat dairy products, nuts, seeds, and legumes. But taking calcium, potassium, and magnesium supplements instead of eating foods that are high in those nutrients does not have the same effect. The DASH diet also includes whole grains, fish, and poultry. The DASH diet is one of several lifestyle changes your doctor may recommend to lower your high blood pressure. Your doctor may also want you to decrease the amount of sodium in your diet. Lowering sodium while following the DASH diet can lower blood pressure even further than just the DASH diet alone. Follow-up care is a key part of your treatment and safety. Be sure to make and go to all appointments, and call your doctor if you are having problems. It's also a good idea to know your test results and keep a list of the medicines you take. How can you care for yourself at home? Following the DASH diet · Eat 4 to 5 servings of fruit each day. A serving is 1 medium-sized piece of fruit, ½ cup chopped or canned fruit, 1/4 cup dried fruit, or 4 ounces (½ cup) of fruit juice. Choose fruit more often than fruit juice. · Eat 4 to 5 servings of vegetables each day. A serving is 1 cup of lettuce or raw leafy vegetables, ½ cup of chopped or cooked vegetables, or 4 ounces (½ cup) of vegetable juice. Choose vegetables more often than vegetable juice. · Get 2 to 3 servings of low-fat and fat-free dairy each day. A serving is 8 ounces of milk, 1 cup of yogurt, or 1 ½ ounces of cheese. · Eat 6 to 8 servings of grains each day.  A serving is 1 slice of bread, 1 ounce of dry cereal, or ½ cup of cooked rice, pasta, or cooked cereal. Try to choose whole-grain products as much as possible. · Limit lean meat, poultry, and fish to 2 servings each day. A serving is 3 ounces, about the size of a deck of cards. · Eat 4 to 5 servings of nuts, seeds, and legumes (cooked dried beans, lentils, and split peas) each week. A serving is 1/3 cup of nuts, 2 tablespoons of seeds, or ½ cup of cooked beans or peas. · Limit fats and oils to 2 to 3 servings each day. A serving is 1 teaspoon of vegetable oil or 2 tablespoons of salad dressing. · Limit sweets and added sugars to 5 servings or less a week. A serving is 1 tablespoon jelly or jam, ½ cup sorbet, or 1 cup of lemonade. · Eat less than 2,300 milligrams (mg) of sodium a day. If you limit your sodium to 1,500 mg a day, you can lower your blood pressure even more. · Be aware that all of these are the suggested number of servings for people who eat 1,800 to 2,000 calories a day. Your recommended number of servings may be different if you need more or fewer calories. Tips for success · Start small. Do not try to make dramatic changes to your diet all at once. You might feel that you are missing out on your favorite foods and then be more likely to not follow the plan. Make small changes, and stick with them. Once those changes become habit, add a few more changes. · Try some of the following: ? Make it a goal to eat a fruit or vegetable at every meal and at snacks. This will make it easy to get the recommended amount of fruits and vegetables each day. ? Try yogurt topped with fruit and nuts for a snack or healthy dessert. ? Add lettuce, tomato, cucumber, and onion to sandwiches. ? Combine a ready-made pizza crust with low-fat mozzarella cheese and lots of vegetable toppings. Try using tomatoes, squash, spinach, broccoli, carrots, cauliflower, and onions. ?  Have a variety of cut-up vegetables with a low-fat dip as an appetizer instead of chips and dip. ? Sprinkle sunflower seeds or chopped almonds over salads. Or try adding chopped walnuts or almonds to cooked vegetables. ? Try some vegetarian meals using beans and peas. Add garbanzo or kidney beans to salads. Make burritos and tacos with mashed day beans or black beans. Where can you learn more? Go to http://www.gray.com/ Enter D410 in the search box to learn more about \"DASH Diet: Care Instructions. \" Current as of: August 31, 2020               Content Version: 12.8 © 0320-8087 [employer]. Care instructions adapted under license by [employer] (which disclaims liability or warranty for this information). If you have questions about a medical condition or this instruction, always ask your healthcare professional. [address] any warranty or liability for your use of this information.

## 2021-04-15 NOTE — PROGRESS NOTES
1. Have you been to the ER, urgent care clinic since your last visit? Hospitalized since your last visit? No    2. Have you seen or consulted any other health care providers outside of the [address] [place] [address] since your last visit? Include any pap smears or colon screening.  No    Chief Complaint   Patient presents with    Diabetes    Hypertension    Cholesterol Problem    Gout    Chronic Kidney Disease

## 2021-04-17 NOTE — PROGRESS NOTES
Kait Sousa, 80 y.o.,  female    SUBJECTIVE  Ff-up    No new concerns other that osteoarthritis pains (knees/ankles)- not interested in seeing ortho, on prn tylenol    HTN/CKD 3-  She continues to take clonidine, hydralazine and norvasc. She is following dr. Milton Davis who recommended to continue to be off lisinopril add potassium andl monitoring proteinuria. She is on prn lasix few times a week for leg edema. I advised her to start using compression stockings as well. She has h/o AV block and advised against BB. DM w/ CKD 3 and neuorpathy-  diet controlled, She Reports FBG  1teens. Reviewed labs a1c 6.5  Numbness of legs/feet secondary to DM neuropathy/spinal stenosis- some response to gabapentin, however discontinued due to drowsiness side effect  MRI incidental finding of benign appearing renal cysts, reviewed c/w US done by dr. Viak Majano for CKD. HL- on zocor    Aortic regurtiation /bradycardia- evaluated by cardiology Dr. Kirk Malhotra 2019,  She is asymptomatic, likely due to calcific aortic valve and recommend recheck in about 2-3 years. Gout/ history of hyperuricemia- usually foot swelling and pain, related to seafood. Now on allopurinol     GERD- EGD gastritis/schatzis ring dilated 9/2020, doing well on protonix    ROS:  See HPI, all others negative        Patient Active Problem List   Diagnosis Code    Essential hypertension I10    Mixed hyperlipidemia E78.2    Advanced directives, counseling/discussion Z71.89    Controlled type 2 diabetes mellitus with stage 3 chronic kidney disease, without long-term current use of insulin ([facility]) E11.22, N18.3         Current Outpatient Medications:     cloNIDine HCL (CATAPRES) 0.2 mg tablet, Take 1 tablet by mouth twice daily, Disp: 180 Tab, Rfl: 1    allopurinoL (ZYLOPRIM) 100 mg tablet, Take 1 Tab by mouth daily. , Disp: 90 Tab, Rfl: 1    amLODIPine (NORVASC) 10 mg tablet, Take 1 tablet by mouth once daily, Disp: 90 Tab, Rfl: 1    Colcrys 0.6 mg tablet, TAKE 1 TABLET BY MOUTH ONCE DAILY FOR  A  TYPE  OF  JOINT  DISORDER  DUE  TO  EXCESS  URIC  ACID  IN  THE  BLOOD  CALLED  GOUT, Disp: 30 Tab, Rfl: 0    hydrALAZINE (APRESOLINE) 100 mg tablet, TAKE 1 TABLET BY MOUTH THREE TIMES DAILY, Disp: 270 Tab, Rfl: 3    pantoprazole (PROTONIX) 40 mg tablet, Take 40 mg by mouth daily. , Disp: , Rfl:     potassium chloride (K-DUR, KLOR-CON) 20 mEq tablet, Take 20 mEq by mouth daily. , Disp: , Rfl:     latanoprost (XALATAN) 0.005 % ophthalmic solution, , Disp: , Rfl:     glucose blood VI test strips (PRODIGY NO CODING) strip, Check fasting glucose once daily, Disp: 100 Strip, Rfl: 3    Blood-Glucose Meter (PRODIGY AUTOCODE MONITOR SYST) misc, Check fasting glucose once daily, Disp: 1 Each, Rfl: 0    cholecalciferol, VITAMIN D3, (VITAMIN D3) 5,000 unit tab tablet, Take  by mouth daily. , Disp: , Rfl:     Aspirin, Buffered 81 mg tab, Take 1 tablet by mouth daily. , Disp: , Rfl:     fish oil-dha-epa 1,200-144-216 mg cap, Take 1 tablet by mouth daily. , Disp: , Rfl:     simvastatin (ZOCOR) 40 mg tablet, Take 1 tablet by mouth nightly, Disp: 90 Tab, Rfl: 3      No Known Allergies    Past Medical History:   Diagnosis Date    Anemia     Carotid bruit 12/07/2013    Carotid Duplex: 1. Bilateral 1-49% stenosis of the internal carotid arteries. 2. No significant stenosis in the external carotid arteries bilaterally. 3. Antegrade flow in both vetebral arteries. 4. Normal flow in both subclavian arteries. Plaque Morphology: 1. Hyperechoic plaque in the bulb and right ICA. 2. Hyperechoic plaque in the bulb and left ICA.  CKD (chronic kidney disease) stage 3, GFR 30-59 ml/min     Diabetes (HCC)     NIDDM    Gastritis 10/27/2014    Duodenum Bx: No Specific Pathologic Abnormality. No Villous Abnormality. 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 No dysplasia or malignancy identified. Bacterial Organisms c/w H. Pylori are present.  Z-Line Bx: GE mucosa w/ Acute/Chronic Inflammation & Reactive Changes. Focal Specialized Type Campos Mucosa Identified. No Dysplasia or Malignancy Identified.  Gout 12/10/2009    Elevated Uric Acid Level    Hyperlipidemia     Hypertension     RAMÓN (iron deficiency anemia)        Social History     Social History    Marital status:      Spouse name: N/A    Number of children: N/A    Years of education: N/A     Occupational History    Not on file.      Social History Main Topics    Smoking status: Never Smoker    Smokeless tobacco: Never Used    Alcohol use No    Drug use: No    Sexual activity: Not Currently     Partners: Male     Birth control/ protection: None     Other Topics Concern    Not on file     Social History Narrative       Family History   Problem Relation Age of Onset    Hypertension Mother     Stroke Mother     Stroke Father          OBJECTIVE    Physical Exam:     Visit Vitals  /62 (BP 1 Location: Left upper arm, BP Patient Position: Sitting, BP Cuff Size: Adult)   Pulse 64   Temp 97.2 °F (36.2 °C) (Oral)   Resp 16   Ht 4' 11\" (1.499 m)   Wt 132 lb (59.9 kg)   SpO2 100%   BMI 26.66 kg/m²         General: alert, well-appearing, AA,  in no apparent distress or pain  Neck: supple, no adenopathy palpated  CVS: normal rate,  regular rhythm, + murmur  Lungs:clear to ausculation bilaterally, no crackles, wheezing or rhonchi noted  Extremities: + grade 1 bipedal edema  Feet: no lesions  Skin: warm, no lesions, rashes noted  Psych:  mood and affect normal  Results for orders placed or performed during the hospital encounter of 04/13/21   HEMOGLOBIN A1C WITH EAG   Result Value Ref Range    Hemoglobin A1c 6.5 (H) 4.2 - 5.6 %    Est. average glucose 140 mg/dL   CBC WITH AUTOMATED DIFF   Result Value Ref Range    WBC 5.8 4.6 - 13.2 K/uL    RBC 4.07 (L) 4.20 - 5.30 M/uL    HGB 11.0 (L) 12.0 - 16.0 g/dL    HCT 34.1 (L) 35.0 - 45.0 %    MCV 83.8 74.0 - 97.0 FL    MCH 27.0 24.0 - 34.0 PG MCHC 32.3 31.0 - 37.0 g/dL    RDW 19.0 (H) 11.6 - 14.5 %    PLATELET 005 012 - 998 K/uL    MPV 9.5 9.2 - 11.8 FL    NEUTROPHILS 56 40 - 73 %    LYMPHOCYTES 33 21 - 52 %    MONOCYTES 9 3 - 10 %    EOSINOPHILS 2 0 - 5 %    BASOPHILS 1 0 - 2 %    ABS. NEUTROPHILS 3.2 1.8 - 8.0 K/UL    ABS. LYMPHOCYTES 1.9 0.9 - 3.6 K/UL    ABS. MONOCYTES 0.5 0.05 - 1.2 K/UL    ABS. EOSINOPHILS 0.1 0.0 - 0.4 K/UL    ABS. BASOPHILS 0.0 0.0 - 0.1 K/UL    DF AUTOMATED     METABOLIC PANEL, COMPREHENSIVE   Result Value Ref Range    Sodium 142 136 - 145 mmol/L    Potassium 3.5 3.5 - 5.5 mmol/L    Chloride 108 100 - 111 mmol/L    CO2 26 21 - 32 mmol/L    Anion gap 8 3.0 - 18 mmol/L    Glucose 113 (H) 74 - 99 mg/dL    BUN 39 (H) 7.0 - 18 MG/DL    Creatinine 1.53 (H) 0.6 - 1.3 MG/DL    BUN/Creatinine ratio 25 (H) 12 - 20      GFR est AA 39 (L) >60 ml/min/1.73m2    GFR est non-AA 32 (L) >60 ml/min/1.73m2    Calcium 9.6 8.5 - 10.1 MG/DL    Bilirubin, total 0.7 0.2 - 1.0 MG/DL    ALT (SGPT) 29 13 - 56 U/L    AST (SGOT) 42 (H) 10 - 38 U/L    Alk.  phosphatase 60 45 - 117 U/L    Protein, total 8.0 6.4 - 8.2 g/dL    Albumin 3.8 3.4 - 5.0 g/dL    Globulin 4.2 (H) 2.0 - 4.0 g/dL    A-G Ratio 0.9 0.8 - 1.7     URIC ACID   Result Value Ref Range    Uric acid 6.4 2.6 - 7.2 MG/DL       ASSESSMENT/PLAN  Diagnoses and all orders for this visit:  Controlled type 2 diabetes mellitus with stage 3 chronic kidney disease, without long-term current use of insulin ([facility])   diet controlled  September 2019 admission for JOHNNY- this has improved, she has seen dr. Mela Allen recommending to continue to be off lisinopril and will monitor proteinuria  a1c 6.6  Foot exam today1/15/2021- no lesions, following podiatry  Check cmp, a1c, lipid panel prior to next visit    Diabetes mellitus type 2, diet controlled ([facility] [address]  Neuropathy  Has stopped gabapentin due to side effect  Lumbar stenosis- following Dr. Delvis Rader    Hyperuricemia  Cont allopurinol  Uric acid 6    Essential hypertension  Controlled  Cont   norvasc, clonidine, hydralazine  Cont  low dose lasix/kcl prn for leg edema  Avoid bb with h/o 1st deg av block    Leg edema  Advised compression stockings   Low salt diet,   Prn lasix/kcl     Mixed hyperlipidemia  Good range LDL <100, on zocor     Gout of foot, unspecified cause, unspecified chronicity, unspecified laterality  Controlled    Anemia of chronic disease  Stable, Baseline 10's  Cbc prior to next visit  Monitoring    Heart murmur  Aortic regurg  Asymptomatic  Monitoring, following cards     Diastolic chf, chronic  Appears compensated  Cont prn lasix  Intolerant to BB    CKD 3-4  Avoid nsaids, keep hydrated  monitoring  Following dr. Phong zaman       Follow-up Disposition:  Ff-up in 3 months or sooner prn    Patient understands plan of care. Patient has provided input and agrees with goals.

## 2021-05-27 ENCOUNTER — TRANSCRIBE ORDER (OUTPATIENT)
Dept: SCHEDULING | Age: 86
End: 2021-05-27

## 2021-05-27 DIAGNOSIS — R77.2 ELEVATED ALPHA FETOPROTEIN: Primary | ICD-10-CM

## 2021-06-14 ENCOUNTER — HOSPITAL ENCOUNTER (OUTPATIENT)
Dept: MRI IMAGING | Age: 86
Discharge: HOME | End: 2021-06-14
Attending: INTERNAL MEDICINE

## 2021-06-14 DIAGNOSIS — R77.2 ELEVATED ALPHA FETOPROTEIN: ICD-10-CM

## 2021-07-13 ENCOUNTER — HOSPITAL ENCOUNTER (OUTPATIENT)
Dept: MRI IMAGING | Age: 86
Discharge: HOME | End: 2021-07-13
Attending: INTERNAL MEDICINE
Payer: MEDICARE

## 2021-07-13 ENCOUNTER — HOSPITAL ENCOUNTER (OUTPATIENT)
Dept: LAB | Age: 86
Discharge: HOME | End: 2021-07-13
Attending: INTERNAL MEDICINE
Payer: MEDICARE

## 2021-07-13 DIAGNOSIS — N18.4 CKD (CHRONIC KIDNEY DISEASE) STAGE 4, GFR 15-29 ML/MIN (HCC): ICD-10-CM

## 2021-07-13 DIAGNOSIS — R77.2 ELEVATED ALPHA FETOPROTEIN: ICD-10-CM

## 2021-07-13 DIAGNOSIS — I10 ESSENTIAL HYPERTENSION: Chronic | ICD-10-CM

## 2021-07-13 DIAGNOSIS — E11.9 DIABETES MELLITUS TYPE 2, DIET-CONTROLLED (HCC): ICD-10-CM

## 2021-07-13 LAB
ALBUMIN SERPL-MCNC: 3.9 G/DL (ref 3.4–5)
ALBUMIN/GLOB SERPL: 1 {RATIO} (ref 0.8–1.7)
ALP SERPL-CCNC: 58 U/L (ref 45–117)
ALT SERPL-CCNC: 28 U/L (ref 13–56)
ANION GAP SERPL CALC-SCNC: 7 MMOL/L (ref 3–18)
AST SERPL-CCNC: 35 U/L (ref 10–38)
BILIRUB SERPL-MCNC: 0.9 MG/DL (ref 0.2–1)
BUN SERPL-MCNC: 50 MG/DL (ref 7–18)
BUN/CREAT SERPL: 30 (ref 12–20)
CALCIUM SERPL-MCNC: 9.6 MG/DL (ref 8.5–10.1)
CHLORIDE SERPL-SCNC: 107 MMOL/L (ref 100–111)
CHOLEST SERPL-MCNC: 239 MG/DL
CO2 SERPL-SCNC: 26 MMOL/L (ref 21–32)
CREAT SERPL-MCNC: 1.64 MG/DL (ref 0.6–1.3)
CREAT UR-MCNC: 1.8 MG/DL (ref 0.6–1.3)
EST. AVERAGE GLUCOSE BLD GHB EST-MCNC: 131 MG/DL
GLOBULIN SER CALC-MCNC: 4.1 G/DL (ref 2–4)
GLUCOSE SERPL-MCNC: 120 MG/DL (ref 74–99)
HBA1C MFR BLD: 6.2 % (ref 4.2–5.6)
HDLC SERPL-MCNC: 59 MG/DL (ref 40–60)
HDLC SERPL: 4.1 {RATIO} (ref 0–5)
LDLC SERPL CALC-MCNC: 156.8 MG/DL (ref 0–100)
LIPID PROFILE,FLP: ABNORMAL
POTASSIUM SERPL-SCNC: 3.7 MMOL/L (ref 3.5–5.5)
PROT SERPL-MCNC: 8 G/DL (ref 6.4–8.2)
SODIUM SERPL-SCNC: 140 MMOL/L (ref 136–145)
TRIGL SERPL-MCNC: 116 MG/DL (ref ?–150)
VLDLC SERPL CALC-MCNC: 23.2 MG/DL

## 2021-07-13 PROCEDURE — 80061 LIPID PANEL: CPT

## 2021-07-13 PROCEDURE — 80053 COMPREHEN METABOLIC PANEL: CPT

## 2021-07-13 PROCEDURE — 74183 MRI ABD W/O CNTR FLWD CNTR: CPT

## 2021-07-13 PROCEDURE — 83036 HEMOGLOBIN GLYCOSYLATED A1C: CPT

## 2021-07-13 PROCEDURE — 82565 ASSAY OF CREATININE: CPT

## 2021-07-13 PROCEDURE — 74011250636 HC RX REV CODE- 250/636: Performed by: INTERNAL MEDICINE

## 2021-07-13 PROCEDURE — A9575 INJ GADOTERATE MEGLUMI 0.1ML: HCPCS | Performed by: INTERNAL MEDICINE

## 2021-07-13 PROCEDURE — 36415 COLL VENOUS BLD VENIPUNCTURE: CPT

## 2021-07-13 RX ORDER — GADOTERATE MEGLUMINE 376.9 MG/ML
7 INJECTION INTRAVENOUS
Status: COMPLETED | OUTPATIENT
Start: 2021-07-13 | End: 2021-07-13

## 2021-07-13 RX ADMIN — GADOTERATE MEGLUMINE 7 ML: 376.9 INJECTION INTRAVENOUS at 09:00

## 2021-07-16 ENCOUNTER — OFFICE VISIT (OUTPATIENT)
Dept: FAMILY MEDICINE CLINIC | Age: 86
End: 2021-07-16
Payer: MEDICARE

## 2021-07-16 VITALS
RESPIRATION RATE: 16 BRPM | HEIGHT: 59 IN | TEMPERATURE: 97 F | WEIGHT: 138 LBS | OXYGEN SATURATION: 98 % | BODY MASS INDEX: 27.82 KG/M2 | HEART RATE: 78 BPM | SYSTOLIC BLOOD PRESSURE: 138 MMHG | DIASTOLIC BLOOD PRESSURE: 80 MMHG

## 2021-07-16 DIAGNOSIS — R60.0 LEG EDEMA: ICD-10-CM

## 2021-07-16 DIAGNOSIS — M10.9 GOUT, UNSPECIFIED CAUSE, UNSPECIFIED CHRONICITY, UNSPECIFIED SITE: ICD-10-CM

## 2021-07-16 DIAGNOSIS — I50.32 DIASTOLIC CHF, CHRONIC (HCC): ICD-10-CM

## 2021-07-16 DIAGNOSIS — E79.0 HYPERURICEMIA: ICD-10-CM

## 2021-07-16 DIAGNOSIS — Z00.00 MEDICARE ANNUAL WELLNESS VISIT, SUBSEQUENT: Primary | ICD-10-CM

## 2021-07-16 DIAGNOSIS — R01.1 HEART MURMUR: ICD-10-CM

## 2021-07-16 DIAGNOSIS — N18.30 STAGE 3 CHRONIC KIDNEY DISEASE, UNSPECIFIED WHETHER STAGE 3A OR 3B CKD (HCC): ICD-10-CM

## 2021-07-16 DIAGNOSIS — E78.2 MIXED HYPERLIPIDEMIA: ICD-10-CM

## 2021-07-16 DIAGNOSIS — I10 ESSENTIAL HYPERTENSION: ICD-10-CM

## 2021-07-16 DIAGNOSIS — D63.8 ANEMIA OF CHRONIC DISEASE: ICD-10-CM

## 2021-07-16 PROCEDURE — G8419 CALC BMI OUT NRM PARAM NOF/U: HCPCS | Performed by: FAMILY MEDICINE

## 2021-07-16 PROCEDURE — 1090F PRES/ABSN URINE INCON ASSESS: CPT | Performed by: FAMILY MEDICINE

## 2021-07-16 PROCEDURE — 99214 OFFICE O/P EST MOD 30 MIN: CPT | Performed by: FAMILY MEDICINE

## 2021-07-16 PROCEDURE — G8536 NO DOC ELDER MAL SCRN: HCPCS | Performed by: FAMILY MEDICINE

## 2021-07-16 PROCEDURE — 1101F PT FALLS ASSESS-DOCD LE1/YR: CPT | Performed by: FAMILY MEDICINE

## 2021-07-16 PROCEDURE — G8427 DOCREV CUR MEDS BY ELIG CLIN: HCPCS | Performed by: FAMILY MEDICINE

## 2021-07-16 PROCEDURE — G0463 HOSPITAL OUTPT CLINIC VISIT: HCPCS | Performed by: FAMILY MEDICINE

## 2021-07-16 PROCEDURE — G8510 SCR DEP NEG, NO PLAN REQD: HCPCS | Performed by: FAMILY MEDICINE

## 2021-07-16 PROCEDURE — G0439 PPPS, SUBSEQ VISIT: HCPCS | Performed by: FAMILY MEDICINE

## 2021-07-16 RX ORDER — PRAVASTATIN SODIUM 20 MG/1
20 TABLET ORAL
Qty: 90 TABLET | Refills: 0 | Status: SHIPPED | OUTPATIENT
Start: 2021-07-16 | End: 2021-10-14

## 2021-07-16 RX ORDER — FERROUS FUMARATE 324(106)MG
TABLET ORAL
COMMUNITY
End: 2021-11-29

## 2021-07-16 NOTE — PATIENT INSTRUCTIONS

## 2021-07-16 NOTE — PROGRESS NOTES
1. Have you been to the ER, urgent care clinic since your last visit? Hospitalized since your last visit? No    2. Have you seen or consulted any other health care providers outside of the [address] [place] [address] since your last visit? Include any pap smears or colon screening. Dr. Padilla Mota, [facility] nephrology    This is the Subsequent Medicare Annual Wellness Exam, performed 12 months or more after the Initial AWV or the last Subsequent AWV    I have reviewed the patient's medical history in detail and updated the computerized patient record. Assessment/Plan   Education and counseling provided:  Are appropriate based on today's review and evaluation  Pneumococcal Vaccine- completed  Influenza Vaccine- annually  Cardiovascular screening blood test- 7/2021  Bone mass measurement (DEXA) 2018 normal  Screening for glaucoma due- pt reminded to schedule provided # dr. Nahun Membreno follows    1.  Medicare annual wellness visit, subsequent       Depression Risk Factor Screening     3 most recent PHQ Screens 7/16/2021   PHQ Not Done -   Little interest or pleasure in doing things Not at all   Feeling down, depressed, irritable, or hopeless Not at all   Total Score PHQ 2 0   Trouble falling or staying asleep, or sleeping too much -   Poor appetite, weight loss, or overeating -   Feeling bad about yourself - or that you are a failure or have let yourself or your family down -   Trouble concentrating on things such as school, work, reading, or watching TV -   Moving or speaking so slowly that other people could have noticed; or the opposite being so fidgety that others notice -   Thoughts of being better off dead, or hurting yourself in some way -       Alcohol Risk Screen    Do you average more than 1 drink per night or more than 7 drinks a week:  No    On any one occasion in the past three months have you have had more than 3 drinks containing alcohol:  No        Functional Ability and Level of Safety    Hearing: Hearing is good. Activities of Daily Living: The home contains: uses a cane and walker  Patient does total self care      Ambulation: with mild difficulty     Fall Risk:  Fall Risk Assessment, last 12 mths 7/16/2021   Able to walk?  Yes   Fall in past 12 months? 0   Do you feel unsteady? -   Are you worried about falling -      Abuse Screen:  Patient is not abused       Cognitive Screening    Has your family/caregiver stated any concerns about your memory: no    Health Maintenance Due     Health Maintenance Due   Topic Date Due    DTaP/Tdap/Td series (1 - Tdap) Never done    Shingrix Vaccine Age 50> (1 of 2) Never done    Eye Exam Retinal or Dilated  10/16/2020    MICROALBUMIN Q1  07/02/2021       Patient Care Team   Patient Care Team:  Mera Davis MD as PCP - General (Family Medicine)  Mera Davis MD as PCP - [facility]  Ricarda Triplett MD (Ophthalmology)  Olvin Feliz MD (Nephrology)  Sonja Estrada MD (Gastroenterology)    History     Patient Active Problem List   Diagnosis Code    Essential hypertension I10    Mixed hyperlipidemia E78.2    Advanced directives, counseling/discussion Z71.89    Diabetes mellitus type 2, diet-controlled ([address] E11.9    Advance care planning Z71.89    Heart murmur R01.1    Gout M10.9    CKD (chronic kidney disease) stage 3, GFR 30-59 ml/min ([facility]) N18.30    Anemia of chronic disease D63.8    Leg edema R60.0    Nausea R11.0    Elevated troponin R77.8    Chronic renal failure, stage 3 (moderate) (HCC) U58.18    Diastolic CHF, chronic (HCC) I50.32    Sinus bradycardia R00.1    CKD (chronic kidney disease) stage 4, GFR 15-29 ml/min ([address] N18.4    Type 2 diabetes mellitus with chronic kidney disease, without long-term current use of insulin ([address] E11.22    Type 2 diabetes mellitus with diabetic neuropathy ([address] E11.40    Chronic gastritis without bleeding K29.50     Past Medical History:   Diagnosis Date    Anemia     Carotid bruit 12/07/2013    Carotid Duplex: 1. Bilateral 1-49% stenosis of the internal carotid arteries. 2. No significant stenosis in the external carotid arteries bilaterally. 3. Antegrade flow in both vetebral arteries. 4. Normal flow in both subclavian arteries. Plaque Morphology: 1. Hyperechoic plaque in the bulb and right ICA. 2. Hyperechoic plaque in the bulb and left ICA.  CKD (chronic kidney disease) stage 3, GFR 30-59 ml/min ([facility])     Diabetes [facility]     NIDDM    Gastritis 10/27/2014    Duodenum Bx: No Specific Pathologic Abnormality. No Villous Abnormality. 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 No dysplasia or malignancy identified. Bacterial Organisms c/w H. Pylori are present. Z-Line Bx: GE mucosa w/ Acute/Chronic Inflammation & Reactive Changes. Focal Specialized Type Campos Mucosa Identified. No Dysplasia or Malignancy Identified.  Gout 12/10/2009    Elevated Uric Acid Level    Hyperlipidemia     Hypertension     RAMÓN (iron deficiency anemia)       Past Surgical History:   Procedure Laterality Date    HX COLONOSCOPY  10/27/2014    Dr. Felix Richter HX ENDOSCOPY  10/27/2014    Dr. Tonio Larsen      Current Outpatient Medications   Medication Sig Dispense Refill    cloNIDine HCL (CATAPRES) 0.2 mg tablet Take 1 tablet by mouth twice daily 180 Tab 1    allopurinoL (ZYLOPRIM) 100 mg tablet Take 1 Tab by mouth daily. 90 Tab 1    amLODIPine (NORVASC) 10 mg tablet Take 1 tablet by mouth once daily 90 Tab 1    Colcrys 0.6 mg tablet TAKE 1 TABLET BY MOUTH ONCE DAILY FOR  A  TYPE  OF  JOINT  DISORDER  DUE  TO  EXCESS  URIC  ACID  IN  THE  BLOOD  CALLED  GOUT 30 Tab 0    hydrALAZINE (APRESOLINE) 100 mg tablet TAKE 1 TABLET BY MOUTH THREE TIMES DAILY 270 Tab 3    simvastatin (ZOCOR) 40 mg tablet Take 1 tablet by mouth nightly 90 Tab 3    pantoprazole (PROTONIX) 40 mg tablet Take 40 mg by mouth daily.       potassium chloride (K-DUR, KLOR-CON) 20 mEq tablet Take 20 mEq by mouth daily.  latanoprost (XALATAN) 0.005 % ophthalmic solution       glucose blood VI test strips (PRODIGY NO CODING) strip Check fasting glucose once daily 100 Strip 3    Blood-Glucose Meter (PRODIGY AUTOCODE MONITOR SYST) misc Check fasting glucose once daily 1 Each 0    cholecalciferol, VITAMIN D3, (VITAMIN D3) 5,000 unit tab tablet Take  by mouth daily.  Aspirin, Buffered 81 mg tab Take 1 tablet by mouth daily.  fish oil-dha-epa 1,200-144-216 mg cap Take 1 tablet by mouth daily. No Known Allergies    Family History   Problem Relation Age of Onset    Hypertension Mother     Stroke Mother     Stroke Father      Social History     Tobacco Use    Smoking status: Never Smoker    Smokeless tobacco: Never Used   Substance Use Topics    Alcohol use: No       Bruce Salmon, 80 y.o.,  female    SUBJECTIVE  Ff-up    No new concerns other that osteoarthritis pains (knees/ankles)- not interested in seeing ortho, on prn tylenol    HTN/CKD 3-  She continues to take clonidine, hydralazine and norvasc. She is following dr. Kendra Hudson who recommended to continue to be off lisinopril add potassium andl monitoring proteinuria. She is on prn lasix few times a week for leg edema. I advised her to start using compression stockings as well. She has h/o AV block and advised against BB. DM w/ CKD 3 and neuorpathy-  diet controlled, She Reports FBG  1teens. Reviewed labs a1c 6.2  Numbness of legs/feet secondary to DM neuropathy/spinal stenosis- some response to gabapentin, however discontinued due to drowsiness side effect  MRI incidental finding of benign appearing renal cysts, reviewed c/w US done by dr. Girish Austin for CKD. HL- was supposed to be on zocor, 's usually <100. We had extensive med review today and bottles did not have zocor. She does not recall why she is not currently on medication.     Aortic regurtiation Hardeep Harrison- evaluated by cardiology Dr. Tenzin Barnard 2019,  She is asymptomatic, likely due to calcific aortic valve and recommend recheck in about 2-3 years. Gout/ history of hyperuricemia- usually foot swelling and pain, related to seafood. Taking allopurinol     GERD- EGD gastritis/schatzis ring dilated 9/2020, doing well on protonix. Following dr. Carmine Quiroz    ROS:  See HPI, all others negative        Patient Active Problem List   Diagnosis Code    Essential hypertension I10    Mixed hyperlipidemia E78.2    Advanced directives, counseling/discussion Z71.89    Controlled type 2 diabetes mellitus with stage 3 chronic kidney disease, without long-term current use of insulin ([facility]) E11.22, N18.3         Current Outpatient Medications:     Ferrous Fumarate (Ferretts) 325 mg (106 mg iron) tab, Take  by mouth., Disp: , Rfl:     pravastatin (PRAVACHOL) 20 mg tablet, Take 1 Tablet by mouth nightly., Disp: 90 Tablet, Rfl: 0    cloNIDine HCL (CATAPRES) 0.2 mg tablet, Take 1 tablet by mouth twice daily, Disp: 180 Tab, Rfl: 1    allopurinoL (ZYLOPRIM) 100 mg tablet, Take 1 Tab by mouth daily. , Disp: 90 Tab, Rfl: 1    amLODIPine (NORVASC) 10 mg tablet, Take 1 tablet by mouth once daily, Disp: 90 Tab, Rfl: 1    hydrALAZINE (APRESOLINE) 100 mg tablet, TAKE 1 TABLET BY MOUTH THREE TIMES DAILY, Disp: 270 Tab, Rfl: 3    potassium chloride (K-DUR, KLOR-CON) 20 mEq tablet, Take 20 mEq by mouth daily. , Disp: , Rfl:     latanoprost (XALATAN) 0.005 % ophthalmic solution, , Disp: , Rfl:     glucose blood VI test strips (PRODIGY NO CODING) strip, Check fasting glucose once daily, Disp: 100 Strip, Rfl: 3    Blood-Glucose Meter (PRODIGY AUTOCODE MONITOR SYST) misc, Check fasting glucose once daily, Disp: 1 Each, Rfl: 0    cholecalciferol, VITAMIN D3, (VITAMIN D3) 5,000 unit tab tablet, Take  by mouth daily. , Disp: , Rfl:     Aspirin, Buffered 81 mg tab, Take 1 tablet by mouth daily. , Disp: , Rfl:       No Known Allergies    Past Medical History: Diagnosis Date    Anemia     Carotid bruit 12/07/2013    Carotid Duplex: 1. Bilateral 1-49% stenosis of the internal carotid arteries. 2. No significant stenosis in the external carotid arteries bilaterally. 3. Antegrade flow in both vetebral arteries. 4. Normal flow in both subclavian arteries. Plaque Morphology: 1. Hyperechoic plaque in the bulb and right ICA. 2. Hyperechoic plaque in the bulb and left ICA.  CKD (chronic kidney disease) stage 3, GFR 30-59 ml/min     Diabetes (HCC)     NIDDM    Gastritis 10/27/2014    Duodenum Bx: No Specific Pathologic Abnormality. No Villous Abnormality. 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 No dysplasia or malignancy identified. Bacterial Organisms c/w H. Pylori are present. Z-Line Bx: GE mucosa w/ Acute/Chronic Inflammation & Reactive Changes. Focal Specialized Type Campos Mucosa Identified. No Dysplasia or Malignancy Identified.  Gout 12/10/2009    Elevated Uric Acid Level    Hyperlipidemia     Hypertension     RAMÓN (iron deficiency anemia)        Social History     Social History    Marital status:      Spouse name: N/A    Number of children: N/A    Years of education: N/A     Occupational History    Not on file.      Social History Main Topics    Smoking status: Never Smoker    Smokeless tobacco: Never Used    Alcohol use No    Drug use: No    Sexual activity: Not Currently     Partners: Male     Birth control/ protection: None     Other Topics Concern    Not on file     Social History Narrative       Family History   Problem Relation Age of Onset    Hypertension Mother     Stroke Mother     Stroke Father          OBJECTIVE    Physical Exam:     Visit Vitals  /80 (BP 1 Location: Left upper arm, BP Patient Position: Sitting, BP Cuff Size: Adult)   Pulse 78   Temp 97 °F (36.1 °C) (Oral)   Resp 16   Ht 4' 11\" (1.499 m)   Wt 138 lb (62.6 kg)   SpO2 98%   BMI 27.87 kg/m²         General: alert, well-appearing, AA,  in no apparent distress or pain  Neck: supple, no adenopathy palpated  CVS: normal rate,  regular rhythm, + murmur  Lungs:clear to ausculation bilaterally, no crackles, wheezing or rhonchi noted  Extremities: + grade 1 bipedal edema  Feet: no lesions  Skin: warm, no lesions, rashes noted  Psych:  mood and affect normal  Results for orders placed or performed during the hospital encounter of 07/13/21   LIPID PANEL   Result Value Ref Range    LIPID PROFILE          Cholesterol, total 239 (H) <200 MG/DL    Triglyceride 116 <150 MG/DL    HDL Cholesterol 59 40 - 60 MG/DL    LDL, calculated 156.8 (H) 0 - 100 MG/DL    VLDL, calculated 23.2 MG/DL    CHOL/HDL Ratio 4.1 0 - 5.0     METABOLIC PANEL, COMPREHENSIVE   Result Value Ref Range    Sodium 140 136 - 145 mmol/L    Potassium 3.7 3.5 - 5.5 mmol/L    Chloride 107 100 - 111 mmol/L    CO2 26 21 - 32 mmol/L    Anion gap 7 3.0 - 18 mmol/L    Glucose 120 (H) 74 - 99 mg/dL    BUN 50 (H) 7.0 - 18 MG/DL    Creatinine 1.64 (H) 0.6 - 1.3 MG/DL    BUN/Creatinine ratio 30 (H) 12 - 20      GFR est AA 36 (L) >60 ml/min/1.73m2    GFR est non-AA 30 (L) >60 ml/min/1.73m2    Calcium 9.6 8.5 - 10.1 MG/DL    Bilirubin, total 0.9 0.2 - 1.0 MG/DL    ALT (SGPT) 28 13 - 56 U/L    AST (SGOT) 35 10 - 38 U/L    Alk.  phosphatase 58 45 - 117 U/L    Protein, total 8.0 6.4 - 8.2 g/dL    Albumin 3.9 3.4 - 5.0 g/dL    Globulin 4.1 (H) 2.0 - 4.0 g/dL    A-G Ratio 1.0 0.8 - 1.7     HEMOGLOBIN A1C WITH EAG   Result Value Ref Range    Hemoglobin A1c 6.2 (H) 4.2 - 5.6 %    Est. average glucose 131 mg/dL       ASSESSMENT/PLAN  Diagnoses and all orders for this visit:  Controlled type 2 diabetes mellitus with stage 3 chronic kidney disease, without long-term current use of insulin ([facility]   diet controlled  September 2019 admission for JOHNNY- this has improved, she has seen dr. Nany Harris recommending to continue to be off lisinopril and will monitor proteinuria  a1c 6.6>6.2  Foot exam today1/15/2021- no lesions, following podiatry  Check cmp, a1c, lipid panel, cmp, uric acid prior to next visit    Diabetes mellitus type 2, diet controlled ([address]  Neuropathy  Has stopped gabapentin due to side effect    Lumbar stenosis   following Dr. Natalie Rosales    Hyperuricemia  Cont allopurinol  Uric acid 6    Essential hypertension  Controlled  Cont   norvasc, clonidine, hydralazine  Cont  low dose lasix/kcl prn for leg edema  Avoid bb with h/o 1st deg av block    Leg edema  Advised compression stockings   Low salt diet,   Prn lasix/kcl     Mixed hyperlipidemia  Was previously controlled on zocor, for some reason out of med  Will try pravachol since she is on norvasc  Lipid panel prior to next visit     Gout of foot, unspecified cause, unspecified chronicity, unspecified laterality  Controlled  Cont allopurinol  Check Uric acid prior to next visit    Anemia of chronic disease  Stable, Baseline 10's  Cbc prior to next visit  Monitoring    Heart murmur  Aortic regurg  Asymptomatic  Monitoring, following cards     Diastolic chf, chronic  Appears compensated  Cont prn lasix  Intolerant to BB    CKD 3-4  Avoid nsaids, keep hydrated  monitoring  Following dr. Louie zaman     BRING MED BOTTLES EVERY VISIT    Follow-up Disposition:  Ff-up in 3 months or sooner prn    Patient understands plan of care. Patient has provided input and agrees with goals.

## 2021-10-06 ENCOUNTER — HOSPITAL ENCOUNTER (OUTPATIENT)
Dept: LAB | Age: 86
Discharge: HOME | End: 2021-10-06
Payer: MEDICARE

## 2021-10-06 DIAGNOSIS — I10 ESSENTIAL HYPERTENSION: ICD-10-CM

## 2021-10-06 DIAGNOSIS — D63.8 ANEMIA OF CHRONIC DISEASE: ICD-10-CM

## 2021-10-06 DIAGNOSIS — M10.9 GOUT, UNSPECIFIED CAUSE, UNSPECIFIED CHRONICITY, UNSPECIFIED SITE: ICD-10-CM

## 2021-10-06 LAB
ALBUMIN SERPL-MCNC: 3.8 G/DL (ref 3.4–5)
ALBUMIN/GLOB SERPL: 0.9 {RATIO} (ref 0.8–1.7)
ALP SERPL-CCNC: 49 U/L (ref 45–117)
ALT SERPL-CCNC: 32 U/L (ref 13–56)
ANION GAP SERPL CALC-SCNC: 9 MMOL/L (ref 3–18)
AST SERPL-CCNC: 39 U/L (ref 10–38)
BASOPHILS # BLD: 0 K/UL (ref 0–0.1)
BASOPHILS NFR BLD: 0 % (ref 0–2)
BILIRUB SERPL-MCNC: 0.6 MG/DL (ref 0.2–1)
BUN SERPL-MCNC: 49 MG/DL (ref 7–18)
BUN/CREAT SERPL: 29 (ref 12–20)
CALCIUM SERPL-MCNC: 9.6 MG/DL (ref 8.5–10.1)
CHLORIDE SERPL-SCNC: 108 MMOL/L (ref 100–111)
CHOLEST SERPL-MCNC: 176 MG/DL
CO2 SERPL-SCNC: 25 MMOL/L (ref 21–32)
CREAT SERPL-MCNC: 1.67 MG/DL (ref 0.6–1.3)
DIFFERENTIAL METHOD BLD: ABNORMAL
EOSINOPHIL # BLD: 0 K/UL (ref 0–0.4)
EOSINOPHIL NFR BLD: 0 % (ref 0–5)
ERYTHROCYTE [DISTWIDTH] IN BLOOD BY AUTOMATED COUNT: 17.5 % (ref 11.6–14.5)
GLOBULIN SER CALC-MCNC: 4.4 G/DL (ref 2–4)
GLUCOSE SERPL-MCNC: 123 MG/DL (ref 74–99)
HCT VFR BLD AUTO: 30.9 % (ref 35–45)
HDLC SERPL-MCNC: 72 MG/DL (ref 40–60)
HDLC SERPL: 2.4 {RATIO} (ref 0–5)
HGB BLD-MCNC: 10 G/DL (ref 12–16)
LDLC SERPL CALC-MCNC: 84.6 MG/DL (ref 0–100)
LIPID PROFILE,FLP: ABNORMAL
LYMPHOCYTES # BLD: 1.2 K/UL (ref 0.9–3.6)
LYMPHOCYTES NFR BLD: 18 % (ref 21–52)
MCH RBC QN AUTO: 28.2 PG (ref 24–34)
MCHC RBC AUTO-ENTMCNC: 32.4 G/DL (ref 31–37)
MCV RBC AUTO: 87.3 FL (ref 78–100)
MONOCYTES # BLD: 0.4 K/UL (ref 0.05–1.2)
MONOCYTES NFR BLD: 6 % (ref 3–10)
NEUTS SEG # BLD: 5 K/UL (ref 1.8–8)
NEUTS SEG NFR BLD: 75 % (ref 40–73)
PLATELET # BLD AUTO: 236 K/UL (ref 135–420)
PMV BLD AUTO: 10.2 FL (ref 9.2–11.8)
POTASSIUM SERPL-SCNC: 3.6 MMOL/L (ref 3.5–5.5)
PROT SERPL-MCNC: 8.2 G/DL (ref 6.4–8.2)
RBC # BLD AUTO: 3.54 M/UL (ref 4.2–5.3)
SODIUM SERPL-SCNC: 142 MMOL/L (ref 136–145)
TRIGL SERPL-MCNC: 97 MG/DL (ref ?–150)
URATE SERPL-MCNC: 6.9 MG/DL (ref 2.6–7.2)
VLDLC SERPL CALC-MCNC: 19.4 MG/DL
WBC # BLD AUTO: 6.6 K/UL (ref 4.6–13.2)

## 2021-10-06 PROCEDURE — 85025 COMPLETE CBC W/AUTO DIFF WBC: CPT

## 2021-10-06 PROCEDURE — 80053 COMPREHEN METABOLIC PANEL: CPT

## 2021-10-06 PROCEDURE — 36415 COLL VENOUS BLD VENIPUNCTURE: CPT

## 2021-10-06 PROCEDURE — 84550 ASSAY OF BLOOD/URIC ACID: CPT

## 2021-10-06 PROCEDURE — 80061 LIPID PANEL: CPT

## 2021-10-18 ENCOUNTER — OFFICE VISIT (OUTPATIENT)
Dept: FAMILY MEDICINE CLINIC | Age: 86
End: 2021-10-18
Payer: MEDICARE

## 2021-10-18 VITALS
BODY MASS INDEX: 27.87 KG/M2 | HEIGHT: 59 IN | HEART RATE: 68 BPM | TEMPERATURE: 97.9 F | DIASTOLIC BLOOD PRESSURE: 72 MMHG | SYSTOLIC BLOOD PRESSURE: 139 MMHG | RESPIRATION RATE: 16 BRPM | OXYGEN SATURATION: 100 %

## 2021-10-18 DIAGNOSIS — D63.8 ANEMIA OF CHRONIC DISEASE: ICD-10-CM

## 2021-10-18 DIAGNOSIS — I50.32 DIASTOLIC CHF, CHRONIC (HCC): ICD-10-CM

## 2021-10-18 DIAGNOSIS — I10 ESSENTIAL HYPERTENSION: Primary | Chronic | ICD-10-CM

## 2021-10-18 DIAGNOSIS — N18.30 STAGE 3 CHRONIC KIDNEY DISEASE, UNSPECIFIED WHETHER STAGE 3A OR 3B CKD (HCC): ICD-10-CM

## 2021-10-18 DIAGNOSIS — N18.30 TYPE 2 DIABETES MELLITUS WITH STAGE 3 CHRONIC KIDNEY DISEASE, WITHOUT LONG-TERM CURRENT USE OF INSULIN, UNSPECIFIED WHETHER STAGE 3A OR 3B CKD (HCC): ICD-10-CM

## 2021-10-18 DIAGNOSIS — E11.22 TYPE 2 DIABETES MELLITUS WITH STAGE 3 CHRONIC KIDNEY DISEASE, WITHOUT LONG-TERM CURRENT USE OF INSULIN, UNSPECIFIED WHETHER STAGE 3A OR 3B CKD (HCC): ICD-10-CM

## 2021-10-18 DIAGNOSIS — R60.0 LEG EDEMA: ICD-10-CM

## 2021-10-18 DIAGNOSIS — Z23 NEEDS FLU SHOT: ICD-10-CM

## 2021-10-18 DIAGNOSIS — E11.9 DIABETES MELLITUS TYPE 2, DIET-CONTROLLED (HCC): ICD-10-CM

## 2021-10-18 DIAGNOSIS — M10.9 GOUT, UNSPECIFIED CAUSE, UNSPECIFIED CHRONICITY, UNSPECIFIED SITE: ICD-10-CM

## 2021-10-18 DIAGNOSIS — E78.2 MIXED HYPERLIPIDEMIA: ICD-10-CM

## 2021-10-18 PROCEDURE — 1101F PT FALLS ASSESS-DOCD LE1/YR: CPT | Performed by: FAMILY MEDICINE

## 2021-10-18 PROCEDURE — G0463 HOSPITAL OUTPT CLINIC VISIT: HCPCS | Performed by: FAMILY MEDICINE

## 2021-10-18 PROCEDURE — 99214 OFFICE O/P EST MOD 30 MIN: CPT | Performed by: FAMILY MEDICINE

## 2021-10-18 PROCEDURE — G8419 CALC BMI OUT NRM PARAM NOF/U: HCPCS | Performed by: FAMILY MEDICINE

## 2021-10-18 PROCEDURE — G8427 DOCREV CUR MEDS BY ELIG CLIN: HCPCS | Performed by: FAMILY MEDICINE

## 2021-10-18 PROCEDURE — G8536 NO DOC ELDER MAL SCRN: HCPCS | Performed by: FAMILY MEDICINE

## 2021-10-18 PROCEDURE — 90694 VACC AIIV4 NO PRSRV 0.5ML IM: CPT | Performed by: FAMILY MEDICINE

## 2021-10-18 PROCEDURE — G8510 SCR DEP NEG, NO PLAN REQD: HCPCS | Performed by: FAMILY MEDICINE

## 2021-10-18 PROCEDURE — 1090F PRES/ABSN URINE INCON ASSESS: CPT | Performed by: FAMILY MEDICINE

## 2021-10-18 RX ORDER — AMLODIPINE BESYLATE 10 MG/1
TABLET ORAL
Qty: 90 TABLET | Refills: 1 | Status: SHIPPED | OUTPATIENT
Start: 2021-10-18 | End: 2022-04-15

## 2021-10-18 RX ORDER — CLONIDINE HYDROCHLORIDE 0.2 MG/1
TABLET ORAL
Qty: 180 TABLET | Refills: 1 | Status: SHIPPED | OUTPATIENT
Start: 2021-10-18 | End: 2022-08-22

## 2021-10-18 NOTE — PROGRESS NOTES
Moises Huffman, 80 y.o.,  female    SUBJECTIVE  Ff-up    No new concerns other that osteoarthritis pains (knees/ankles)- not interested in seeing ortho, on prn tylenol    HTN/CKD 3-  She continues to take clonidine, hydralazine and norvasc. She is following dr. Feliciano Atkinson who recommended to continue to be off lisinopril add potassium andl monitoring proteinuria. She is on prn lasix few times a week for leg edema. I advised her to start using compression stockings as well. She has h/o AV block and advised against BB. HL on zocor    DM w/ CKD 3 and neuorpathy-  diet controlled, She Reports [facility]  1teens. Numbness of legs/feet secondary to DM neuropathy/spinal stenosis- some response to gabapentin, however discontinued due to drowsiness side effect    Aortic regurtiation Tonia Petersen- evaluated by cardiology Dr. Brown Harp 2019,  She is asymptomatic, likely due to calcific aortic valve and recommend recheck in about 2-3 years. Gout/ history of hyperuricemia- usually foot swelling and pain, related to seafood. Taking allopurinol     GERD- EGD gastritis/schatzis ring dilated 9/2020, doing well on protonix. Following dr. Iwona Birch    ROS:  See HPI, all others negative        Patient Active Problem List   Diagnosis Code    Essential hypertension I10    Mixed hyperlipidemia E78.2    Advanced directives, counseling/discussion Z71.89    Controlled type 2 diabetes mellitus with stage 3 chronic kidney disease, without long-term current use of insulin ([facility]) E11.22, N18.3         Current Outpatient Medications:     cloNIDine HCL (CATAPRES) 0.2 mg tablet, Take 1 tablet by mouth twice daily, Disp: 180 Tablet, Rfl: 1    amLODIPine (NORVASC) 10 mg tablet, Take 1 tablet by mouth once daily, Disp: 90 Tablet, Rfl: 1    docosahexaenoic acid/epa (FISH OIL PO), Take 1,000 mg by mouth daily. , Disp: , Rfl:     pravastatin (PRAVACHOL) 20 mg tablet, Take 1 tablet by mouth nightly, Disp: 90 Tablet, Rfl: 3    Ferrous Fumarate (Ferretts) 325 mg (106 mg iron) tab, Take  by mouth., Disp: , Rfl:     allopurinoL (ZYLOPRIM) 100 mg tablet, Take 1 Tab by mouth daily. , Disp: 90 Tab, Rfl: 1    hydrALAZINE (APRESOLINE) 100 mg tablet, TAKE 1 TABLET BY MOUTH THREE TIMES DAILY, Disp: 270 Tab, Rfl: 3    potassium chloride (K-DUR, KLOR-CON) 20 mEq tablet, Take 20 mEq by mouth daily. , Disp: , Rfl:     latanoprost (XALATAN) 0.005 % ophthalmic solution, , Disp: , Rfl:     glucose blood VI test strips (PRODIGY NO CODING) strip, Check fasting glucose once daily, Disp: 100 Strip, Rfl: 3    Blood-Glucose Meter (PRODIGY AUTOCODE MONITOR SYST) misc, Check fasting glucose once daily, Disp: 1 Each, Rfl: 0    cholecalciferol, VITAMIN D3, (VITAMIN D3) 5,000 unit tab tablet, Take  by mouth daily. , Disp: , Rfl:     Aspirin, Buffered 81 mg tab, Take 1 tablet by mouth daily. , Disp: , Rfl:       No Known Allergies    Past Medical History:   Diagnosis Date    Anemia     Carotid bruit 12/07/2013    Carotid Duplex: 1. Bilateral 1-49% stenosis of the internal carotid arteries. 2. No significant stenosis in the external carotid arteries bilaterally. 3. Antegrade flow in both vetebral arteries. 4. Normal flow in both subclavian arteries. Plaque Morphology: 1. Hyperechoic plaque in the bulb and right ICA. 2. Hyperechoic plaque in the bulb and left ICA.  CKD (chronic kidney disease) stage 3, GFR 30-59 ml/min     Diabetes (HCC)     NIDDM    Gastritis 10/27/2014    Duodenum Bx: No Specific Pathologic Abnormality. No Villous Abnormality. 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 No dysplasia or malignancy identified. Bacterial Organisms c/w H. Pylori are present. Z-Line Bx: GE mucosa w/ Acute/Chronic Inflammation & Reactive Changes. Focal Specialized Type Campos Mucosa Identified. No Dysplasia or Malignancy Identified.      Gout 12/10/2009    Elevated Uric Acid Level    Hyperlipidemia     Hypertension     RAMÓN (iron deficiency anemia)        Social History     Social History    Marital status:      Spouse name: N/A    Number of children: N/A    Years of education: N/A     Occupational History    Not on file.      Social History Main Topics    Smoking status: Never Smoker    Smokeless tobacco: Never Used    Alcohol use No    Drug use: No    Sexual activity: Not Currently     Partners: Male     Birth control/ protection: None     Other Topics Concern    Not on file     Social History Narrative       Family History   Problem Relation Age of Onset    Hypertension Mother     Stroke Mother     Stroke Father          OBJECTIVE    Physical Exam:     Visit Vitals  /72 (BP 1 Location: Right upper arm, BP Patient Position: Sitting, BP Cuff Size: Adult)   Pulse 68   Temp 97.9 °F (36.6 °C) (Oral)   Resp 16   Ht 4' 11\" (1.499 m)   SpO2 100%   BMI 27.87 kg/m²         General: alert, well-appearing, AA,  in no apparent distress or pain  Neck: supple, no adenopathy palpated  CVS: normal rate,  regular rhythm, + murmur  Lungs:clear to ausculation bilaterally, no crackles, wheezing or rhonchi noted  Extremities: + grade 1 bipedal edema  Feet: no lesions  Skin: warm, no lesions, rashes noted  Psych:  mood and affect normal  Results for orders placed or performed during the hospital encounter of 10/06/21   LIPID PANEL   Result Value Ref Range    LIPID PROFILE          Cholesterol, total 176 <200 MG/DL    Triglyceride 97 <150 MG/DL    HDL Cholesterol 72 (H) 40 - 60 MG/DL    LDL, calculated 84.6 0 - 100 MG/DL    VLDL, calculated 19.4 MG/DL    CHOL/HDL Ratio 2.4 0 - 5.0     METABOLIC PANEL, COMPREHENSIVE   Result Value Ref Range    Sodium 142 136 - 145 mmol/L    Potassium 3.6 3.5 - 5.5 mmol/L    Chloride 108 100 - 111 mmol/L    CO2 25 21 - 32 mmol/L    Anion gap 9 3.0 - 18 mmol/L    Glucose 123 (H) 74 - 99 mg/dL    BUN 49 (H) 7.0 - 18 MG/DL    Creatinine 1.67 (H) 0.6 - 1.3 MG/DL    BUN/Creatinine ratio 29 (H) 12 - 20      GFR est AA 35 (L) >60 ml/min/1.73m2    GFR est non-AA 29 (L) >60 ml/min/1.73m2    Calcium 9.6 8.5 - 10.1 MG/DL    Bilirubin, total 0.6 0.2 - 1.0 MG/DL    ALT (SGPT) 32 13 - 56 U/L    AST (SGOT) 39 (H) 10 - 38 U/L    Alk. phosphatase 49 45 - 117 U/L    Protein, total 8.2 6.4 - 8.2 g/dL    Albumin 3.8 3.4 - 5.0 g/dL    Globulin 4.4 (H) 2.0 - 4.0 g/dL    A-G Ratio 0.9 0.8 - 1.7     URIC ACID   Result Value Ref Range    Uric acid 6.9 2.6 - 7.2 MG/DL   CBC WITH AUTOMATED DIFF   Result Value Ref Range    WBC 6.6 4.6 - 13.2 K/uL    RBC 3.54 (L) 4.20 - 5.30 M/uL    HGB 10.0 (L) 12.0 - 16.0 g/dL    HCT 30.9 (L) 35.0 - 45.0 %    MCV 87.3 78.0 - 100.0 FL    MCH 28.2 24.0 - 34.0 PG    MCHC 32.4 31.0 - 37.0 g/dL    RDW 17.5 (H) 11.6 - 14.5 %    PLATELET 321 037 - 220 K/uL    MPV 10.2 9.2 - 11.8 FL    NEUTROPHILS 75 (H) 40 - 73 %    LYMPHOCYTES 18 (L) 21 - 52 %    MONOCYTES 6 3 - 10 %    EOSINOPHILS 0 0 - 5 %    BASOPHILS 0 0 - 2 %    ABS. NEUTROPHILS 5.0 1.8 - 8.0 K/UL    ABS. LYMPHOCYTES 1.2 0.9 - 3.6 K/UL    ABS. MONOCYTES 0.4 0.05 - 1.2 K/UL    ABS. EOSINOPHILS 0.0 0.0 - 0.4 K/UL    ABS.  BASOPHILS 0.0 0.0 - 0.1 K/UL    DF AUTOMATED         ASSESSMENT/PLAN  Diagnoses and all orders for this visit:  Controlled type 2 diabetes mellitus with stage 3 chronic kidney disease, without long-term current use of insulin (HCC)   diet controlled  September 2019 admission for JOHNNY- this has improved, she has seen dr. Matthew Simental recommending to continue to be off lisinopril and will monitor proteinuria  a1c 6.6>6.2  Foot exam today1/15/2021- no lesions, following podiatry  Check cmp, a1c,cmp, microalbumin prior to next visit    Diabetes mellitus type 2, diet controlled ([address]  Neuropathy  Has stopped gabapentin due to side effect    Lumbar stenosis   following Dr. Carlos Raphael    Hyperuricemia  Cont allopurinol    Essential hypertension  Controlled  Cont   norvasc, clonidine, hydralazine  Cont  low dose lasix/kcl prn for leg edema  Avoid bb with h/o 1st deg av block    Leg edema  Advised compression stockings   Low salt diet,   Prn lasix/kcl     Mixed hyperlipidemia  Good range on pravachol, to cont  Lipid panel 10/2021     Gout of foot, unspecified cause, unspecified chronicity, unspecified laterality  Controlled  Cont allopurinol    Anemia of chronic disease  Stable, Baseline 10's  Cbc prior to next visit  Monitoring    Heart murmur  Aortic regurg  Asymptomatic  Monitoring, following cards     Diastolic chf, chronic  Appears compensated  Cont prn lasix  Intolerant to BB    CKD 3-4  Avoid nsaids, keep hydrated  monitoring  Following dr. Wanda zaman     Needs flu vaccine  High dose flu vaccine given    BRING MED BOTTLES EVERY VISIT    Follow-up Disposition:  Ff-up in 3 months or sooner prn    Patient understands plan of care. Patient has provided input and agrees with goals.

## 2021-10-18 NOTE — PROGRESS NOTES
1. Have you been to the ER, urgent care clinic since your last visit? Hospitalized since your last visit? No    2. Have you seen or consulted any other health care providers outside of the [address] [place] [address] since your last visit? Include any pap smears or colon screening. 8/19/21 [employer].     Chief Complaint   Patient presents with    Hypertension    Cholesterol Problem    Leg Swelling     bilat leg swelling

## 2021-10-18 NOTE — PATIENT INSTRUCTIONS
Vaccine Information Statement    Influenza (Flu) Vaccine (Inactivated or Recombinant): What You Need to Know    Many vaccine information statements are available in [place] and other languages. See www.immunize.org/vis. Hojas de información sobre vacunas están disponibles en español y en muchos otros idiomas. Visite www.immunize.org/vis. 1. Why get vaccinated? Influenza vaccine can prevent influenza (flu). Flu is a contagious disease that spreads around the [facility] every year, usually between October and May. Anyone can get the flu, but it is more dangerous for some people. Infants and young children, people 72 years and older, pregnant people, and people with certain health conditions or a weakened immune system are at greatest risk of flu complications. Pneumonia, bronchitis, sinus infections, and ear infections are examples of flu-related complications. If you have a medical condition, such as heart disease, cancer, or diabetes, flu can make it worse. Flu can cause fever and chills, sore throat, muscle aches, fatigue, cough, headache, and runny or stuffy nose. Some people may have vomiting and diarrhea, though this is more common in children than adults. In an average year, thousands of people in the [facility] die from flu, and many more are hospitalized. Flu vaccine prevents millions of illnesses and flu-related visits to the doctor each year. 2. Influenza vaccines     CDC recommends everyone 6 months and older get vaccinated every flu season. Children 6 months through 6years of age may need 2 doses during a single flu season. Everyone else needs only 1 dose each flu season. It takes about 2 weeks for protection to develop after vaccination. There are many flu viruses, and they are always changing. Each year a new flu vaccine is made to protect against the influenza viruses believed to be likely to cause disease in the upcoming flu season.  Even when the vaccine doesnt exactly match these viruses, it may still provide some protection. Influenza vaccine does not cause flu. Influenza vaccine may be given at the same time as other vaccines. 3. Talk with your health care provider    Tell your vaccination provider if the person getting the vaccine:   Has had an allergic reaction after a previous dose of influenza vaccine, or has any severe, life-threatening allergies    Has ever had Guillain-Barré Syndrome (also called GBS)    In some cases, your health care provider may decide to postpone influenza vaccination until a future visit. Influenza vaccine can be administered at any time during pregnancy. People who are or will be pregnant during influenza season should receive inactivated influenza vaccine. People with minor illnesses, such as a cold, may be vaccinated. People who are moderately or severely ill should usually wait until they recover before getting influenza vaccine. Your health care provider can give you more information. 4. Risks of a vaccine reaction     Soreness, redness, and swelling where the shot is given, fever, muscle aches, and headache can happen after influenza vaccination.  There may be a very small increased risk of Guillain-Barré Syndrome (GBS) after inactivated influenza vaccine (the flu shot). Feliciano Pinch children who get the flu shot along with pneumococcal vaccine (PCV13) and/or DTaP vaccine at the same time might be slightly more likely to have a seizure caused by fever. Tell your health care provider if a child who is getting flu vaccine has ever had a seizure. People sometimes faint after medical procedures, including vaccination. Tell your provider if you feel dizzy or have vision changes or ringing in the ears. As with any medicine, there is a very remote chance of a vaccine causing a severe allergic reaction, other serious injury, or death. 5. What if there is a serious problem?     An allergic reaction could occur after the vaccinated person leaves the clinic. If you see signs of a severe allergic reaction (hives, swelling of the face and throat, difficulty breathing, a fast heartbeat, dizziness, or weakness), call 9-1-1 and get the person to the nearest hospital.    For other signs that concern you, call your health care provider. Adverse reactions should be reported to the Vaccine Adverse Event Reporting System (VAERS). Your health care provider will usually file this report, or you can do it yourself. Visit the VAERS website at www.vaers. [facility].gov or call 7-127.949.3444. VAERS is only for reporting reactions, and VAERS staff members do not give medical advice. 6. The National Vaccine Injury Compensation Program    The [facility] Vaccine Injury Compensation Program (VICP) is a federal program that was created to compensate people who may have been injured by certain vaccines. Claims regarding alleged injury or death due to vaccination have a time limit for filing, which may be as short as two years. Visit the VICP website at www.[facility].gov/vaccinecompensation or call 7-317.403.6441 to learn about the program and about filing a claim. 7. How can I learn more?  Ask your health care provider.  Call your local or state health department.  Visit the website of the Food and Drug Administration (FDA) for vaccine package inserts and additional information at www.fda.gov/vaccines-blood-biologics/vaccines.  Contact the Centers for Disease Control and Prevention (CDC):  - Call 3-277.678.6720 (1-800-CDC-INFO) or  - Visit CDCs influenza website at www.cdc.gov/flu. Vaccine Information Statement   Inactivated Influenza Vaccine   8/6/2021  42 JAHPatsy Triplett [id number]   Department of Health and Human Services  Centers for Disease Control and Prevention    Office Use Only

## 2021-11-23 ENCOUNTER — HOSPITAL ENCOUNTER (OUTPATIENT)
Dept: LAB | Age: 86
Discharge: HOME | End: 2021-11-23
Payer: MEDICARE

## 2021-11-23 DIAGNOSIS — E11.9 DIABETES MELLITUS TYPE 2, DIET-CONTROLLED (HCC): ICD-10-CM

## 2021-11-23 LAB
ALBUMIN SERPL-MCNC: 3.7 G/DL (ref 3.4–5)
ALBUMIN/GLOB SERPL: 0.9 {RATIO} (ref 0.8–1.7)
ALP SERPL-CCNC: 53 U/L (ref 45–117)
ALT SERPL-CCNC: 21 U/L (ref 13–56)
ANION GAP SERPL CALC-SCNC: 7 MMOL/L (ref 3–18)
AST SERPL-CCNC: 29 U/L (ref 10–38)
BASOPHILS # BLD: 0 K/UL (ref 0–0.1)
BASOPHILS NFR BLD: 0 % (ref 0–2)
BILIRUB SERPL-MCNC: 0.5 MG/DL (ref 0.2–1)
BUN SERPL-MCNC: 43 MG/DL (ref 7–18)
BUN/CREAT SERPL: 25 (ref 12–20)
CALCIUM SERPL-MCNC: 9.7 MG/DL (ref 8.5–10.1)
CHLORIDE SERPL-SCNC: 105 MMOL/L (ref 100–111)
CO2 SERPL-SCNC: 28 MMOL/L (ref 21–32)
CREAT SERPL-MCNC: 1.73 MG/DL (ref 0.6–1.3)
CREAT UR-MCNC: 79 MG/DL (ref 30–125)
DIFFERENTIAL METHOD BLD: ABNORMAL
EOSINOPHIL # BLD: 0.1 K/UL (ref 0–0.4)
EOSINOPHIL NFR BLD: 1 % (ref 0–5)
ERYTHROCYTE [DISTWIDTH] IN BLOOD BY AUTOMATED COUNT: 16.9 % (ref 11.6–14.5)
EST. AVERAGE GLUCOSE BLD GHB EST-MCNC: 128 MG/DL
GLOBULIN SER CALC-MCNC: 4.3 G/DL (ref 2–4)
GLUCOSE SERPL-MCNC: 129 MG/DL (ref 74–99)
HBA1C MFR BLD: 6.1 % (ref 4.2–5.6)
HCT VFR BLD AUTO: 32.7 % (ref 35–45)
HGB BLD-MCNC: 10.4 G/DL (ref 12–16)
IMM GRANULOCYTES # BLD AUTO: 0 K/UL (ref 0–0.04)
IMM GRANULOCYTES NFR BLD AUTO: 0 % (ref 0–0.5)
LYMPHOCYTES # BLD: 1.5 K/UL (ref 0.9–3.6)
LYMPHOCYTES NFR BLD: 25 % (ref 21–52)
MCH RBC QN AUTO: 28.6 PG (ref 24–34)
MCHC RBC AUTO-ENTMCNC: 31.8 G/DL (ref 31–37)
MCV RBC AUTO: 89.8 FL (ref 78–100)
MICROALBUMIN UR-MCNC: 130 MG/DL (ref 0–3)
MICROALBUMIN/CREAT UR-RTO: 1646 MG/G (ref 0–30)
MONOCYTES # BLD: 0.5 K/UL (ref 0.05–1.2)
MONOCYTES NFR BLD: 9 % (ref 3–10)
NEUTS SEG # BLD: 3.8 K/UL (ref 1.8–8)
NEUTS SEG NFR BLD: 65 % (ref 40–73)
NRBC # BLD: 0 K/UL (ref 0–0.01)
NRBC BLD-RTO: 0 PER 100 WBC
PLATELET # BLD AUTO: 232 K/UL (ref 135–420)
PMV BLD AUTO: 9.9 FL (ref 9.2–11.8)
POTASSIUM SERPL-SCNC: 4.1 MMOL/L (ref 3.5–5.5)
PROT SERPL-MCNC: 8 G/DL (ref 6.4–8.2)
RBC # BLD AUTO: 3.64 M/UL (ref 4.2–5.3)
SODIUM SERPL-SCNC: 140 MMOL/L (ref 136–145)
WBC # BLD AUTO: 5.9 K/UL (ref 4.6–13.2)

## 2021-11-23 PROCEDURE — 82043 UR ALBUMIN QUANTITATIVE: CPT

## 2021-11-23 PROCEDURE — 83036 HEMOGLOBIN GLYCOSYLATED A1C: CPT

## 2021-11-23 PROCEDURE — 80053 COMPREHEN METABOLIC PANEL: CPT

## 2021-11-23 PROCEDURE — 85025 COMPLETE CBC W/AUTO DIFF WBC: CPT

## 2021-11-23 PROCEDURE — 36415 COLL VENOUS BLD VENIPUNCTURE: CPT

## 2021-11-28 ENCOUNTER — TRANSCRIBE ORDER (OUTPATIENT)
Dept: SCHEDULING | Age: 86
End: 2021-11-28

## 2021-11-28 DIAGNOSIS — K76.9 LIVER LESION: Primary | ICD-10-CM

## 2021-11-28 DIAGNOSIS — E88.09: ICD-10-CM

## 2021-11-28 DIAGNOSIS — K74.60 CIRRHOSIS, NON-ALCOHOLIC (HCC): ICD-10-CM

## 2021-11-29 ENCOUNTER — TRANSCRIBE ORDER (OUTPATIENT)
Dept: SCHEDULING | Age: 86
End: 2021-11-29

## 2021-11-29 ENCOUNTER — OFFICE VISIT (OUTPATIENT)
Dept: CARDIOLOGY CLINIC | Age: 86
End: 2021-11-29
Payer: MEDICARE

## 2021-11-29 VITALS
BODY MASS INDEX: 30.04 KG/M2 | DIASTOLIC BLOOD PRESSURE: 78 MMHG | OXYGEN SATURATION: 98 % | HEIGHT: 59 IN | HEART RATE: 61 BPM | WEIGHT: 149 LBS | SYSTOLIC BLOOD PRESSURE: 160 MMHG

## 2021-11-29 DIAGNOSIS — I35.1 NONRHEUMATIC AORTIC VALVE INSUFFICIENCY: Primary | ICD-10-CM

## 2021-11-29 DIAGNOSIS — N18.30 STAGE 3 CHRONIC KIDNEY DISEASE, UNSPECIFIED WHETHER STAGE 3A OR 3B CKD (HCC): ICD-10-CM

## 2021-11-29 DIAGNOSIS — I10 ESSENTIAL HYPERTENSION: ICD-10-CM

## 2021-11-29 DIAGNOSIS — J91.8 PLEURAL EFFUSION ASSOCIATED WITH HEPATIC DISORDER: Primary | ICD-10-CM

## 2021-11-29 DIAGNOSIS — K76.9 PLEURAL EFFUSION ASSOCIATED WITH HEPATIC DISORDER: Primary | ICD-10-CM

## 2021-11-29 DIAGNOSIS — E78.2 MIXED HYPERLIPIDEMIA: ICD-10-CM

## 2021-11-29 PROCEDURE — 99214 OFFICE O/P EST MOD 30 MIN: CPT | Performed by: INTERNAL MEDICINE

## 2021-11-29 PROCEDURE — G8510 SCR DEP NEG, NO PLAN REQD: HCPCS | Performed by: INTERNAL MEDICINE

## 2021-11-29 PROCEDURE — G8417 CALC BMI ABV UP PARAM F/U: HCPCS | Performed by: INTERNAL MEDICINE

## 2021-11-29 PROCEDURE — G8536 NO DOC ELDER MAL SCRN: HCPCS | Performed by: INTERNAL MEDICINE

## 2021-11-29 PROCEDURE — 1090F PRES/ABSN URINE INCON ASSESS: CPT | Performed by: INTERNAL MEDICINE

## 2021-11-29 PROCEDURE — 1101F PT FALLS ASSESS-DOCD LE1/YR: CPT | Performed by: INTERNAL MEDICINE

## 2021-11-29 PROCEDURE — 93000 ELECTROCARDIOGRAM COMPLETE: CPT | Performed by: INTERNAL MEDICINE

## 2021-11-29 PROCEDURE — G8427 DOCREV CUR MEDS BY ELIG CLIN: HCPCS | Performed by: INTERNAL MEDICINE

## 2021-11-29 RX ORDER — LANOLIN ALCOHOL/MO/W.PET/CERES
65 CREAM (GRAM) TOPICAL
COMMUNITY

## 2021-11-29 NOTE — PROGRESS NOTES
HISTORY OF PRESENT ILLNESS  Jerrica Grace is a 80 y.o. female. Follow-up  Pertinent negatives include no chest pain, no abdominal pain, no headaches and no shortness of breath. Patient presents for a follow-up office visit. She was initially referred here for evaluation of a cardiac murmur. She has a long-standing history of essential hypertension, type 2 diabetes mellitus, and dyslipidemia. Patient patient was found to have aortic insufficiency on an echocardiogram back in 2018. She was recently hospitalized at the beginning of September 2019 for worsening renal failure and nausea and vomiting. She underwent a repeat echocardiogram which showed preserved LV systolic function, EF 56 to 62%, grade 1 diastolic dysfunction, biatrial enlargement, mild to moderate aortic insufficiency, which is relatively unchanged compared to her prior study. Patient was last seen in our office a little over a year ago. Since last visit, she has noted some increased swelling in both legs up to her shins. This typically will improve when she elevates her legs. She does follow-up with her nephrologist for her chronic kidney disease. She reports this has been stable. She denies any increase shortness of breath, no orthopnea or PND. No chest pain or pressure. Past Medical History:   Diagnosis Date    Anemia     Carotid bruit 12/07/2013    Carotid Duplex: 1. Bilateral 1-49% stenosis of the internal carotid arteries. 2. No significant stenosis in the external carotid arteries bilaterally. 3. Antegrade flow in both vetebral arteries. 4. Normal flow in both subclavian arteries. Plaque Morphology: 1. Hyperechoic plaque in the bulb and right ICA. 2. Hyperechoic plaque in the bulb and left ICA.  CKD (chronic kidney disease) stage 3, GFR 30-59 ml/min ([facility])     Diabetes ([facility] [address]     NIDDM    Gastritis 10/27/2014    Duodenum Bx: No Specific Pathologic Abnormality. No Villous Abnormality.  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 No dysplasia or malignancy identified. Bacterial Organisms c/w H. Pylori are present. Z-Line Bx: GE mucosa w/ Acute/Chronic Inflammation & Reactive Changes. Focal Specialized Type Campos Mucosa Identified. No Dysplasia or Malignancy Identified.  Gout 12/10/2009    Elevated Uric Acid Level    Hyperlipidemia     Hypertension     RAMÓN (iron deficiency anemia)      Current Outpatient Medications   Medication Sig Dispense Refill    ferrous sulfate (Iron) 325 mg (65 mg iron) tablet Take  by mouth Daily (before breakfast).  hydrALAZINE (APRESOLINE) 100 mg tablet TAKE 1 TABLET BY MOUTH THREE TIMES DAILY 270 Tablet 3    cloNIDine HCL (CATAPRES) 0.2 mg tablet Take 1 tablet by mouth twice daily 180 Tablet 1    amLODIPine (NORVASC) 10 mg tablet Take 1 tablet by mouth once daily 90 Tablet 1    docosahexaenoic acid/epa (FISH OIL PO) Take 1,000 mg by mouth daily.  pravastatin (PRAVACHOL) 20 mg tablet Take 1 tablet by mouth nightly 90 Tablet 3    allopurinoL (ZYLOPRIM) 100 mg tablet Take 1 Tab by mouth daily. 90 Tab 1    potassium chloride (K-DUR, KLOR-CON) 20 mEq tablet Take 20 mEq by mouth daily.  latanoprost (XALATAN) 0.005 % ophthalmic solution Administer 1 Drop to both eyes daily.  glucose blood VI test strips (PRODIGY NO CODING) strip Check fasting glucose once daily 100 Strip 3    Blood-Glucose Meter (PRODIGY AUTOCODE MONITOR SYST) misc Check fasting glucose once daily 1 Each 0    cholecalciferol, VITAMIN D3, (VITAMIN D3) 5,000 unit tab tablet Take  by mouth daily.  Aspirin, Buffered 81 mg tab Take 1 tablet by mouth daily.        No Known Allergies     Social History     Tobacco Use    Smoking status: Never Smoker    Smokeless tobacco: Never Used   Substance Use Topics    Alcohol use: No    Drug use: No     Family History   Problem Relation Age of Onset    Hypertension Mother     Stroke Mother     Stroke Father Review of Systems   Constitutional: Negative for chills, fever, malaise/fatigue and weight loss. HENT: Negative for nosebleeds. Eyes: Negative for blurred vision and double vision. Respiratory: Negative for cough, shortness of breath and wheezing. Cardiovascular: Positive for leg swelling. Negative for chest pain, palpitations, orthopnea, claudication and PND. Gastrointestinal: Negative for abdominal pain, heartburn, nausea and vomiting. Genitourinary: Negative for dysuria and hematuria. Musculoskeletal: Negative for falls and myalgias. Skin: Negative for rash. Neurological: Negative for dizziness, focal weakness and headaches. Endo/Heme/Allergies: Does not bruise/bleed easily. Psychiatric/Behavioral: Negative for substance abuse. Visit Vitals  BP (!) 160/78 (BP 1 Location: Left upper arm, BP Patient Position: Sitting, BP Cuff Size: Small adult)   Pulse 61   Ht 4' 11\" (1.499 m)   Wt 67.6 kg (149 lb)   SpO2 98%   BMI 30.09 kg/m²       Physical Exam  Constitutional:       Appearance: She is well-developed. HENT:      Head: Normocephalic and atraumatic. Eyes:      Conjunctiva/sclera: Conjunctivae normal.   Neck:      Vascular: No carotid bruit or JVD. Cardiovascular:      Rate and Rhythm: Normal rate and regular rhythm. Pulses: Normal pulses. Heart sounds: S1 normal and S2 normal. Murmur heard. Midsystolic murmur is present with a grade of 1/6 at the lower right sternal border. High-pitched blowing decrescendo early diastolic murmur is present with a grade of 1/4 at the upper right sternal border radiating to the apex. No gallop. Pulmonary:      Effort: Pulmonary effort is normal.      Breath sounds: Normal breath sounds. No wheezing or rales. Abdominal:      General: Bowel sounds are normal.      Palpations: Abdomen is soft. Tenderness: There is no abdominal tenderness.    Musculoskeletal:         General: Swelling ( 1+ bilateral lower extremity to the shins) present. No tenderness or deformity. Cervical back: Neck supple. Skin:     General: Skin is warm and dry. Neurological:      General: No focal deficit present. Mental Status: She is alert and oriented to person, place, and time. Psychiatric:         Behavior: Behavior normal.         Thought Content: Thought content normal.       EKG: Normal sinus rhythm, left axis deviation, poor R-wave progression, no ST or T-wave abnormalities concerning for ischemia. Normal QTc interval.   Compared to the previous EKG, no significant will change. ASSESSMENT and PLAN    Aortic insufficiency. This was in the mild to moderate range on a recent echocardiogram from September 2019. This is likely just due to calcific aortic valve disease. This is not significant change compared to an echocardiogram from the year prior. This can be reassessed few years unless new symptoms arise. Essential hypertension. Patient's blood pressure is again elevated today in our office. She states at home this is typically labile with half of her readings being normal and have been elevated. For now we will continue her current medical regimen and focus on adhering to a strict low-sodium diet. Dyslipidemia. Patient has been treated with pravastatin. This is followed by her PCP. .    Diabetes mellitus, type II. Patient is managed with oral agents. From a cardiac standpoint for her hemoglobin A1c to be less than 7. Chronic kidney disease, stage III-IV. This is followed closely by her nephrologist.    Lower extremity swelling. My suspicion is that this is more likely due to dependent edema since her symptoms improved whenever she elevates her legs. I have recommended she wear compression stockings throughout the day and try to elevate her legs throughout the night. Follow-up in 12 months, sooner if needed.

## 2021-11-29 NOTE — PROGRESS NOTES
Anastasia Porter presents today for   Chief Complaint   Patient presents with    Follow-up     1 year follow up       Anastasia Porter preferred language for health care discussion is english/other. Is someone accompanying this pt? no    Is the patient using any DME equipment during [address]? no    Depression Screening:  3 most recent PHQ Screens 11/29/2021   PHQ Not Done -   Little interest or pleasure in doing things Not at all   Feeling down, depressed, irritable, or hopeless Not at all   Total Score PHQ 2 0   Trouble falling or staying asleep, or sleeping too much -   Poor appetite, weight loss, or overeating -   Feeling bad about yourself - or that you are a failure or have let yourself or your family down -   Trouble concentrating on things such as school, work, reading, or watching TV -   Moving or speaking so slowly that other people could have noticed; or the opposite being so fidgety that others notice -   Thoughts of being better off dead, or hurting yourself in some way -       Learning Assessment:  Learning Assessment 11/29/2021   PRIMARY LEARNER Patient   HIGHEST LEVEL OF EDUCATION - PRIMARY LEARNER  -   BARRIERS PRIMARY LEARNER -   [address] [place] [address]    NEED -   LEARNER PREFERENCE PRIMARY DEMONSTRATION   LEARNING SPECIAL TOPICS -   ANSWERED BY patient   RELATIONSHIP SELF       Abuse Screening:  Abuse Screening Questionnaire 11/29/2021   Do you ever feel afraid of your partner? N   Are you in a relationship with someone who physically or mentally threatens you? N   Is it safe for you to go home? Y       Fall Risk  Fall Risk Assessment, last 12 mths 11/29/2021   Able to walk? Yes   Fall in past 12 months? 0   Do you feel unsteady? 0   Are you worried about falling 0           Pt currently taking Anticoagulant therapy? no    Pt currently taking Antiplatelet therapy ? ASA 81 mg once a day      Coordination of Care:  1.  Have you been to the ER, urgent care clinic since your last visit? Hospitalized since your last visit? no    2. Have you seen or consulted any other health care providers outside of the [address] [place] [address] since your last visit? Include any pap smears or colon screening.  no

## 2021-12-10 ENCOUNTER — HOSPITAL ENCOUNTER (OUTPATIENT)
Dept: MRI IMAGING | Age: 86
Discharge: HOME | End: 2021-12-10
Attending: PHYSICIAN ASSISTANT
Payer: MEDICARE

## 2021-12-10 DIAGNOSIS — J91.8 PLEURAL EFFUSION ASSOCIATED WITH HEPATIC DISORDER: ICD-10-CM

## 2021-12-10 DIAGNOSIS — K76.9 PLEURAL EFFUSION ASSOCIATED WITH HEPATIC DISORDER: ICD-10-CM

## 2021-12-10 PROCEDURE — 74181 MRI ABDOMEN W/O CONTRAST: CPT

## 2022-01-18 ENCOUNTER — OFFICE VISIT (OUTPATIENT)
Dept: FAMILY MEDICINE CLINIC | Age: 87
End: 2022-01-18
Payer: MEDICARE

## 2022-01-18 VITALS
WEIGHT: 151.6 LBS | RESPIRATION RATE: 16 BRPM | SYSTOLIC BLOOD PRESSURE: 118 MMHG | BODY MASS INDEX: 30.56 KG/M2 | HEIGHT: 59 IN | DIASTOLIC BLOOD PRESSURE: 60 MMHG | TEMPERATURE: 97.8 F | OXYGEN SATURATION: 98 % | HEART RATE: 64 BPM

## 2022-01-18 DIAGNOSIS — E11.22 TYPE 2 DIABETES MELLITUS WITH CHRONIC KIDNEY DISEASE, WITHOUT LONG-TERM CURRENT USE OF INSULIN, UNSPECIFIED CKD STAGE (HCC): ICD-10-CM

## 2022-01-18 DIAGNOSIS — N18.30 TYPE 2 DIABETES MELLITUS WITH STAGE 3 CHRONIC KIDNEY DISEASE, WITHOUT LONG-TERM CURRENT USE OF INSULIN, UNSPECIFIED WHETHER STAGE 3A OR 3B CKD (HCC): ICD-10-CM

## 2022-01-18 DIAGNOSIS — I10 ESSENTIAL HYPERTENSION: Chronic | ICD-10-CM

## 2022-01-18 DIAGNOSIS — N18.4 CKD (CHRONIC KIDNEY DISEASE) STAGE 4, GFR 15-29 ML/MIN (HCC): ICD-10-CM

## 2022-01-18 DIAGNOSIS — E11.9 DIABETES MELLITUS TYPE 2, DIET-CONTROLLED (HCC): Primary | ICD-10-CM

## 2022-01-18 DIAGNOSIS — E11.22 TYPE 2 DIABETES MELLITUS WITH STAGE 3 CHRONIC KIDNEY DISEASE, WITHOUT LONG-TERM CURRENT USE OF INSULIN, UNSPECIFIED WHETHER STAGE 3A OR 3B CKD (HCC): ICD-10-CM

## 2022-01-18 DIAGNOSIS — E78.2 MIXED HYPERLIPIDEMIA: ICD-10-CM

## 2022-01-18 DIAGNOSIS — M10.9 GOUT, UNSPECIFIED CAUSE, UNSPECIFIED CHRONICITY, UNSPECIFIED SITE: ICD-10-CM

## 2022-01-18 DIAGNOSIS — I50.32 DIASTOLIC CHF, CHRONIC (HCC): ICD-10-CM

## 2022-01-18 DIAGNOSIS — D63.8 ANEMIA OF CHRONIC DISEASE: ICD-10-CM

## 2022-01-18 PROCEDURE — G8536 NO DOC ELDER MAL SCRN: HCPCS | Performed by: FAMILY MEDICINE

## 2022-01-18 PROCEDURE — 1090F PRES/ABSN URINE INCON ASSESS: CPT | Performed by: FAMILY MEDICINE

## 2022-01-18 PROCEDURE — G8510 SCR DEP NEG, NO PLAN REQD: HCPCS | Performed by: FAMILY MEDICINE

## 2022-01-18 PROCEDURE — 1101F PT FALLS ASSESS-DOCD LE1/YR: CPT | Performed by: FAMILY MEDICINE

## 2022-01-18 PROCEDURE — 99214 OFFICE O/P EST MOD 30 MIN: CPT | Performed by: FAMILY MEDICINE

## 2022-01-18 PROCEDURE — G0463 HOSPITAL OUTPT CLINIC VISIT: HCPCS | Performed by: FAMILY MEDICINE

## 2022-01-18 PROCEDURE — G8427 DOCREV CUR MEDS BY ELIG CLIN: HCPCS | Performed by: FAMILY MEDICINE

## 2022-01-18 PROCEDURE — G8417 CALC BMI ABV UP PARAM F/U: HCPCS | Performed by: FAMILY MEDICINE

## 2022-01-18 NOTE — PATIENT INSTRUCTIONS
DASH Diet: Care Instructions  Your Care Instructions     The DASH diet is an eating plan that can help lower your blood pressure. DASH stands for Dietary Approaches to Stop Hypertension. Hypertension is high blood pressure. The DASH diet focuses on eating foods that are high in calcium, potassium, and magnesium. These nutrients can lower blood pressure. The foods that are highest in these nutrients are fruits, vegetables, low-fat dairy products, nuts, seeds, and legumes. But taking calcium, potassium, and magnesium supplements instead of eating foods that are high in those nutrients does not have the same effect. The DASH diet also includes whole grains, fish, and poultry. The DASH diet is one of several lifestyle changes your doctor may recommend to lower your high blood pressure. Your doctor may also want you to decrease the amount of sodium in your diet. Lowering sodium while following the DASH diet can lower blood pressure even further than just the DASH diet alone. Follow-up care is a key part of your treatment and safety. Be sure to make and go to all appointments, and call your doctor if you are having problems. It's also a good idea to know your test results and keep a list of the medicines you take. How can you care for yourself at home? Following the DASH diet  · Eat 4 to 5 servings of fruit each day. A serving is 1 medium-sized piece of fruit, ½ cup chopped or canned fruit, 1/4 cup dried fruit, or 4 ounces (½ cup) of fruit juice. Choose fruit more often than fruit juice. · Eat 4 to 5 servings of vegetables each day. A serving is 1 cup of lettuce or raw leafy vegetables, ½ cup of chopped or cooked vegetables, or 4 ounces (½ cup) of vegetable juice. Choose vegetables more often than vegetable juice. · Get 2 to 3 servings of low-fat and fat-free dairy each day. A serving is 8 ounces of milk, 1 cup of yogurt, or 1 ½ ounces of cheese. · Eat 6 to 8 servings of grains each day.  A serving is 1 slice of bread, 1 ounce of dry cereal, or ½ cup of cooked rice, pasta, or cooked cereal. Try to choose whole-grain products as much as possible. · Limit lean meat, poultry, and fish to 2 servings each day. A serving is 3 ounces, about the size of a deck of cards. · Eat 4 to 5 servings of nuts, seeds, and legumes (cooked dried beans, lentils, and split peas) each week. A serving is 1/3 cup of nuts, 2 tablespoons of seeds, or ½ cup of cooked beans or peas. · Limit fats and oils to 2 to 3 servings each day. A serving is 1 teaspoon of vegetable oil or 2 tablespoons of salad dressing. · Limit sweets and added sugars to 5 servings or less a week. A serving is 1 tablespoon jelly or jam, ½ cup sorbet, or 1 cup of lemonade. · Eat less than 2,300 milligrams (mg) of sodium a day. If you limit your sodium to 1,500 mg a day, you can lower your blood pressure even more. · Be aware that all of these are the suggested number of servings for people who eat 1,800 to 2,000 calories a day. Your recommended number of servings may be different if you need more or fewer calories. Tips for success  · Start small. Do not try to make dramatic changes to your diet all at once. You might feel that you are missing out on your favorite foods and then be more likely to not follow the plan. Make small changes, and stick with them. Once those changes become habit, add a few more changes. · Try some of the following:  ? Make it a goal to eat a fruit or vegetable at every meal and at snacks. This will make it easy to get the recommended amount of fruits and vegetables each day. ? Try yogurt topped with fruit and nuts for a snack or healthy dessert. ? Add lettuce, tomato, cucumber, and onion to sandwiches. ? Combine a ready-made pizza crust with low-fat mozzarella cheese and lots of vegetable toppings. Try using tomatoes, squash, spinach, broccoli, carrots, cauliflower, and onions. ?  Have a variety of cut-up vegetables with a low-fat dip as an appetizer instead of chips and dip. ? Sprinkle sunflower seeds or chopped almonds over salads. Or try adding chopped walnuts or almonds to cooked vegetables. ? Try some vegetarian meals using beans and peas. Add garbanzo or kidney beans to salads. Make burritos and tacos with mashed day beans or black beans. Where can you learn more? Go to http://www.aj.com/  Enter H967 in the search box to learn more about \"DASH Diet: Care Instructions. \"  Current as of: April 29, 2021               Content Version: 13.0  © 5990-9478 [employer]. Care instructions adapted under license by [employer] (which disclaims liability or warranty for this information). If you have questions about a medical condition or this instruction, always ask your healthcare professional. [address] any warranty or liability for your use of this information.

## 2022-01-18 NOTE — PROGRESS NOTES
1. Have you been to the ER, urgent care clinic since your last visit? Hospitalized since your last visit? No    2. Have you seen or consulted any other health care providers outside of the [address] [place] [address] since your last visit? Include any pap smears or colon screening.  No    Chief Complaint   Patient presents with    Diabetes    Hypertension    Cholesterol Problem    Anemia    Gout

## 2022-01-18 NOTE — PROGRESS NOTES
Angela Jenkins, 80 y.o.,  female    SUBJECTIVE  Ff-up    No new concerns other that osteoarthritis pains (knees/ankles)- not interested in seeing ortho, on prn tylenol    HTN/CKD 3-  She continues to take clonidine, hydralazine and norvasc. She is following dr. Gal Barros who recommended to continue to be off lisinopril add potassium andl monitoring proteinuria. She is on prn lasix few times a week for leg edema. I advised her to be consistent with  using compression stockings as well. She has h/o AV block and advised against BB. HL on zocor    DM w/ CKD 3 and neuorpathy-  diet controlled, She Reports [facility]  1teens. Numbness of legs/feet secondary to DM neuropathy/spinal stenosis- some response to gabapentin, however discontinued due to drowsiness side effect    Aortic regurtiation Cleshane Wootenjefferson- evaluated by cardiology Dr. Narendra Winslow 2019,  She is asymptomatic, likely due to calcific aortic valve and recommend  Yearly visits. .     Gout/ history of hyperuricemia- usually foot swelling and pain, related to seafood. Taking allopurinol     GERD- EGD gastritis/schatzis ring dilated 9/2020, doing well on protonix. Following dr. Cathleen Oneill. Also known benign appearing liver cyst.     ROS:  See HPI, all others negative        Patient Active Problem List   Diagnosis Code    Essential hypertension I10    Mixed hyperlipidemia E78.2    Advanced directives, counseling/discussion Z71.89    Controlled type 2 diabetes mellitus with stage 3 chronic kidney disease, without long-term current use of insulin (HCC) E11.22, N18.3         Current Outpatient Medications:     ferrous sulfate (Iron) 325 mg (65 mg iron) tablet, Take 65 mg by mouth Daily (before breakfast). , Disp: , Rfl:     hydrALAZINE (APRESOLINE) 100 mg tablet, TAKE 1 TABLET BY MOUTH THREE TIMES DAILY, Disp: 270 Tablet, Rfl: 3    cloNIDine HCL (CATAPRES) 0.2 mg tablet, Take 1 tablet by mouth twice daily, Disp: 180 Tablet, Rfl: 1    amLODIPine (NORVASC) 10 mg tablet, Take 1 tablet by mouth once daily, Disp: 90 Tablet, Rfl: 1    docosahexaenoic acid/epa (FISH OIL PO), Take 1,000 mg by mouth daily. , Disp: , Rfl:     pravastatin (PRAVACHOL) 20 mg tablet, Take 1 tablet by mouth nightly, Disp: 90 Tablet, Rfl: 3    allopurinoL (ZYLOPRIM) 100 mg tablet, Take 1 Tab by mouth daily. , Disp: 90 Tab, Rfl: 1    potassium chloride (K-DUR, KLOR-CON) 20 mEq tablet, Take 20 mEq by mouth daily. , Disp: , Rfl:     latanoprost (XALATAN) 0.005 % ophthalmic solution, Administer 1 Drop to both eyes daily. , Disp: , Rfl:     glucose blood VI test strips (PRODIGY NO CODING) strip, Check fasting glucose once daily, Disp: 100 Strip, Rfl: 3    Blood-Glucose Meter (PRODIGY AUTOCODE MONITOR SYST) misc, Check fasting glucose once daily, Disp: 1 Each, Rfl: 0    cholecalciferol, VITAMIN D3, (VITAMIN D3) 5,000 unit tab tablet, Take  by mouth daily. , Disp: , Rfl:     Aspirin, Buffered 81 mg tab, Take 1 tablet by mouth daily. , Disp: , Rfl:       No Known Allergies    Past Medical History:   Diagnosis Date    Anemia     Carotid bruit 12/07/2013    Carotid Duplex: 1. Bilateral 1-49% stenosis of the internal carotid arteries. 2. No significant stenosis in the external carotid arteries bilaterally. 3. Antegrade flow in both vetebral arteries. 4. Normal flow in both subclavian arteries. Plaque Morphology: 1. Hyperechoic plaque in the bulb and right ICA. 2. Hyperechoic plaque in the bulb and left ICA.  CKD (chronic kidney disease) stage 3, GFR 30-59 ml/min     Diabetes (HCC)     NIDDM    Gastritis 10/27/2014    Duodenum Bx: No Specific Pathologic Abnormality. No Villous Abnormality. 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 No dysplasia or malignancy identified. Bacterial Organisms c/w H. Pylori are present. Z-Line Bx: GE mucosa w/ Acute/Chronic Inflammation & Reactive Changes. Focal Specialized Type Campos Mucosa Identified.  No Dysplasia or Malignancy Identified.  Gout 12/10/2009    Elevated Uric Acid Level    Hyperlipidemia     Hypertension     RAMÓN (iron deficiency anemia)        Social History     Social History    Marital status:      Spouse name: N/A    Number of children: N/A    Years of education: N/A     Occupational History    Not on file.      Social History Main Topics    Smoking status: Never Smoker    Smokeless tobacco: Never Used    Alcohol use No    Drug use: No    Sexual activity: Not Currently     Partners: Male     Birth control/ protection: None     Other Topics Concern    Not on file     Social History Narrative       Family History   Problem Relation Age of Onset    Hypertension Mother     Stroke Mother     Stroke Father          OBJECTIVE    Physical Exam:     Visit Vitals  /60 (BP 1 Location: Left upper arm, BP Patient Position: Sitting, BP Cuff Size: Adult)   Pulse 64   Temp 97.8 °F (36.6 °C) (Temporal)   Resp 16   Ht 4' 11\" (1.499 m)   Wt 151 lb 9.6 oz (68.8 kg)   SpO2 98%   BMI 30.62 kg/m²         General: alert, well-appearing, AA,  in no apparent distress or pain  Neck: supple, no adenopathy palpated  CVS: normal rate,  regular rhythm, + murmur  Lungs:clear to ausculation bilaterally, no crackles, wheezing or rhonchi noted  Extremities: + grade 1 bipedal edema  Feet: no lesions  Skin: warm, no lesions, rashes noted  Psych:  mood and affect normal  Results for orders placed or performed during the hospital encounter of 11/23/21   CBC WITH AUTOMATED DIFF   Result Value Ref Range    WBC 5.9 4.6 - 13.2 K/uL    RBC 3.64 (L) 4.20 - 5.30 M/uL    HGB 10.4 (L) 12.0 - 16.0 g/dL    HCT 32.7 (L) 35.0 - 45.0 %    MCV 89.8 78.0 - 100.0 FL    MCH 28.6 24.0 - 34.0 PG    MCHC 31.8 31.0 - 37.0 g/dL    RDW 16.9 (H) 11.6 - 14.5 %    PLATELET 274 921 - 349 K/uL    MPV 9.9 9.2 - 11.8 FL    NRBC 0.0 0  WBC    ABSOLUTE NRBC 0.00 0.00 - 0.01 K/uL    NEUTROPHILS 65 40 - 73 %    LYMPHOCYTES 25 21 - 52 %    MONOCYTES 9 3 - 10 %    EOSINOPHILS 1 0 - 5 %    BASOPHILS 0 0 - 2 %    IMMATURE GRANULOCYTES 0 0.0 - 0.5 %    ABS. NEUTROPHILS 3.8 1.8 - 8.0 K/UL    ABS. LYMPHOCYTES 1.5 0.9 - 3.6 K/UL    ABS. MONOCYTES 0.5 0.05 - 1.2 K/UL    ABS. EOSINOPHILS 0.1 0.0 - 0.4 K/UL    ABS. BASOPHILS 0.0 0.0 - 0.1 K/UL    ABS. IMM. GRANS. 0.0 0.00 - 0.04 K/UL    DF AUTOMATED     HEMOGLOBIN A1C WITH EAG   Result Value Ref Range    Hemoglobin A1c 6.1 (H) 4.2 - 5.6 %    Est. average glucose 128 mg/dL   MICROALBUMIN, UR, RAND W/ MICROALB/CREAT RATIO   Result Value Ref Range    Microalbumin,urine random 130.00 (H) 0 - 3.0 MG/DL    Creatinine, urine 79.00 30 - 125 mg/dL    Microalbumin/Creat ratio (mg/g creat) 1,646 (H) 0 - 30 mg/g   METABOLIC PANEL, COMPREHENSIVE   Result Value Ref Range    Sodium 140 136 - 145 mmol/L    Potassium 4.1 3.5 - 5.5 mmol/L    Chloride 105 100 - 111 mmol/L    CO2 28 21 - 32 mmol/L    Anion gap 7 3.0 - 18 mmol/L    Glucose 129 (H) 74 - 99 mg/dL    BUN 43 (H) 7.0 - 18 MG/DL    Creatinine 1.73 (H) 0.6 - 1.3 MG/DL    BUN/Creatinine ratio 25 (H) 12 - 20      GFR est AA 34 (L) >60 ml/min/1.73m2    GFR est non-AA 28 (L) >60 ml/min/1.73m2    Calcium 9.7 8.5 - 10.1 MG/DL    Bilirubin, total 0.5 0.2 - 1.0 MG/DL    ALT (SGPT) 21 13 - 56 U/L    AST (SGOT) 29 10 - 38 U/L    Alk.  phosphatase 53 45 - 117 U/L    Protein, total 8.0 6.4 - 8.2 g/dL    Albumin 3.7 3.4 - 5.0 g/dL    Globulin 4.3 (H) 2.0 - 4.0 g/dL    A-G Ratio 0.9 0.8 - 1.7         ASSESSMENT/PLAN  Diagnoses and all orders for this visit:  Controlled type 2 diabetes mellitus with stage 4 chronic kidney disease, without long-term current use of insulin ([facility] [address]   diet controlled  September 2019 admission for JOHNYN- this has improved, she has seen dr. Rabago Presume recommending to continue to be off lisinopril and will monitor proteinuria  a1c 6.6>6.2  Foot exam today1/15/2021- no lesions, following podiatry  Check cmp, a1c,cmp, cbc prior to next visit    Diabetes mellitus type 2, diet controlled ([facility] [address]  Neuropathy  Has stopped gabapentin due to side effect  Non insulin user, advised to check glucose once a day    Lumbar stenosis   following Dr. Jeanna Gaytan    Hyperuricemia  Cont allopurinol    Essential hypertension  Controlled  Cont   norvasc, clonidine, hydralazine  Cont  low dose lasix/kcl prn for leg edema  Avoid bb with h/o 1st deg av block    Leg edema  Advised compression stockings   Low salt diet,   Prn lasix/kcl     Mixed hyperlipidemia  Good range on pravachol, to cont  Lipid panel 10/2021     Gout of foot, unspecified cause, unspecified chronicity, unspecified laterality  Controlled  Cont allopurinol    Anemia of chronic disease  Stable, Baseline 10's  Cbc prior to next visit  Monitoring    Heart murmur  Aortic regurg  Asymptomatic  Monitoring, following cards     Diastolic chf, chronic  Appears compensated  Cont prn lasix  Intolerant to BB    CKD 3-4  Avoid nsaids, keep hydrated  monitoring  Following dr. Wilma Pereyra VISIT    Follow-up Disposition:  Ff-up in 3 months or sooner prn    Patient understands plan of care. Patient has provided input and agrees with goals.

## 2022-03-18 PROBLEM — R11.0 NAUSEA: Status: ACTIVE | Noted: 2019-09-01

## 2022-03-18 PROBLEM — E11.9 DIABETES MELLITUS TYPE 2, DIET-CONTROLLED (HCC): Status: ACTIVE | Noted: 2017-08-02

## 2022-03-18 PROBLEM — M10.9 GOUT: Status: ACTIVE | Noted: 2018-10-25

## 2022-03-18 PROBLEM — Z71.89 ADVANCE CARE PLANNING: Status: ACTIVE | Noted: 2018-07-25

## 2022-03-18 PROBLEM — K29.50 CHRONIC GASTRITIS WITHOUT BLEEDING: Status: ACTIVE | Noted: 2020-09-03

## 2022-03-18 PROBLEM — R01.1 HEART MURMUR: Status: ACTIVE | Noted: 2018-10-25

## 2022-03-18 PROBLEM — N18.4 CKD (CHRONIC KIDNEY DISEASE) STAGE 4, GFR 15-29 ML/MIN (HCC): Status: ACTIVE | Noted: 2019-09-09

## 2022-03-18 PROBLEM — N18.30 CHRONIC RENAL FAILURE, STAGE 3 (MODERATE) (HCC): Status: ACTIVE | Noted: 2019-09-01

## 2022-03-19 PROBLEM — E78.2 MIXED HYPERLIPIDEMIA: Status: ACTIVE | Noted: 2017-05-02

## 2022-03-19 PROBLEM — R79.89 ELEVATED TROPONIN: Status: ACTIVE | Noted: 2019-09-01

## 2022-03-19 PROBLEM — R00.1 SINUS BRADYCARDIA: Status: ACTIVE | Noted: 2019-09-09

## 2022-03-19 PROBLEM — E11.22 TYPE 2 DIABETES MELLITUS WITH CHRONIC KIDNEY DISEASE (HCC): Status: ACTIVE | Noted: 2019-10-18

## 2022-03-19 PROBLEM — N18.30 CKD (CHRONIC KIDNEY DISEASE) STAGE 3, GFR 30-59 ML/MIN (HCC): Status: ACTIVE | Noted: 2018-10-25

## 2022-03-19 PROBLEM — R77.8 ELEVATED TROPONIN: Status: ACTIVE | Noted: 2019-09-01

## 2022-03-19 PROBLEM — D63.8 ANEMIA OF CHRONIC DISEASE: Status: ACTIVE | Noted: 2018-10-25

## 2022-03-19 PROBLEM — I50.32 DIASTOLIC CHF, CHRONIC (HCC): Status: ACTIVE | Noted: 2019-09-01

## 2022-03-19 PROBLEM — I10 ESSENTIAL HYPERTENSION: Status: ACTIVE | Noted: 2017-05-02

## 2022-03-19 PROBLEM — Z71.89 ADVANCED DIRECTIVES, COUNSELING/DISCUSSION: Status: ACTIVE | Noted: 2017-05-04

## 2022-03-20 PROBLEM — E11.40 TYPE 2 DIABETES MELLITUS WITH DIABETIC NEUROPATHY (HCC): Status: ACTIVE | Noted: 2020-09-03

## 2022-03-20 PROBLEM — R60.0 LEG EDEMA: Status: ACTIVE | Noted: 2019-07-25

## 2022-04-11 ENCOUNTER — HOSPITAL ENCOUNTER (OUTPATIENT)
Dept: LAB | Age: 87
Discharge: HOME | End: 2022-04-11
Payer: MEDICARE

## 2022-04-11 DIAGNOSIS — E11.9 DIABETES MELLITUS TYPE 2, DIET-CONTROLLED (HCC): ICD-10-CM

## 2022-04-11 DIAGNOSIS — E11.22 TYPE 2 DIABETES MELLITUS WITH CHRONIC KIDNEY DISEASE, WITHOUT LONG-TERM CURRENT USE OF INSULIN, UNSPECIFIED CKD STAGE (HCC): ICD-10-CM

## 2022-04-11 DIAGNOSIS — I10 ESSENTIAL HYPERTENSION: Chronic | ICD-10-CM

## 2022-04-11 LAB
ALBUMIN SERPL-MCNC: 3.9 G/DL (ref 3.4–5)
ALBUMIN/GLOB SERPL: 1 {RATIO} (ref 0.8–1.7)
ALP SERPL-CCNC: 47 U/L (ref 45–117)
ALT SERPL-CCNC: 25 U/L (ref 13–56)
ANION GAP SERPL CALC-SCNC: 7 MMOL/L (ref 3–18)
AST SERPL-CCNC: 32 U/L (ref 10–38)
BASOPHILS # BLD: 0 K/UL (ref 0–0.1)
BASOPHILS NFR BLD: 1 % (ref 0–2)
BILIRUB SERPL-MCNC: 0.5 MG/DL (ref 0.2–1)
BUN SERPL-MCNC: 48 MG/DL (ref 7–18)
BUN/CREAT SERPL: 25 (ref 12–20)
CALCIUM SERPL-MCNC: 9.5 MG/DL (ref 8.5–10.1)
CHLORIDE SERPL-SCNC: 107 MMOL/L (ref 100–111)
CO2 SERPL-SCNC: 25 MMOL/L (ref 21–32)
CREAT SERPL-MCNC: 1.92 MG/DL (ref 0.6–1.3)
DIFFERENTIAL METHOD BLD: ABNORMAL
EOSINOPHIL # BLD: 0.1 K/UL (ref 0–0.4)
EOSINOPHIL NFR BLD: 2 % (ref 0–5)
ERYTHROCYTE [DISTWIDTH] IN BLOOD BY AUTOMATED COUNT: 16.9 % (ref 11.6–14.5)
EST. AVERAGE GLUCOSE BLD GHB EST-MCNC: 137 MG/DL
GLOBULIN SER CALC-MCNC: 4 G/DL (ref 2–4)
GLUCOSE SERPL-MCNC: 114 MG/DL (ref 74–99)
HBA1C MFR BLD: 6.4 % (ref 4.2–5.6)
HCT VFR BLD AUTO: 34 % (ref 35–45)
HGB BLD-MCNC: 10.8 G/DL (ref 12–16)
IMM GRANULOCYTES # BLD AUTO: 0 K/UL (ref 0–0.04)
IMM GRANULOCYTES NFR BLD AUTO: 0 % (ref 0–0.5)
LYMPHOCYTES # BLD: 1.6 K/UL (ref 0.9–3.6)
LYMPHOCYTES NFR BLD: 33 % (ref 21–52)
MCH RBC QN AUTO: 28.3 PG (ref 24–34)
MCHC RBC AUTO-ENTMCNC: 31.8 G/DL (ref 31–37)
MCV RBC AUTO: 89.2 FL (ref 78–100)
MONOCYTES # BLD: 0.4 K/UL (ref 0.05–1.2)
MONOCYTES NFR BLD: 8 % (ref 3–10)
NEUTS SEG # BLD: 2.8 K/UL (ref 1.8–8)
NEUTS SEG NFR BLD: 56 % (ref 40–73)
NRBC # BLD: 0 K/UL (ref 0–0.01)
NRBC BLD-RTO: 0 PER 100 WBC
PLATELET # BLD AUTO: 234 K/UL (ref 135–420)
PMV BLD AUTO: 10.9 FL (ref 9.2–11.8)
POTASSIUM SERPL-SCNC: 4.3 MMOL/L (ref 3.5–5.5)
PROT SERPL-MCNC: 7.9 G/DL (ref 6.4–8.2)
RBC # BLD AUTO: 3.81 M/UL (ref 4.2–5.3)
SODIUM SERPL-SCNC: 139 MMOL/L (ref 136–145)
WBC # BLD AUTO: 4.9 K/UL (ref 4.6–13.2)

## 2022-04-11 PROCEDURE — 83036 HEMOGLOBIN GLYCOSYLATED A1C: CPT

## 2022-04-11 PROCEDURE — 80053 COMPREHEN METABOLIC PANEL: CPT

## 2022-04-11 PROCEDURE — 85025 COMPLETE CBC W/AUTO DIFF WBC: CPT

## 2022-04-11 PROCEDURE — 36415 COLL VENOUS BLD VENIPUNCTURE: CPT

## 2022-04-14 DIAGNOSIS — I10 ESSENTIAL HYPERTENSION: Chronic | ICD-10-CM

## 2022-04-15 RX ORDER — AMLODIPINE BESYLATE 10 MG/1
TABLET ORAL
Qty: 90 TABLET | Refills: 0 | Status: SHIPPED | OUTPATIENT
Start: 2022-04-15 | End: 2022-07-08

## 2022-04-19 ENCOUNTER — OFFICE VISIT (OUTPATIENT)
Dept: FAMILY MEDICINE CLINIC | Age: 87
End: 2022-04-19
Payer: MEDICARE

## 2022-04-19 VITALS
TEMPERATURE: 97.7 F | HEIGHT: 59 IN | HEART RATE: 65 BPM | RESPIRATION RATE: 16 BRPM | BODY MASS INDEX: 31.04 KG/M2 | WEIGHT: 154 LBS | SYSTOLIC BLOOD PRESSURE: 142 MMHG | DIASTOLIC BLOOD PRESSURE: 80 MMHG | OXYGEN SATURATION: 100 %

## 2022-04-19 DIAGNOSIS — I50.32 DIASTOLIC CHF, CHRONIC (HCC): ICD-10-CM

## 2022-04-19 DIAGNOSIS — N18.4 CKD (CHRONIC KIDNEY DISEASE) STAGE 4, GFR 15-29 ML/MIN (HCC): ICD-10-CM

## 2022-04-19 DIAGNOSIS — R01.1 HEART MURMUR: ICD-10-CM

## 2022-04-19 DIAGNOSIS — D63.8 ANEMIA OF CHRONIC DISEASE: ICD-10-CM

## 2022-04-19 DIAGNOSIS — E11.22 TYPE 2 DIABETES MELLITUS WITH CHRONIC KIDNEY DISEASE, WITHOUT LONG-TERM CURRENT USE OF INSULIN, UNSPECIFIED CKD STAGE (HCC): ICD-10-CM

## 2022-04-19 DIAGNOSIS — M10.00 IDIOPATHIC GOUT, UNSPECIFIED CHRONICITY, UNSPECIFIED SITE: ICD-10-CM

## 2022-04-19 DIAGNOSIS — E11.9 DIABETES MELLITUS TYPE 2, DIET-CONTROLLED (HCC): Primary | ICD-10-CM

## 2022-04-19 PROCEDURE — 99214 OFFICE O/P EST MOD 30 MIN: CPT | Performed by: FAMILY MEDICINE

## 2022-04-19 PROCEDURE — 1101F PT FALLS ASSESS-DOCD LE1/YR: CPT | Performed by: FAMILY MEDICINE

## 2022-04-19 PROCEDURE — G8427 DOCREV CUR MEDS BY ELIG CLIN: HCPCS | Performed by: FAMILY MEDICINE

## 2022-04-19 PROCEDURE — 1090F PRES/ABSN URINE INCON ASSESS: CPT | Performed by: FAMILY MEDICINE

## 2022-04-19 PROCEDURE — G0463 HOSPITAL OUTPT CLINIC VISIT: HCPCS | Performed by: FAMILY MEDICINE

## 2022-04-19 PROCEDURE — 3044F HG A1C LEVEL LT 7.0%: CPT | Performed by: FAMILY MEDICINE

## 2022-04-19 PROCEDURE — G8536 NO DOC ELDER MAL SCRN: HCPCS | Performed by: FAMILY MEDICINE

## 2022-04-19 PROCEDURE — G8417 CALC BMI ABV UP PARAM F/U: HCPCS | Performed by: FAMILY MEDICINE

## 2022-04-19 PROCEDURE — G8510 SCR DEP NEG, NO PLAN REQD: HCPCS | Performed by: FAMILY MEDICINE

## 2022-04-19 RX ORDER — CETIRIZINE HCL 10 MG
10 TABLET ORAL DAILY
Qty: 90 TABLET | Refills: 0 | Status: SHIPPED | OUTPATIENT
Start: 2022-04-19 | End: 2022-07-19

## 2022-04-19 RX ORDER — FLUTICASONE PROPIONATE 50 MCG
2 SPRAY, SUSPENSION (ML) NASAL DAILY
Qty: 1 EACH | Refills: 0 | Status: SHIPPED | OUTPATIENT
Start: 2022-04-19 | End: 2022-07-19

## 2022-04-19 NOTE — PATIENT INSTRUCTIONS
A Healthy Heart: Care Instructions  Your Care Instructions     Coronary artery disease, also called heart disease, occurs when a substance called plaque builds up in the vessels that supply oxygen-rich blood to your heart muscle. This can narrow the blood vessels and reduce blood flow. A heart attack happens when blood flow is completely blocked. A high-fat diet, smoking, and other factors increase the risk of heart disease. Your doctor has found that you have a chance of having heart disease. You can do lots of things to keep your heart healthy. It may not be easy, but you can change your diet, exercise more, and quit smoking. These steps really work to lower your chance of heart disease. Follow-up care is a key part of your treatment and safety. Be sure to make and go to all appointments, and call your doctor if you are having problems. It's also a good idea to know your test results and keep a list of the medicines you take. How can you care for yourself at home? Diet    · Use less salt when you cook and eat. This helps lower your blood pressure. Taste food before salting. Add only a little salt when you think you need it. With time, your taste buds will adjust to less salt.     · Eat fewer snack items, fast foods, canned soups, and other high-salt, high-fat, processed foods.     · Read food labels and try to avoid saturated and trans fats. They increase your risk of heart disease by raising cholesterol levels.     · Limit the amount of solid fat-butter, margarine, and shortening-you eat. Use olive, peanut, or canola oil when you cook. Bake, broil, and steam foods instead of frying them.     · Eat a variety of fruit and vegetables every day. Dark green, deep orange, red, or yellow fruits and vegetables are especially good for you. Examples include spinach, carrots, peaches, and berries.     · Foods high in fiber can reduce your cholesterol and provide important vitamins and minerals.  High-fiber foods include whole-grain cereals and breads, oatmeal, beans, brown rice, citrus fruits, and apples.     · Eat lean proteins. Heart-healthy proteins include seafood, lean meats and poultry, eggs, beans, peas, nuts, seeds, and soy products.     · Limit drinks and foods with added sugar. These include candy, desserts, and soda pop. Lifestyle changes    · If your doctor recommends it, get more exercise. Walking is a good choice. Bit by bit, increase the amount you walk every day. Try for at least 30 minutes on most days of the week. You also may want to swim, bike, or do other activities.     · Do not smoke. If you need help quitting, talk to your doctor about stop-smoking programs and medicines. These can increase your chances of quitting for good. Quitting smoking may be the most important step you can take to protect your heart. It is never too late to quit.     · Limit alcohol to 2 drinks a day for men and 1 drink a day for women. Too much alcohol can cause health problems.     · Manage other health problems such as diabetes, high blood pressure, and high cholesterol. If you think you may have a problem with alcohol or drug use, talk to your doctor. Medicines    · Take your medicines exactly as prescribed. Call your doctor if you think you are having a problem with your medicine.     · If your doctor recommends aspirin, take the amount directed each day. Make sure you take aspirin and not another kind of pain reliever, such as acetaminophen (Tylenol). When should you call for help? Call 911 if you have symptoms of a heart attack. These may include:    · Chest pain or pressure, or a strange feeling in the chest.     · Sweating.     · Shortness of breath.     · Pain, pressure, or a strange feeling in the back, neck, jaw, or upper belly or in one or both shoulders or arms.     · Lightheadedness or sudden weakness.     · A fast or irregular heartbeat.    After you call 911, the  may tell you to chew 1 adult-strength or 2 to 4 low-dose aspirin. Wait for an ambulance. Do not try to drive yourself. Watch closely for changes in your health, and be sure to contact your doctor if you have any problems. Where can you learn more? Go to http://www.aj.com/  Enter F075 in the search box to learn more about \"A Healthy Heart: Care Instructions. \"  Current as of: January 10, 2022               Content Version: 13.2  © 2006-2022 [employer]. Care instructions adapted under license by [employer] (which disclaims liability or warranty for this information). If you have questions about a medical condition or this instruction, always ask your healthcare professional. [address] any warranty or liability for your use of this information.

## 2022-04-19 NOTE — PROGRESS NOTES
Jose Zelaya, 80 y.o.,  female    SUBJECTIVE  Ff-up    No new concerns other that osteoarthritis pains (knees/ankles)- not interested in seeing ortho, on prn tylenol    HTN/CKD 3-  She continues to take clonidine, hydralazine and norvasc. She is following dr. Hayde Díaz who recommended to continue to be off lisinopril add potassium andl monitoring proteinuria. She is on prn lasix few times a week for leg edema. I advised her to be consistent with  using compression stockings as well. She has h/o AV block and advised against BB. HL on zocor    DM w/ CKD 3 and neuorpathy-  diet controlled, She Reports [facility]  1teens. Numbness of legs/feet secondary to DM neuropathy/spinal stenosis- some response to gabapentin, however discontinued due to drowsiness side effect    Aortic regurtiation Gaby Montes- evaluated by cardiology Dr. Kamilah Mayes 2019,  She is asymptomatic, likely due to calcific aortic valve and recommend  Yearly visits. .     Gout/ history of hyperuricemia- usually foot swelling and pain, related to seafood. Taking allopurinol     GERD- EGD gastritis/schatzis ring dilated 9/2020, doing well on protonix. Following dr. Babs Ellison. Also known benign appearing liver cyst.     ROS:  See HPI, all others negative        Patient Active Problem List   Diagnosis Code    Essential hypertension I10    Mixed hyperlipidemia E78.2    Advanced directives, counseling/discussion Z71.89    Controlled type 2 diabetes mellitus with stage 3 chronic kidney disease, without long-term current use of insulin (HCC) E11.22, N18.3         Current Outpatient Medications:     cetirizine (ZYRTEC) 10 mg tablet, Take 1 Tablet by mouth daily. , Disp: 90 Tablet, Rfl: 0    fluticasone propionate (FLONASE) 50 mcg/actuation nasal spray, 2 Sprays by Both Nostrils route daily. , Disp: 1 Each, Rfl: 0    amLODIPine (NORVASC) 10 mg tablet, Take 1 tablet by mouth once daily, Disp: 90 Tablet, Rfl: 0    allopurinoL (ZYLOPRIM) 100 mg tablet, Take 1 tablet by mouth once daily, Disp: 90 Tablet, Rfl: 1    ferrous sulfate (Iron) 325 mg (65 mg iron) tablet, Take 65 mg by mouth Daily (before breakfast). , Disp: , Rfl:     hydrALAZINE (APRESOLINE) 100 mg tablet, TAKE 1 TABLET BY MOUTH THREE TIMES DAILY, Disp: 270 Tablet, Rfl: 3    cloNIDine HCL (CATAPRES) 0.2 mg tablet, Take 1 tablet by mouth twice daily, Disp: 180 Tablet, Rfl: 1    docosahexaenoic acid/epa (FISH OIL PO), Take 1,000 mg by mouth daily. , Disp: , Rfl:     pravastatin (PRAVACHOL) 20 mg tablet, Take 1 tablet by mouth nightly, Disp: 90 Tablet, Rfl: 3    potassium chloride (K-DUR, KLOR-CON) 20 mEq tablet, Take 20 mEq by mouth daily. , Disp: , Rfl:     latanoprost (XALATAN) 0.005 % ophthalmic solution, Administer 1 Drop to both eyes daily. , Disp: , Rfl:     glucose blood VI test strips (PRODIGY NO CODING) strip, Check fasting glucose once daily, Disp: 100 Strip, Rfl: 3    Blood-Glucose Meter (PRODIGY AUTOCODE MONITOR SYST) misc, Check fasting glucose once daily, Disp: 1 Each, Rfl: 0    cholecalciferol, VITAMIN D3, (VITAMIN D3) 5,000 unit tab tablet, Take  by mouth daily. , Disp: , Rfl:     Aspirin, Buffered 81 mg tab, Take 1 tablet by mouth daily. , Disp: , Rfl:       No Known Allergies    Past Medical History:   Diagnosis Date    Anemia     Carotid bruit 12/07/2013    Carotid Duplex: 1. Bilateral 1-49% stenosis of the internal carotid arteries. 2. No significant stenosis in the external carotid arteries bilaterally. 3. Antegrade flow in both vetebral arteries. 4. Normal flow in both subclavian arteries. Plaque Morphology: 1. Hyperechoic plaque in the bulb and right ICA. 2. Hyperechoic plaque in the bulb and left ICA.  CKD (chronic kidney disease) stage 3, GFR 30-59 ml/min     Diabetes (HCC)     NIDDM    Gastritis 10/27/2014    Duodenum Bx: No Specific Pathologic Abnormality. No Villous Abnormality. 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  No dysplasia or malignancy identified. Bacterial Organisms c/w H. Pylori are present. Z-Line Bx: GE mucosa w/ Acute/Chronic Inflammation & Reactive Changes. Focal Specialized Type Campos Mucosa Identified. No Dysplasia or Malignancy Identified.  Gout 12/10/2009    Elevated Uric Acid Level    Hyperlipidemia     Hypertension     RAMÓN (iron deficiency anemia)        Social History     Social History    Marital status:      Spouse name: N/A    Number of children: N/A    Years of education: N/A     Occupational History    Not on file.      Social History Main Topics    Smoking status: Never Smoker    Smokeless tobacco: Never Used    Alcohol use No    Drug use: No    Sexual activity: Not Currently     Partners: Male     Birth control/ protection: None     Other Topics Concern    Not on file     Social History Narrative       Family History   Problem Relation Age of Onset    Hypertension Mother     Stroke Mother     Stroke Father          OBJECTIVE    Physical Exam:     Visit Vitals  BP (!) 142/80 (BP 1 Location: Left upper arm, BP Patient Position: Sitting, BP Cuff Size: Adult)   Pulse 65   Temp 97.7 °F (36.5 °C) (Oral)   Resp 16   Ht 4' 11\" (1.499 m)   Wt 154 lb (69.9 kg)   SpO2 100%   BMI 31.10 kg/m²         General: alert, well-appearing, AA,  in no apparent distress or pain  Neck: supple, no adenopathy palpated  CVS: normal rate,  regular rhythm, + murmur  Lungs:clear to ausculation bilaterally, no crackles, wheezing or rhonchi noted  Extremities: + grade 1 bipedal edema  Feet: no lesions  Skin: warm, no lesions, rashes noted  Psych:  mood and affect normal  Results for orders placed or performed during the hospital encounter of 24/49/43   METABOLIC PANEL, COMPREHENSIVE   Result Value Ref Range    Sodium 139 136 - 145 mmol/L    Potassium 4.3 3.5 - 5.5 mmol/L    Chloride 107 100 - 111 mmol/L    CO2 25 21 - 32 mmol/L    Anion gap 7 3.0 - 18 mmol/L    Glucose 114 (H) 74 - 99 mg/dL    BUN 48 (H) 7.0 - 18 MG/DL    Creatinine 1.92 (H) 0.6 - 1.3 MG/DL    BUN/Creatinine ratio 25 (H) 12 - 20      GFR est AA 30 (L) >60 ml/min/1.73m2    GFR est non-AA 25 (L) >60 ml/min/1.73m2    Calcium 9.5 8.5 - 10.1 MG/DL    Bilirubin, total 0.5 0.2 - 1.0 MG/DL    ALT (SGPT) 25 13 - 56 U/L    AST (SGOT) 32 10 - 38 U/L    Alk. phosphatase 47 45 - 117 U/L    Protein, total 7.9 6.4 - 8.2 g/dL    Albumin 3.9 3.4 - 5.0 g/dL    Globulin 4.0 2.0 - 4.0 g/dL    A-G Ratio 1.0 0.8 - 1.7     HEMOGLOBIN A1C WITH EAG   Result Value Ref Range    Hemoglobin A1c 6.4 (H) 4.2 - 5.6 %    Est. average glucose 137 mg/dL   CBC WITH AUTOMATED DIFF   Result Value Ref Range    WBC 4.9 4.6 - 13.2 K/uL    RBC 3.81 (L) 4.20 - 5.30 M/uL    HGB 10.8 (L) 12.0 - 16.0 g/dL    HCT 34.0 (L) 35.0 - 45.0 %    MCV 89.2 78.0 - 100.0 FL    MCH 28.3 24.0 - 34.0 PG    MCHC 31.8 31.0 - 37.0 g/dL    RDW 16.9 (H) 11.6 - 14.5 %    PLATELET 932 450 - 936 K/uL    MPV 10.9 9.2 - 11.8 FL    NRBC 0.0 0  WBC    ABSOLUTE NRBC 0.00 0.00 - 0.01 K/uL    NEUTROPHILS 56 40 - 73 %    LYMPHOCYTES 33 21 - 52 %    MONOCYTES 8 3 - 10 %    EOSINOPHILS 2 0 - 5 %    BASOPHILS 1 0 - 2 %    IMMATURE GRANULOCYTES 0 0.0 - 0.5 %    ABS. NEUTROPHILS 2.8 1.8 - 8.0 K/UL    ABS. LYMPHOCYTES 1.6 0.9 - 3.6 K/UL    ABS. MONOCYTES 0.4 0.05 - 1.2 K/UL    ABS. EOSINOPHILS 0.1 0.0 - 0.4 K/UL    ABS. BASOPHILS 0.0 0.0 - 0.1 K/UL    ABS. IMM. GRANS. 0.0 0.00 - 0.04 K/UL    DF AUTOMATED         ASSESSMENT/PLAN  Diagnoses and all orders for this visit:  Controlled type 2 diabetes mellitus with stage 4 chronic kidney disease, without long-term current use of insulin ([facility]   diet controlled  September 2019 admission for JOHNNY- this has improved, she has seen dr. Eliecer Denson recommending to continue to be off lisinopril and will monitor proteinuria  a1c 6.6>6.2zzzz. 6  Check cmp, a1c,cmp, cbc prior to next visit    Diabetes mellitus type 2, diet controlled ([facility]  Neuropathy  Has stopped gabapentin due to side effect  Non insulin user, advised to check glucose once a day    Lumbar stenosis   following Dr. Roberts Clamp    Hyperuricemia  Cont allopurinol    Essential hypertension  Controlled  Cont   norvasc, clonidine, hydralazine  Cont  low dose lasix/kcl prn for leg edema  Avoid bb with h/o 1st deg av block    Leg edema  Advised compression stockings   Low salt diet,   Prn lasix/kcl     Mixed hyperlipidemia  Good range on pravachol, to cont  Lipid panel 10/2021     Gout of foot, unspecified cause, unspecified chronicity, unspecified laterality  Controlled  Cont allopurinol    Anemia of chronic disease  Stable, Baseline 10's  Cbc prior to next visit  Monitoring    Heart murmur  Aortic regurg  Asymptomatic  Monitoring, following cards     Diastolic chf, chronic  Appears compensated  Cont prn lasix  Intolerant to BB    CKD 3-4  Avoid nsaids, keep hydrated  monitoring  Following dr. Fallon Myers VISIT    Follow-up Disposition:  Ff-up in 3 months or sooner prn, plan on MWV then    Patient understands plan of care. Patient has provided input and agrees with goals.

## 2022-04-19 NOTE — PROGRESS NOTES
1. Have you been to the ER, urgent care clinic since your last visit? Hospitalized since your last visit? No    2. Have you seen or consulted any other health care providers outside of the [address] [place] [address] since your last visit? Include any pap smears or colon screening.  No

## 2022-04-27 NOTE — TELEPHONE ENCOUNTER
This patient contacted office for the following prescriptions to be filled:    Medication requested :   Requested Prescriptions     Pending Prescriptions Disp Refills    colchicine (Colcrys) 0.6 mg tablet 90 Tablet 2     Sig: Take 1 Tablet by mouth daily.  Indications: a type of joint disorder due to excess uric acid in the blood called gout     PCP: [address] [place] [address] or Print: [facility]  Mail order or Local pharmacy [address]     Scheduled appointment if not seen by current providers in office: LOV 4/19/2022 f/u 7/19/2022

## 2022-04-28 RX ORDER — COLCHICINE 0.6 MG/1
0.6 TABLET ORAL DAILY
Qty: 30 TABLET | Refills: 0 | Status: SHIPPED | OUTPATIENT
Start: 2022-04-28 | End: 2022-07-19

## 2022-04-28 NOTE — TELEPHONE ENCOUNTER
Pls notify pt this medication colchicine is for acute gout attack, only if with symptoms  She is on allopurinol for maintenance  Erx done but only 30 pills.

## 2022-05-04 NOTE — TELEPHONE ENCOUNTER
Patient identified with 2 identifiers (name and ). Patient verbalizes understanding that she is only to take Colchiocine for gout attacks. Not daily.

## 2022-05-09 NOTE — ACP (ADVANCE CARE PLANNING)
Advance Care Planning (ACP) Provider Note - Comprehensive     Date of ACP Conversation: 07/25/19  Persons included in Conversation:  patient and   Length of ACP Conversation in minutes:  16 minutes    Authorized Decision Maker (if patient is incapable of making informed decisions): This person is:  has yet to delegate            General ACP for ALL Patients with Decision Making Capacity:   Importance of advance care planning, including choosing a healthcare agent to communicate patient's healthcare decisions if patient lost the ability to make decisions, such as after a sudden illness or accident  Understanding of the healthcare agent role was assessed and information provided  Exploration of values, goals, and preferences if recovery is not expected, even with continued medical treatment in the event of: Imminent death  Severe, permanent brain injury  \"In these circumstances, what matters most to you? \"  Other: still considering    Interventions Provided:  Recommended completion of Advance Directive form after review of ACP materials and conversation with prospective healthcare agent   Recommended communicating the plan and making copies for the healthcare agent, personal physician, and others as appropriate (e.g., health system)  Recommended review of completed ACP document annually or upon change in health status Statement Selected

## 2022-05-12 ENCOUNTER — OFFICE VISIT (OUTPATIENT)
Dept: CARDIOLOGY CLINIC | Age: 87
End: 2022-05-12
Payer: MEDICARE

## 2022-05-12 VITALS
HEART RATE: 34 BPM | BODY MASS INDEX: 30.84 KG/M2 | SYSTOLIC BLOOD PRESSURE: 120 MMHG | HEIGHT: 59 IN | DIASTOLIC BLOOD PRESSURE: 72 MMHG | WEIGHT: 153 LBS | OXYGEN SATURATION: 99 %

## 2022-05-12 DIAGNOSIS — I35.1 NONRHEUMATIC AORTIC VALVE INSUFFICIENCY: ICD-10-CM

## 2022-05-12 DIAGNOSIS — I10 ESSENTIAL HYPERTENSION: ICD-10-CM

## 2022-05-12 DIAGNOSIS — R00.1 BRADYCARDIA: Primary | ICD-10-CM

## 2022-05-12 DIAGNOSIS — I50.32 DIASTOLIC CHF, CHRONIC (HCC): ICD-10-CM

## 2022-05-12 DIAGNOSIS — E78.2 MIXED HYPERLIPIDEMIA: ICD-10-CM

## 2022-05-12 DIAGNOSIS — N18.30 STAGE 3 CHRONIC KIDNEY DISEASE, UNSPECIFIED WHETHER STAGE 3A OR 3B CKD (HCC): ICD-10-CM

## 2022-05-12 PROCEDURE — G8427 DOCREV CUR MEDS BY ELIG CLIN: HCPCS | Performed by: INTERNAL MEDICINE

## 2022-05-12 PROCEDURE — G8510 SCR DEP NEG, NO PLAN REQD: HCPCS | Performed by: INTERNAL MEDICINE

## 2022-05-12 PROCEDURE — G8536 NO DOC ELDER MAL SCRN: HCPCS | Performed by: INTERNAL MEDICINE

## 2022-05-12 PROCEDURE — 1101F PT FALLS ASSESS-DOCD LE1/YR: CPT | Performed by: INTERNAL MEDICINE

## 2022-05-12 PROCEDURE — 93000 ELECTROCARDIOGRAM COMPLETE: CPT | Performed by: INTERNAL MEDICINE

## 2022-05-12 PROCEDURE — 1090F PRES/ABSN URINE INCON ASSESS: CPT | Performed by: INTERNAL MEDICINE

## 2022-05-12 PROCEDURE — 99214 OFFICE O/P EST MOD 30 MIN: CPT | Performed by: INTERNAL MEDICINE

## 2022-05-12 PROCEDURE — G8417 CALC BMI ABV UP PARAM F/U: HCPCS | Performed by: INTERNAL MEDICINE

## 2022-05-12 NOTE — PROGRESS NOTES
Debra Marcano presents today for   Chief Complaint   Patient presents with    Follow-up     ADD ON - 6 month follow up       Debra Marcano preferred language for health care discussion is english/other. Is someone accompanying this pt? no    Is the patient using any DME equipment during [address]? no    Depression Screening:  3 most recent PHQ Screens 5/12/2022   PHQ Not Done -   Little interest or pleasure in doing things Not at all   Feeling down, depressed, irritable, or hopeless Not at all   Total Score PHQ 2 0   Trouble falling or staying asleep, or sleeping too much -   Poor appetite, weight loss, or overeating -   Feeling bad about yourself - or that you are a failure or have let yourself or your family down -   Trouble concentrating on things such as school, work, reading, or watching TV -   Moving or speaking so slowly that other people could have noticed; or the opposite being so fidgety that others notice -   Thoughts of being better off dead, or hurting yourself in some way -       Learning Assessment:  Learning Assessment 5/12/2022   PRIMARY LEARNER Patient   HIGHEST LEVEL OF EDUCATION - PRIMARY LEARNER  -   BARRIERS PRIMARY LEARNER -   [facility]    NEED -   LEARNER PREFERENCE PRIMARY DEMONSTRATION   LEARNING SPECIAL TOPICS -   ANSWERED BY patient   RELATIONSHIP SELF       Abuse Screening:  Abuse Screening Questionnaire 5/12/2022   Do you ever feel afraid of your partner? N   Are you in a relationship with someone who physically or mentally threatens you? N   Is it safe for you to go home? Y       Fall Risk  Fall Risk Assessment, last 12 mths 5/12/2022   Able to walk? Yes   Fall in past 12 months? 0   Do you feel unsteady? 0   Are you worried about falling 0           Pt currently taking Anticoagulant therapy? no    Pt currently taking Antiplatelet therapy ? ASA 81 mg once a day      Coordination of Care:  1.  Have you been to the ER, urgent care clinic since your last visit? Hospitalized since your last visit? no    2. Have you seen or consulted any other health care providers outside of the [address] [place] [address] since your last visit? Include any pap smears or colon screening.  no

## 2022-05-12 NOTE — PROGRESS NOTES
HISTORY OF PRESENT ILLNESS  Alexis Leone is a 80 y.o. female. Follow-up  Pertinent negatives include no chest pain, no abdominal pain, no headaches and no shortness of breath. Patient presents for a follow-up office visit. She was initially referred here for evaluation of a cardiac murmur. She has a long-standing history of essential hypertension, type 2 diabetes mellitus, and dyslipidemia. Patient patient was found to have aortic insufficiency on an echocardiogram back in 2018. She was recently hospitalized at the beginning of September 2019 for worsening renal failure and nausea and vomiting. She underwent a repeat echocardiogram which showed preserved LV systolic function, EF 56 to 80%, grade 1 diastolic dysfunction, biatrial enlargement, mild to moderate aortic insufficiency, which is relatively unchanged compared to her prior study. Patient was last seen in the office 5 to 6 months ago. Since last visit, she has been feeling well. She denies any excessive fatigue, no heart palpitations, dizziness, lightheadedness or syncope. No major change in her activity tolerance. No change in her chronic lower extremity swelling. Past Medical History:   Diagnosis Date    Anemia     Carotid bruit 12/07/2013    Carotid Duplex: 1. Bilateral 1-49% stenosis of the internal carotid arteries. 2. No significant stenosis in the external carotid arteries bilaterally. 3. Antegrade flow in both vetebral arteries. 4. Normal flow in both subclavian arteries. Plaque Morphology: 1. Hyperechoic plaque in the bulb and right ICA. 2. Hyperechoic plaque in the bulb and left ICA.  CKD (chronic kidney disease) stage 3, GFR 30-59 ml/min ([facility])     [facility]     NIDDM    Gastritis 10/27/2014    Duodenum Bx: No Specific Pathologic Abnormality. No Villous Abnormality. 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 No dysplasia or malignancy identified.  Bacterial Organisms c/w H. Pylori are present. Z-Line Bx: GE mucosa w/ Acute/Chronic Inflammation & Reactive Changes. Focal Specialized Type Campos Mucosa Identified. No Dysplasia or Malignancy Identified.  Gout 12/10/2009    Elevated Uric Acid Level    Hyperlipidemia     Hypertension     RAMÓN (iron deficiency anemia)      Current Outpatient Medications   Medication Sig Dispense Refill    colchicine (Colcrys) 0.6 mg tablet Take 1 Tablet by mouth daily. Indications: a type of joint disorder due to excess uric acid in the blood called gout 30 Tablet 0    cetirizine (ZYRTEC) 10 mg tablet Take 1 Tablet by mouth daily. 90 Tablet 0    fluticasone propionate (FLONASE) 50 mcg/actuation nasal spray 2 Sprays by Both Nostrils route daily. 1 Each 0    amLODIPine (NORVASC) 10 mg tablet Take 1 tablet by mouth once daily 90 Tablet 0    allopurinoL (ZYLOPRIM) 100 mg tablet Take 1 tablet by mouth once daily 90 Tablet 1    ferrous sulfate (Iron) 325 mg (65 mg iron) tablet Take 65 mg by mouth Daily (before breakfast).  hydrALAZINE (APRESOLINE) 100 mg tablet TAKE 1 TABLET BY MOUTH THREE TIMES DAILY 270 Tablet 3    cloNIDine HCL (CATAPRES) 0.2 mg tablet Take 1 tablet by mouth twice daily 180 Tablet 1    docosahexaenoic acid/epa (FISH OIL PO) Take 1,000 mg by mouth daily.  pravastatin (PRAVACHOL) 20 mg tablet Take 1 tablet by mouth nightly 90 Tablet 3    potassium chloride (K-DUR, KLOR-CON) 20 mEq tablet Take 20 mEq by mouth daily.  latanoprost (XALATAN) 0.005 % ophthalmic solution Administer 1 Drop to both eyes daily.  glucose blood VI test strips (PRODIGY NO CODING) strip Check fasting glucose once daily 100 Strip 3    Blood-Glucose Meter (PRODIGY AUTOCODE MONITOR SYST) misc Check fasting glucose once daily 1 Each 0    cholecalciferol, VITAMIN D3, (VITAMIN D3) 5,000 unit tab tablet Take  by mouth daily.  Aspirin, Buffered 81 mg tab Take 1 tablet by mouth daily.        No Known Allergies     Social History     Tobacco Use    Smoking status: Never Smoker    Smokeless tobacco: Never Used   Vaping Use    Vaping Use: Never used   Substance Use Topics    Alcohol use: No    Drug use: No     Family History   Problem Relation Age of Onset    Hypertension Mother     Stroke Mother     Stroke Father          Review of Systems   Constitutional: Negative for chills, fever, malaise/fatigue and weight loss. HENT: Negative for nosebleeds. Eyes: Negative for blurred vision and double vision. Respiratory: Negative for cough, shortness of breath and wheezing. Cardiovascular: Positive for leg swelling. Negative for chest pain, palpitations, orthopnea, claudication and PND. Gastrointestinal: Negative for abdominal pain, heartburn, nausea and vomiting. Genitourinary: Negative for dysuria and hematuria. Musculoskeletal: Negative for falls and myalgias. Skin: Negative for rash. Neurological: Negative for dizziness, focal weakness and headaches. Endo/Heme/Allergies: Does not bruise/bleed easily. Psychiatric/Behavioral: Negative for substance abuse. Visit Vitals  /72 (BP 1 Location: Left upper arm, BP Patient Position: Sitting, BP Cuff Size: Small adult)   Pulse (!) 34   Ht 4' 11\" (1.499 m)   Wt 69.4 kg (153 lb)   SpO2 99%   BMI 30.90 kg/m²       Physical Exam  Constitutional:       Appearance: She is well-developed. HENT:      Head: Normocephalic and atraumatic. Eyes:      Conjunctiva/sclera: Conjunctivae normal.   Neck:      Vascular: No carotid bruit or JVD. Cardiovascular:      Rate and Rhythm: Regular rhythm. Bradycardia present. Pulses: Normal pulses. Heart sounds: S1 normal and S2 normal. Murmur heard. Midsystolic murmur is present with a grade of 1/6 at the lower right sternal border. High-pitched blowing decrescendo early diastolic murmur is present with a grade of 1/4 at the upper right sternal border radiating to the apex. No gallop.     Pulmonary:      Effort: Pulmonary effort is normal.      Breath sounds: Normal breath sounds. No wheezing or rales. Abdominal:      General: Bowel sounds are normal.      Palpations: Abdomen is soft. Tenderness: There is no abdominal tenderness. Musculoskeletal:         General: Swelling (Trace to 1+ bilateral lower extremity to the shins ) present. No tenderness or deformity. Cervical back: Neck supple. Skin:     General: Skin is warm and dry. Neurological:      General: No focal deficit present. Mental Status: She is alert and oriented to person, place, and time. Psychiatric:         Behavior: Behavior normal.         Thought Content: Thought content normal.       EKG: Sinus bradycardia with sinus arrhythmia, left axis deviation, poor R-wave progression, no ST or T-wave abnormalities concerning for ischemia. Normal QTc interval.   Compared to the previous EKG, heart rate has decreased. ASSESSMENT and PLAN    Asymptomatic sinus bradycardia. This is a new finding for the patient on EKG today. I have recommended checking a thyroid panel and also arranging for a 48-hour Holter monitor to evaluate for any prolonged pauses, episodes of high-grade AV block, and heart rate variability. Aortic insufficiency. This was in the mild to moderate range on a recent echocardiogram from September 2019. This is likely just due to calcific aortic valve disease. This is not significant change compared to an echocardiogram from the year prior. This can be reassessed few years unless new symptoms arise. Essential hypertension. Patient's blood pressure now appears to be well controlled on her current medical regimen, all of which I would continue. Dyslipidemia. Patient has been treated with pravastatin. This is followed by her PCP. Diabetes mellitus, type II. Patient is managed with oral agents. From a cardiac standpoint for her hemoglobin A1c to be less than 7. Chronic kidney disease, stage III-IV.   This is followed closely by her nephrologist.    Lower extremity swelling. This has not changed in severity compared to last visit. If anything it may be a little better. No need for scheduled diuretic therapy. Follow-up in 6 months, sooner if needed.

## 2022-06-07 ENCOUNTER — DOCUMENTATION ONLY (OUTPATIENT)
Dept: CARDIOLOGY CLINIC | Age: 87
End: 2022-06-07

## 2022-06-07 NOTE — PROGRESS NOTES
Received event monitor results and reviewed per Dr. Storm Hunter. Per Dr. Storm Hunter results will be discussed at next visit.   - asymptomatic bradycardia, average HR 43; range 26-85  - 1 pause lasting 3 seconds; asymptomatic

## 2022-06-13 DIAGNOSIS — R00.1 BRADYCARDIA: ICD-10-CM

## 2022-06-16 ENCOUNTER — TRANSCRIBE ORDER (OUTPATIENT)
Dept: SCHEDULING | Age: 87
End: 2022-06-16

## 2022-06-16 DIAGNOSIS — E66.3 OVER WEIGHT: ICD-10-CM

## 2022-06-16 DIAGNOSIS — K76.9 LIVER LESION: ICD-10-CM

## 2022-06-16 DIAGNOSIS — Z12.11 COLON CANCER SCREENING: ICD-10-CM

## 2022-06-16 DIAGNOSIS — D50.9 IDA (IRON DEFICIENCY ANEMIA): ICD-10-CM

## 2022-06-16 DIAGNOSIS — K74.60 CIRRHOSIS (HCC): Primary | ICD-10-CM

## 2022-06-22 ENCOUNTER — HOSPITAL ENCOUNTER (OUTPATIENT)
Dept: MRI IMAGING | Age: 87
Discharge: HOME | End: 2022-06-22
Attending: INTERNAL MEDICINE

## 2022-06-22 DIAGNOSIS — K74.60 CIRRHOSIS (HCC): ICD-10-CM

## 2022-06-22 DIAGNOSIS — D50.9 IDA (IRON DEFICIENCY ANEMIA): ICD-10-CM

## 2022-06-22 DIAGNOSIS — Z12.11 COLON CANCER SCREENING: ICD-10-CM

## 2022-06-22 DIAGNOSIS — E66.3 OVER WEIGHT: ICD-10-CM

## 2022-06-22 DIAGNOSIS — K76.9 LIVER LESION: ICD-10-CM

## 2022-07-18 ENCOUNTER — HOSPITAL ENCOUNTER (OUTPATIENT)
Dept: LAB | Age: 87
Discharge: HOME | End: 2022-07-18
Payer: MEDICARE

## 2022-07-18 DIAGNOSIS — E11.9 DIABETES MELLITUS TYPE 2, DIET-CONTROLLED (HCC): ICD-10-CM

## 2022-07-18 LAB
ALBUMIN SERPL-MCNC: 3.6 G/DL (ref 3.4–5)
ALBUMIN/GLOB SERPL: 1 {RATIO} (ref 0.8–1.7)
ALP SERPL-CCNC: 36 U/L (ref 45–117)
ALT SERPL-CCNC: 18 U/L (ref 13–56)
ANION GAP SERPL CALC-SCNC: 9 MMOL/L (ref 3–18)
AST SERPL-CCNC: 24 U/L (ref 10–38)
BASOPHILS # BLD: 0 K/UL (ref 0–0.1)
BASOPHILS NFR BLD: 1 % (ref 0–2)
BILIRUB SERPL-MCNC: 0.5 MG/DL (ref 0.2–1)
BUN SERPL-MCNC: 72 MG/DL (ref 7–18)
BUN/CREAT SERPL: 34 (ref 12–20)
CALCIUM SERPL-MCNC: 9.3 MG/DL (ref 8.5–10.1)
CHLORIDE SERPL-SCNC: 107 MMOL/L (ref 100–111)
CO2 SERPL-SCNC: 23 MMOL/L (ref 21–32)
CREAT SERPL-MCNC: 2.14 MG/DL (ref 0.6–1.3)
DIFFERENTIAL METHOD BLD: ABNORMAL
EOSINOPHIL # BLD: 0.1 K/UL (ref 0–0.4)
EOSINOPHIL NFR BLD: 2 % (ref 0–5)
ERYTHROCYTE [DISTWIDTH] IN BLOOD BY AUTOMATED COUNT: 15.9 % (ref 11.6–14.5)
EST. AVERAGE GLUCOSE BLD GHB EST-MCNC: 148 MG/DL
GLOBULIN SER CALC-MCNC: 3.6 G/DL (ref 2–4)
GLUCOSE SERPL-MCNC: 152 MG/DL (ref 74–99)
HBA1C MFR BLD: 6.8 % (ref 4.2–5.6)
HCT VFR BLD AUTO: 31.4 % (ref 35–45)
HGB BLD-MCNC: 9.9 G/DL (ref 12–16)
IMM GRANULOCYTES # BLD AUTO: 0 K/UL (ref 0–0.04)
IMM GRANULOCYTES NFR BLD AUTO: 1 % (ref 0–0.5)
LYMPHOCYTES # BLD: 1.4 K/UL (ref 0.9–3.6)
LYMPHOCYTES NFR BLD: 29 % (ref 21–52)
MCH RBC QN AUTO: 27.5 PG (ref 24–34)
MCHC RBC AUTO-ENTMCNC: 31.5 G/DL (ref 31–37)
MCV RBC AUTO: 87.2 FL (ref 78–100)
MONOCYTES # BLD: 0.6 K/UL (ref 0.05–1.2)
MONOCYTES NFR BLD: 13 % (ref 3–10)
NEUTS SEG # BLD: 2.7 K/UL (ref 1.8–8)
NEUTS SEG NFR BLD: 55 % (ref 40–73)
NRBC # BLD: 0 K/UL (ref 0–0.01)
NRBC BLD-RTO: 0 PER 100 WBC
PLATELET # BLD AUTO: 208 K/UL (ref 135–420)
PMV BLD AUTO: 10.1 FL (ref 9.2–11.8)
POTASSIUM SERPL-SCNC: 4.2 MMOL/L (ref 3.5–5.5)
PROT SERPL-MCNC: 7.2 G/DL (ref 6.4–8.2)
RBC # BLD AUTO: 3.6 M/UL (ref 4.2–5.3)
SODIUM SERPL-SCNC: 139 MMOL/L (ref 136–145)
WBC # BLD AUTO: 4.9 K/UL (ref 4.6–13.2)

## 2022-07-18 PROCEDURE — 80053 COMPREHEN METABOLIC PANEL: CPT

## 2022-07-18 PROCEDURE — 36415 COLL VENOUS BLD VENIPUNCTURE: CPT

## 2022-07-18 PROCEDURE — 83036 HEMOGLOBIN GLYCOSYLATED A1C: CPT

## 2022-07-18 PROCEDURE — 85025 COMPLETE CBC W/AUTO DIFF WBC: CPT

## 2022-07-19 ENCOUNTER — OFFICE VISIT (OUTPATIENT)
Dept: FAMILY MEDICINE CLINIC | Age: 87
End: 2022-07-19
Payer: MEDICARE

## 2022-07-19 VITALS
TEMPERATURE: 97.8 F | OXYGEN SATURATION: 99 % | DIASTOLIC BLOOD PRESSURE: 68 MMHG | HEART RATE: 47 BPM | RESPIRATION RATE: 16 BRPM | WEIGHT: 153 LBS | BODY MASS INDEX: 30.84 KG/M2 | HEIGHT: 59 IN | SYSTOLIC BLOOD PRESSURE: 126 MMHG

## 2022-07-19 DIAGNOSIS — N18.4 CKD (CHRONIC KIDNEY DISEASE) STAGE 4, GFR 15-29 ML/MIN (HCC): ICD-10-CM

## 2022-07-19 DIAGNOSIS — R01.1 HEART MURMUR: ICD-10-CM

## 2022-07-19 DIAGNOSIS — E11.22 TYPE 2 DIABETES MELLITUS WITH CHRONIC KIDNEY DISEASE, WITHOUT LONG-TERM CURRENT USE OF INSULIN, UNSPECIFIED CKD STAGE (HCC): ICD-10-CM

## 2022-07-19 DIAGNOSIS — D63.8 ANEMIA OF CHRONIC DISEASE: ICD-10-CM

## 2022-07-19 DIAGNOSIS — E11.9 DIABETES MELLITUS TYPE 2, DIET-CONTROLLED (HCC): ICD-10-CM

## 2022-07-19 DIAGNOSIS — I10 ESSENTIAL HYPERTENSION: ICD-10-CM

## 2022-07-19 DIAGNOSIS — N18.32 CHRONIC RENAL FAILURE, STAGE 3B (HCC): ICD-10-CM

## 2022-07-19 DIAGNOSIS — E78.2 MIXED HYPERLIPIDEMIA: ICD-10-CM

## 2022-07-19 DIAGNOSIS — R00.1 SINUS BRADYCARDIA: ICD-10-CM

## 2022-07-19 DIAGNOSIS — R60.0 LEG EDEMA: ICD-10-CM

## 2022-07-19 DIAGNOSIS — Z00.00 MEDICARE ANNUAL WELLNESS VISIT, SUBSEQUENT: Primary | ICD-10-CM

## 2022-07-19 DIAGNOSIS — M10.00 IDIOPATHIC GOUT, UNSPECIFIED CHRONICITY, UNSPECIFIED SITE: ICD-10-CM

## 2022-07-19 PROCEDURE — G8536 NO DOC ELDER MAL SCRN: HCPCS | Performed by: FAMILY MEDICINE

## 2022-07-19 PROCEDURE — G8510 SCR DEP NEG, NO PLAN REQD: HCPCS | Performed by: FAMILY MEDICINE

## 2022-07-19 PROCEDURE — 1123F ACP DISCUSS/DSCN MKR DOCD: CPT | Performed by: FAMILY MEDICINE

## 2022-07-19 PROCEDURE — 99214 OFFICE O/P EST MOD 30 MIN: CPT | Performed by: FAMILY MEDICINE

## 2022-07-19 PROCEDURE — G0439 PPPS, SUBSEQ VISIT: HCPCS | Performed by: FAMILY MEDICINE

## 2022-07-19 PROCEDURE — G8417 CALC BMI ABV UP PARAM F/U: HCPCS | Performed by: FAMILY MEDICINE

## 2022-07-19 PROCEDURE — G8427 DOCREV CUR MEDS BY ELIG CLIN: HCPCS | Performed by: FAMILY MEDICINE

## 2022-07-19 PROCEDURE — 3044F HG A1C LEVEL LT 7.0%: CPT | Performed by: FAMILY MEDICINE

## 2022-07-19 PROCEDURE — 1090F PRES/ABSN URINE INCON ASSESS: CPT | Performed by: FAMILY MEDICINE

## 2022-07-19 PROCEDURE — 1101F PT FALLS ASSESS-DOCD LE1/YR: CPT | Performed by: FAMILY MEDICINE

## 2022-07-19 PROCEDURE — G0463 HOSPITAL OUTPT CLINIC VISIT: HCPCS | Performed by: FAMILY MEDICINE

## 2022-07-19 RX ORDER — DORZOLAMIDE HYDROCHLORIDE AND TIMOLOL MALEATE 20; 5 MG/ML; MG/ML
1 SOLUTION/ DROPS OPHTHALMIC EVERY MORNING
COMMUNITY
Start: 2022-06-28

## 2022-07-19 NOTE — PROGRESS NOTES
Chief Complaint   Patient presents with    Annual Wellness Visit    Anemia    Cholesterol Problem    Chronic Kidney Disease    Gout    Hypertension    Other     hx of leg edema and lumbar stenosis      1. \"Have you been to the ER, urgent care clinic since your last visit? Hospitalized since your last visit? \" No    2. \"Have you seen or consulted any other health care providers outside of the [address] [place] [address] since your last visit? \" No     3. For patients aged 39-70: Has the patient had a colonoscopy / FIT/ Cologuard? NA - based on age      If the patient is female:    4. For patients aged 41-77: Has the patient had a mammogram within the past 2 years? NA - based on age or sex      11. For patients aged 21-65: Has the patient had a pap smear? NA - based on age or sex   Abuse Screening Questionnaire 7/19/2022   Do you ever feel afraid of your partner? N   Are you in a relationship with someone who physically or mentally threatens you? N   Is it safe for you to go home? Y     Fall Risk Assessment, last 12 mths 7/19/2022   Able to walk? Yes   Fall in past 12 months? 0   Do you feel unsteady? 1   Are you worried about falling 1   Is TUG test greater than 12 seconds?  0   Is the gait abnormal? 0     3 most recent PHQ Screens 7/19/2022   PHQ Not Done -   Little interest or pleasure in doing things Not at all   Feeling down, depressed, irritable, or hopeless Not at all   Total Score PHQ 2 0   Trouble falling or staying asleep, or sleeping too much -   Poor appetite, weight loss, or overeating -   Feeling bad about yourself - or that you are a failure or have let yourself or your family down -   Trouble concentrating on things such as school, work, reading, or watching TV -   Moving or speaking so slowly that other people could have noticed; or the opposite being so fidgety that others notice -   Thoughts of being better off dead, or hurting yourself in some way -

## 2022-07-19 NOTE — Clinical Note
Pls assist her and , complex case, lives independently needs extra support with specialist appts Soon- eye -dr Cindra Soulier Should already be following cards/gi, has appts but pls remind/support Thanks nisha

## 2022-07-20 NOTE — PROGRESS NOTES
1. Have you been to the ER, urgent care clinic since your last visit? Hospitalized since your last visit? No    2. Have you seen or consulted any other health care providers outside of the [address] [place] [address] since your last visit? Include any pap smears or colon screening. Dr. Bossman Miranda, [facility]    This is the Subsequent Medicare Annual Wellness Exam, performed 12 months or more after the Initial AWV or the last Subsequent AWV    I have reviewed the patient's medical history in detail and updated the computerized patient record.        Assessment/Plan   Education and counseling provided:  Are appropriate based on today's review and evaluation  Pneumococcal Vaccine- 23/13 completed  Influenza Vaccine- annually   Cardiovascular screening blood test- 10/2021  Bone mass measurement (DEXA) 2018 normal - declines further checks  Screening for glaucoma annually due- pt reminded to schedule provided # dr. Howard Renner follows    Depression Risk Factor Screening     3 most recent PHQ Screens 7/19/2022   PHQ Not Done -   Little interest or pleasure in doing things Not at all   Feeling down, depressed, irritable, or hopeless Not at all   Total Score PHQ 2 0   Trouble falling or staying asleep, or sleeping too much -   Poor appetite, weight loss, or overeating -   Feeling bad about yourself - or that you are a failure or have let yourself or your family down -   Trouble concentrating on things such as school, work, reading, or watching TV -   Moving or speaking so slowly that other people could have noticed; or the opposite being so fidgety that others notice -   Thoughts of being better off dead, or hurting yourself in some way -       Alcohol Risk Screen    Do you average more than 1 drink per night or more than 7 drinks a week:  No    On any one occasion in the past three months have you have had more than 3 drinks containing alcohol:  No        Functional Ability and Level of Safety    Hearing: Hearing is good.      Activities of Daily Living: The home contains: uses a cane and walker  Patient does total self care      Ambulation: with mild difficulty     Fall Risk:  Fall Risk Assessment, last 12 mths 7/19/2022   Able to walk? Yes   Fall in past 12 months? 0   Do you feel unsteady? 1   Are you worried about falling 1   Is TUG test greater than 12 seconds?  0   Is the gait abnormal? 0      Abuse Screen:  Patient is not abused       Cognitive Screening    Has your family/caregiver stated any concerns about your memory: no    Health Maintenance Due     Health Maintenance Due   Topic Date Due    Shingrix Vaccine Age 49> (1 of 2) Never done    DTaP/Tdap/Td series (1 - Tdap) Never done    Eye Exam Retinal or Dilated  10/16/2020    Foot Exam Q1  01/15/2022    COVID-19 Vaccine (4 - Booster for Pfizer series) 03/30/2022    Medicare Yearly Exam  07/17/2022       Patient Care Team   Patient Care Team:  Elma Patricio MD as PCP - General (Family Medicine)  Elma Patricio MD as PCP - [facility] Empaneled Provider  Carroll Clark MD (Ophthalmology)  Zeny Porras MD (Nephrology)  Ashutosh Keys MD (Gastroenterology)    History     Patient Active Problem List   Diagnosis Code    Essential hypertension I10    Mixed hyperlipidemia E78.2    Advanced directives, counseling/discussion Z71.89    Diabetes mellitus type 2, diet-controlled ([address] E11.9    Advance care planning Z71.89    Heart murmur R01.1    Gout M10.9    CKD (chronic kidney disease) stage 3, GFR 30-59 ml/min ([facility]) N18.30    Anemia of chronic disease D63.8    Leg edema R60.0    Nausea R11.0    Elevated troponin R77.8    Chronic renal failure, stage 3 (moderate) ([facility]) O91.24    Diastolic CHF, chronic ([facility]) I50.32    Sinus bradycardia R00.1    CKD (chronic kidney disease) stage 4, GFR 15-29 ml/min ([address] N18.4    Type 2 diabetes mellitus with chronic kidney disease ([address] E11.22    Type 2 diabetes mellitus with diabetic neuropathy ([address] E11.40    Chronic gastritis without bleeding K29.50    Chronic renal disease, stage III N18.30     Past Medical History:   Diagnosis Date    Anemia     Carotid bruit 12/07/2013    Carotid Duplex: 1. Bilateral 1-49% stenosis of the internal carotid arteries. 2. No significant stenosis in the external carotid arteries bilaterally. 3. Antegrade flow in both vetebral arteries. 4. Normal flow in both subclavian arteries. Plaque Morphology: 1. Hyperechoic plaque in the bulb and right ICA. 2. Hyperechoic plaque in the bulb and left ICA. CKD (chronic kidney disease) stage 3, GFR 30-59 ml/min ([facility])     Diabetes ([facility]     NIDDM    Gastritis 10/27/2014    Duodenum Bx: No Specific Pathologic Abnormality. No Villous Abnormality. 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 No dysplasia or malignancy identified. Bacterial Organisms c/w H. Pylori are present. Z-Line Bx: GE mucosa w/ Acute/Chronic Inflammation & Reactive Changes. Focal Specialized Type Campos Mucosa Identified. No Dysplasia or Malignancy Identified. Gout 12/10/2009    Elevated Uric Acid Level    Hyperlipidemia     Hypertension     RAMÓN (iron deficiency anemia)       Past Surgical History:   Procedure Laterality Date    HX COLONOSCOPY  10/27/2014    Dr. Bridget Mcduffie     HX ENDOSCOPY  10/27/2014    Dr. Bridget Mcduffie      Current Outpatient Medications   Medication Sig Dispense Refill    dorzolamide-timoloL (COSOPT) 22.3-6.8 mg/mL ophthalmic solution Administer 1 Drop to left eye Every morning. amLODIPine (NORVASC) 10 mg tablet Take 1 tablet by mouth once daily 90 Tablet 1    ferrous sulfate (Iron) 325 mg (65 mg iron) tablet Take 65 mg by mouth Daily (before breakfast). hydrALAZINE (APRESOLINE) 100 mg tablet TAKE 1 TABLET BY MOUTH THREE TIMES DAILY 270 Tablet 3    cloNIDine HCL (CATAPRES) 0.2 mg tablet Take 1 tablet by mouth twice daily 180 Tablet 1    docosahexaenoic acid/epa (FISH OIL PO) Take 1,000 mg by mouth daily. pravastatin (PRAVACHOL) 20 mg tablet Take 1 tablet by mouth nightly 90 Tablet 3    potassium chloride (K-DUR, KLOR-CON) 20 mEq tablet Take 20 mEq by mouth daily. latanoprost (XALATAN) 0.005 % ophthalmic solution Administer 1 Drop to both eyes daily. cholecalciferol, VITAMIN D3, (VITAMIN D3) 5,000 unit tab tablet Take  by mouth daily. Aspirin, Buffered 81 mg tab Take 1 tablet by mouth daily. glucose blood VI test strips (PRODIGY NO CODING) strip Check fasting glucose once daily 100 Strip 3    Blood-Glucose Meter (PRODIGY AUTOCODE MONITOR SYST) misc Check fasting glucose once daily 1 Each 0     No Known Allergies    Family History   Problem Relation Age of Onset    Hypertension Mother     Stroke Mother     Stroke Father      Social History     Tobacco Use    Smoking status: Never    Smokeless tobacco: Never   Substance Use Topics    Alcohol use: No       Anhmaryanne Alan, 80 y.o.,  female    SUBJECTIVE  Ff-up    HTN/CKD 3-4 GFR declining. She continues to take clonidine, hydralazine and norvasc. She is following dr. Jhon Whitehead who recommended to continue to be off lisinopril add potassium andl monitoring proteinuria. She is on prn lasix few times a week for leg edema dvised her to start using compression stockings as well. She is due for her annual nephrology visit in august, sent out another referral, will have our complex case manager assist as well    Asymptomatic bradycardia- She has h/o AV block and advised against BB. Recent holter monitoring assessment, has appt with cardiology for ff-up 11/2022    DM w/ CKD 3 and neuorpathy-  diet controlled, She Reports FBG  1teens. Reviewed labs a1c 6.8  Numbness of legs/feet secondary to DM neuropathy/spinal stenosis- some response to gabapentin, however discontinued due to drowsiness side effect  MRI incidental finding of benign appearing renal cysts, reviewed c/w US done by dr. Sharmin Burnette for CKD. HL- was supposed to be on zocor, 's usually <100. We had extensive med review today and bottles did not have zocor. She does not recall why she is not currently on medication. Aortic regurtiation Sera Parada- evaluated by cardiology Dr. Melton Has 2019,  She is asymptomatic, likely due to calcific aortic valve and recommend recheck in about 2-3 years. Gout/ history of hyperuricemia- decided to come off of allopurinol, has not had flare for years. monitoring     GERD- EGD gastritis/schatzis ring dilated 9/2020, doing well on protonix. Following dr. Chelsea Baron:  See HPI, all others negative        Patient Active Problem List   Diagnosis Code    Essential hypertension I10    Mixed hyperlipidemia E78.2    Advanced directives, counseling/discussion Z71.89    Controlled type 2 diabetes mellitus with stage 3 chronic kidney disease, without long-term current use of insulin (HCC) E11.22, N18.3         Current Outpatient Medications:     dorzolamide-timoloL (COSOPT) 22.3-6.8 mg/mL ophthalmic solution, Administer 1 Drop to left eye Every morning., Disp: , Rfl:     amLODIPine (NORVASC) 10 mg tablet, Take 1 tablet by mouth once daily, Disp: 90 Tablet, Rfl: 1    ferrous sulfate (Iron) 325 mg (65 mg iron) tablet, Take 65 mg by mouth Daily (before breakfast). , Disp: , Rfl:     hydrALAZINE (APRESOLINE) 100 mg tablet, TAKE 1 TABLET BY MOUTH THREE TIMES DAILY, Disp: 270 Tablet, Rfl: 3    cloNIDine HCL (CATAPRES) 0.2 mg tablet, Take 1 tablet by mouth twice daily, Disp: 180 Tablet, Rfl: 1    docosahexaenoic acid/epa (FISH OIL PO), Take 1,000 mg by mouth daily. , Disp: , Rfl:     pravastatin (PRAVACHOL) 20 mg tablet, Take 1 tablet by mouth nightly, Disp: 90 Tablet, Rfl: 3    potassium chloride (K-DUR, KLOR-CON) 20 mEq tablet, Take 20 mEq by mouth daily. , Disp: , Rfl:     latanoprost (XALATAN) 0.005 % ophthalmic solution, Administer 1 Drop to both eyes daily. , Disp: , Rfl:     cholecalciferol, VITAMIN D3, (VITAMIN D3) 5,000 unit tab tablet, Take  by mouth daily. , Disp: , Rfl:     Aspirin, Buffered 81 mg tab, Take 1 tablet by mouth daily. , Disp: , Rfl:     glucose blood VI test strips (PRODIGY NO CODING) strip, Check fasting glucose once daily, Disp: 100 Strip, Rfl: 3    Blood-Glucose Meter (PRODIGY AUTOCODE MONITOR SYST) misc, Check fasting glucose once daily, Disp: 1 Each, Rfl: 0      No Known Allergies    Past Medical History:   Diagnosis Date    Anemia     Carotid bruit 12/07/2013    Carotid Duplex: 1. Bilateral 1-49% stenosis of the internal carotid arteries. 2. No significant stenosis in the external carotid arteries bilaterally. 3. Antegrade flow in both vetebral arteries. 4. Normal flow in both subclavian arteries. Plaque Morphology: 1. Hyperechoic plaque in the bulb and right ICA. 2. Hyperechoic plaque in the bulb and left ICA. CKD (chronic kidney disease) stage 3, GFR 30-59 ml/min     Diabetes ([facility]     NIDDM    Gastritis 10/27/2014    Duodenum Bx: No Specific Pathologic Abnormality. No Villous Abnormality. 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 No dysplasia or malignancy identified. Bacterial Organisms c/w H. Pylori are present. Z-Line Bx: GE mucosa w/ Acute/Chronic Inflammation & Reactive Changes. Focal Specialized Type Campos Mucosa Identified. No Dysplasia or Malignancy Identified. Gout 12/10/2009    Elevated Uric Acid Level    Hyperlipidemia     Hypertension     RAMÓN (iron deficiency anemia)        Social History     Social History    Marital status:      Spouse name: N/A    Number of children: N/A    Years of education: N/A     Occupational History    Not on file.      Social History Main Topics    Smoking status: Never Smoker    Smokeless tobacco: Never Used    Alcohol use No    Drug use: No    Sexual activity: Not Currently     Partners: Male     Birth control/ protection: None     Other Topics Concern    Not on file     Social History Narrative       Family History   Problem Relation Age of Onset Hypertension Mother     Stroke Mother     Stroke Father          OBJECTIVE    Physical Exam:     Visit Vitals  /68 (BP 1 Location: Left arm, BP Patient Position: Standing, BP Cuff Size: Large adult)   Pulse (!) 47   Temp 97.8 °F (36.6 °C) (Temporal)   Resp 16   Ht 4' 11\" (1.499 m)   Wt 153 lb (69.4 kg)   SpO2 99%   BMI 30.90 kg/m²         General: alert, well-appearing, AA,  in no apparent distress or pain  Neck: supple, no adenopathy palpated  CVS: normal rate,  regular rhythm, + murmur  Lungs:clear to ausculation bilaterally, no crackles, wheezing or rhonchi noted  Extremities: + grade 1 bipedal edema- chronic  Feet: no lesions  Skin: warm, no lesions, rashes noted  Psych:  mood and affect normal  Results for orders placed or performed during the hospital encounter of 07/18/22   CBC WITH AUTOMATED DIFF   Result Value Ref Range    WBC 4.9 4.6 - 13.2 K/uL    RBC 3.60 (L) 4.20 - 5.30 M/uL    HGB 9.9 (L) 12.0 - 16.0 g/dL    HCT 31.4 (L) 35.0 - 45.0 %    MCV 87.2 78.0 - 100.0 FL    MCH 27.5 24.0 - 34.0 PG    MCHC 31.5 31.0 - 37.0 g/dL    RDW 15.9 (H) 11.6 - 14.5 %    PLATELET 258 531 - 578 K/uL    MPV 10.1 9.2 - 11.8 FL    NRBC 0.0 0  WBC    ABSOLUTE NRBC 0.00 0.00 - 0.01 K/uL    NEUTROPHILS 55 40 - 73 %    LYMPHOCYTES 29 21 - 52 %    MONOCYTES 13 (H) 3 - 10 %    EOSINOPHILS 2 0 - 5 %    BASOPHILS 1 0 - 2 %    IMMATURE GRANULOCYTES 1 (H) 0.0 - 0.5 %    ABS. NEUTROPHILS 2.7 1.8 - 8.0 K/UL    ABS. LYMPHOCYTES 1.4 0.9 - 3.6 K/UL    ABS. MONOCYTES 0.6 0.05 - 1.2 K/UL    ABS. EOSINOPHILS 0.1 0.0 - 0.4 K/UL    ABS. BASOPHILS 0.0 0.0 - 0.1 K/UL    ABS. IMM.  GRANS. 0.0 0.00 - 0.04 K/UL    DF AUTOMATED     METABOLIC PANEL, COMPREHENSIVE   Result Value Ref Range    Sodium 139 136 - 145 mmol/L    Potassium 4.2 3.5 - 5.5 mmol/L    Chloride 107 100 - 111 mmol/L    CO2 23 21 - 32 mmol/L    Anion gap 9 3.0 - 18 mmol/L    Glucose 152 (H) 74 - 99 mg/dL    BUN 72 (H) 7.0 - 18 MG/DL    Creatinine 2.14 (H) 0.6 - 1.3 MG/DL BUN/Creatinine ratio 34 (H) 12 - 20      GFR est AA 26 (L) >60 ml/min/1.73m2    GFR est non-AA 22 (L) >60 ml/min/1.73m2    Calcium 9.3 8.5 - 10.1 MG/DL    Bilirubin, total 0.5 0.2 - 1.0 MG/DL    ALT (SGPT) 18 13 - 56 U/L    AST (SGOT) 24 10 - 38 U/L    Alk.  phosphatase 36 (L) 45 - 117 U/L    Protein, total 7.2 6.4 - 8.2 g/dL    Albumin 3.6 3.4 - 5.0 g/dL    Globulin 3.6 2.0 - 4.0 g/dL    A-G Ratio 1.0 0.8 - 1.7     HEMOGLOBIN A1C WITH EAG   Result Value Ref Range    Hemoglobin A1c 6.8 (H) 4.2 - 5.6 %    Est. average glucose 148 mg/dL       ASSESSMENT/PLAN  Diagnoses and all orders for this visit:  Controlled type 2 diabetes mellitus with stage 3 chronic kidney disease, without long-term current use of insulin (HCC)   diet controlled  a1c 6.6>6.2>6.8  Foot exam today1/15/2021- no lesions, following podiatry  Check cmp, a1c, lipid panel, cbc uric acid prior to next visit    Diabetes mellitus type 2, diet controlled (HCC)  Neuropathy  Has stopped gabapentin due to side effect    Hyperuricemia  Off allopurinol  No gout flares for years  Monitoring, check uric acid prior to next visit      Essential hypertension  Controlled  Cont   norvasc, clonidine, hydralazine  Cont  low dose lasix/kcl prn for leg edema  Avoid bb with h/o 1st deg av block- cardiology following    Leg edema  Advised compression stockings   Low salt diet,   Prn lasix/kcl     Mixed hyperlipidemia  On pravachol     Gout of foot, unspecified cause, unspecified chronicity, unspecified laterality  Controlled  Check Uric acid prior to next visit    Anemia of chronic disease  Stable, Baseline 10's  Likely anemia of chronic disease, mildly lower, defer to nephrology for other recs  Cbc prior to next visit  Monitoring    Heart murmur  Aortic regurg  Asymptomatic  Monitoring, following cards     Diastolic chf, chronic  Appears compensated  Cont prn lasix  Intolerant to BB    CKD 3-4  Avoid nsaids, keep hydrated  monitoring  Following dr. Rita zaman     [facility] BOTTLES EVERY VISIT    Will have Complex case manager follow as well, multiple specialists  Need for update eye/nephrology appts  Cont to follow GI/ cardiology    Follow-up Disposition:  Ff-up in 3 months or sooner prn    Patient understands plan of care. Patient has provided input and agrees with goals.

## 2022-07-25 ENCOUNTER — PATIENT OUTREACH (OUTPATIENT)
Dept: CASE MANAGEMENT | Age: 87
End: 2022-07-25

## 2022-07-25 NOTE — PROGRESS NOTES
Complex Case Management      Date/Time:  2022 1:13 PM    Method of communication with patient:phone    [address] [facility] ([facility]) contacted the patient by telephone to perform Ambulatory Care Coordination. Verified name and  (PHI) with patient as identifiers. Provided introduction to self, and explanation of the Ambulatory Care Manager's role. Reviewed most recent clinic visit w/ patient who verbalized understanding. Patient given an opportunity to ask questions. Top Challenges reviewed with the Provider   N/a     1. Hx of DM2, CKD III, neuropathy, hyperuricemia, htn, LLE, gout, anemia, aortic regurg, dCHF, HLD  2. Referred for CCM by PCP after recent visit. 3. PCP w/ referral to Nephrology. Upon call to practice, she is already followed there. Assisted w/ scheduling appt. 4. Pt states doing well, no questions/issues. 5. Of note, patient needs scheduled eye exam and med rec, will attempt on next outreach. The patient agrees to contact the PCP office or the [facility] for questions related to their healthcare. Provided contact information for future reference. Disease Specific:   N/A    Home Health Active: No    DME Active: No    Barriers to care? Advanced age    Advance Care Planning:   Does patient have an Advance Directive:  reviewed and current     Medication(s):   Medication reconciliation was not performed with patient, who verbalizes understanding of administration of home medications. There were no barriers to obtaining medications identified at this time. Referral to Pharm D needed: no     Current Outpatient Medications   Medication Sig    dorzolamide-timoloL (COSOPT) 22.3-6.8 mg/mL ophthalmic solution Administer 1 Drop to left eye Every morning. amLODIPine (NORVASC) 10 mg tablet Take 1 tablet by mouth once daily    ferrous sulfate (Iron) 325 mg (65 mg iron) tablet Take 65 mg by mouth Daily (before breakfast).     hydrALAZINE (APRESOLINE) 100 mg tablet TAKE 1 TABLET BY MOUTH THREE TIMES DAILY    cloNIDine HCL (CATAPRES) 0.2 mg tablet Take 1 tablet by mouth twice daily    docosahexaenoic acid/epa (FISH OIL PO) Take 1,000 mg by mouth daily. pravastatin (PRAVACHOL) 20 mg tablet Take 1 tablet by mouth nightly    potassium chloride (K-DUR, KLOR-CON) 20 mEq tablet Take 20 mEq by mouth daily. latanoprost (XALATAN) 0.005 % ophthalmic solution Administer 1 Drop to both eyes daily. glucose blood VI test strips (PRODIGY NO CODING) strip Check fasting glucose once daily    Blood-Glucose Meter (PRODIGY AUTOCODE MONITOR SYST) misc Check fasting glucose once daily    cholecalciferol, VITAMIN D3, (VITAMIN D3) 5,000 unit tab tablet Take  by mouth daily. Aspirin, Buffered 81 mg tab Take 1 tablet by mouth daily. No current facility-administered medications for this visit.        [facility] follow up appointment(s):   Future Appointments   Date Time Provider Gee Tamezi   10/19/2022  9:15 AM Elma Patricio MD [facility] BS AMB   11/10/2022 10:40 AM Aide Lagunas MD [facility] BS AMB        Non-[facility] follow up appointment(s):      Goals Addressed                   This Visit's Progress     Attends follow up appointments on schedule        7/25/22 Patient will attend all scheduled appointments through 10/25/22       Knowledge and adherence of prescribed medication (ie. action, side effects, missed dose, etc.).        7/25/22 Pt will take all medications prescribed to be evaluated on each outreach through 10/25/22

## 2022-08-05 ENCOUNTER — PATIENT OUTREACH (OUTPATIENT)
Dept: CASE MANAGEMENT | Age: 87
End: 2022-08-05

## 2022-08-11 ENCOUNTER — PATIENT OUTREACH (OUTPATIENT)
Dept: CASE MANAGEMENT | Age: 87
End: 2022-08-11

## 2022-08-11 NOTE — PROGRESS NOTES
Complex Case Management      Date/Time:  2022 1:13 PM    Method of communication with patient:phone    [facility] ([facility]) contacted the patient by telephone to perform Ambulatory Care Coordination. Verified name and  (PHI) with patient as identifiers. Provided introduction to self, and explanation of the Ambulatory Care Manager's role. Reviewed most recent clinic visit w/ patient who verbalized understanding. Patient given an opportunity to ask questions. Top Challenges reviewed with the Provider   N/a     1. Hx of DM2, CKD III, neuropathy, hyperuricemia, htn, LLE, gout, anemia, aortic regurg, dCHF, HLD  2. PCP referred to nephrology, however, patient states she has an appointment with them, but does not remember the date. Called and found date and time. Relayed to patient. 3. States has scheduled an eye appt for herself. 4. Pt states doing well, no questions/issues. 5. Pt declined med rec at this time, will attempt on next outreach    The patient agrees to contact the PCP office or the [facility] for questions related to their healthcare. Provided contact information for future reference. Disease Specific:   N/A    Home Health Active: No    DME Active: No    Barriers to care? Advanced age    Advance Care Planning:   Does patient have an Advance Directive:  reviewed and current     Medication(s):   Medication reconciliation was not performed with patient, who verbalizes understanding of administration of home medications. There were no barriers to obtaining medications identified at this time. Referral to Pharm D needed: no     Current Outpatient Medications   Medication Sig    dorzolamide-timoloL (COSOPT) 22.3-6.8 mg/mL ophthalmic solution Administer 1 Drop to left eye Every morning. amLODIPine (NORVASC) 10 mg tablet Take 1 tablet by mouth once daily    ferrous sulfate (Iron) 325 mg (65 mg iron) tablet Take 65 mg by mouth Daily (before breakfast).     hydrALAZINE (APRESOLINE) 100 mg tablet TAKE 1 TABLET BY MOUTH THREE TIMES DAILY    cloNIDine HCL (CATAPRES) 0.2 mg tablet Take 1 tablet by mouth twice daily    docosahexaenoic acid/epa (FISH OIL PO) Take 1,000 mg by mouth daily. pravastatin (PRAVACHOL) 20 mg tablet Take 1 tablet by mouth nightly    potassium chloride (K-DUR, KLOR-CON) 20 mEq tablet Take 20 mEq by mouth daily. latanoprost (XALATAN) 0.005 % ophthalmic solution Administer 1 Drop to both eyes daily. glucose blood VI test strips (PRODIGY NO CODING) strip Check fasting glucose once daily    Blood-Glucose Meter (PRODIGY AUTOCODE MONITOR SYST) misc Check fasting glucose once daily    cholecalciferol, VITAMIN D3, (VITAMIN D3) 5,000 unit tab tablet Take  by mouth daily. Aspirin, Buffered 81 mg tab Take 1 tablet by mouth daily. No current facility-administered medications for this visit. [facility] follow up appointment(s):   Future Appointments   Date Time Provider Gee Montes   10/19/2022  9:15 AM Myranda Sarah MD \[facility]\"" BS AMB   11/10/2022 10:40 AM He Sapp MD [facility] BS AMB        Non-[facility] follow up appointment(s):      Goals Addressed                   This Visit's Progress     Attends follow up appointments on schedule   On track     7/25/22 Patient will attend all scheduled appointments through 10/25/22       Knowledge and adherence of prescribed medication (ie. action, side effects, missed dose, etc.).    On track     7/25/22 Pt will take all medications prescribed to be evaluated on each outreach through 10/25/22

## 2022-08-17 ENCOUNTER — PATIENT OUTREACH (OUTPATIENT)
Dept: CASE MANAGEMENT | Age: 87
End: 2022-08-17

## 2022-08-17 NOTE — PROGRESS NOTES
Complex Case Management      Date/Time:  2022 1:13 PM    Method of communication with patient:phone    [facility] ([facility]) contacted the patient by telephone to perform Ambulatory Care Coordination. Verified name and  (PHI) with patient as identifiers. Provided introduction to self, and explanation of the Ambulatory Care Manager's role. Reviewed most recent clinic visit w/ patient who verbalized understanding. Patient given an opportunity to ask questions. Top Challenges reviewed with the Provider   N/a     1. Hx of DM2, CKD III, neuropathy, hyperuricemia, htn, LLE, gout, anemia, aortic regurg, dCHF, HLD  2. Pt states doing well, no questions/issues. 3. Pt declined med rec at this time again    The patient agrees to contact the PCP office or the [address] [place] [address] for questions related to their healthcare. Provided contact information for future reference. Disease Specific:   N/A    Home Health Active: No    DME Active: No    Barriers to care? Advanced age    Advance Care Planning:   Does patient have an Advance Directive:  reviewed and current     Medication(s):   Medication reconciliation was not performed with patient, who verbalizes understanding of administration of home medications. There were no barriers to obtaining medications identified at this time. Referral to Pharm D needed: no     Current Outpatient Medications   Medication Sig    dorzolamide-timoloL (COSOPT) 22.3-6.8 mg/mL ophthalmic solution Administer 1 Drop to left eye Every morning. amLODIPine (NORVASC) 10 mg tablet Take 1 tablet by mouth once daily    ferrous sulfate (Iron) 325 mg (65 mg iron) tablet Take 65 mg by mouth Daily (before breakfast). hydrALAZINE (APRESOLINE) 100 mg tablet TAKE 1 TABLET BY MOUTH THREE TIMES DAILY    cloNIDine HCL (CATAPRES) 0.2 mg tablet Take 1 tablet by mouth twice daily    docosahexaenoic acid/epa (FISH OIL PO) Take 1,000 mg by mouth daily.     pravastatin (PRAVACHOL) 20 mg tablet Take 1 tablet by mouth nightly    potassium chloride (K-DUR, KLOR-CON) 20 mEq tablet Take 20 mEq by mouth daily. latanoprost (XALATAN) 0.005 % ophthalmic solution Administer 1 Drop to both eyes daily. glucose blood VI test strips (PRODIGY NO CODING) strip Check fasting glucose once daily    Blood-Glucose Meter (PRODIGY AUTOCODE MONITOR SYST) misc Check fasting glucose once daily    cholecalciferol, VITAMIN D3, (VITAMIN D3) 5,000 unit tab tablet Take  by mouth daily. Aspirin, Buffered 81 mg tab Take 1 tablet by mouth daily. No current facility-administered medications for this visit.        [facility] follow up appointment(s):   Future Appointments   Date Time Provider Gee Jyoti   10/19/2022  9:15 AM Luis Dill MD [facility] BS AMB   11/10/2022 10:40 AM Ryley Cody MD [facility] BS AMB        Non-[facility] follow up appointment(s):      Goals Addressed    None

## 2022-08-23 ENCOUNTER — APPOINTMENT (OUTPATIENT)
Dept: GENERAL RADIOLOGY | Age: 87
End: 2022-08-23
Attending: STUDENT IN AN ORGANIZED HEALTH CARE EDUCATION/TRAINING PROGRAM
Payer: MEDICARE

## 2022-08-23 ENCOUNTER — APPOINTMENT (OUTPATIENT)
Dept: CT IMAGING | Age: 87
End: 2022-08-23
Attending: STUDENT IN AN ORGANIZED HEALTH CARE EDUCATION/TRAINING PROGRAM
Payer: MEDICARE

## 2022-08-23 ENCOUNTER — HOSPITAL ENCOUNTER (EMERGENCY)
Age: 87
Discharge: HOME | End: 2022-08-23
Attending: STUDENT IN AN ORGANIZED HEALTH CARE EDUCATION/TRAINING PROGRAM
Payer: MEDICARE

## 2022-08-23 VITALS
OXYGEN SATURATION: 96 % | DIASTOLIC BLOOD PRESSURE: 67 MMHG | HEART RATE: 65 BPM | SYSTOLIC BLOOD PRESSURE: 169 MMHG | WEIGHT: 152 LBS | RESPIRATION RATE: 18 BRPM | TEMPERATURE: 98.2 F | BODY MASS INDEX: 30.64 KG/M2 | HEIGHT: 59 IN

## 2022-08-23 DIAGNOSIS — R11.2 NAUSEA AND VOMITING, UNSPECIFIED VOMITING TYPE: Primary | ICD-10-CM

## 2022-08-23 LAB
ALBUMIN SERPL-MCNC: 4 G/DL (ref 3.4–5)
ALBUMIN/GLOB SERPL: 1 {RATIO} (ref 0.8–1.7)
ALP SERPL-CCNC: 41 U/L (ref 45–117)
ALT SERPL-CCNC: 16 U/L (ref 13–56)
ANION GAP SERPL CALC-SCNC: 8 MMOL/L (ref 3–18)
APPEARANCE UR: CLEAR
AST SERPL-CCNC: 27 U/L (ref 10–38)
BACTERIA URNS QL MICRO: ABNORMAL /HPF
BASOPHILS # BLD: 0 K/UL (ref 0–0.1)
BASOPHILS NFR BLD: 1 % (ref 0–2)
BILIRUB SERPL-MCNC: 0.5 MG/DL (ref 0.2–1)
BILIRUB UR QL: NEGATIVE
BUN SERPL-MCNC: 50 MG/DL (ref 7–18)
BUN/CREAT SERPL: 22 (ref 12–20)
CALCIUM SERPL-MCNC: 9.3 MG/DL (ref 8.5–10.1)
CHLORIDE SERPL-SCNC: 106 MMOL/L (ref 100–111)
CO2 SERPL-SCNC: 25 MMOL/L (ref 21–32)
COLOR UR: YELLOW
CREAT SERPL-MCNC: 2.31 MG/DL (ref 0.6–1.3)
DIFFERENTIAL METHOD BLD: ABNORMAL
EOSINOPHIL # BLD: 0.1 K/UL (ref 0–0.4)
EOSINOPHIL NFR BLD: 1 % (ref 0–5)
EPITH CASTS URNS QL MICRO: ABNORMAL /LPF (ref 0–5)
ERYTHROCYTE [DISTWIDTH] IN BLOOD BY AUTOMATED COUNT: 15.8 % (ref 11.6–14.5)
GLOBULIN SER CALC-MCNC: 4 G/DL (ref 2–4)
GLUCOSE SERPL-MCNC: 137 MG/DL (ref 74–99)
GLUCOSE UR STRIP.AUTO-MCNC: NEGATIVE MG/DL
HCT VFR BLD AUTO: 33 % (ref 35–45)
HGB BLD-MCNC: 10.5 G/DL (ref 12–16)
HGB UR QL STRIP: NEGATIVE
IMM GRANULOCYTES # BLD AUTO: 0 K/UL (ref 0–0.04)
IMM GRANULOCYTES NFR BLD AUTO: 0 % (ref 0–0.5)
KETONES UR QL STRIP.AUTO: NEGATIVE MG/DL
LEUKOCYTE ESTERASE UR QL STRIP.AUTO: NEGATIVE
LIPASE SERPL-CCNC: 199 U/L (ref 73–393)
LYMPHOCYTES # BLD: 0.9 K/UL (ref 0.9–3.6)
LYMPHOCYTES NFR BLD: 15 % (ref 21–52)
MCH RBC QN AUTO: 27.6 PG (ref 24–34)
MCHC RBC AUTO-ENTMCNC: 31.8 G/DL (ref 31–37)
MCV RBC AUTO: 86.6 FL (ref 78–100)
MONOCYTES # BLD: 0.6 K/UL (ref 0.05–1.2)
MONOCYTES NFR BLD: 10 % (ref 3–10)
MUCOUS THREADS URNS QL MICRO: ABNORMAL /LPF
NEUTS SEG # BLD: 4.5 K/UL (ref 1.8–8)
NEUTS SEG NFR BLD: 73 % (ref 40–73)
NITRITE UR QL STRIP.AUTO: NEGATIVE
NRBC # BLD: 0 K/UL (ref 0–0.01)
NRBC BLD-RTO: 0 PER 100 WBC
PH UR STRIP: 5.5 [PH] (ref 5–8)
PLATELET # BLD AUTO: 207 K/UL (ref 135–420)
PMV BLD AUTO: 9.7 FL (ref 9.2–11.8)
POTASSIUM SERPL-SCNC: 4.6 MMOL/L (ref 3.5–5.5)
PROT SERPL-MCNC: 8 G/DL (ref 6.4–8.2)
PROT UR STRIP-MCNC: 300 MG/DL
RBC # BLD AUTO: 3.81 M/UL (ref 4.2–5.3)
SODIUM SERPL-SCNC: 139 MMOL/L (ref 136–145)
SP GR UR REFRACTOMETRY: 1.01 (ref 1–1.03)
TROPONIN-HIGH SENSITIVITY: 40 NG/L (ref 0–54)
UROBILINOGEN UR QL STRIP.AUTO: 0.2 EU/DL (ref 0.2–1)
WBC # BLD AUTO: 6.1 K/UL (ref 4.6–13.2)
WBC URNS QL MICRO: ABNORMAL /HPF (ref 0–4)

## 2022-08-23 PROCEDURE — 85025 COMPLETE CBC W/AUTO DIFF WBC: CPT

## 2022-08-23 PROCEDURE — 74176 CT ABD & PELVIS W/O CONTRAST: CPT

## 2022-08-23 PROCEDURE — 83690 ASSAY OF LIPASE: CPT

## 2022-08-23 PROCEDURE — 71045 X-RAY EXAM CHEST 1 VIEW: CPT

## 2022-08-23 PROCEDURE — 80053 COMPREHEN METABOLIC PANEL: CPT

## 2022-08-23 PROCEDURE — 96374 THER/PROPH/DIAG INJ IV PUSH: CPT

## 2022-08-23 PROCEDURE — 84484 ASSAY OF TROPONIN QUANT: CPT

## 2022-08-23 PROCEDURE — 81001 URINALYSIS AUTO W/SCOPE: CPT

## 2022-08-23 PROCEDURE — 74011250636 HC RX REV CODE- 250/636: Performed by: STUDENT IN AN ORGANIZED HEALTH CARE EDUCATION/TRAINING PROGRAM

## 2022-08-23 PROCEDURE — 99285 EMERGENCY DEPT VISIT HI MDM: CPT

## 2022-08-23 PROCEDURE — 93005 ELECTROCARDIOGRAM TRACING: CPT

## 2022-08-23 RX ORDER — ONDANSETRON 2 MG/ML
4 INJECTION INTRAMUSCULAR; INTRAVENOUS
Status: COMPLETED | OUTPATIENT
Start: 2022-08-23 | End: 2022-08-23

## 2022-08-23 RX ORDER — ONDANSETRON 4 MG/1
4 TABLET, FILM COATED ORAL
Qty: 14 TABLET | Refills: 0 | Status: SHIPPED | OUTPATIENT
Start: 2022-08-23

## 2022-08-23 RX ADMIN — ONDANSETRON 4 MG: 2 INJECTION INTRAMUSCULAR; INTRAVENOUS at 18:58

## 2022-08-23 NOTE — ED PROVIDER NOTES
EMERGENCY DEPARTMENT HISTORY AND PHYSICAL EXAM    I have evaluated the patient at 6:32 PM      Date: 8/23/2022  Patient Name: Regi Moctezuma    History of Presenting Illness     Chief Complaint   Patient presents with    Nausea    Vomiting         History Provided By: Patient  Location/Duration/Severity/Modifying factors   59-year-old female with history of diabetes, gastritis, hyperlipidemia, hypertension presenting to the emergency department for evaluation of nausea and vomiting for the past 4 days without associated abdominal pain. States that she took a home COVID test that was negative. Reports feeling intermittently nauseated and still has been able to drink and eat some. She denies any fevers or chills, chest pain or shortness of breath. Last bowel movement was this morning. Denies any urinary symptoms. PCP: Ladi Chun MD    Current Outpatient Medications   Medication Sig Dispense Refill    ondansetron hcl (Zofran) 4 mg tablet Take 1 Tablet by mouth every eight (8) hours as needed for Nausea. 14 Tablet 0    cloNIDine HCL (CATAPRES) 0.2 mg tablet Take 1 tablet by mouth twice daily 180 Tablet 1    dorzolamide-timoloL (COSOPT) 22.3-6.8 mg/mL ophthalmic solution Administer 1 Drop to left eye Every morning. amLODIPine (NORVASC) 10 mg tablet Take 1 tablet by mouth once daily 90 Tablet 1    ferrous sulfate (Iron) 325 mg (65 mg iron) tablet Take 65 mg by mouth Daily (before breakfast). hydrALAZINE (APRESOLINE) 100 mg tablet TAKE 1 TABLET BY MOUTH THREE TIMES DAILY 270 Tablet 3    docosahexaenoic acid/epa (FISH OIL PO) Take 1,000 mg by mouth daily. pravastatin (PRAVACHOL) 20 mg tablet Take 1 tablet by mouth nightly 90 Tablet 3    potassium chloride (K-DUR, KLOR-CON) 20 mEq tablet Take 20 mEq by mouth daily. latanoprost (XALATAN) 0.005 % ophthalmic solution Administer 1 Drop to both eyes daily.       glucose blood VI test strips (PRODIGY NO CODING) strip Check fasting glucose once daily 100 Strip 3    Blood-Glucose Meter (PRODIGY AUTOCODE MONITOR SYST) misc Check fasting glucose once daily 1 Each 0    cholecalciferol, VITAMIN D3, (VITAMIN D3) 5,000 unit tab tablet Take  by mouth daily. Aspirin, Buffered 81 mg tab Take 1 tablet by mouth daily. Past History     Past Medical History:  Past Medical History:   Diagnosis Date    Anemia     Carotid bruit 12/07/2013    Carotid Duplex: 1. Bilateral 1-49% stenosis of the internal carotid arteries. 2. No significant stenosis in the external carotid arteries bilaterally. 3. Antegrade flow in both vetebral arteries. 4. Normal flow in both subclavian arteries. Plaque Morphology: 1. Hyperechoic plaque in the bulb and right ICA. 2. Hyperechoic plaque in the bulb and left ICA. CKD (chronic kidney disease) stage 3, GFR 30-59 ml/min ([facility])     [facility]     NIDDM    Gastritis 10/27/2014    Duodenum Bx: No Specific Pathologic Abnormality. No Villous Abnormality. 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 No dysplasia or malignancy identified. Bacterial Organisms c/w H. Pylori are present. Z-Line Bx: GE mucosa w/ Acute/Chronic Inflammation & Reactive Changes. Focal Specialized Type Campos Mucosa Identified. No Dysplasia or Malignancy Identified.      Gout 12/10/2009    Elevated Uric Acid Level    Hyperlipidemia     Hypertension     RAMÓN (iron deficiency anemia)        Past Surgical History:  Past Surgical History:   Procedure Laterality Date    HX COLONOSCOPY  10/27/2014    Dr. Mary Lange     HX ENDOSCOPY  10/27/2014    Dr. Mary Lange        Family History:  Family History   Problem Relation Age of Onset    Hypertension Mother     Stroke Mother     Stroke Father        Social History:  Social History     Tobacco Use    Smoking status: Never    Smokeless tobacco: Never   Vaping Use    Vaping Use: Never used   Substance Use Topics    Alcohol use: No    Drug use: No       Allergies:  No Known Allergies      Review of Systems       Review of Systems   Constitutional:  Negative for activity change, chills, diaphoresis, fatigue and fever. Eyes:  Negative for photophobia, pain and visual disturbance. Respiratory:  Negative for cough, chest tightness, shortness of breath, wheezing and stridor. Cardiovascular:  Negative for chest pain and palpitations. Gastrointestinal:  Positive for nausea and vomiting. Negative for abdominal distention, abdominal pain, constipation and diarrhea. Genitourinary:  Negative for difficulty urinating, dysuria and hematuria. Musculoskeletal:  Negative for back pain, joint swelling and myalgias. Skin:  Negative for rash and wound. Neurological:  Negative for dizziness, weakness and headaches. Psychiatric/Behavioral:  Negative for agitation. The patient is not nervous/anxious. Physical Exam   Visit Vitals  BP (!) 166/72   Pulse 69   Temp 98.2 °F (36.8 °C)   Resp 18   Ht 4' 11\" (1.499 m)   Wt 68.9 kg (152 lb)   SpO2 100%   BMI 30.70 kg/m²         Physical Exam  Constitutional:       General: She is not in acute distress. Appearance: She is not toxic-appearing. HENT:      Head: Normocephalic and atraumatic. Mouth/Throat:      Mouth: Mucous membranes are moist.   Cardiovascular:      Rate and Rhythm: Normal rate and regular rhythm. Heart sounds: Normal heart sounds. No murmur heard. No friction rub. No gallop. Pulmonary:      Effort: Pulmonary effort is normal.      Breath sounds: Normal breath sounds. Abdominal:      General: There is no distension. Palpations: Abdomen is soft. There is no mass. Tenderness: There is no abdominal tenderness. There is no guarding. Hernia: No hernia is present. Musculoskeletal:         General: No swelling, tenderness or deformity. Cervical back: Normal range of motion and neck supple. Skin:     General: Skin is warm and dry. Findings: No rash.    Neurological:      General: No focal deficit present. Mental Status: She is alert and oriented to person, place, and time. Psychiatric:         Mood and Affect: Mood normal.         Diagnostic Study Results     Labs -  Recent Results (from the past 12 hour(s))   CBC WITH AUTOMATED DIFF    Collection Time: 08/23/22  6:50 PM   Result Value Ref Range    WBC 6.1 4.6 - 13.2 K/uL    RBC 3.81 (L) 4.20 - 5.30 M/uL    HGB 10.5 (L) 12.0 - 16.0 g/dL    HCT 33.0 (L) 35.0 - 45.0 %    MCV 86.6 78.0 - 100.0 FL    MCH 27.6 24.0 - 34.0 PG    MCHC 31.8 31.0 - 37.0 g/dL    RDW 15.8 (H) 11.6 - 14.5 %    PLATELET 092 190 - 625 K/uL    MPV 9.7 9.2 - 11.8 FL    NRBC 0.0 0  WBC    ABSOLUTE NRBC 0.00 0.00 - 0.01 K/uL    NEUTROPHILS 73 40 - 73 %    LYMPHOCYTES 15 (L) 21 - 52 %    MONOCYTES 10 3 - 10 %    EOSINOPHILS 1 0 - 5 %    BASOPHILS 1 0 - 2 %    IMMATURE GRANULOCYTES 0 0.0 - 0.5 %    ABS. NEUTROPHILS 4.5 1.8 - 8.0 K/UL    ABS. LYMPHOCYTES 0.9 0.9 - 3.6 K/UL    ABS. MONOCYTES 0.6 0.05 - 1.2 K/UL    ABS. EOSINOPHILS 0.1 0.0 - 0.4 K/UL    ABS. BASOPHILS 0.0 0.0 - 0.1 K/UL    ABS. IMM. GRANS. 0.0 0.00 - 0.04 K/UL    DF AUTOMATED     METABOLIC PANEL, COMPREHENSIVE    Collection Time: 08/23/22  6:50 PM   Result Value Ref Range    Sodium 139 136 - 145 mmol/L    Potassium 4.6 3.5 - 5.5 mmol/L    Chloride 106 100 - 111 mmol/L    CO2 25 21 - 32 mmol/L    Anion gap 8 3.0 - 18 mmol/L    Glucose 137 (H) 74 - 99 mg/dL    BUN 50 (H) 7.0 - 18 MG/DL    Creatinine 2.31 (H) 0.6 - 1.3 MG/DL    BUN/Creatinine ratio 22 (H) 12 - 20      GFR est AA 24 (L) >60 ml/min/1.73m2    GFR est non-AA 20 (L) >60 ml/min/1.73m2    Calcium 9.3 8.5 - 10.1 MG/DL    Bilirubin, total 0.5 0.2 - 1.0 MG/DL    ALT (SGPT) 16 13 - 56 U/L    AST (SGOT) 27 10 - 38 U/L    Alk.  phosphatase 41 (L) 45 - 117 U/L    Protein, total 8.0 6.4 - 8.2 g/dL    Albumin 4.0 3.4 - 5.0 g/dL    Globulin 4.0 2.0 - 4.0 g/dL    A-G Ratio 1.0 0.8 - 1.7     LIPASE    Collection Time: 08/23/22  6:50 PM   Result Value Ref Range    Lipase 199 73 - 393 U/L   TROPONIN-HIGH SENSITIVITY    Collection Time: 08/23/22  6:50 PM   Result Value Ref Range    Troponin-High Sensitivity 40 0 - 54 ng/L   EKG, 12 LEAD, INITIAL    Collection Time: 08/23/22  7:01 PM   Result Value Ref Range    Ventricular Rate 68 BPM    Atrial Rate 68 BPM    P-R Interval 192 ms    QRS Duration 80 ms    Q-T Interval 422 ms    QTC Calculation (Bezet) 448 ms    Calculated P Axis 19 degrees    Calculated R Axis -51 degrees    Calculated T Axis 48 degrees    Diagnosis       Sinus rhythm with premature atrial complexes  Left anterior fascicular block  Abnormal ECG  When compared with ECG of 02-SEP-2019 09:53,  premature atrial complexes are now present  QT has shortened     URINALYSIS W/ RFLX MICROSCOPIC    Collection Time: 08/23/22  9:58 PM   Result Value Ref Range    Color YELLOW      Appearance CLEAR      Specific gravity 1.014 1.005 - 1.030      pH (UA) 5.5 5.0 - 8.0      Protein 300 (A) NEG mg/dL    Glucose Negative NEG mg/dL    Ketone Negative NEG mg/dL    Bilirubin Negative NEG      Blood Negative NEG      Urobilinogen 0.2 0.2 - 1.0 EU/dL    Nitrites Negative NEG      Leukocyte Esterase Negative NEG     URINE MICROSCOPIC ONLY    Collection Time: 08/23/22  9:58 PM   Result Value Ref Range    WBC 0 to 3 0 - 4 /hpf    Epithelial cells FEW 0 - 5 /lpf    Bacteria FEW (A) NEG /hpf    Mucus FEW (A) NEG /lpf       Radiologic Studies -   CT ABD PELV WO CONT   Final Result   1. No acute abnormality. No evidence for bowel obstruction. 2. Colonic diverticulosis. Thank you for enabling us to participate in the care of this patient. XR CHEST PORT   Final Result   No acute cardiopulmonary process. Mild cardiomegaly. Medical Decision Making   I am the first provider for this patient. I reviewed the vital signs, available nursing notes, past medical history, past surgical history, family history and social history.     Vital Signs-Reviewed the patient's vital signs. EKG:     Records Reviewed: Nursing Notes, Old Medical Records, Previous electrocardiograms, Previous Radiology Studies, and Previous Laboratory Studies (Time of Review: 6:32 PM)    ED Course: Progress Notes, Reevaluation, and Consults:         Provider Notes (Medical Decision Making):   MDM  Number of Diagnoses or Management Options  Nausea and vomiting, unspecified vomiting type  Diagnosis management comments: 75-year-old female presenting to the emergency department for evaluation of nausea and vomiting for the past 4 days. She appears in no acute distress. Vital signs are stable. Abdominal exam is unremarkable. Screening lab work obtained shows no acute findings. Urinalysis unremarkable. Chest x-ray normal.  Patient is feeling improved after having Zofran here. Will discharge home at this time with Zofran. Return precautions advised. Follow-up advised. Patient verbalized understanding agreement with plan. Stable for discharge. Procedures    Critical Care Time:       Diagnosis     Clinical Impression:   1. Nausea and vomiting, unspecified vomiting type        Disposition: discharged      Follow-up Information       Follow up With Specialties Details Why [address] [facility] EMERGENCY DEPT Emergency Medicine  As needed, If symptoms worsen [address] [place] [id number], MD [facility] Medicine Call   1 [facility]  [phone]               Patient's Medications   Start Taking    ONDANSETRON HCL (ZOFRAN) 4 MG TABLET    Take 1 Tablet by mouth every eight (8) hours as needed for Nausea. Continue Taking    AMLODIPINE (NORVASC) 10 MG TABLET    Take 1 tablet by mouth once daily    ASPIRIN, BUFFERED 81 MG TAB    Take 1 tablet by mouth daily.     BLOOD-GLUCOSE METER (PRODIGY AUTOCODE MONITOR SYST) MISC    Check fasting glucose once daily    CHOLECALCIFEROL, VITAMIN D3, (VITAMIN D3) 5,000 UNIT TAB TABLET    Take  by mouth daily. CLONIDINE HCL (CATAPRES) 0.2 MG TABLET    Take 1 tablet by mouth twice daily    DOCOSAHEXAENOIC ACID/EPA (FISH OIL PO)    Take 1,000 mg by mouth daily. DORZOLAMIDE-TIMOLOL (COSOPT) 22.3-6.8 MG/ML OPHTHALMIC SOLUTION    Administer 1 Drop to left eye Every morning. FERROUS SULFATE (IRON) 325 MG (65 MG IRON) TABLET    Take 65 mg by mouth Daily (before breakfast). GLUCOSE BLOOD VI TEST STRIPS (PRODIGY NO CODING) STRIP    Check fasting glucose once daily    HYDRALAZINE (APRESOLINE) 100 MG TABLET    TAKE 1 TABLET BY MOUTH THREE TIMES DAILY    LATANOPROST (XALATAN) 0.005 % OPHTHALMIC SOLUTION    Administer 1 Drop to both eyes daily. POTASSIUM CHLORIDE (K-DUR, KLOR-CON) 20 MEQ TABLET    Take 20 mEq by mouth daily. PRAVASTATIN (PRAVACHOL) 20 MG TABLET    Take 1 tablet by mouth nightly   These Medications have changed    No medications on file   Stop Taking    No medications on file     Disclaimer: Sections of this note are dictated using utilizing voice recognition software. Minor typographical errors may be present. If questions arise, please do not hesitate to contact me or call our department.

## 2022-08-24 LAB
ATRIAL RATE: 68 BPM
CALCULATED P AXIS, ECG09: 19 DEGREES
CALCULATED R AXIS, ECG10: -51 DEGREES
CALCULATED T AXIS, ECG11: 48 DEGREES
DIAGNOSIS, 93000: NORMAL
P-R INTERVAL, ECG05: 192 MS
Q-T INTERVAL, ECG07: 422 MS
QRS DURATION, ECG06: 80 MS
QTC CALCULATION (BEZET), ECG08: 448 MS
VENTRICULAR RATE, ECG03: 68 BPM

## 2022-08-24 NOTE — ED NOTES
Pt given both verbal and written dc instructions, verbalized understanding. All questions answered. Pt taken to ed lobby via hospital wheelchair.  driving home.

## 2022-08-30 ENCOUNTER — PATIENT OUTREACH (OUTPATIENT)
Dept: CASE MANAGEMENT | Age: 87
End: 2022-08-30

## 2022-09-13 ENCOUNTER — PATIENT OUTREACH (OUTPATIENT)
Dept: CASE MANAGEMENT | Age: 87
End: 2022-09-13

## 2022-09-13 NOTE — PROGRESS NOTES
Complex Case Management      Date/Time:  2022 1:13 PM    Method of communication with patient:phone    [facility] ([facility]) contacted the patient by telephone to perform Ambulatory Care Coordination. Verified name and  (PHI) with patient as identifiers. Provided introduction to self, and explanation of the Ambulatory Care Manager's role. Reviewed most recent clinic visit w/ patient who verbalized understanding. Patient given an opportunity to ask questions. Top Challenges reviewed with the Provider   N/a     1. Hx of DM2, CKD III, neuropathy, hyperuricemia, htn, LLE, gout, anemia, aortic regurg, dCHF, HLD  2. Pt states doing well, no questions/issues. 3. Pt declined med rec at this time again    The patient agrees to contact the PCP office or the [address] [place] [address] for questions related to their healthcare. Provided contact information for future reference. Disease Specific:   N/A    Home Health Active: No    DME Active: No    Barriers to care? Advanced age    Advance Care Planning:   Does patient have an Advance Directive:  reviewed and current     Medication(s):   Medication reconciliation was not performed with patient, who verbalizes understanding of administration of home medications. There were no barriers to obtaining medications identified at this time. Referral to Pharm D needed: no     Current Outpatient Medications   Medication Sig    ondansetron hcl (Zofran) 4 mg tablet Take 1 Tablet by mouth every eight (8) hours as needed for Nausea. cloNIDine HCL (CATAPRES) 0.2 mg tablet Take 1 tablet by mouth twice daily    dorzolamide-timoloL (COSOPT) 22.3-6.8 mg/mL ophthalmic solution Administer 1 Drop to left eye Every morning. amLODIPine (NORVASC) 10 mg tablet Take 1 tablet by mouth once daily    ferrous sulfate (Iron) 325 mg (65 mg iron) tablet Take 65 mg by mouth Daily (before breakfast).     hydrALAZINE (APRESOLINE) 100 mg tablet TAKE 1 TABLET BY MOUTH THREE TIMES DAILY docosahexaenoic acid/epa (FISH OIL PO) Take 1,000 mg by mouth daily. pravastatin (PRAVACHOL) 20 mg tablet Take 1 tablet by mouth nightly    potassium chloride (K-DUR, KLOR-CON) 20 mEq tablet Take 20 mEq by mouth daily. latanoprost (XALATAN) 0.005 % ophthalmic solution Administer 1 Drop to both eyes daily. glucose blood VI test strips (PRODIGY NO CODING) strip Check fasting glucose once daily    Blood-Glucose Meter (PRODIGY AUTOCODE MONITOR SYST) misc Check fasting glucose once daily    cholecalciferol, VITAMIN D3, (VITAMIN D3) 5,000 unit tab tablet Take  by mouth daily. Aspirin, Buffered 81 mg tab Take 1 tablet by mouth daily. No current facility-administered medications for this visit. [facility] follow up appointment(s):   Future Appointments   Date Time Provider Gee Montes   10/19/2022  9:15 AM Noemi Harrington MD [facility] BS AMB   11/10/2022 10:40 AM Ehsan Elizabeth MD [facility] BS AMB        Non-[facility] follow up appointment(s):      Goals Addressed                   This Visit's Progress     Attends follow up appointments on schedule   On track     7/25/22 Patient will attend all scheduled appointments through 10/25/22       Knowledge and adherence of prescribed medication (ie. action, side effects, missed dose, etc.).    On track     7/25/22 Pt will take all medications prescribed to be evaluated on each outreach through 10/25/22

## 2022-09-26 ENCOUNTER — PATIENT OUTREACH (OUTPATIENT)
Dept: CASE MANAGEMENT | Age: 87
End: 2022-09-26

## 2022-09-26 NOTE — PROGRESS NOTES
Complex Case Management      Date/Time:  2022 1:13 PM    Method of communication with patient:phone    [facility] ([facility]) contacted the patient by telephone to perform Ambulatory Care Coordination. Verified name and  (PHI) with patient as identifiers. Provided introduction to self, and explanation of the Ambulatory Care Manager's role. Reviewed most recent clinic visit w/ patient who verbalized understanding. Patient given an opportunity to ask questions. Top Challenges reviewed with the Provider   N/a     1. Hx of DM2, CKD III, neuropathy, hyperuricemia, htn, LLE, gout, anemia, aortic regurg, dCHF, HLD  2. Aware of upcoming appts  3. Pt states doing well, no questions/issues. The patient agrees to contact the PCP office or the [facility] for questions related to their healthcare. Provided contact information for future reference. Disease Specific:   N/A    Home Health Active: No    DME Active: No    Barriers to care? Advanced age    Advance Care Planning:   Does patient have an Advance Directive:  reviewed and current     Medication(s):   Medication reconciliation was not performed with patient, who verbalizes understanding of administration of home medications. There were no barriers to obtaining medications identified at this time. Referral to Pharm D needed: no     Current Outpatient Medications   Medication Sig    ondansetron hcl (Zofran) 4 mg tablet Take 1 Tablet by mouth every eight (8) hours as needed for Nausea. cloNIDine HCL (CATAPRES) 0.2 mg tablet Take 1 tablet by mouth twice daily    dorzolamide-timoloL (COSOPT) 22.3-6.8 mg/mL ophthalmic solution Administer 1 Drop to left eye Every morning. amLODIPine (NORVASC) 10 mg tablet Take 1 tablet by mouth once daily    ferrous sulfate (Iron) 325 mg (65 mg iron) tablet Take 65 mg by mouth Daily (before breakfast).     hydrALAZINE (APRESOLINE) 100 mg tablet TAKE 1 TABLET BY MOUTH THREE TIMES DAILY docosahexaenoic acid/epa (FISH OIL PO) Take 1,000 mg by mouth daily. pravastatin (PRAVACHOL) 20 mg tablet Take 1 tablet by mouth nightly    potassium chloride (K-DUR, KLOR-CON) 20 mEq tablet Take 20 mEq by mouth daily. latanoprost (XALATAN) 0.005 % ophthalmic solution Administer 1 Drop to both eyes daily. glucose blood VI test strips (PRODIGY NO CODING) strip Check fasting glucose once daily    Blood-Glucose Meter (PRODIGY AUTOCODE MONITOR SYST) misc Check fasting glucose once daily    cholecalciferol, VITAMIN D3, (VITAMIN D3) 5,000 unit tab tablet Take  by mouth daily. Aspirin, Buffered 81 mg tab Take 1 tablet by mouth daily. No current facility-administered medications for this visit. [facility] follow up appointment(s):   Future Appointments   Date Time Provider Gee Montes   10/19/2022  9:15 AM Ann-Marie Prasad MD [facility] BS AMB   11/10/2022 10:40 AM Alma Delia Flores MD [facility] BS AMB        Non-[facility] follow up appointment(s):      Goals Addressed                   This Visit's Progress     Attends follow up appointments on schedule   On track     7/25/22 Patient will attend all scheduled appointments through 10/25/22       Knowledge and adherence of prescribed medication (ie. action, side effects, missed dose, etc.).    On track     7/25/22 Pt will take all medications prescribed to be evaluated on each outreach through 10/25/22

## 2022-10-05 ENCOUNTER — HOSPITAL ENCOUNTER (OUTPATIENT)
Dept: LAB | Age: 87
Discharge: HOME | End: 2022-10-05
Payer: MEDICARE

## 2022-10-05 DIAGNOSIS — E11.9 DIABETES MELLITUS TYPE 2, DIET-CONTROLLED (HCC): ICD-10-CM

## 2022-10-05 DIAGNOSIS — M10.00 IDIOPATHIC GOUT, UNSPECIFIED CHRONICITY, UNSPECIFIED SITE: ICD-10-CM

## 2022-10-05 DIAGNOSIS — E11.22 TYPE 2 DIABETES MELLITUS WITH CHRONIC KIDNEY DISEASE, WITHOUT LONG-TERM CURRENT USE OF INSULIN, UNSPECIFIED CKD STAGE (HCC): ICD-10-CM

## 2022-10-05 DIAGNOSIS — N18.4 CKD (CHRONIC KIDNEY DISEASE) STAGE 4, GFR 15-29 ML/MIN (HCC): ICD-10-CM

## 2022-10-05 LAB
ALBUMIN SERPL-MCNC: 3.7 G/DL (ref 3.4–5)
ALBUMIN/GLOB SERPL: 0.9 {RATIO} (ref 0.8–1.7)
ALP SERPL-CCNC: 38 U/L (ref 45–117)
ALT SERPL-CCNC: 14 U/L (ref 13–56)
ANION GAP SERPL CALC-SCNC: 7 MMOL/L (ref 3–18)
AST SERPL-CCNC: 19 U/L (ref 10–38)
BASOPHILS # BLD: 0 K/UL (ref 0–0.1)
BASOPHILS NFR BLD: 1 % (ref 0–2)
BILIRUB SERPL-MCNC: 0.6 MG/DL (ref 0.2–1)
BUN SERPL-MCNC: 56 MG/DL (ref 7–18)
BUN/CREAT SERPL: 24 (ref 12–20)
CALCIUM SERPL-MCNC: 9.4 MG/DL (ref 8.5–10.1)
CHLORIDE SERPL-SCNC: 111 MMOL/L (ref 100–111)
CHOLEST SERPL-MCNC: 163 MG/DL
CO2 SERPL-SCNC: 21 MMOL/L (ref 21–32)
CREAT SERPL-MCNC: 2.38 MG/DL (ref 0.6–1.3)
DIFFERENTIAL METHOD BLD: ABNORMAL
EOSINOPHIL # BLD: 0.1 K/UL (ref 0–0.4)
EOSINOPHIL NFR BLD: 2 % (ref 0–5)
ERYTHROCYTE [DISTWIDTH] IN BLOOD BY AUTOMATED COUNT: 16.8 % (ref 11.6–14.5)
EST. AVERAGE GLUCOSE BLD GHB EST-MCNC: 140 MG/DL
GLOBULIN SER CALC-MCNC: 4.1 G/DL (ref 2–4)
GLUCOSE SERPL-MCNC: 118 MG/DL (ref 74–99)
HBA1C MFR BLD: 6.5 % (ref 4.2–5.6)
HCT VFR BLD AUTO: 32.7 % (ref 35–45)
HDLC SERPL-MCNC: 62 MG/DL (ref 40–60)
HDLC SERPL: 2.6 {RATIO} (ref 0–5)
HGB BLD-MCNC: 10.5 G/DL (ref 12–16)
IMM GRANULOCYTES # BLD AUTO: 0 K/UL (ref 0–0.04)
IMM GRANULOCYTES NFR BLD AUTO: 0 % (ref 0–0.5)
LDLC SERPL CALC-MCNC: 79.6 MG/DL (ref 0–100)
LIPID PROFILE,FLP: ABNORMAL
LYMPHOCYTES # BLD: 1.3 K/UL (ref 0.9–3.6)
LYMPHOCYTES NFR BLD: 32 % (ref 21–52)
MCH RBC QN AUTO: 27.8 PG (ref 24–34)
MCHC RBC AUTO-ENTMCNC: 32.1 G/DL (ref 31–37)
MCV RBC AUTO: 86.5 FL (ref 78–100)
MONOCYTES # BLD: 0.4 K/UL (ref 0.05–1.2)
MONOCYTES NFR BLD: 10 % (ref 3–10)
NEUTS SEG # BLD: 2.3 K/UL (ref 1.8–8)
NEUTS SEG NFR BLD: 55 % (ref 40–73)
NRBC # BLD: 0 K/UL (ref 0–0.01)
NRBC BLD-RTO: 0 PER 100 WBC
PLATELET # BLD AUTO: 212 K/UL (ref 135–420)
PMV BLD AUTO: 10 FL (ref 9.2–11.8)
POTASSIUM SERPL-SCNC: 4.3 MMOL/L (ref 3.5–5.5)
PROT SERPL-MCNC: 7.8 G/DL (ref 6.4–8.2)
RBC # BLD AUTO: 3.78 M/UL (ref 4.2–5.3)
SODIUM SERPL-SCNC: 139 MMOL/L (ref 136–145)
TRIGL SERPL-MCNC: 107 MG/DL (ref ?–150)
URATE SERPL-MCNC: 10.3 MG/DL (ref 2.6–7.2)
VLDLC SERPL CALC-MCNC: 21.4 MG/DL
WBC # BLD AUTO: 4.2 K/UL (ref 4.6–13.2)

## 2022-10-05 PROCEDURE — 80061 LIPID PANEL: CPT

## 2022-10-05 PROCEDURE — 84550 ASSAY OF BLOOD/URIC ACID: CPT

## 2022-10-05 PROCEDURE — 36415 COLL VENOUS BLD VENIPUNCTURE: CPT

## 2022-10-05 PROCEDURE — 85025 COMPLETE CBC W/AUTO DIFF WBC: CPT

## 2022-10-05 PROCEDURE — 80053 COMPREHEN METABOLIC PANEL: CPT

## 2022-10-05 PROCEDURE — 83036 HEMOGLOBIN GLYCOSYLATED A1C: CPT

## 2022-10-19 ENCOUNTER — OFFICE VISIT (OUTPATIENT)
Dept: FAMILY MEDICINE CLINIC | Age: 87
End: 2022-10-19
Payer: MEDICARE

## 2022-10-19 VITALS
SYSTOLIC BLOOD PRESSURE: 140 MMHG | HEIGHT: 59 IN | HEART RATE: 65 BPM | WEIGHT: 144 LBS | RESPIRATION RATE: 16 BRPM | BODY MASS INDEX: 29.03 KG/M2 | OXYGEN SATURATION: 100 % | DIASTOLIC BLOOD PRESSURE: 84 MMHG | TEMPERATURE: 98.8 F

## 2022-10-19 DIAGNOSIS — E11.40 TYPE 2 DIABETES MELLITUS WITH DIABETIC NEUROPATHY, WITHOUT LONG-TERM CURRENT USE OF INSULIN (HCC): ICD-10-CM

## 2022-10-19 DIAGNOSIS — N18.4 CKD (CHRONIC KIDNEY DISEASE) STAGE 4, GFR 15-29 ML/MIN (HCC): ICD-10-CM

## 2022-10-19 DIAGNOSIS — R01.1 HEART MURMUR: ICD-10-CM

## 2022-10-19 DIAGNOSIS — I10 ESSENTIAL HYPERTENSION: ICD-10-CM

## 2022-10-19 DIAGNOSIS — E11.9 DIABETES MELLITUS TYPE 2, DIET-CONTROLLED (HCC): ICD-10-CM

## 2022-10-19 DIAGNOSIS — Z23 ENCOUNTER FOR IMMUNIZATION: Primary | ICD-10-CM

## 2022-10-19 DIAGNOSIS — E11.22 TYPE 2 DIABETES MELLITUS WITH CHRONIC KIDNEY DISEASE, WITHOUT LONG-TERM CURRENT USE OF INSULIN, UNSPECIFIED CKD STAGE (HCC): ICD-10-CM

## 2022-10-19 DIAGNOSIS — D63.8 ANEMIA OF CHRONIC DISEASE: ICD-10-CM

## 2022-10-19 DIAGNOSIS — E78.2 MIXED HYPERLIPIDEMIA: ICD-10-CM

## 2022-10-19 PROCEDURE — G8432 DEP SCR NOT DOC, RNG: HCPCS | Performed by: FAMILY MEDICINE

## 2022-10-19 PROCEDURE — G0463 HOSPITAL OUTPT CLINIC VISIT: HCPCS | Performed by: FAMILY MEDICINE

## 2022-10-19 PROCEDURE — 1090F PRES/ABSN URINE INCON ASSESS: CPT | Performed by: FAMILY MEDICINE

## 2022-10-19 PROCEDURE — G8427 DOCREV CUR MEDS BY ELIG CLIN: HCPCS | Performed by: FAMILY MEDICINE

## 2022-10-19 PROCEDURE — 3044F HG A1C LEVEL LT 7.0%: CPT | Performed by: FAMILY MEDICINE

## 2022-10-19 PROCEDURE — 1123F ACP DISCUSS/DSCN MKR DOCD: CPT | Performed by: FAMILY MEDICINE

## 2022-10-19 PROCEDURE — G8417 CALC BMI ABV UP PARAM F/U: HCPCS | Performed by: FAMILY MEDICINE

## 2022-10-19 PROCEDURE — 99214 OFFICE O/P EST MOD 30 MIN: CPT | Performed by: FAMILY MEDICINE

## 2022-10-19 PROCEDURE — G8536 NO DOC ELDER MAL SCRN: HCPCS | Performed by: FAMILY MEDICINE

## 2022-10-19 PROCEDURE — 1101F PT FALLS ASSESS-DOCD LE1/YR: CPT | Performed by: FAMILY MEDICINE

## 2022-10-19 NOTE — PROGRESS NOTES
David Silva, 80 y.o.,  female    SUBJECTIVE  Ff-up    HTN/CKD 3-4 GFR declining. She continues to take clonidine, hydralazine and norvasc. She is following dr. Mu Andino who have discussed options including dialysis. Has appt 11/4  ED visit 8/23 for N/V, believes this was food poisoning and has resolved. She is on prn lasix few times a week for leg edema dvised her to start using compression stockings as well. Asymptomatic bradycardia/aortic regurgitation- She has h/o AV block and advised against BB. Following cardiology Dr. Erickson appt 11/10/2022    DM w/ CKD 3 and neuorpathy-  diet controlled, She Reports FBG  1teens. Reviewed labs a1c 6.5  Numbness of legs/feet secondary to DM neuropathy/spinal stenosis- some response to gabapentin, however discontinued due to drowsiness side effect    HL-on pravachol    Gout/ history of hyperuricemia- decided to come off of allopurinol, has not had flare for years. monitoring     GERD- EGD gastritis/schatzis ring dilated 9/2020, doing well on protonix.  Following dr. Juhi Ayala    ROS:  See HPI, all others negative        Patient Active Problem List   Diagnosis Code    Essential hypertension I10    Mixed hyperlipidemia E78.2    Advanced directives, counseling/discussion Z71.89    Controlled type 2 diabetes mellitus with stage 3 chronic kidney disease, without long-term current use of insulin ([facility] E11.22, N18.3         Current Outpatient Medications:     pravastatin (PRAVACHOL) 20 mg tablet, Take 1 tablet by mouth nightly, Disp: 90 Tablet, Rfl: 3    ondansetron hcl (Zofran) 4 mg tablet, Take 1 Tablet by mouth every eight (8) hours as needed for Nausea., Disp: 14 Tablet, Rfl: 0    cloNIDine HCL (CATAPRES) 0.2 mg tablet, Take 1 tablet by mouth twice daily, Disp: 180 Tablet, Rfl: 1    dorzolamide-timoloL (COSOPT) 22.3-6.8 mg/mL ophthalmic solution, Administer 1 Drop to left eye Every morning., Disp: , Rfl:     amLODIPine (NORVASC) 10 mg tablet, Take 1 tablet by mouth once daily, Disp: 90 Tablet, Rfl: 1    ferrous sulfate 325 mg (65 mg iron) tablet, Take 65 mg by mouth Daily (before breakfast). , Disp: , Rfl:     hydrALAZINE (APRESOLINE) 100 mg tablet, TAKE 1 TABLET BY MOUTH THREE TIMES DAILY, Disp: 270 Tablet, Rfl: 3    docosahexaenoic acid/epa (FISH OIL PO), Take 1,000 mg by mouth daily. , Disp: , Rfl:     potassium chloride (K-DUR, KLOR-CON) 20 mEq tablet, Take 20 mEq by mouth daily. , Disp: , Rfl:     latanoprost (XALATAN) 0.005 % ophthalmic solution, Administer 1 Drop to both eyes daily. , Disp: , Rfl:     cholecalciferol, VITAMIN D3, (VITAMIN D3) 5,000 unit tab tablet, Take  by mouth daily. , Disp: , Rfl:     Aspirin, Buffered 81 mg tab, Take 1 tablet by mouth daily. , Disp: , Rfl:     glucose blood VI test strips (PRODIGY NO CODING) strip, Check fasting glucose once daily, Disp: 100 Strip, Rfl: 3    Blood-Glucose Meter (PRODIGY AUTOCODE MONITOR SYST) misc, Check fasting glucose once daily, Disp: 1 Each, Rfl: 0      No Known Allergies    Past Medical History:   Diagnosis Date    Anemia     Carotid bruit 12/07/2013    Carotid Duplex: 1. Bilateral 1-49% stenosis of the internal carotid arteries. 2. No significant stenosis in the external carotid arteries bilaterally. 3. Antegrade flow in both vetebral arteries. 4. Normal flow in both subclavian arteries. Plaque Morphology: 1. Hyperechoic plaque in the bulb and right ICA. 2. Hyperechoic plaque in the bulb and left ICA. CKD (chronic kidney disease) stage 3, GFR 30-59 ml/min     Diabetes ([facility]     NIDDM    Gastritis 10/27/2014    Duodenum Bx: No Specific Pathologic Abnormality. No Villous Abnormality. 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 No dysplasia or malignancy identified. Bacterial Organisms c/w H. Pylori are present. Z-Line Bx: GE mucosa w/ Acute/Chronic Inflammation & Reactive Changes. Focal Specialized Type Campos Mucosa Identified. No Dysplasia or Malignancy Identified.      Gout 12/10/2009    Elevated Uric Acid Level    Hyperlipidemia     Hypertension     RAMÓN (iron deficiency anemia)        Social History     Social History    Marital status:      Spouse name: N/A    Number of children: N/A    Years of education: N/A     Occupational History    Not on file.      Social History Main Topics    Smoking status: Never Smoker    Smokeless tobacco: Never Used    Alcohol use No    Drug use: No    Sexual activity: Not Currently     Partners: Male     Birth control/ protection: None     Other Topics Concern    Not on file     Social History Narrative       Family History   Problem Relation Age of Onset    Hypertension Mother     Stroke Mother     Stroke Father          OBJECTIVE    Physical Exam:     Visit Vitals  BP (!) 140/84 (BP 1 Location: Right arm, BP Patient Position: Sitting, BP Cuff Size: Large adult)   Pulse 65   Temp 98.8 °F (37.1 °C) (Temporal)   Resp 16   Ht 4' 11\" (1.499 m)   Wt 144 lb (65.3 kg)   SpO2 100%   BMI 29.08 kg/m²         General: alert, well-appearing, AA,  in no apparent distress or pain  Neck: supple, no adenopathy palpated  CVS: normal rate,  regular rhythm, + murmur  Lungs:clear to ausculation bilaterally, no crackles, wheezing or rhonchi noted  Extremities: + grade 1 bipedal edema- chronic  Feet: no lesions  Skin: warm, no lesions, rashes noted  Psych:  mood and affect normal  Results for orders placed or performed during the hospital encounter of 10/05/22   HEMOGLOBIN A1C WITH EAG   Result Value Ref Range    Hemoglobin A1c 6.5 (H) 4.2 - 5.6 %    Est. average glucose 073 mg/dL   METABOLIC PANEL, COMPREHENSIVE   Result Value Ref Range    Sodium 139 136 - 145 mmol/L    Potassium 4.3 3.5 - 5.5 mmol/L    Chloride 111 100 - 111 mmol/L    CO2 21 21 - 32 mmol/L    Anion gap 7 3.0 - 18 mmol/L    Glucose 118 (H) 74 - 99 mg/dL    BUN 56 (H) 7.0 - 18 MG/DL    Creatinine 2.38 (H) 0.6 - 1.3 MG/DL    BUN/Creatinine ratio 24 (H) 12 - 20      eGFR 19 (L) >60 ml/min/1.73m2 Calcium 9.4 8.5 - 10.1 MG/DL    Bilirubin, total 0.6 0.2 - 1.0 MG/DL    ALT (SGPT) 14 13 - 56 U/L    AST (SGOT) 19 10 - 38 U/L    Alk. phosphatase 38 (L) 45 - 117 U/L    Protein, total 7.8 6.4 - 8.2 g/dL    Albumin 3.7 3.4 - 5.0 g/dL    Globulin 4.1 (H) 2.0 - 4.0 g/dL    A-G Ratio 0.9 0.8 - 1.7     LIPID PANEL   Result Value Ref Range    LIPID PROFILE          Cholesterol, total 163 <200 MG/DL    Triglyceride 107 <150 MG/DL    HDL Cholesterol 62 (H) 40 - 60 MG/DL    LDL, calculated 79.6 0 - 100 MG/DL    VLDL, calculated 21.4 MG/DL    CHOL/HDL Ratio 2.6 0 - 5.0     URIC ACID   Result Value Ref Range    Uric acid 10.3 (H) 2.6 - 7.2 MG/DL   CBC WITH AUTOMATED DIFF   Result Value Ref Range    WBC 4.2 (L) 4.6 - 13.2 K/uL    RBC 3.78 (L) 4.20 - 5.30 M/uL    HGB 10.5 (L) 12.0 - 16.0 g/dL    HCT 32.7 (L) 35.0 - 45.0 %    MCV 86.5 78.0 - 100.0 FL    MCH 27.8 24.0 - 34.0 PG    MCHC 32.1 31.0 - 37.0 g/dL    RDW 16.8 (H) 11.6 - 14.5 %    PLATELET 851 774 - 645 K/uL    MPV 10.0 9.2 - 11.8 FL    NRBC 0.0 0  WBC    ABSOLUTE NRBC 0.00 0.00 - 0.01 K/uL    NEUTROPHILS 55 40 - 73 %    LYMPHOCYTES 32 21 - 52 %    MONOCYTES 10 3 - 10 %    EOSINOPHILS 2 0 - 5 %    BASOPHILS 1 0 - 2 %    IMMATURE GRANULOCYTES 0 0.0 - 0.5 %    ABS. NEUTROPHILS 2.3 1.8 - 8.0 K/UL    ABS. LYMPHOCYTES 1.3 0.9 - 3.6 K/UL    ABS. MONOCYTES 0.4 0.05 - 1.2 K/UL    ABS. EOSINOPHILS 0.1 0.0 - 0.4 K/UL    ABS. BASOPHILS 0.0 0.0 - 0.1 K/UL    ABS. IMM. GRANS. 0.0 0.00 - 0.04 K/UL    DF AUTOMATED         ASSESSMENT/PLAN  Diagnoses and all orders for this visit:    Controlled type 2 diabetes mellitus with stage 3 chronic kidney disease, without long-term current use of insulin ([facility])   diet controlled  a1c 6.5  Concern regarding social situation, living with elderly  with dementia. Complex case manager Vidal Moritz following.  Declining GFR, close monitoring with nephrology    Diabetes mellitus type 2, diet controlled ([address]  Neuropathy  Stable  Intolerant to gabapentin    Essential hypertension  Fiar control  Cont   norvasc, clonidine, hydralazine  Cont  low dose lasix/kcl prn for leg edema  Avoid bb with h/o 1st deg av block- cardiology following    Leg edema  Advised compression stockings   Low salt diet,   Prn lasix/kcl     Mixed hyperlipidemia  On pravachol     Gout of foot, unspecified cause, unspecified chronicity, unspecified laterality  Controlled  Check Uric acid prior to next visit    Anemia of chronic disease  Stable, Baseline 10's  Likely anemia of chronic disease, mildly lower, defer to nephrology for other recs  Cbc prior to next visit  Monitoring    Heart murmur  Aortic regurg  Asymptomatic  Monitoring, following cards appt w/ dr. Angeline Puente    Diastolic chf, chronic  Appears compensated  Cont prn lasix  Intolerant to BB    CKD 4  Avoid nsaids, keep hydrated  monitoring  Following dr. Andreina zaman     Encounter for immunization  High dose flu vaccine given    BRING MED BOTTLES EVERY VISIT    Complex case manager following, social assistance, multiple specialists  Cont to follow GI/ cardiology/nephrology/optha    Follow-up Disposition:  Ff-up in 1 months w/  and yadira (niece) family conference after cardiology/nephrology visits. Patient understands plan of care. Patient has provided input and agrees with goals.

## 2022-10-19 NOTE — PROGRESS NOTES
Chief Complaint   Patient presents with    Anemia    Cholesterol Problem    Chronic Kidney Disease    Gout    Hypertension    Other     Hx of leg edema, lumbar stenosis and CKD          1. \"Have you been to the ER, urgent care clinic since your last visit? Hospitalized since your last visit? \" No    2. \"Have you seen or consulted any other health care providers outside of the [address] [place] [address] since your last visit? \" Yes 09- Nephrology for CKD      3. For patients aged 39-70: Has the patient had a colonoscopy / FIT/ Cologuard? NA - based on age      If the patient is female:    4. For patients aged 41-77: Has the patient had a mammogram within the past 2 years? NA - based on age or sex      11. For patients aged 21-65: Has the patient had a pap smear? NA - based on age or sex       Physician order obtained. Patient completed adult immunization consent form. Allergies, contraindications and recommendations reviewed with patient. Seasonal influenza vaccine administered IM right deltoid. Patient tolerated well. Patient remained in office for 15 minutes after injection and no adverse reactions were noted.      Lot # [id number]  Exp Date: 05-  [facility] # [id number]

## 2022-10-19 NOTE — Clinical Note
Will keep you posted on family conference next month, hoping you can join us with romeo to provide assistance.

## 2022-10-20 RX ORDER — BLOOD-GLUCOSE METER
EACH MISCELLANEOUS
Qty: 1 EACH | Refills: 0 | Status: SHIPPED | OUTPATIENT
Start: 2022-10-20 | End: 2022-10-20 | Stop reason: SDUPTHER

## 2022-10-20 RX ORDER — BLOOD SUGAR DIAGNOSTIC
STRIP MISCELLANEOUS
Qty: 100 STRIP | Refills: 3 | Status: SHIPPED | OUTPATIENT
Start: 2022-10-20

## 2022-10-20 RX ORDER — BLOOD-GLUCOSE METER
EACH MISCELLANEOUS
Qty: 1 EACH | Refills: 0 | Status: SHIPPED | OUTPATIENT
Start: 2022-10-20

## 2022-10-24 ENCOUNTER — PATIENT OUTREACH (OUTPATIENT)
Dept: CASE MANAGEMENT | Age: 87
End: 2022-10-24

## 2022-10-24 NOTE — PROGRESS NOTES
Complex Case Management      Date/Time:  10/24/2022 1:13 PM    Method of communication with patient:phone    [facility] ([facility]) contacted the patient by telephone to perform Ambulatory Care Coordination. Verified name and  (PHI) with patient as identifiers. Provided introduction to self, and explanation of the Ambulatory Care Manager's role. Reviewed most recent clinic visit w/ patient who verbalized understanding. Patient given an opportunity to ask questions. Top Challenges reviewed with the Provider   N/a     1. Hx of DM2, CKD III, neuropathy, hyperuricemia, htn, LLE, gout, anemia, aortic regurg, dCHF, HLD  2. Aware of upcoming appts  3. Pt states doing well, no questions/issues. The patient agrees to contact the PCP office or the [facility] for questions related to their healthcare. Provided contact information for future reference. Disease Specific:   N/A    Home Health Active: No    DME Active: No    Barriers to care? Advanced age    Advance Care Planning:   Does patient have an Advance Directive:  reviewed and current     Medication(s):   Medication reconciliation was not performed with patient, who verbalizes understanding of administration of home medications. There were no barriers to obtaining medications identified at this time. Referral to Pharm D needed: no     Current Outpatient Medications   Medication Sig    Blood-Glucose Meter (Prodigy Autocode Monitor Syst) misc Check fasting glucose once daily    glucose blood VI test strips (Prodigy No Coding) strip Check fasting glucose once daily    pravastatin (PRAVACHOL) 20 mg tablet Take 1 tablet by mouth nightly    ondansetron hcl (Zofran) 4 mg tablet Take 1 Tablet by mouth every eight (8) hours as needed for Nausea. cloNIDine HCL (CATAPRES) 0.2 mg tablet Take 1 tablet by mouth twice daily    dorzolamide-timoloL (COSOPT) 22.3-6.8 mg/mL ophthalmic solution Administer 1 Drop to left eye Every morning. amLODIPine (NORVASC) 10 mg tablet Take 1 tablet by mouth once daily    ferrous sulfate 325 mg (65 mg iron) tablet Take 65 mg by mouth Daily (before breakfast). hydrALAZINE (APRESOLINE) 100 mg tablet TAKE 1 TABLET BY MOUTH THREE TIMES DAILY    docosahexaenoic acid/epa (FISH OIL PO) Take 1,000 mg by mouth daily. potassium chloride (K-DUR, KLOR-CON) 20 mEq tablet Take 20 mEq by mouth daily. latanoprost (XALATAN) 0.005 % ophthalmic solution Administer 1 Drop to both eyes daily. cholecalciferol, VITAMIN D3, (VITAMIN D3) 5,000 unit tab tablet Take  by mouth daily. Aspirin, Buffered 81 mg tab Take 1 tablet by mouth daily. No current facility-administered medications for this visit. [facility] follow up appointment(s):   Future Appointments   Date Time Provider Gee Montes   11/10/2022 10:40 AM Wilver Ryan MD [facility] BS AMB   11/29/2022 10:00 AM Dulce Granda MD [facility] AMB        Non-[facility] follow up appointment(s):      Goals Addressed                   This Visit's Progress     Attends follow up appointments on schedule   On track     7/25/22 Patient will attend all scheduled appointments through 10/25/22       Knowledge and adherence of prescribed medication (ie. action, side effects, missed dose, etc.).    On track     7/25/22 Pt will take all medications prescribed to be evaluated on each outreach through 10/25/22

## 2022-11-02 ENCOUNTER — HOSPITAL ENCOUNTER (OUTPATIENT)
Dept: LAB | Age: 87
Discharge: HOME | End: 2022-11-02
Payer: MEDICARE

## 2022-11-02 DIAGNOSIS — I35.1 NONRHEUMATIC AORTIC VALVE INSUFFICIENCY: ICD-10-CM

## 2022-11-02 DIAGNOSIS — I10 ESSENTIAL HYPERTENSION: ICD-10-CM

## 2022-11-02 DIAGNOSIS — N18.30 STAGE 3 CHRONIC KIDNEY DISEASE, UNSPECIFIED WHETHER STAGE 3A OR 3B CKD (HCC): ICD-10-CM

## 2022-11-02 LAB
T4 FREE SERPL-MCNC: 1 NG/DL (ref 0.7–1.5)
TSH SERPL DL<=0.05 MIU/L-ACNC: 3.66 UIU/ML (ref 0.36–3.74)

## 2022-11-02 PROCEDURE — 36415 COLL VENOUS BLD VENIPUNCTURE: CPT

## 2022-11-02 PROCEDURE — 84439 ASSAY OF FREE THYROXINE: CPT

## 2022-11-02 NOTE — PROGRESS NOTES
Per your note - Asymptomatic sinus bradycardia. This is a new finding for the patient on EKG today. I have recommended checking a thyroid panel and also arranging for a 48-hour Holter monitor to evaluate for any prolonged pauses, episodes of high-grade AV block, and heart rate variability.

## 2022-11-04 ENCOUNTER — PATIENT OUTREACH (OUTPATIENT)
Dept: CASE MANAGEMENT | Age: 87
End: 2022-11-04

## 2022-11-04 NOTE — PROGRESS NOTES
Patient has graduated from the Complex Case Management  program on 22. Patient/family has the ability to self-manage at this time. Care management goals have been completed. No further Ambulatory Care Manager follow up scheduled. Heart Failure Education outreach Date/Time: 2022 11:24 AM    Ambulatory Care Manager (ACM) contacted the patient by telephone to perform Ambulatory Care Coordination. Verified name and  with patient as identifiers. Provided introduction to self, and explanation of the Ambulatory Care Manager's role. ACM reviewed that a Health Healthy tips packet for the Holiday has been mailed to the them. ACM reviewed CHF zones, daily weights, fluid restriction, the importance of low sodium diet, healthy tips packet with the patient. Instructed patient to call their Cardiologist if they have a weight gain of 3 lbs in 2 days or 5 lbs in a week. Patient reminded that there is a physician on call 24 hours a day / 7 days a week should the patient have questions or concerns. The patient verbalized understanding. Goals Addressed                   This Visit's Progress     COMPLETED: Attends follow up appointments on schedule        22 Patient will attend all scheduled appointments through 10/25/22       COMPLETED: Knowledge and adherence of prescribed medication (ie. action, side effects, missed dose, etc.).        22 Pt will take all medications prescribed to be evaluated on each outreach through 10/25/22              Patient has Ambulatory Care Manager's contact information for any further questions, concerns, or needs.   Patients upcoming visits:    Future Appointments   Date Time Provider Gee Montes   11/10/2022 10:40 AM Ana María Glass MD [facility] BS AMB   2022 10:00 AM Emmanuel Farley MD [facility] BS AMB

## 2022-11-07 DIAGNOSIS — E11.21 DIABETES MELLITUS WITH NEPHROPATHY (HCC): Primary | ICD-10-CM

## 2022-11-07 RX ORDER — INSULIN PUMP SYRINGE, 3 ML
EACH MISCELLANEOUS
Qty: 1 KIT | Refills: 0 | Status: SHIPPED | OUTPATIENT
Start: 2022-11-07

## 2022-11-10 ENCOUNTER — OFFICE VISIT (OUTPATIENT)
Dept: CARDIOLOGY CLINIC | Age: 87
End: 2022-11-10
Payer: MEDICARE

## 2022-11-10 VITALS
WEIGHT: 156 LBS | HEART RATE: 55 BPM | DIASTOLIC BLOOD PRESSURE: 88 MMHG | OXYGEN SATURATION: 98 % | SYSTOLIC BLOOD PRESSURE: 164 MMHG | HEIGHT: 59 IN | BODY MASS INDEX: 31.45 KG/M2

## 2022-11-10 DIAGNOSIS — I10 ESSENTIAL HYPERTENSION: ICD-10-CM

## 2022-11-10 DIAGNOSIS — N18.30 STAGE 3 CHRONIC KIDNEY DISEASE, UNSPECIFIED WHETHER STAGE 3A OR 3B CKD (HCC): ICD-10-CM

## 2022-11-10 DIAGNOSIS — E78.2 MIXED HYPERLIPIDEMIA: ICD-10-CM

## 2022-11-10 DIAGNOSIS — I35.1 NONRHEUMATIC AORTIC VALVE INSUFFICIENCY: ICD-10-CM

## 2022-11-10 DIAGNOSIS — I50.32 DIASTOLIC CHF, CHRONIC (HCC): ICD-10-CM

## 2022-11-10 DIAGNOSIS — R00.1 BRADYCARDIA: Primary | ICD-10-CM

## 2022-11-10 PROCEDURE — 93000 ELECTROCARDIOGRAM COMPLETE: CPT | Performed by: INTERNAL MEDICINE

## 2022-11-10 PROCEDURE — G8427 DOCREV CUR MEDS BY ELIG CLIN: HCPCS | Performed by: INTERNAL MEDICINE

## 2022-11-10 PROCEDURE — 99214 OFFICE O/P EST MOD 30 MIN: CPT | Performed by: INTERNAL MEDICINE

## 2022-11-10 PROCEDURE — 1123F ACP DISCUSS/DSCN MKR DOCD: CPT | Performed by: INTERNAL MEDICINE

## 2022-11-10 PROCEDURE — G8417 CALC BMI ABV UP PARAM F/U: HCPCS | Performed by: INTERNAL MEDICINE

## 2022-11-10 PROCEDURE — G8510 SCR DEP NEG, NO PLAN REQD: HCPCS | Performed by: INTERNAL MEDICINE

## 2022-11-10 PROCEDURE — 1101F PT FALLS ASSESS-DOCD LE1/YR: CPT | Performed by: INTERNAL MEDICINE

## 2022-11-10 PROCEDURE — 1090F PRES/ABSN URINE INCON ASSESS: CPT | Performed by: INTERNAL MEDICINE

## 2022-11-10 PROCEDURE — G8536 NO DOC ELDER MAL SCRN: HCPCS | Performed by: INTERNAL MEDICINE

## 2022-11-10 NOTE — ACP (ADVANCE CARE PLANNING)
Advance Care Planning       Advance Care Planning (ACP) Physician/NP/PA (Provider) Conversation        Date of ACP Conversation: 7/2/2020    Conversation Conducted with:   Patient with Decision Making Zachary Ozuna Maker:    Current Designated Health Care Decision Maker:   (If there is a valid [address] named in the [address]" box in the ACP activity, but it is not visible above, be sure to open that field and then select the health care decision maker relationship (ie \"primary\") in the blank space to the right of the name.)    Note: Assess and validate information in current ACP documents, as indicated. Note: If the relationship of these Decision-Makers to the patient does NOT follow your state's Next of Kin hierarchy, recommend that patient complete ACP document that meets state-specific requirements to allow them to act on the patient's behalf when appropriate. Care Preferences:    Hospitalization: \"If your health worsens and it becomes clear that your chance of recovery is unlikely, what would your preference be regarding hospitalization? \"  If the patient would want hospitalization, answer \"yes\". If the patient would prefer comfort-focused treatment without hospitalization, answer \"no\". yes      Ventilation: \"If you were in your present state of health and suddenly became very ill and were unable to breathe on your own, what would your preference be about the use of a ventilator (breathing machine) if it was available to you? \"    If patient would desire the use of a ventilator (breathing machine), answer \"yes\", if not answer \"no\":no    \"If your health worsens and it becomes clear that your chance of recovery is unlikely, what would your preference be about the use of a ventilator (breathing machine) if it was available to you? \"   no      Resuscitation:  \"CPR works best to restart the heart when there is a sudden event, like a heart attack, in someone who is otherwise healthy. Unfortunately, CPR does not typically restart the heart for people who have serious health conditions or who are very sick. \"    \"In the event your heart stopped as a result of an underlying serious health condition, would you want attempts to be made to restart your heart (answer \"yes\" for attempt to resuscitate) or would you prefer a natural death (answer \"no\" for do not attempt to resuscitate)? \"   no    NOTE: If the patient has a valid advance directive AND provides care preference(s) that are inconsistent with that prior directive, advise the patient to consider either: creating a new advance directive that complies with state-specific requirements; or, if that is not possible, orally revoking that prior directive in accordance with state-specific requirements, which must be documented in the EHR.     Conversation Outcomes / Follow-Up Plan:   Reviewed existing ACD    Length of Voluntary ACP Conversation in minutes:  16 minutes      Mary Guido MD [Follow - Up] : a follow-up visit [Hyperthyroidism] : hyperthyroidism

## 2022-11-10 NOTE — PROGRESS NOTES
HISTORY OF PRESENT ILLNESS  Abhi Grace is a 80 y.o. female. Follow-up  Pertinent negatives include no chest pain, no abdominal pain, no headaches and no shortness of breath. Leg Swelling  Pertinent negatives include no chest pain, no abdominal pain, no headaches and no shortness of breath. Patient presents for a follow-up office visit. She was initially referred here for evaluation of a cardiac murmur. She has a long-standing history of essential hypertension, type 2 diabetes mellitus, and dyslipidemia. Patient patient was found to have aortic insufficiency on an echocardiogram back in 2018. She was recently hospitalized at the beginning of September 2019 for worsening renal failure and nausea and vomiting. She underwent a repeat echocardiogram which showed preserved LV systolic function, EF 56 to 50%, grade 1 diastolic dysfunction, biatrial enlargement, mild to moderate aortic insufficiency, which is relatively unchanged compared to her prior study. Patient was discovered to have a significant bradycardia with heart rates in the 30s at last visit, so she wore a 7-day event monitor in May 2022 which demonstrated an asymptomatic sinus bradycardia with an average heart rate of 43 bpm, range from 26 to 85 bpm.  She had 1 prolonged pauses lasting 3 seconds during sleeping hours. She was last seen in our office 6 months ago. Since that time, she has been feeling well. She denies any excessive fatigue, no activity intolerance, no dizziness, syncope or near syncope. Past Medical History:   Diagnosis Date    Anemia     Carotid bruit 12/07/2013    Carotid Duplex: 1. Bilateral 1-49% stenosis of the internal carotid arteries. 2. No significant stenosis in the external carotid arteries bilaterally. 3. Antegrade flow in both vetebral arteries. 4. Normal flow in both subclavian arteries. Plaque Morphology: 1. Hyperechoic plaque in the bulb and right ICA.  2. Hyperechoic plaque in the bulb and left ICA.     CKD (chronic kidney disease) stage 3, GFR 30-59 ml/min ([facility])     Diabetes ([facility]     NIDDM    Gastritis 10/27/2014    Duodenum Bx: No Specific Pathologic Abnormality. No Villous Abnormality. 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 No dysplasia or malignancy identified. Bacterial Organisms c/w H. Pylori are present. Z-Line Bx: GE mucosa w/ Acute/Chronic Inflammation & Reactive Changes. Focal Specialized Type Campos Mucosa Identified. No Dysplasia or Malignancy Identified. Gout 12/10/2009    Elevated Uric Acid Level    Hyperlipidemia     Hypertension     RAMÓN (iron deficiency anemia)      Current Outpatient Medications   Medication Sig Dispense Refill    Blood-Glucose Meter (OneTouch Ultra2 Meter) monitoring kit Check glucose once daily. Dx E 11.9 1 Kit 0    glucose blood VI test strips (Prodigy No Coding) strip Check fasting glucose once daily 100 Strip 3    pravastatin (PRAVACHOL) 20 mg tablet Take 1 tablet by mouth nightly 90 Tablet 3    ondansetron hcl (Zofran) 4 mg tablet Take 1 Tablet by mouth every eight (8) hours as needed for Nausea. 14 Tablet 0    cloNIDine HCL (CATAPRES) 0.2 mg tablet Take 1 tablet by mouth twice daily 180 Tablet 1    dorzolamide-timoloL (COSOPT) 22.3-6.8 mg/mL ophthalmic solution Administer 1 Drop to left eye Every morning. amLODIPine (NORVASC) 10 mg tablet Take 1 tablet by mouth once daily 90 Tablet 1    ferrous sulfate 325 mg (65 mg iron) tablet Take 65 mg by mouth Daily (before breakfast). hydrALAZINE (APRESOLINE) 100 mg tablet TAKE 1 TABLET BY MOUTH THREE TIMES DAILY 270 Tablet 3    docosahexaenoic acid/epa (FISH OIL PO) Take 1,000 mg by mouth daily. potassium chloride (K-DUR, KLOR-CON) 20 mEq tablet Take 20 mEq by mouth daily. latanoprost (XALATAN) 0.005 % ophthalmic solution Administer 1 Drop to both eyes daily. cholecalciferol, VITAMIN D3, (VITAMIN D3) 5,000 unit tab tablet Take  by mouth daily. Aspirin, Buffered 81 mg tab Take 1 tablet by mouth daily. No Known Allergies     Social History     Tobacco Use    Smoking status: Never    Smokeless tobacco: Never   Vaping Use    Vaping Use: Never used   Substance Use Topics    Alcohol use: No    Drug use: No     Family History   Problem Relation Age of Onset    Hypertension Mother     Stroke Mother     Stroke Father          Review of Systems   Constitutional:  Negative for chills, fever, malaise/fatigue and weight loss. HENT:  Negative for nosebleeds. Eyes:  Negative for blurred vision and double vision. Respiratory:  Negative for cough, shortness of breath and wheezing. Cardiovascular:  Negative for chest pain, palpitations, orthopnea, claudication, leg swelling and PND. Gastrointestinal:  Negative for abdominal pain, heartburn, nausea and vomiting. Genitourinary:  Negative for dysuria and hematuria. Musculoskeletal:  Negative for falls and myalgias. Skin:  Negative for rash. Neurological:  Negative for dizziness, focal weakness and headaches. Endo/Heme/Allergies:  Does not bruise/bleed easily. Psychiatric/Behavioral:  Negative for substance abuse. Visit Vitals  BP (!) 164/88 (BP 1 Location: Left upper arm, BP Patient Position: Sitting, BP Cuff Size: Adult)   Pulse (!) 55   Ht 4' 11\" (1.499 m)   Wt 70.8 kg (156 lb)   SpO2 98%   BMI 31.51 kg/m²       Physical Exam  Constitutional:       Appearance: She is well-developed. HENT:      Head: Normocephalic and atraumatic. Eyes:      Conjunctiva/sclera: Conjunctivae normal.   Neck:      Vascular: No carotid bruit or JVD. Cardiovascular:      Rate and Rhythm: Regular rhythm. Bradycardia present. Pulses: Normal pulses. Heart sounds: S1 normal and S2 normal. Murmur heard. Midsystolic murmur is present with a grade of 1/6 at the lower right sternal border.    High-pitched blowing decrescendo early diastolic murmur is present with a grade of 1/4 at the upper right sternal border radiating to the apex. No gallop. Pulmonary:      Effort: Pulmonary effort is normal.      Breath sounds: Normal breath sounds. No wheezing or rales. Abdominal:      General: Bowel sounds are normal.      Palpations: Abdomen is soft. Tenderness: There is no abdominal tenderness. Musculoskeletal:         General: No swelling, tenderness or deformity. Cervical back: Neck supple. Skin:     General: Skin is warm and dry. Neurological:      General: No focal deficit present. Mental Status: She is alert and oriented to person, place, and time. Psychiatric:         Behavior: Behavior normal.         Thought Content: Thought content normal.     EKG: Sinus bradycardia, left axis deviation, first-degree AV block, poor R wave progression, no ST or T wave changes concerning for ischemia. Compared to the previous EKG, the ND interval has increased. ASSESSMENT and PLAN    Asymptomatic sinus bradycardia. Patient wore a 7-day event monitor in May 2022 which did not show any significant high-grade AV block or prolonged pauses. She was asymptomatic to her low heart rates. Her heart rate is increased today but she did not take her clonidine. I suspect the clonidine may be contributing to her slow heart rate. However, this is important for blood pressure, so I would continue her current blood pressure regimen but would not recommend any additional AV slowing agents. Aortic insufficiency. This was in the mild to moderate range on a recent echocardiogram from September 2019. This is likely just due to calcific aortic valve disease. This is not significant change compared to an echocardiogram from the year prior. This can be reassessed few years unless new symptoms arise. Essential hypertension. Patient's blood pressure is significantly elevated in the office today, though she admits she did not yet take out of her blood pressure medications.   She was encouraged to take them regularly. Dyslipidemia. Patient has been treated with pravastatin. This is followed by her PCP. Diabetes mellitus, type II. Patient is managed with oral agents. From a cardiac standpoint for her hemoglobin A1c to be less than 7. Chronic kidney disease, stage III-IV. This is followed closely by her nephrologist.    Follow-up in 6 months, sooner if needed.

## 2022-11-10 NOTE — PROGRESS NOTES
Beti Harp presents today for   Chief Complaint   Patient presents with    Follow-up     6 month    Leg Swelling       Beti Harp preferred language for health care discussion is english/other. Is someone accompanying this pt? no    Is the patient using any DME equipment during [address]? no    Depression Screening:  3 most recent PHQ Screens 11/10/2022   PHQ Not Done -   Little interest or pleasure in doing things Not at all   Feeling down, depressed, irritable, or hopeless Not at all   Total Score PHQ 2 0   Trouble falling or staying asleep, or sleeping too much -   Poor appetite, weight loss, or overeating -   Feeling bad about yourself - or that you are a failure or have let yourself or your family down -   Trouble concentrating on things such as school, work, reading, or watching TV -   Moving or speaking so slowly that other people could have noticed; or the opposite being so fidgety that others notice -   Thoughts of being better off dead, or hurting yourself in some way -       Learning Assessment:  Learning Assessment 11/10/2022   PRIMARY LEARNER Patient   HIGHEST LEVEL OF EDUCATION - PRIMARY LEARNER  -   BARRIERS PRIMARY LEARNER -   [address] [place] [address]    NEED -   LEARNER PREFERENCE PRIMARY DEMONSTRATION   LEARNING SPECIAL TOPICS -   ANSWERED BY patient   RELATIONSHIP SELF       Abuse Screening:  Abuse Screening Questionnaire 11/10/2022   Do you ever feel afraid of your partner? N   Are you in a relationship with someone who physically or mentally threatens you? N   Is it safe for you to go home? Y       Fall Risk  Fall Risk Assessment, last 12 mths 11/10/2022   Able to walk? Yes   Fall in past 12 months? 0   Do you feel unsteady? 0   Are you worried about falling 0   Is TUG test greater than 12 seconds? -   Is the gait abnormal? -           Pt currently taking Anticoagulant therapy? no    Pt currently taking Antiplatelet therapy ?  Aspirin 81 mg daily      Coordination of Care:  1. Have you been to the ER, urgent care clinic since your last visit? Hospitalized since your last visit? no    2. Have you seen or consulted any other health care providers outside of the [address] [place] [address] since your last visit? Include any pap smears or colon screening.  no

## 2022-11-29 ENCOUNTER — OFFICE VISIT (OUTPATIENT)
Dept: FAMILY MEDICINE CLINIC | Age: 87
End: 2022-11-29
Payer: MEDICARE

## 2022-11-29 VITALS
HEART RATE: 76 BPM | RESPIRATION RATE: 16 BRPM | OXYGEN SATURATION: 99 % | HEIGHT: 59 IN | TEMPERATURE: 98.6 F | DIASTOLIC BLOOD PRESSURE: 80 MMHG | BODY MASS INDEX: 29.43 KG/M2 | WEIGHT: 146 LBS | SYSTOLIC BLOOD PRESSURE: 144 MMHG

## 2022-11-29 DIAGNOSIS — R00.1 SINUS BRADYCARDIA: ICD-10-CM

## 2022-11-29 DIAGNOSIS — E11.9 DIABETES MELLITUS TYPE 2, DIET-CONTROLLED (HCC): ICD-10-CM

## 2022-11-29 DIAGNOSIS — M15.9 PRIMARY OSTEOARTHRITIS INVOLVING MULTIPLE JOINTS: ICD-10-CM

## 2022-11-29 DIAGNOSIS — I10 ESSENTIAL HYPERTENSION: ICD-10-CM

## 2022-11-29 DIAGNOSIS — N18.4 CKD (CHRONIC KIDNEY DISEASE) STAGE 4, GFR 15-29 ML/MIN (HCC): ICD-10-CM

## 2022-11-29 DIAGNOSIS — E11.40 TYPE 2 DIABETES MELLITUS WITH DIABETIC NEUROPATHY, WITHOUT LONG-TERM CURRENT USE OF INSULIN (HCC): ICD-10-CM

## 2022-11-29 DIAGNOSIS — E78.2 MIXED HYPERLIPIDEMIA: ICD-10-CM

## 2022-11-29 DIAGNOSIS — R54 FRAIL ELDERLY: ICD-10-CM

## 2022-11-29 DIAGNOSIS — R60.0 LEG EDEMA: ICD-10-CM

## 2022-11-29 DIAGNOSIS — I50.32 DIASTOLIC CHF, CHRONIC (HCC): ICD-10-CM

## 2022-11-29 DIAGNOSIS — E11.22 TYPE 2 DIABETES MELLITUS WITH CHRONIC KIDNEY DISEASE, WITHOUT LONG-TERM CURRENT USE OF INSULIN, UNSPECIFIED CKD STAGE (HCC): Primary | ICD-10-CM

## 2022-11-29 DIAGNOSIS — D63.8 ANEMIA OF CHRONIC DISEASE: ICD-10-CM

## 2022-11-29 PROCEDURE — 1101F PT FALLS ASSESS-DOCD LE1/YR: CPT | Performed by: FAMILY MEDICINE

## 2022-11-29 PROCEDURE — 1090F PRES/ABSN URINE INCON ASSESS: CPT | Performed by: FAMILY MEDICINE

## 2022-11-29 PROCEDURE — 99215 OFFICE O/P EST HI 40 MIN: CPT | Performed by: FAMILY MEDICINE

## 2022-11-29 PROCEDURE — 3044F HG A1C LEVEL LT 7.0%: CPT | Performed by: FAMILY MEDICINE

## 2022-11-29 PROCEDURE — G8427 DOCREV CUR MEDS BY ELIG CLIN: HCPCS | Performed by: FAMILY MEDICINE

## 2022-11-29 PROCEDURE — G8417 CALC BMI ABV UP PARAM F/U: HCPCS | Performed by: FAMILY MEDICINE

## 2022-11-29 PROCEDURE — 1123F ACP DISCUSS/DSCN MKR DOCD: CPT | Performed by: FAMILY MEDICINE

## 2022-11-29 PROCEDURE — G8536 NO DOC ELDER MAL SCRN: HCPCS | Performed by: FAMILY MEDICINE

## 2022-11-29 PROCEDURE — G0463 HOSPITAL OUTPT CLINIC VISIT: HCPCS | Performed by: FAMILY MEDICINE

## 2022-11-29 PROCEDURE — G8432 DEP SCR NOT DOC, RNG: HCPCS | Performed by: FAMILY MEDICINE

## 2022-11-29 NOTE — PROGRESS NOTES
Chief Complaint   Patient presents with    Cholesterol Problem    Diabetes    Gout    Anemia    Hypertension    Other     Hx of leg edema           1. \"Have you been to the ER, urgent care clinic since your last visit? Hospitalized since your last visit? \" No     2. \"Have you seen or consulted any other health care providers outside of the [address] [place] [address] since your last visit? \" Yes 11- Nephrology Dr. Enoc Angelo for CKD       3. For patients aged 39-70: Has the patient had a colonoscopy / FIT/ Cologuard? NA - based on age        If the patient is female:     4. For patients aged 41-77: Has the patient had a mammogram within the past 2 years? NA - based on age or sex        11. For patients aged 21-65: Has the patient had a pap smear?  NA - based on age or sex     On 12-0- office notes mailed to home address on file per PCP request on 11-

## 2022-11-30 PROBLEM — M15.9 PRIMARY OSTEOARTHRITIS INVOLVING MULTIPLE JOINTS: Status: ACTIVE | Noted: 2022-11-30

## 2022-11-30 PROBLEM — R54 FRAIL ELDERLY: Status: ACTIVE | Noted: 2022-11-30

## 2022-11-30 PROBLEM — M15.0 PRIMARY OSTEOARTHRITIS INVOLVING MULTIPLE JOINTS: Status: ACTIVE | Noted: 2022-11-30

## 2022-11-30 NOTE — PROGRESS NOTES
Kunal Ferraro, 80 y.o.,  female    [facility]  Ff-up    Family conference, here with niece Mary Stuart and Per Croft. Both her  and pt are my patient and concern for safety of living independently without much support, both are frail elderly with multiple medical problems, limited ambulation due to severe arthritis. We discussed advance care planning, and has form to be scanned. We discussed resources, and family is a  who will assist with acquiring home aide    HTN/CKD 3-4 GFR . She continues to take clonidine, hydralazine and norvasc. She is following dr. Christophe Luis who have discussed options including dialysis. She is on prn lasix few times a week for leg edema dvised her to start using compression stockings as well. Asymptomatic bradycardia/aortic regurgitation- She has h/o AV block and advised against BB. Following cardiology Dr. Cassidy Ash appt 11/10/2022, no changes. DM w/ CKD 3 and neuorpathy-  diet controlled, She Reports [facility]  1teens. Numbness of legs/feet secondary to DM neuropathy/spinal stenosis- some response to gabapentin, however discontinued due to drowsiness side effect    HL-on pravachol    Gout/ history of hyperuricemia- decided to come off of allopurinol, has not had flare for years. monitoring     GERD- EGD gastritis/schatzis ring dilated 9/2020, doing well on protonix.  Following dr. Maliha Sanz of multiple joints, using walker    ROS:  See HPI, all others negative        Patient Active Problem List   Diagnosis Code    Essential hypertension I10    Mixed hyperlipidemia E78.2    Advanced directives, counseling/discussion Z71.89    Controlled type 2 diabetes mellitus with stage 3 chronic kidney disease, without long-term current use of insulin ([facility] E11.22, N18.3         Current Outpatient Medications:     pravastatin (PRAVACHOL) 20 mg tablet, Take 1 tablet by mouth nightly, Disp: 90 Tablet, Rfl: 3    cloNIDine HCL (CATAPRES) 0.2 mg tablet, Take 1 tablet by mouth twice daily, Disp: 180 Tablet, Rfl: 1    dorzolamide-timoloL (COSOPT) 22.3-6.8 mg/mL ophthalmic solution, Administer 1 Drop to left eye Every morning., Disp: , Rfl:     amLODIPine (NORVASC) 10 mg tablet, Take 1 tablet by mouth once daily, Disp: 90 Tablet, Rfl: 1    ferrous sulfate 325 mg (65 mg iron) tablet, Take 65 mg by mouth Daily (before breakfast). , Disp: , Rfl:     hydrALAZINE (APRESOLINE) 100 mg tablet, TAKE 1 TABLET BY MOUTH THREE TIMES DAILY, Disp: 270 Tablet, Rfl: 3    docosahexaenoic acid/epa (FISH OIL PO), Take 1,000 mg by mouth daily. , Disp: , Rfl:     potassium chloride (K-DUR, KLOR-CON) 20 mEq tablet, Take 20 mEq by mouth daily. , Disp: , Rfl:     latanoprost (XALATAN) 0.005 % ophthalmic solution, Administer 1 Drop to both eyes daily. , Disp: , Rfl:     cholecalciferol, VITAMIN D3, (VITAMIN D3) 5,000 unit tab tablet, Take  by mouth daily. , Disp: , Rfl:     Aspirin, Buffered 81 mg tab, Take 1 tablet by mouth daily. , Disp: , Rfl:     Blood-Glucose Meter (OneTouch Ultra2 Meter) monitoring kit, Check glucose once daily. Dx E 11.9, Disp: 1 Kit, Rfl: 0    glucose blood VI test strips (Prodigy No Coding) strip, Check fasting glucose once daily, Disp: 100 Strip, Rfl: 3      No Known Allergies    Past Medical History:   Diagnosis Date    Anemia     Carotid bruit 12/07/2013    Carotid Duplex: 1. Bilateral 1-49% stenosis of the internal carotid arteries. 2. No significant stenosis in the external carotid arteries bilaterally. 3. Antegrade flow in both vetebral arteries. 4. Normal flow in both subclavian arteries. Plaque Morphology: 1. Hyperechoic plaque in the bulb and right ICA. 2. Hyperechoic plaque in the bulb and left ICA. CKD (chronic kidney disease) stage 3, GFR 30-59 ml/min     Di[facility] ([facility]     NIDDM    Gastritis 10/27/2014    Duodenum Bx: No Specific Pathologic Abnormality. No Villous Abnormality. 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  No dysplasia or malignancy identified. Bacterial Organisms c/w H. Pylori are present. Z-Line Bx: GE mucosa w/ Acute/Chronic Inflammation & Reactive Changes. Focal Specialized Type Campos Mucosa Identified. No Dysplasia or Malignancy Identified. Gout 12/10/2009    Elevated Uric Acid Level    Hyperlipidemia     Hypertension     RAMÓN (iron deficiency anemia)        Social History     Social History    Marital status:      Spouse name: N/A    Number of children: N/A    Years of education: N/A     Occupational History    Not on file.      Social History Main Topics    Smoking status: Never Smoker    Smokeless tobacco: Never Used    Alcohol use No    Drug use: No    Sexual activity: Not Currently     Partners: Male     Birth control/ protection: None     Other Topics Concern    Not on file     Social History Narrative       Family History   Problem Relation Age of Onset    Hypertension Mother     Stroke Mother     Stroke Father          OBJECTIVE    Physical Exam:     Visit Vitals  BP (!) 144/80 (BP 1 Location: Left arm, BP Patient Position: Sitting, BP Cuff Size: Large adult)   Pulse 76   Temp 98.6 °F (37 °C) (Temporal)   Resp 16   Ht 4' 11\" (1.499 m)   Wt 146 lb (66.2 kg)   SpO2 99%   BMI 29.49 kg/m²         General: alert, elderly, AA, using walker  Neck: supple, no adenopathy palpated  CVS: normal rate,  regular rhythm, + murmur  Lungs:clear to ausculation bilaterally, no crackles, wheezing or rhonchi noted  Extremities: + grade 1 bipedal edema- chronic  Feet: no lesions  Skin: warm, no lesions, rashes noted  Psych:  mood and affect normal  Results for orders placed or performed during the hospital encounter of 11/02/22   TSH AND FREE T4   Result Value Ref Range    TSH 3.66 0.36 - 3.74 uIU/mL    T4, Free 1.0 0.7 - 1.5 NG/DL       ASSESSMENT/PLAN  Diagnoses and all orders for this visit:    Controlled type 2 diabetes mellitus with stage 3 chronic kidney disease, without long-term current use of insulin (HCC) diet controlled  a1c 6.5  Concern regarding social situation, living with elderly  with dementia. Complex case manager Juan Crisostomo following. Declining GFR, close monitoring with nephrology  We discussed with family members present including Simin Castillo who is POA, and now part of [employer], and plan is to start home aid assistance for most ADLs, family member is a  and plan to assist with processing  Check cbc/ cmp/ a1c prior to next visit    Diabetes mellitus type 2, diet controlled ([address]  Neuropathy  Stable  Intolerant to gabapentin    Essential hypertension  Fiar control  Cont   norvasc, clonidine, hydralazine  Cont  low dose lasix/kcl prn for leg edema  Avoid bb with h/o 1st deg av block- cardiology following    Leg edema  Advised compression stockings   Low salt diet,   Prn lasix/kcl     Mixed hyperlipidemia  On pravachol     Gout of foot, unspecified cause, unspecified chronicity, unspecified laterality  Controlled  Check Uric acid prior to next visit    Anemia of chronic disease  Stable, Baseline 10's  Likely anemia of chronic disease, mildly lower, defer to nephrology for other recs  Cbc prior to next visit  Monitoring    Heart murmur  Aortic regurg  Asymptomatic  Monitoring, following cards appt w/ dr. Devan Mayo    Diastolic chf, chronic  Appears compensated  Cont prn lasix  Intolerant to BB    CKD 4  Avoid nsaids, keep hydrated  monitoring  Following dr. Leoncio Kaiser elderly    Primary osteoarthritis involving multiple joints  Requiring assistance for most ADLs- bathing, meals, transportation    BRING MED BOTTLES EVERY VISIT    Complex case manager following, social assistance, multiple specialists  Cont to follow GI/ cardiology/nephrology/optha    I spent greater than 40 minutes with pt with greater than 50% of the face to face time counseling and coordinating care     Follow-up Disposition:  Ff-up in 3  months     Patient understands plan of care.  Patient has provided input and agrees with goals.

## 2022-12-06 ENCOUNTER — PATIENT OUTREACH (OUTPATIENT)
Dept: CASE MANAGEMENT | Age: 87
End: 2022-12-06

## 2022-12-06 NOTE — PROGRESS NOTES
Complex Case Management      Date/Time:  2022 1:13 PM    Method of communication with patient:phone    [facility] ([facility]) contacted the patient by telephone to perform Ambulatory Care Coordination. Verified name and  (PHI) with patient as identifiers. Provided introduction to self, and explanation of the Ambulatory Care Manager's role. Reviewed most recent clinic visit w/ patient who verbalized understanding. Patient given an opportunity to ask questions. Top Challenges reviewed with the Provider   N/a     1. Hx of DM2, CKD III, neuropathy, hyperuricemia, htn, LLE, gout, anemia, aortic regurg, dCHF, HLD  3. Pt states doing well, no questions/issues. The patient agrees to contact the PCP office or the [facility] for questions related to their healthcare. Provided contact information for future reference. Disease Specific:   N/A    Home Health Active: No    DME Active: No    Barriers to care? Advanced age    Advance Care Planning:   Does patient have an Advance Directive:  reviewed and current     Medication(s):   Medication reconciliation was not performed with patient, who verbalizes understanding of administration of home medications. There were no barriers to obtaining medications identified at this time. Referral to Pharm D needed: no     Current Outpatient Medications   Medication Sig    Blood-Glucose Meter (OneTouch Ultra2 Meter) monitoring kit Check glucose once daily. Dx E 11.9    glucose blood VI test strips (Prodigy No Coding) strip Check fasting glucose once daily    pravastatin (PRAVACHOL) 20 mg tablet Take 1 tablet by mouth nightly    cloNIDine HCL (CATAPRES) 0.2 mg tablet Take 1 tablet by mouth twice daily    dorzolamide-timoloL (COSOPT) 22.3-6.8 mg/mL ophthalmic solution Administer 1 Drop to left eye Every morning.     amLODIPine (NORVASC) 10 mg tablet Take 1 tablet by mouth once daily    ferrous sulfate 325 mg (65 mg iron) tablet Take 65 mg by mouth Daily (before breakfast). hydrALAZINE (APRESOLINE) 100 mg tablet TAKE 1 TABLET BY MOUTH THREE TIMES DAILY    docosahexaenoic acid/epa (FISH OIL PO) Take 1,000 mg by mouth daily. potassium chloride (K-DUR, KLOR-CON) 20 mEq tablet Take 20 mEq by mouth daily. latanoprost (XALATAN) 0.005 % ophthalmic solution Administer 1 Drop to both eyes daily. cholecalciferol, VITAMIN D3, (VITAMIN D3) 5,000 unit tab tablet Take  by mouth daily. Aspirin, Buffered 81 mg tab Take 1 tablet by mouth daily. No current facility-administered medications for this visit.        [facility] follow up appointment(s):   Future Appointments   Date Time Provider Gee Montes   2/28/2023  9:00 AM Mariza Chandler MD hospitals [facility] AMB   5/11/2023  9:40 AM Elizabeth Díaz MD [facility] BS AMB        Non-[facility] follow up appointment(s):      Goals Addressed                   This Visit's Progress     Attends follow up appointments on schedule        12/6/22 Patient will attend all scheduled appointments through 3/6/23       Knowledge and adherence of prescribed medication (ie. action, side effects, missed dose, etc.).        12/6/22 Pt will take all medications prescribed to be evaluated on each outreach through 3/6/23

## 2022-12-12 ENCOUNTER — PATIENT OUTREACH (OUTPATIENT)
Dept: CASE MANAGEMENT | Age: 87
End: 2022-12-12

## 2022-12-21 ENCOUNTER — PATIENT OUTREACH (OUTPATIENT)
Dept: CASE MANAGEMENT | Age: 87
End: 2022-12-21

## 2022-12-21 NOTE — PROGRESS NOTES
Complex Case Management      Date/Time:  2022 1:13 PM    Method of communication with patient:phone    [address] [facility] ([facility]) contacted the patient by telephone to perform Ambulatory Care Coordination. Verified name and  (PHI) with patient as identifiers. Provided introduction to self, and explanation of the Ambulatory Care Manager's role. Reviewed most recent clinic visit w/ patient who verbalized understanding. Patient given an opportunity to ask questions. Top Challenges reviewed with the Provider   N/a     1. Hx of DM2, CKD III, neuropathy, hyperuricemia, htn, LLE, gout, anemia, aortic regurg, dCHF, HLD  3. Pt states doing well, no questions/issues. The patient agrees to contact the PCP office or the [facility] for questions related to their healthcare. Provided contact information for future reference. Disease Specific:   N/A    Home Health Active: No    DME Active: No    Barriers to care? Advanced age    Advance Care Planning:   Does patient have an Advance Directive:  reviewed and current     Medication(s):   Medication reconciliation was not performed with patient, who verbalizes understanding of administration of home medications. There were no barriers to obtaining medications identified at this time. Referral to Pharm D needed: no     Current Outpatient Medications   Medication Sig    Blood-Glucose Meter (OneTouch Ultra2 Meter) monitoring kit Check glucose once daily. Dx E 11.9    glucose blood VI test strips (Prodigy No Coding) strip Check fasting glucose once daily    pravastatin (PRAVACHOL) 20 mg tablet Take 1 tablet by mouth nightly    cloNIDine HCL (CATAPRES) 0.2 mg tablet Take 1 tablet by mouth twice daily    dorzolamide-timoloL (COSOPT) 22.3-6.8 mg/mL ophthalmic solution Administer 1 Drop to left eye Every morning.     amLODIPine (NORVASC) 10 mg tablet Take 1 tablet by mouth once daily    ferrous sulfate 325 mg (65 mg iron) tablet Take 65 mg by mouth Daily (before breakfast). hydrALAZINE (APRESOLINE) 100 mg tablet TAKE 1 TABLET BY MOUTH THREE TIMES DAILY    docosahexaenoic acid/epa (FISH OIL PO) Take 1,000 mg by mouth daily. potassium chloride (K-DUR, KLOR-CON) 20 mEq tablet Take 20 mEq by mouth daily. latanoprost (XALATAN) 0.005 % ophthalmic solution Administer 1 Drop to both eyes daily. cholecalciferol, VITAMIN D3, (VITAMIN D3) 5,000 unit tab tablet Take  by mouth daily. Aspirin, Buffered 81 mg tab Take 1 tablet by mouth daily. No current facility-administered medications for this visit. [facility] follow up appointment(s):   Future Appointments   Date Time Provider Gee Montes   2/28/2023  9:00 AM Angi Osei MD [facility] BS AMB   5/11/2023  9:40 AM Edgardo Hernandez MD [facility] BS AMB        Non-[facility] follow up appointment(s):      Goals Addressed                   This Visit's Progress     Attends follow up appointments on schedule   On track     12/6/22 Patient will attend all scheduled appointments through 3/6/23       Knowledge and adherence of prescribed medication (ie. action, side effects, missed dose, etc.).    On track     12/6/22 Pt will take all medications prescribed to be evaluated on each outreach through 3/6/23

## 2022-12-29 ENCOUNTER — PATIENT OUTREACH (OUTPATIENT)
Dept: CASE MANAGEMENT | Age: 87
End: 2022-12-29

## 2022-12-29 NOTE — PROGRESS NOTES
Complex Case Management      Date/Time:  2022 1:13 PM    Method of communication with patient:phone    [address] [facility] ([facility]) contacted the patient by telephone to perform Ambulatory Care Coordination. Verified name and  (PHI) with patient as identifiers. Provided introduction to self, and explanation of the Ambulatory Care Manager's role. Reviewed most recent clinic visit w/ patient who verbalized understanding. Patient given an opportunity to ask questions. Top Challenges reviewed with the Provider   N/a     1. Hx of DM2, CKD III, neuropathy, hyperuricemia, htn, LLE, gout, anemia, aortic regurg, dCHF, HLD  3. Pt states doing well, no questions/issues. The patient agrees to contact the PCP office or the [facility] for questions related to their healthcare. Provided contact information for future reference. Disease Specific:   N/A    Home Health Active: No    DME Active: No    Barriers to care? Advanced age    Advance Care Planning:   Does patient have an Advance Directive:  reviewed and current     Medication(s):   Medication reconciliation was not performed with patient, who verbalizes understanding of administration of home medications. There were no barriers to obtaining medications identified at this time. Referral to Pharm D needed: no     Current Outpatient Medications   Medication Sig    hydrALAZINE (APRESOLINE) 100 mg tablet TAKE 1 TABLET BY MOUTH THREE TIMES DAILY    Blood-Glucose Meter (OneTouch Ultra2 Meter) monitoring kit Check glucose once daily. Dx E 11.9    glucose blood VI test strips (Prodigy No Coding) strip Check fasting glucose once daily    pravastatin (PRAVACHOL) 20 mg tablet Take 1 tablet by mouth nightly    cloNIDine HCL (CATAPRES) 0.2 mg tablet Take 1 tablet by mouth twice daily    dorzolamide-timoloL (COSOPT) 22.3-6.8 mg/mL ophthalmic solution Administer 1 Drop to left eye Every morning.     amLODIPine (NORVASC) 10 mg tablet Take 1 tablet by mouth once daily    ferrous sulfate 325 mg (65 mg iron) tablet Take 65 mg by mouth Daily (before breakfast). docosahexaenoic acid/epa (FISH OIL PO) Take 1,000 mg by mouth daily. potassium chloride (K-DUR, KLOR-CON) 20 mEq tablet Take 20 mEq by mouth daily. latanoprost (XALATAN) 0.005 % ophthalmic solution Administer 1 Drop to both eyes daily. cholecalciferol, VITAMIN D3, (VITAMIN D3) 5,000 unit tab tablet Take  by mouth daily. Aspirin, Buffered 81 mg tab Take 1 tablet by mouth daily. No current facility-administered medications for this visit. [facility] follow up appointment(s):   Future Appointments   Date Time Provider Gee Montes   2/28/2023  9:00 AM Stepan August MD [facility]"" BS AMB   5/11/2023  9:40 AM Dar Shook MD [facility] BS AMB        Non-[facility] follow up appointment(s):      Goals Addressed                   This Visit's Progress     Attends follow up appointments on schedule   On track     12/6/22 Patient will attend all scheduled appointments through 3/6/23       Knowledge and adherence of prescribed medication (ie. action, side effects, missed dose, etc.).    On track     12/6/22 Pt will take all medications prescribed to be evaluated on each outreach through 3/6/23

## 2023-01-11 ENCOUNTER — PATIENT OUTREACH (OUTPATIENT)
Dept: CASE MANAGEMENT | Age: 88
End: 2023-01-11

## 2023-01-24 ENCOUNTER — PATIENT OUTREACH (OUTPATIENT)
Dept: CASE MANAGEMENT | Age: 88
End: 2023-01-24

## 2023-01-24 NOTE — PROGRESS NOTES
Complex Case Management      Date/Time:  2023 1:13 PM    Method of communication with patient:phone    [address] [facility] ([facility]) contacted the patient by telephone to perform Ambulatory Care Coordination. Verified name and  (PHI) with patient as identifiers. Provided introduction to self, and explanation of the Ambulatory Care Manager's role. Reviewed most recent clinic visit w/ patient who verbalized understanding. Patient given an opportunity to ask questions. Top Challenges reviewed with the Provider   N/a     1. Hx of DM2, CKD III, neuropathy, hyperuricemia, htn, LLE, gout, anemia, aortic regurg, dCHF, HLD  3. Pt states doing well, no questions/issues. The patient agrees to contact the PCP office or the [facility] for questions related to their healthcare. Provided contact information for future reference. Disease Specific:   N/A    Home Health Active: No    DME Active: No    Barriers to care? Advanced age    Advance Care Planning:   Does patient have an Advance Directive:  reviewed and current     Medication(s):   Medication reconciliation was not performed with patient, who verbalizes understanding of administration of home medications. There were no barriers to obtaining medications identified at this time. Referral to Pharm D needed: no     Current Outpatient Medications   Medication Sig    amLODIPine (NORVASC) 10 mg tablet Take 1 tablet by mouth once daily    hydrALAZINE (APRESOLINE) 100 mg tablet TAKE 1 TABLET BY MOUTH THREE TIMES DAILY    Blood-Glucose Meter (OneTouch Ultra2 Meter) monitoring kit Check glucose once daily.  Dx E 11.9    glucose blood VI test strips (Prodigy No Coding) strip Check fasting glucose once daily    pravastatin (PRAVACHOL) 20 mg tablet Take 1 tablet by mouth nightly    cloNIDine HCL (CATAPRES) 0.2 mg tablet Take 1 tablet by mouth twice daily    dorzolamide-timoloL (COSOPT) 22.3-6.8 mg/mL ophthalmic solution Administer 1 Drop to left eye Every morning. ferrous sulfate 325 mg (65 mg iron) tablet Take 65 mg by mouth Daily (before breakfast). docosahexaenoic acid/epa (FISH OIL PO) Take 1,000 mg by mouth daily. potassium chloride (K-DUR, KLOR-CON) 20 mEq tablet Take 20 mEq by mouth daily. latanoprost (XALATAN) 0.005 % ophthalmic solution Administer 1 Drop to both eyes daily. cholecalciferol, VITAMIN D3, (VITAMIN D3) 5,000 unit tab tablet Take  by mouth daily. Aspirin, Buffered 81 mg tab Take 1 tablet by mouth daily. No current facility-administered medications for this visit. [facility] follow up appointment(s):   Future Appointments   Date Time Provider Gee Montes   2/28/2023  9:00 AM Rosemarie Moon MD [facility] BS AMB   5/11/2023  9:40 AM Leti Beyer MD [facility] BS AMB        Non-[facility] follow up appointment(s):      Goals Addressed                   This Visit's Progress     Attends follow up appointments on schedule   On track     12/6/22 Patient will attend all scheduled appointments through 3/6/23       Knowledge and adherence of prescribed medication (ie. action, side effects, missed dose, etc.).    On track     12/6/22 Pt will take all medications prescribed to be evaluated on each outreach through 3/6/23

## 2023-02-06 ENCOUNTER — PATIENT OUTREACH (OUTPATIENT)
Dept: CASE MANAGEMENT | Age: 88
End: 2023-02-06

## 2023-02-06 NOTE — PROGRESS NOTES
Complex Case Management      Date/Time:  2023 1:13 PM    Method of communication with patient:phone    [address] [facility] ([facility]) contacted the patient by telephone to perform Ambulatory Care Coordination. Verified name and  (PHI) with patient as identifiers. Provided introduction to self, and explanation of the Ambulatory Care Manager's role. Reviewed most recent clinic visit w/ patient who verbalized understanding. Patient given an opportunity to ask questions. Top Challenges reviewed with the Provider   N/a     1. Hx of DM2, CKD III, neuropathy, hyperuricemia, htn, LLE, gout, anemia, aortic regurg, dCHF, HLD  3. Pt states doing well, no questions/issues. The patient agrees to contact the PCP office or the [facility][note 3]5 [facility] for questions related to their healthcare. Provided contact information for future reference. Disease Specific:   N/A    Home Health Active: No    DME Active: No    Barriers to care? Advanced age    Advance Care Planning:   Does patient have an Advance Directive:  reviewed and current     Medication(s):   Medication reconciliation was not performed with patient, who verbalizes understanding of administration of home medications. There were no barriers to obtaining medications identified at this time. Referral to Pharm D needed: no     Current Outpatient Medications   Medication Sig    amLODIPine (NORVASC) 10 mg tablet Take 1 tablet by mouth once daily    hydrALAZINE (APRESOLINE) 100 mg tablet TAKE 1 TABLET BY MOUTH THREE TIMES DAILY    Blood-Glucose Meter (OneTouch Ultra2 Meter) monitoring kit Check glucose once daily.  Dx E 11.9    glucose blood VI test strips (Prodigy No Coding) strip Check fasting glucose once daily    pravastatin (PRAVACHOL) 20 mg tablet Take 1 tablet by mouth nightly    cloNIDine HCL (CATAPRES) 0.2 mg tablet Take 1 tablet by mouth twice daily    dorzolamide-timoloL (COSOPT) 22.3-6.8 mg/mL ophthalmic solution Administer 1 Drop to left eye Every morning. ferrous sulfate 325 mg (65 mg iron) tablet Take 65 mg by mouth Daily (before breakfast). docosahexaenoic acid/epa (FISH OIL PO) Take 1,000 mg by mouth daily. potassium chloride (K-DUR, KLOR-CON) 20 mEq tablet Take 20 mEq by mouth daily. latanoprost (XALATAN) 0.005 % ophthalmic solution Administer 1 Drop to both eyes daily. cholecalciferol, VITAMIN D3, (VITAMIN D3) 5,000 unit tab tablet Take  by mouth daily. Aspirin, Buffered 81 mg tab Take 1 tablet by mouth daily. No current facility-administered medications for this visit. [facility] follow up appointment(s):   No future appointments. Non-[facility] follow up appointment(s):      Goals Addressed                   This Visit's Progress     Attends follow up appointments on schedule   On track     12/6/22 Patient will attend all scheduled appointments through 3/6/23       Knowledge and adherence of prescribed medication (ie. action, side effects, missed dose, etc.).    On track     12/6/22 Pt will take all medications prescribed to be evaluated on each outreach through 3/6/23

## 2023-02-14 DIAGNOSIS — E11.9 DIABETES MELLITUS TYPE 2, DIET-CONTROLLED (HCC): ICD-10-CM

## 2023-02-14 DIAGNOSIS — N18.4 CKD (CHRONIC KIDNEY DISEASE) STAGE 4, GFR 15-29 ML/MIN (HCC): Primary | ICD-10-CM

## 2023-02-14 DIAGNOSIS — M10.00 IDIOPATHIC GOUT, UNSPECIFIED CHRONICITY, UNSPECIFIED SITE: ICD-10-CM

## 2023-02-20 ENCOUNTER — CARE COORDINATION (OUTPATIENT)
Facility: CLINIC | Age: 88
End: 2023-02-20

## 2023-02-23 ENCOUNTER — HOSPITAL ENCOUNTER (OUTPATIENT)
Facility: HOSPITAL | Age: 88
Discharge: HOME | End: 2023-02-23
Payer: MEDICARE

## 2023-02-23 DIAGNOSIS — D63.8 ANEMIA OF CHRONIC DISEASE: Primary | ICD-10-CM

## 2023-02-23 DIAGNOSIS — N18.6 TYPE 2 DIABETES MELLITUS WITH ESRD (END-STAGE RENAL DISEASE) (HCC): ICD-10-CM

## 2023-02-23 DIAGNOSIS — E11.22 TYPE 2 DIABETES MELLITUS WITH ESRD (END-STAGE RENAL DISEASE) (HCC): ICD-10-CM

## 2023-02-23 LAB
ALBUMIN SERPL-MCNC: 3.5 G/DL (ref 3.4–5)
ALBUMIN/GLOB SERPL: 1 (ref 0.8–1.7)
ALP SERPL-CCNC: 32 U/L (ref 45–117)
ALT SERPL-CCNC: 11 U/L (ref 13–56)
ANION GAP SERPL CALC-SCNC: 8 MMOL/L (ref 3–18)
AST SERPL-CCNC: 19 U/L (ref 10–38)
BASOPHILS # BLD: 0 K/UL (ref 0–0.1)
BASOPHILS NFR BLD: 1 % (ref 0–2)
BILIRUB SERPL-MCNC: 0.6 MG/DL (ref 0.2–1)
BUN SERPL-MCNC: 69 MG/DL (ref 7–18)
BUN/CREAT SERPL: 27 (ref 12–20)
CALCIUM SERPL-MCNC: 9.2 MG/DL (ref 8.5–10.1)
CHLORIDE SERPL-SCNC: 109 MMOL/L (ref 100–111)
CO2 SERPL-SCNC: 23 MMOL/L (ref 21–32)
CREAT SERPL-MCNC: 2.54 MG/DL (ref 0.6–1.3)
DIFFERENTIAL METHOD BLD: ABNORMAL
EOSINOPHIL # BLD: 0.1 K/UL (ref 0–0.4)
EOSINOPHIL NFR BLD: 3 % (ref 0–5)
ERYTHROCYTE [DISTWIDTH] IN BLOOD BY AUTOMATED COUNT: 15.9 % (ref 11.6–14.5)
EST. AVERAGE GLUCOSE BLD GHB EST-MCNC: 134 MG/DL
GLOBULIN SER CALC-MCNC: 3.6 G/DL (ref 2–4)
GLUCOSE SERPL-MCNC: 134 MG/DL (ref 74–99)
HBA1C MFR BLD: 6.3 % (ref 4.2–5.6)
HCT VFR BLD AUTO: 28.4 % (ref 35–45)
HGB BLD-MCNC: 8.8 G/DL (ref 12–16)
IMM GRANULOCYTES # BLD AUTO: 0 K/UL (ref 0–0.04)
IMM GRANULOCYTES NFR BLD AUTO: 0 % (ref 0–0.5)
LYMPHOCYTES # BLD: 1.2 K/UL (ref 0.9–3.6)
LYMPHOCYTES NFR BLD: 27 % (ref 21–52)
MCH RBC QN AUTO: 27.8 PG (ref 24–34)
MCHC RBC AUTO-ENTMCNC: 31 G/DL (ref 31–37)
MCV RBC AUTO: 89.9 FL (ref 78–100)
MONOCYTES # BLD: 0.4 K/UL (ref 0.05–1.2)
MONOCYTES NFR BLD: 9 % (ref 3–10)
NEUTS SEG # BLD: 2.7 K/UL (ref 1.8–8)
NEUTS SEG NFR BLD: 59 % (ref 40–73)
NRBC # BLD: 0 K/UL (ref 0–0.01)
NRBC BLD-RTO: 0 PER 100 WBC
PLATELET # BLD AUTO: 226 K/UL (ref 135–420)
PMV BLD AUTO: 10.1 FL (ref 9.2–11.8)
POTASSIUM SERPL-SCNC: 4.3 MMOL/L (ref 3.5–5.5)
PROT SERPL-MCNC: 7.1 G/DL (ref 6.4–8.2)
RBC # BLD AUTO: 3.16 M/UL (ref 4.2–5.3)
SODIUM SERPL-SCNC: 140 MMOL/L (ref 136–145)
WBC # BLD AUTO: 4.5 K/UL (ref 4.6–13.2)

## 2023-02-23 PROCEDURE — 36415 COLL VENOUS BLD VENIPUNCTURE: CPT

## 2023-02-23 PROCEDURE — 83036 HEMOGLOBIN GLYCOSYLATED A1C: CPT

## 2023-02-23 PROCEDURE — 80053 COMPREHEN METABOLIC PANEL: CPT

## 2023-02-23 PROCEDURE — 85025 COMPLETE CBC W/AUTO DIFF WBC: CPT

## 2023-02-25 RX ORDER — CLONIDINE HYDROCHLORIDE 0.2 MG/1
TABLET ORAL
Qty: 180 TABLET | Refills: 1 | Status: SHIPPED | OUTPATIENT
Start: 2023-02-25

## 2023-02-27 ENCOUNTER — HOSPITAL ENCOUNTER (OUTPATIENT)
Facility: HOSPITAL | Age: 88
Discharge: HOME | End: 2023-03-02
Payer: MEDICARE

## 2023-02-27 DIAGNOSIS — D63.8 ANEMIA OF CHRONIC DISEASE: ICD-10-CM

## 2023-02-27 LAB
BASOPHILS # BLD: 0 K/UL (ref 0–0.1)
BASOPHILS NFR BLD: 1 % (ref 0–2)
DIFFERENTIAL METHOD BLD: ABNORMAL
EOSINOPHIL # BLD: 0.1 K/UL (ref 0–0.4)
EOSINOPHIL NFR BLD: 1 % (ref 0–5)
ERYTHROCYTE [DISTWIDTH] IN BLOOD BY AUTOMATED COUNT: 15.9 % (ref 11.6–14.5)
FERRITIN SERPL-MCNC: 107 NG/ML (ref 8–388)
HCT VFR BLD AUTO: 32.2 % (ref 35–45)
HGB BLD-MCNC: 10 G/DL (ref 12–16)
IMM GRANULOCYTES # BLD AUTO: 0 K/UL (ref 0–0.04)
IMM GRANULOCYTES NFR BLD AUTO: 0 % (ref 0–0.5)
IRON SATN MFR SERPL: 13 % (ref 20–50)
IRON SERPL-MCNC: 37 UG/DL (ref 50–175)
LYMPHOCYTES # BLD: 1.4 K/UL (ref 0.9–3.6)
LYMPHOCYTES NFR BLD: 21 % (ref 21–52)
MCH RBC QN AUTO: 27.9 PG (ref 24–34)
MCHC RBC AUTO-ENTMCNC: 31.1 G/DL (ref 31–37)
MCV RBC AUTO: 89.9 FL (ref 78–100)
MONOCYTES # BLD: 0.5 K/UL (ref 0.05–1.2)
MONOCYTES NFR BLD: 8 % (ref 3–10)
NEUTS SEG # BLD: 4.6 K/UL (ref 1.8–8)
NEUTS SEG NFR BLD: 69 % (ref 40–73)
NRBC # BLD: 0 K/UL (ref 0–0.01)
NRBC BLD-RTO: 0 PER 100 WBC
PLATELET # BLD AUTO: 253 K/UL (ref 135–420)
PMV BLD AUTO: 9.7 FL (ref 9.2–11.8)
RBC # BLD AUTO: 3.58 M/UL (ref 4.2–5.3)
TIBC SERPL-MCNC: 292 UG/DL (ref 250–450)
WBC # BLD AUTO: 6.6 K/UL (ref 4.6–13.2)

## 2023-02-27 PROCEDURE — 85025 COMPLETE CBC W/AUTO DIFF WBC: CPT

## 2023-02-27 PROCEDURE — 36415 COLL VENOUS BLD VENIPUNCTURE: CPT

## 2023-02-27 PROCEDURE — 83540 ASSAY OF IRON: CPT

## 2023-02-27 PROCEDURE — 82728 ASSAY OF FERRITIN: CPT

## 2023-02-27 NOTE — PROGRESS NOTES
Chief Complaint   Patient presents with    Anemia    Cholesterol Problem    Diabetes    Gout    Edema     Hx of leg edema     Hypertension    Results     Review of results         1. \"Have you been to the ER, urgent care clinic since your last visit? Hospitalized since your last visit? \" No    2. \"Have you seen or consulted any other health care providers outside of the [address] [place] [address] since your last visit? \" No     3. For patients aged 39-70: Has the patient had a colonoscopy / FIT/ Cologuard? NA - based on age      If the patient is female:    4. For patients aged 41-77: Has the patient had a mammogram within the past 2 years? NA - based on age or sex      11. For patients aged 21-65: Has the patient had a pap smear?  NA - based on age or sex

## 2023-02-28 ENCOUNTER — OFFICE VISIT (OUTPATIENT)
Age: 88
End: 2023-02-28

## 2023-02-28 VITALS
BODY MASS INDEX: 29.06 KG/M2 | HEART RATE: 67 BPM | WEIGHT: 144.13 LBS | SYSTOLIC BLOOD PRESSURE: 130 MMHG | TEMPERATURE: 97.8 F | RESPIRATION RATE: 16 BRPM | OXYGEN SATURATION: 98 % | DIASTOLIC BLOOD PRESSURE: 88 MMHG | HEIGHT: 59 IN

## 2023-02-28 DIAGNOSIS — N18.4 TYPE 2 DIABETES MELLITUS WITH STAGE 4 CHRONIC KIDNEY DISEASE, WITHOUT LONG-TERM CURRENT USE OF INSULIN (HCC): ICD-10-CM

## 2023-02-28 DIAGNOSIS — N18.4 CHRONIC KIDNEY DISEASE, STAGE 4 (SEVERE) (HCC): ICD-10-CM

## 2023-02-28 DIAGNOSIS — D63.8 ANEMIA OF CHRONIC DISEASE: ICD-10-CM

## 2023-02-28 DIAGNOSIS — E78.2 MIXED HYPERLIPIDEMIA: ICD-10-CM

## 2023-02-28 DIAGNOSIS — I50.32 CHRONIC DIASTOLIC (CONGESTIVE) HEART FAILURE (HCC): ICD-10-CM

## 2023-02-28 DIAGNOSIS — Z91.81 AT HIGH RISK FOR FALLS: ICD-10-CM

## 2023-02-28 DIAGNOSIS — E11.22 TYPE 2 DIABETES MELLITUS WITH STAGE 4 CHRONIC KIDNEY DISEASE, WITHOUT LONG-TERM CURRENT USE OF INSULIN (HCC): ICD-10-CM

## 2023-02-28 DIAGNOSIS — R01.1 HEART MURMUR: ICD-10-CM

## 2023-02-28 DIAGNOSIS — D50.9 IRON DEFICIENCY ANEMIA, UNSPECIFIED IRON DEFICIENCY ANEMIA TYPE: ICD-10-CM

## 2023-02-28 DIAGNOSIS — I50.32 DIASTOLIC CHF, CHRONIC (HCC): ICD-10-CM

## 2023-02-28 DIAGNOSIS — N18.4 CKD (CHRONIC KIDNEY DISEASE) STAGE 4, GFR 15-29 ML/MIN (HCC): ICD-10-CM

## 2023-02-28 DIAGNOSIS — E11.9 DIABETES MELLITUS TYPE 2, DIET-CONTROLLED (HCC): Primary | ICD-10-CM

## 2023-02-28 RX ORDER — CHLORAL HYDRATE 500 MG
1 CAPSULE ORAL DAILY
COMMUNITY

## 2023-02-28 RX ORDER — FERROUS SULFATE 325(65) MG
325 TABLET ORAL
Qty: 90 TABLET | Refills: 0 | Status: SHIPPED | OUTPATIENT
Start: 2023-02-28

## 2023-02-28 RX ORDER — BLOOD SUGAR DIAGNOSTIC
STRIP MISCELLANEOUS
COMMUNITY
Start: 2023-01-18

## 2023-02-28 SDOH — ECONOMIC STABILITY: HOUSING INSECURITY
IN THE LAST 12 MONTHS, WAS THERE A TIME WHEN YOU DID NOT HAVE A STEADY PLACE TO SLEEP OR SLEPT IN A SHELTER (INCLUDING NOW)?: NO

## 2023-02-28 SDOH — ECONOMIC STABILITY: FOOD INSECURITY: WITHIN THE PAST 12 MONTHS, YOU WORRIED THAT YOUR FOOD WOULD RUN OUT BEFORE YOU GOT MONEY TO BUY MORE.: NEVER TRUE

## 2023-02-28 SDOH — ECONOMIC STABILITY: INCOME INSECURITY: HOW HARD IS IT FOR YOU TO PAY FOR THE VERY BASICS LIKE FOOD, HOUSING, MEDICAL CARE, AND HEATING?: SOMEWHAT HARD

## 2023-02-28 SDOH — ECONOMIC STABILITY: FOOD INSECURITY: WITHIN THE PAST 12 MONTHS, THE FOOD YOU BOUGHT JUST DIDN'T LAST AND YOU DIDN'T HAVE MONEY TO GET MORE.: OFTEN TRUE

## 2023-02-28 ASSESSMENT — PATIENT HEALTH QUESTIONNAIRE - PHQ9
4. FEELING TIRED OR HAVING LITTLE ENERGY: 0
5. POOR APPETITE OR OVEREATING: 0
7. TROUBLE CONCENTRATING ON THINGS, SUCH AS READING THE NEWSPAPER OR WATCHING TELEVISION: 0
SUM OF ALL RESPONSES TO PHQ QUESTIONS 1-9: 0
8. MOVING OR SPEAKING SO SLOWLY THAT OTHER PEOPLE COULD HAVE NOTICED. OR THE OPPOSITE, BEING SO FIGETY OR RESTLESS THAT YOU HAVE BEEN MOVING AROUND A LOT MORE THAN USUAL: 0
10. IF YOU CHECKED OFF ANY PROBLEMS, HOW DIFFICULT HAVE THESE PROBLEMS MADE IT FOR YOU TO DO YOUR WORK, TAKE CARE OF THINGS AT HOME, OR GET ALONG WITH OTHER PEOPLE: 0
SUM OF ALL RESPONSES TO PHQ9 QUESTIONS 1 & 2: 0
3. TROUBLE FALLING OR STAYING ASLEEP: 0
1. LITTLE INTEREST OR PLEASURE IN DOING THINGS: 0
SUM OF ALL RESPONSES TO PHQ QUESTIONS 1-9: 0
9. THOUGHTS THAT YOU WOULD BE BETTER OFF DEAD, OR OF HURTING YOURSELF: 0
SUM OF ALL RESPONSES TO PHQ QUESTIONS 1-9: 0
6. FEELING BAD ABOUT YOURSELF - OR THAT YOU ARE A FAILURE OR HAVE LET YOURSELF OR YOUR FAMILY DOWN: 0
SUM OF ALL RESPONSES TO PHQ QUESTIONS 1-9: 0
2. FEELING DOWN, DEPRESSED OR HOPELESS: 0

## 2023-02-28 NOTE — PROGRESS NOTES
[facility], 80 y.o.,  female    SUBJECTIVE  Ff-up      HTN/CKD 3-4 GFR . She continues to take clonidine, hydralazine and norvasc. She is following dr. Geremias Quiroz nephrology, reviewed labs creatinine stable 2's. She does not have ff-up appt yet recommended for May, pt reminded    Asymptomatic bradycardia/aortic regurgitation- She has h/o AV block and advised against BB. Following cardiology Dr. Sharda Gardner appt 5/31/2023, no changes. DM w/ CKD 3 and neuorpathy-  diet controlled, She Reports FBG  1teens. HL-on pravachol       GERD- EGD gastritis/schatzis ring dilated 9/2020, doing well on protonix. Reviewed labs has worsening anemia, has known anemia of chronic disease hgb 10's, down to 8 then repeat 10, with mild iron def. Was Following dr. Sonia Felix, will have her follow up again along with hematology consult    Oa of multiple joints, using walker, + high risk for falls,discussed fall precautions,  pt declines formal PT for now, also with concerns regarding social support living with elderly  with dementia. Niece assisting, ultimately reports to be managing ok day to day.      ROS:  See HPI, all others negative        Patient Active Problem List   Diagnosis Code    Essential hypertension I10    Mixed hyperlipidemia E78.2    Advanced directives, counseling/discussion Z71.89    Controlled type 2 diabetes mellitus with stage 3 chronic kidney disease, without long-term current use of insulin ([facility] E11.22, N18.3         Current Outpatient Medications:     ASPIRIN 81 PO, Take 1 tablet by mouth daily, Disp: , Rfl:     ferrous sulfate (IRON 325) 325 (65 Fe) MG tablet, Take 1 tablet by mouth every morning (before breakfast), Disp: 90 tablet, Rfl: 0    Omega-3 Fatty Acids (FISH OIL) 1000 MG capsule, Take 1 tablet by mouth daily, Disp: , Rfl:     ONETOUCH VERIO strip, Use to check fasting blood sugar one hour prior to breakfast and 1 hour after dinner, Disp: , Rfl:     cloNIDine (CATAPRES) 0.2 MG tablet, Take 1 tablet by mouth twice daily, Disp: 180 tablet, Rfl: 1    amLODIPine (NORVASC) 10 MG tablet, Take 1 tablet by mouth once daily, Disp: , Rfl:     vitamin D3 (CHOLECALCIFEROL) 125 MCG (5000 UT) TABS tablet, Take by mouth daily, Disp: , Rfl:     dorzolamide-timolol (COSOPT) 22.3-6.8 MG/ML ophthalmic solution, Apply 1 drop to eye every morning, Disp: , Rfl:     hydrALAZINE (APRESOLINE) 100 MG tablet, TAKE 1 TABLET BY MOUTH THREE TIMES DAILY, Disp: , Rfl:     latanoprost (XALATAN) 0.005 % ophthalmic solution, Apply 1 drop to eye daily, Disp: , Rfl:     potassium chloride (KLOR-CON M) 20 MEQ extended release tablet, Take 20 mEq by mouth daily, Disp: , Rfl:     pravastatin (PRAVACHOL) 20 MG tablet, Take 20 mg by mouth, Disp: , Rfl:       No Known Allergies    Past Medical History:   Diagnosis Date    Anemia     Carotid bruit 12/07/2013    Carotid Duplex: 1. Bilateral 1-49% stenosis of the internal carotid arteries. 2. No significant stenosis in the external carotid arteries bilaterally. 3. Antegrade flow in both vetebral arteries. 4. Normal flow in both subclavian arteries. Plaque Morphology: 1. Hyperechoic plaque in the bulb and right ICA. 2. Hyperechoic plaque in the bulb and left ICA. CKD (chronic kidney disease) stage 3, GFR 30-59 ml/min     Diabetes ([facility]     NIDDM    Gastritis 10/27/2014    Duodenum Bx: No Specific Pathologic Abnormality. No Villous Abnormality. 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 No dysplasia or malignancy identified. Bacterial Organisms c/w H. Pylori are present. Z-Line Bx: GE mucosa w/ Acute/Chronic Inflammation & Reactive Changes. Focal Specialized Type Connors Mucosa Identified. No Dysplasia or Malignancy Identified.      Gout 12/10/2009    Elevated Uric Acid Level    Hyperlipidemia     Hypertension     RENETTA (iron deficiency anemia)        Social History     Social History    Marital status:      Spouse name: N/A    Number of children: N/A Years of education: N/A     Occupational History    Not on file.      Social History Main Topics    Smoking status: Never Smoker    Smokeless tobacco: Never Used    Alcohol use No    Drug use: No    Sexual activity: Not Currently     Partners: Male     Birth control/ protection: None     Other Topics Concern    Not on file     Social History Narrative       Family History   Problem Relation Age of Onset    Hypertension Mother     Stroke Mother     Stroke Father          OBJECTIVE    Physical Exam:     /88 (Site: Right Upper Arm, Position: Sitting, Cuff Size: Large Adult)   Pulse 67   Temp 97.8 °F (36.6 °C) (Temporal)   Resp 16   Ht 4' 11\" (1.499 m)   Wt 144 lb 2 oz (65.4 kg)   SpO2 98%   BMI 29.11 kg/m²           General: alert, elderly, AA, using walker  Neck: supple, no adenopathy palpated  CVS: normal rate,  regular rhythm, + murmur  Lungs:clear to ausculation bilaterally, no crackles, wheezing or rhonchi noted  Extremities: + grade 1 bipedal edema- chronic  Feet: no lesions  Skin: warm, no lesions, rashes noted  Psych:  mood and affect normal  CMP:   Lab Results   Component Value Date/Time     02/23/2023 11:12 AM    K 4.3 02/23/2023 11:12 AM     02/23/2023 11:12 AM    CO2 23 02/23/2023 11:12 AM    BUN 69 02/23/2023 11:12 AM    CREATININE 2.54 02/23/2023 11:12 AM    GLUCOSE 134 02/23/2023 11:12 AM    CALCIUM 9.2 02/23/2023 11:12 AM    PROT 7.1 02/23/2023 11:12 AM    LABALBU 3.5 02/23/2023 11:12 AM    BILITOT 0.6 02/23/2023 11:12 AM    AST 19 02/23/2023 11:12 AM    ALT 11 02/23/2023 11:12 AM        CBC:   Lab Results   Component Value Date/Time    WBC 6.6 02/27/2023 09:48 AM    RBC 3.58 02/27/2023 09:48 AM    HGB 10.0 02/27/2023 09:48 AM    HCT 32.2 02/27/2023 09:48 AM    MCV 89.9 02/27/2023 09:48 AM    MCH 27.9 02/27/2023 09:48 AM    MCHC 31.1 02/27/2023 09:48 AM    RDW 15.9 02/27/2023 09:48 AM     02/27/2023 09:48 AM    MPV 9.7 02/27/2023 09:48 AM        Lipids   Lab Results Component Value Date/Time    CHOL 163 10/05/2022 11:47 AM    TRIG 107 10/05/2022 11:47 AM    LDLCALC 79.6 10/05/2022 11:47 AM    LABVLDL 21.4 10/05/2022 11:47 AM    CHOLHDLRATIO 2.6 10/05/2022 11:47 AM         Imaging results last 24 hrs :No results found. Imaging results impression onlyNo results found.    No orders to display       A1c:   Hemoglobin A1C   Date Value Ref Range Status   02/23/2023 6.3 (H) 4.2 - 5.6 % Final     Comment:     (NOTE)  HbA1C Interpretive Ranges  <5.7              Normal  5.7 - 6.4         Consider Prediabetes  >6.5              Consider Diabetes     10/05/2022 6.5 (H) 4.2 - 5.6 % Final     Comment:     (NOTE)  HbA1C Interpretive Ranges  <5.7              Normal  5.7 - 6.4         Consider Prediabetes  >6.5              Consider Diabetes     07/18/2022 6.8 (H) 4.2 - 5.6 % Final     Comment:     (NOTE)  HbA1C Interpretive Ranges  <5.7              Normal  5.7 - 6.4         Consider Prediabetes  >6.5              Consider Diabetes             ASSESSMENT/PLAN  Diagnoses and all orders for this visit:    Controlled type 2 diabetes mellitus with stage 3 chronic kidney disease, without long-term current use of insulin (HCC)   diet controlled  a1c 6.3  Cbc/cmp/a1c prior to next visit  monitoring    Essential hypertension  Fiar control  Cont   norvasc, clonidine, hydralazine  Cont  low dose lasix/kcl prn for leg edema  Avoid bb with h/o 1st deg av block- cardiology following    Leg edema  Advised compression stockings   Low salt diet,   Prn lasix/kcl     Mixed hyperlipidemia  On pravachol    Anemia of chronic disease  Hgb dropped to 8 from  Baseline 10's  Likely anemia of chronic disease, with iron def  Start daily fe 325 mg  Plan on consult with to GI and hematology   Keep appt with nephrology, due in May    Heart murmur  Aortic regurg  Asymptomatic  Monitoring, following cards appt w/ dr. Arielle Ayala    Diastolic chf, chronic  Appears compensated  Cont prn lasix  Intolerant to BB    CKD 4  Avoid nsaids, keep hydrated  monitoring  Following dr. Hyacinth Donis     At risk for falls  Fall precautions, consider formal PT    BRING MED BOTTLES EVERY VISIT      Follow-up Disposition:  Ff-up in 3  months     Patient understands plan of care. Patient has provided input and agrees with goals.

## 2023-03-09 ENCOUNTER — CARE COORDINATION (OUTPATIENT)
Facility: CLINIC | Age: 88
End: 2023-03-09

## 2023-03-09 NOTE — CARE COORDINATION
Patient has graduated from the Complex Care program on 3/9/23. Patient/family has the ability to self-manage at this time. Care management goals have been completed. No further Ambulatory Care Manager follow up scheduled. Patient has Ambulatory Care Manager's contact information for any further questions, concerns, or needs.   Patients upcoming visits:    Future Appointments   Date Time Provider Ted Conner   5/11/2023  9:40 AM Dulce Moncada MD [facility] BS AMB   5/31/2023  9:30 AM Martha Mendez MD [facility] BS AMB   7/18/2023  9:00 AM Anthony Christian MD [facility] BS AMB

## 2023-03-13 ENCOUNTER — HOSPITAL ENCOUNTER (OUTPATIENT)
Facility: HOSPITAL | Age: 88
Discharge: HOME | End: 2023-03-16
Payer: MEDICARE

## 2023-03-13 DIAGNOSIS — N18.4 CHRONIC KIDNEY DISEASE, STAGE IV (SEVERE) (HCC): ICD-10-CM

## 2023-03-13 DIAGNOSIS — N25.81 SECONDARY HYPERPARATHYROIDISM OF RENAL ORIGIN (HCC): ICD-10-CM

## 2023-03-13 DIAGNOSIS — R80.1 PERSISTENT PROTEINURIA, UNSPECIFIED: ICD-10-CM

## 2023-03-13 DIAGNOSIS — N18.9 CHRONIC KIDNEY DISEASE, UNSPECIFIED CKD STAGE: ICD-10-CM

## 2023-03-13 LAB
ALBUMIN SERPL-MCNC: 3.8 G/DL (ref 3.4–5)
ANION GAP SERPL CALC-SCNC: 5 MMOL/L (ref 3–18)
BUN SERPL-MCNC: 72 MG/DL (ref 7–18)
BUN/CREAT SERPL: 29 (ref 12–20)
CALCIUM SERPL-MCNC: 9.3 MG/DL (ref 8.5–10.1)
CALCIUM SERPL-MCNC: 9.4 MG/DL (ref 8.5–10.1)
CHLORIDE SERPL-SCNC: 110 MMOL/L (ref 100–111)
CO2 SERPL-SCNC: 23 MMOL/L (ref 21–32)
CREAT SERPL-MCNC: 2.51 MG/DL (ref 0.6–1.3)
CREAT UR-MCNC: 44 MG/DL (ref 30–125)
ERYTHROCYTE [DISTWIDTH] IN BLOOD BY AUTOMATED COUNT: 15.4 % (ref 11.6–14.5)
GLUCOSE SERPL-MCNC: 138 MG/DL (ref 74–99)
HCT VFR BLD AUTO: 31 % (ref 35–45)
HGB BLD-MCNC: 9.7 G/DL (ref 12–16)
MCH RBC QN AUTO: 27.6 PG (ref 24–34)
MCHC RBC AUTO-ENTMCNC: 31.3 G/DL (ref 31–37)
MCV RBC AUTO: 88.1 FL (ref 78–100)
MICROALBUMIN UR-MCNC: 108 MG/DL (ref 0–3)
MICROALBUMIN/CREAT UR-RTO: 2455 MG/G (ref 0–30)
NRBC # BLD: 0 K/UL (ref 0–0.01)
NRBC BLD-RTO: 0 PER 100 WBC
PHOSPHATE SERPL-MCNC: 3.6 MG/DL (ref 2.5–4.9)
PLATELET # BLD AUTO: 247 K/UL (ref 135–420)
PMV BLD AUTO: 10.1 FL (ref 9.2–11.8)
POTASSIUM SERPL-SCNC: 4.1 MMOL/L (ref 3.5–5.5)
PTH-INTACT SERPL-MCNC: 69 PG/ML (ref 18.4–88)
RBC # BLD AUTO: 3.52 M/UL (ref 4.2–5.3)
SODIUM SERPL-SCNC: 138 MMOL/L (ref 136–145)
WBC # BLD AUTO: 4.7 K/UL (ref 4.6–13.2)

## 2023-03-13 PROCEDURE — 83970 ASSAY OF PARATHORMONE: CPT

## 2023-03-13 PROCEDURE — 82570 ASSAY OF URINE CREATININE: CPT

## 2023-03-13 PROCEDURE — 36415 COLL VENOUS BLD VENIPUNCTURE: CPT

## 2023-03-13 PROCEDURE — 80069 RENAL FUNCTION PANEL: CPT

## 2023-03-13 PROCEDURE — 85027 COMPLETE CBC AUTOMATED: CPT

## 2023-05-16 ENCOUNTER — HOSPITAL ENCOUNTER (OUTPATIENT)
Facility: HOSPITAL | Age: 88
Discharge: HOME | End: 2023-05-19
Payer: MEDICARE

## 2023-05-16 DIAGNOSIS — E11.9 DIABETES MELLITUS WITHOUT COMPLICATION (HCC): ICD-10-CM

## 2023-05-16 LAB
ALBUMIN SERPL-MCNC: 3.8 G/DL (ref 3.4–5)
ALBUMIN/GLOB SERPL: 1.1 (ref 0.8–1.7)
ALP SERPL-CCNC: 40 U/L (ref 45–117)
ALT SERPL-CCNC: 10 U/L (ref 13–56)
ANION GAP SERPL CALC-SCNC: 8 MMOL/L (ref 3–18)
AST SERPL-CCNC: 18 U/L (ref 10–38)
BASOPHILS # BLD: 0 K/UL (ref 0–0.1)
BASOPHILS NFR BLD: 1 % (ref 0–2)
BILIRUB SERPL-MCNC: 0.4 MG/DL (ref 0.2–1)
BUN SERPL-MCNC: 64 MG/DL (ref 7–18)
BUN/CREAT SERPL: 28 (ref 12–20)
CALCIUM SERPL-MCNC: 9.4 MG/DL (ref 8.5–10.1)
CHLORIDE SERPL-SCNC: 107 MMOL/L (ref 100–111)
CO2 SERPL-SCNC: 24 MMOL/L (ref 21–32)
CREAT SERPL-MCNC: 2.31 MG/DL (ref 0.6–1.3)
DIFFERENTIAL METHOD BLD: ABNORMAL
EOSINOPHIL # BLD: 0.1 K/UL (ref 0–0.4)
EOSINOPHIL NFR BLD: 2 % (ref 0–5)
ERYTHROCYTE [DISTWIDTH] IN BLOOD BY AUTOMATED COUNT: 14.8 % (ref 11.6–14.5)
EST. AVERAGE GLUCOSE BLD GHB EST-MCNC: 140 MG/DL
GLOBULIN SER CALC-MCNC: 3.5 G/DL (ref 2–4)
GLUCOSE SERPL-MCNC: 141 MG/DL (ref 74–99)
HBA1C MFR BLD: 6.5 % (ref 4.2–5.6)
HCT VFR BLD AUTO: 30.1 % (ref 35–45)
HGB BLD-MCNC: 9.5 G/DL (ref 12–16)
IMM GRANULOCYTES # BLD AUTO: 0 K/UL (ref 0–0.04)
IMM GRANULOCYTES NFR BLD AUTO: 0 % (ref 0–0.5)
LYMPHOCYTES # BLD: 1.2 K/UL (ref 0.9–3.6)
LYMPHOCYTES NFR BLD: 23 % (ref 21–52)
MCH RBC QN AUTO: 27.9 PG (ref 24–34)
MCHC RBC AUTO-ENTMCNC: 31.6 G/DL (ref 31–37)
MCV RBC AUTO: 88.3 FL (ref 78–100)
MONOCYTES # BLD: 0.4 K/UL (ref 0.05–1.2)
MONOCYTES NFR BLD: 8 % (ref 3–10)
NEUTS SEG # BLD: 3.5 K/UL (ref 1.8–8)
NEUTS SEG NFR BLD: 66 % (ref 40–73)
NRBC # BLD: 0 K/UL (ref 0–0.01)
NRBC BLD-RTO: 0 PER 100 WBC
PLATELET # BLD AUTO: 252 K/UL (ref 135–420)
PMV BLD AUTO: 10.1 FL (ref 9.2–11.8)
POTASSIUM SERPL-SCNC: 4 MMOL/L (ref 3.5–5.5)
PROT SERPL-MCNC: 7.3 G/DL (ref 6.4–8.2)
RBC # BLD AUTO: 3.41 M/UL (ref 4.2–5.3)
SODIUM SERPL-SCNC: 139 MMOL/L (ref 136–145)
WBC # BLD AUTO: 5.3 K/UL (ref 4.6–13.2)

## 2023-05-16 PROCEDURE — 36415 COLL VENOUS BLD VENIPUNCTURE: CPT

## 2023-05-16 PROCEDURE — 83036 HEMOGLOBIN GLYCOSYLATED A1C: CPT

## 2023-05-16 PROCEDURE — 80053 COMPREHEN METABOLIC PANEL: CPT

## 2023-05-16 PROCEDURE — 85025 COMPLETE CBC W/AUTO DIFF WBC: CPT

## 2023-05-18 NOTE — PATIENT INSTRUCTIONS

## 2023-05-31 ENCOUNTER — OFFICE VISIT (OUTPATIENT)
Age: 88
End: 2023-05-31
Payer: MEDICARE

## 2023-05-31 VITALS
OXYGEN SATURATION: 99 % | TEMPERATURE: 98 F | HEIGHT: 59 IN | HEART RATE: 74 BPM | SYSTOLIC BLOOD PRESSURE: 134 MMHG | BODY MASS INDEX: 28.63 KG/M2 | RESPIRATION RATE: 16 BRPM | WEIGHT: 142 LBS | DIASTOLIC BLOOD PRESSURE: 86 MMHG

## 2023-05-31 DIAGNOSIS — E78.2 MIXED HYPERLIPIDEMIA: ICD-10-CM

## 2023-05-31 DIAGNOSIS — I50.32 DIASTOLIC CHF, CHRONIC (HCC): ICD-10-CM

## 2023-05-31 DIAGNOSIS — I10 ESSENTIAL HYPERTENSION: ICD-10-CM

## 2023-05-31 DIAGNOSIS — E11.9 DIABETES MELLITUS TYPE 2, DIET-CONTROLLED (HCC): Primary | ICD-10-CM

## 2023-05-31 DIAGNOSIS — R54 FRAIL ELDERLY: ICD-10-CM

## 2023-05-31 DIAGNOSIS — N18.4 CKD (CHRONIC KIDNEY DISEASE) STAGE 4, GFR 15-29 ML/MIN (HCC): ICD-10-CM

## 2023-05-31 DIAGNOSIS — R01.1 HEART MURMUR: ICD-10-CM

## 2023-05-31 DIAGNOSIS — D63.8 ANEMIA OF CHRONIC DISEASE: ICD-10-CM

## 2023-05-31 PROCEDURE — 1036F TOBACCO NON-USER: CPT | Performed by: FAMILY MEDICINE

## 2023-05-31 PROCEDURE — G8427 DOCREV CUR MEDS BY ELIG CLIN: HCPCS | Performed by: FAMILY MEDICINE

## 2023-05-31 PROCEDURE — G8417 CALC BMI ABV UP PARAM F/U: HCPCS | Performed by: FAMILY MEDICINE

## 2023-05-31 PROCEDURE — 99214 OFFICE O/P EST MOD 30 MIN: CPT | Performed by: FAMILY MEDICINE

## 2023-05-31 PROCEDURE — 1090F PRES/ABSN URINE INCON ASSESS: CPT | Performed by: FAMILY MEDICINE

## 2023-05-31 PROCEDURE — 3044F HG A1C LEVEL LT 7.0%: CPT | Performed by: FAMILY MEDICINE

## 2023-05-31 PROCEDURE — 1123F ACP DISCUSS/DSCN MKR DOCD: CPT | Performed by: FAMILY MEDICINE

## 2023-05-31 RX ORDER — CLONIDINE HYDROCHLORIDE 0.2 MG/1
0.2 TABLET ORAL 2 TIMES DAILY
Qty: 180 TABLET | Refills: 3 | Status: SHIPPED | OUTPATIENT
Start: 2023-05-31

## 2023-05-31 RX ORDER — HYDRALAZINE HYDROCHLORIDE 100 MG/1
100 TABLET, FILM COATED ORAL 3 TIMES DAILY
Qty: 270 TABLET | Refills: 3 | Status: SHIPPED | OUTPATIENT
Start: 2023-05-31

## 2023-05-31 RX ORDER — AMLODIPINE BESYLATE 10 MG/1
10 TABLET ORAL DAILY
Qty: 90 TABLET | Refills: 3 | Status: SHIPPED | OUTPATIENT
Start: 2023-05-31

## 2023-05-31 RX ORDER — FERROUS SULFATE 325(65) MG
325 TABLET ORAL
Qty: 90 TABLET | Refills: 3 | Status: SHIPPED | OUTPATIENT
Start: 2023-05-31

## 2023-05-31 RX ORDER — BUMETANIDE 0.5 MG/1
0.5 TABLET ORAL DAILY
COMMUNITY
Start: 2023-04-20

## 2023-05-31 NOTE — PROGRESS NOTES
1. \"Have you been to the ER, urgent care clinic since your last visit? Hospitalized since your last visit? \" No    2. \"Have you seen or consulted any other health care providers outside of the [address] [place] [address] since your last visit? \" Dr. Tali Romero     3. For patients aged 39-70: Has the patient had a colonoscopy / FIT/ Cologuard? NA - based on age      If the patient is female:    4. For patients aged 41-77: Has the patient had a mammogram within the past 2 years? NA - based on age or sex      11. For patients aged 21-65: Has the patient had a pap smear?  NA - based on age or sex
Hypertension     RENETTA (iron deficiency anemia)        Social History     Social History    Marital status:      Spouse name: N/A    Number of children: N/A    Years of education: N/A     Occupational History    Not on file.      Social History Main Topics    Smoking status: Never Smoker    Smokeless tobacco: Never Used    Alcohol use No    Drug use: No    Sexual activity: Not Currently     Partners: Male     Birth control/ protection: None     Other Topics Concern    Not on file     Social History Narrative       Family History   Problem Relation Age of Onset    Hypertension Mother     Stroke Mother     Stroke Father          OBJECTIVE    Physical Exam:     /86 (Site: Right Upper Arm, Position: Sitting, Cuff Size: Medium Adult)   Pulse 74   Temp 98 °F (36.7 °C) (Temporal)   Resp 16   Ht 4' 11\" (1.499 m)   Wt 142 lb (64.4 kg)   SpO2 99%   BMI 28.68 kg/m²           General: alert, elderly, AA, using walker  Neck: supple, no adenopathy palpated  CVS: normal rate,  regular rhythm, + murmur  Lungs:clear to ausculation bilaterally, no crackles, wheezing or rhonchi noted  Extremities: + grade 1 bipedal edema- chronic  Feet: no lesions  Skin: warm, no lesions, rashes noted  Psych:  mood and affect normal  CMP:   Lab Results   Component Value Date/Time     05/16/2023 11:18 AM    K 4.0 05/16/2023 11:18 AM     05/16/2023 11:18 AM    CO2 24 05/16/2023 11:18 AM    BUN 64 05/16/2023 11:18 AM    CREATININE 2.31 05/16/2023 11:18 AM    GLUCOSE 141 05/16/2023 11:18 AM    CALCIUM 9.4 05/16/2023 11:18 AM    PROT 7.3 05/16/2023 11:18 AM    LABALBU 3.8 05/16/2023 11:18 AM    BILITOT 0.4 05/16/2023 11:18 AM    AST 18 05/16/2023 11:18 AM    ALT 10 05/16/2023 11:18 AM        CBC:   Lab Results   Component Value Date/Time    WBC 5.3 05/16/2023 11:18 AM    RBC 3.41 05/16/2023 11:18 AM    HGB 9.5 05/16/2023 11:18 AM    HCT 30.1 05/16/2023 11:18 AM    MCV 88.3 05/16/2023 11:18 AM    MCH 27.9 05/16/2023 11:18 AM

## 2023-06-09 ENCOUNTER — HOSPITAL ENCOUNTER (OUTPATIENT)
Facility: HOSPITAL | Age: 88
End: 2023-06-09
Payer: MEDICARE

## 2023-06-09 DIAGNOSIS — N18.4 CHRONIC KIDNEY DISEASE, STAGE IV (SEVERE) (HCC): ICD-10-CM

## 2023-06-09 DIAGNOSIS — N25.81 SECONDARY HYPERPARATHYROIDISM OF RENAL ORIGIN (HCC): ICD-10-CM

## 2023-06-09 DIAGNOSIS — I12.9 RENAL HYPERTENSION: ICD-10-CM

## 2023-06-09 DIAGNOSIS — N18.9 CHRONIC KIDNEY DISEASE, UNSPECIFIED CKD STAGE: ICD-10-CM

## 2023-06-09 LAB
ALBUMIN SERPL-MCNC: 3.4 G/DL (ref 3.4–5)
ANION GAP SERPL CALC-SCNC: 7 MMOL/L (ref 3–18)
BASOPHILS # BLD: 0 K/UL (ref 0–0.1)
BASOPHILS NFR BLD: 0 % (ref 0–2)
BUN SERPL-MCNC: 59 MG/DL (ref 7–18)
BUN/CREAT SERPL: 25 (ref 12–20)
CALCIUM SERPL-MCNC: 9 MG/DL (ref 8.5–10.1)
CALCIUM SERPL-MCNC: 9.1 MG/DL (ref 8.5–10.1)
CHLORIDE SERPL-SCNC: 107 MMOL/L (ref 100–111)
CO2 SERPL-SCNC: 24 MMOL/L (ref 21–32)
CREAT SERPL-MCNC: 2.38 MG/DL (ref 0.6–1.3)
CREAT UR-MCNC: 68 MG/DL (ref 30–125)
DIFFERENTIAL METHOD BLD: ABNORMAL
EOSINOPHIL # BLD: 0.1 K/UL (ref 0–0.4)
EOSINOPHIL NFR BLD: 1 % (ref 0–5)
ERYTHROCYTE [DISTWIDTH] IN BLOOD BY AUTOMATED COUNT: 15.6 % (ref 11.6–14.5)
FERRITIN SERPL-MCNC: 174 NG/ML (ref 8–388)
GLUCOSE SERPL-MCNC: 120 MG/DL (ref 74–99)
HCT VFR BLD AUTO: 29 % (ref 35–45)
HGB BLD-MCNC: 9.1 G/DL (ref 12–16)
IMM GRANULOCYTES # BLD AUTO: 0 K/UL (ref 0–0.04)
IMM GRANULOCYTES NFR BLD AUTO: 0 % (ref 0–0.5)
IRON SATN MFR SERPL: 9 % (ref 20–50)
IRON SERPL-MCNC: 20 UG/DL (ref 50–175)
LYMPHOCYTES # BLD: 1.3 K/UL (ref 0.9–3.6)
LYMPHOCYTES NFR BLD: 15 % (ref 21–52)
MCH RBC QN AUTO: 28.3 PG (ref 24–34)
MCHC RBC AUTO-ENTMCNC: 31.4 G/DL (ref 31–37)
MCV RBC AUTO: 90.3 FL (ref 78–100)
MICROALBUMIN UR-MCNC: 78.8 MG/DL (ref 0–3)
MICROALBUMIN/CREAT UR-RTO: 1159 MG/G (ref 0–30)
MONOCYTES # BLD: 0.6 K/UL (ref 0.05–1.2)
MONOCYTES NFR BLD: 7 % (ref 3–10)
NEUTS SEG # BLD: 6.9 K/UL (ref 1.8–8)
NEUTS SEG NFR BLD: 77 % (ref 40–73)
NRBC # BLD: 0 K/UL (ref 0–0.01)
NRBC BLD-RTO: 0 PER 100 WBC
PHOSPHATE SERPL-MCNC: 3.6 MG/DL (ref 2.5–4.9)
PLATELET # BLD AUTO: 239 K/UL (ref 135–420)
PMV BLD AUTO: 9.7 FL (ref 9.2–11.8)
POTASSIUM SERPL-SCNC: 4.2 MMOL/L (ref 3.5–5.5)
PTH-INTACT SERPL-MCNC: 80 PG/ML (ref 18.4–88)
RBC # BLD AUTO: 3.21 M/UL (ref 4.2–5.3)
SODIUM SERPL-SCNC: 138 MMOL/L (ref 136–145)
TIBC SERPL-MCNC: 213 UG/DL (ref 250–450)
WBC # BLD AUTO: 9 K/UL (ref 4.6–13.2)

## 2023-06-09 PROCEDURE — 82728 ASSAY OF FERRITIN: CPT

## 2023-06-09 PROCEDURE — 83970 ASSAY OF PARATHORMONE: CPT

## 2023-06-09 PROCEDURE — 83550 IRON BINDING TEST: CPT

## 2023-06-09 PROCEDURE — 83540 ASSAY OF IRON: CPT

## 2023-06-09 PROCEDURE — 82570 ASSAY OF URINE CREATININE: CPT

## 2023-06-09 PROCEDURE — 36415 COLL VENOUS BLD VENIPUNCTURE: CPT

## 2023-06-09 PROCEDURE — 80069 RENAL FUNCTION PANEL: CPT

## 2023-06-09 PROCEDURE — 85025 COMPLETE CBC W/AUTO DIFF WBC: CPT

## 2023-06-09 PROCEDURE — 82043 UR ALBUMIN QUANTITATIVE: CPT

## 2023-06-27 ENCOUNTER — CLINICAL DOCUMENTATION (OUTPATIENT)
Age: 88
End: 2023-06-27

## 2023-07-03 ENCOUNTER — OFFICE VISIT (OUTPATIENT)
Age: 88
End: 2023-07-03
Payer: MEDICARE

## 2023-07-03 VITALS
HEIGHT: 59 IN | SYSTOLIC BLOOD PRESSURE: 130 MMHG | BODY MASS INDEX: 27.21 KG/M2 | WEIGHT: 135 LBS | DIASTOLIC BLOOD PRESSURE: 70 MMHG | OXYGEN SATURATION: 99 % | HEART RATE: 59 BPM

## 2023-07-03 DIAGNOSIS — E78.2 MIXED HYPERLIPIDEMIA: ICD-10-CM

## 2023-07-03 DIAGNOSIS — N18.30 STAGE 3 CHRONIC KIDNEY DISEASE, UNSPECIFIED WHETHER STAGE 3A OR 3B CKD (HCC): ICD-10-CM

## 2023-07-03 DIAGNOSIS — R00.1 BRADYCARDIA: ICD-10-CM

## 2023-07-03 DIAGNOSIS — I35.1 NONRHEUMATIC AORTIC VALVE INSUFFICIENCY: Primary | ICD-10-CM

## 2023-07-03 DIAGNOSIS — I50.32 DIASTOLIC CHF, CHRONIC (HCC): ICD-10-CM

## 2023-07-03 DIAGNOSIS — I10 ESSENTIAL HYPERTENSION: ICD-10-CM

## 2023-07-03 PROBLEM — N18.9 CHRONIC RENAL FAILURE: Status: ACTIVE | Noted: 2019-09-01

## 2023-07-03 PROBLEM — E11.9 DIET-CONTROLLED TYPE 2 DIABETES MELLITUS (HCC): Status: ACTIVE | Noted: 2017-08-02

## 2023-07-03 PROBLEM — T78.00XA ANAPHYLACTIC REACTION DUE TO UNSPECIFIED FOOD, INITIAL ENCOUNTER: Status: ACTIVE | Noted: 2023-03-16

## 2023-07-03 PROBLEM — R60.0 EDEMA OF LOWER EXTREMITY: Status: ACTIVE | Noted: 2019-07-25

## 2023-07-03 PROCEDURE — 1036F TOBACCO NON-USER: CPT | Performed by: INTERNAL MEDICINE

## 2023-07-03 PROCEDURE — 1123F ACP DISCUSS/DSCN MKR DOCD: CPT | Performed by: INTERNAL MEDICINE

## 2023-07-03 PROCEDURE — G8427 DOCREV CUR MEDS BY ELIG CLIN: HCPCS | Performed by: INTERNAL MEDICINE

## 2023-07-03 PROCEDURE — 99214 OFFICE O/P EST MOD 30 MIN: CPT | Performed by: INTERNAL MEDICINE

## 2023-07-03 PROCEDURE — G8417 CALC BMI ABV UP PARAM F/U: HCPCS | Performed by: INTERNAL MEDICINE

## 2023-07-03 PROCEDURE — 1090F PRES/ABSN URINE INCON ASSESS: CPT | Performed by: INTERNAL MEDICINE

## 2023-07-03 PROCEDURE — 93000 ELECTROCARDIOGRAM COMPLETE: CPT | Performed by: INTERNAL MEDICINE

## 2023-07-03 ASSESSMENT — ANXIETY QUESTIONNAIRES
7. FEELING AFRAID AS IF SOMETHING AWFUL MIGHT HAPPEN: 0
2. NOT BEING ABLE TO STOP OR CONTROL WORRYING: 0
GAD7 TOTAL SCORE: 0
6. BECOMING EASILY ANNOYED OR IRRITABLE: 0
5. BEING SO RESTLESS THAT IT IS HARD TO SIT STILL: 0
3. WORRYING TOO MUCH ABOUT DIFFERENT THINGS: 0
1. FEELING NERVOUS, ANXIOUS, OR ON EDGE: 0
4. TROUBLE RELAXING: 0

## 2023-07-03 ASSESSMENT — ENCOUNTER SYMPTOMS
NAUSEA: 0
COUGH: 0
ABDOMINAL PAIN: 0
ABDOMINAL DISTENTION: 0
VOMITING: 0
SORE THROAT: 0
SHORTNESS OF BREATH: 0

## 2023-07-03 ASSESSMENT — PATIENT HEALTH QUESTIONNAIRE - PHQ9
SUM OF ALL RESPONSES TO PHQ QUESTIONS 1-9: 0
SUM OF ALL RESPONSES TO PHQ QUESTIONS 1-9: 0
2. FEELING DOWN, DEPRESSED OR HOPELESS: 0
1. LITTLE INTEREST OR PLEASURE IN DOING THINGS: 0
SUM OF ALL RESPONSES TO PHQ QUESTIONS 1-9: 0
SUM OF ALL RESPONSES TO PHQ9 QUESTIONS 1 & 2: 0
SUM OF ALL RESPONSES TO PHQ QUESTIONS 1-9: 0

## 2023-07-03 NOTE — PROGRESS NOTES
07/03/23     Oral Bhandari  is a 80 y.o. female     Chief Complaint   Patient presents with    Follow-up     6 month       HPI  Patient presents for a follow-up office visit. She was initially referred here for evaluation of a cardiac murmur. She has a long-standing history of essential hypertension, type 2 diabetes mellitus, and dyslipidemia. Patient patient was found to have aortic insufficiency on an echocardiogram back in 2018. She was recently hospitalized at the beginning of September 2019 for worsening renal failure and nausea and vomiting. She underwent a repeat echocardiogram which showed preserved LV systolic function, EF 13-60%, grade 1 diastolic dysfunction, biatrial enlargement, mild to moderate aortic insufficiency, which is relatively unchanged compared to her prior study. Patient was discovered to have a significant bradycardia with heart rates in the 30s at last visit, so she wore a 7-day event monitor in May 2022 which demonstrated an asymptomatic sinus bradycardia with an average heart rate of 43 bpm, range from 26 to 85 bpm.  She had 1 prolonged pauses lasting 3 seconds during sleeping hours. She was last seen in our office 6 months ago. Since last visit she states she has been feeling well. She has lost almost 20 pounds in weight somewhat unexpectedly but admits she is not eating as much as she has been in the past.  She continues to complain of some swelling in her feet and ankles which will worsen throughout the day but always improves when she elevates her legs or goes to sleep at night. No new chest pain, no heart palpitations, no dizziness or syncope. No increased exertional dyspnea. Past Medical History:   Diagnosis Date    Anemia     Carotid bruit 12/07/2013    Carotid Duplex: 1. Bilateral 1-49% stenosis of the internal carotid arteries. 2. No significant stenosis in the external carotid arteries bilaterally. 3. Antegrade flow in both vetebral arteries.  4. Normal

## 2023-07-03 NOTE — PROGRESS NOTES
Adilene Freeman presents today for   Chief Complaint   Patient presents with    Follow-up     6 month       Adilene Freeman preferred language for health care discussion is english/other. Is someone accompanying this pt? no    Is the patient using any DME equipment during OV? no    Depression Screening:  Depression: Not at risk    PHQ-2 Score: 0        Learning Assessment:  Who is the primary learner? Patient    What is the preferred language for health care of the primary learner? ENGLISH    How does the primary learner prefer to learn new concepts? DEMONSTRATION    Answered By patient    Relationship to Learner SELF           Pt currently taking Anticoagulant therapy? no    Pt currently taking Antiplatelet therapy ? Aspirin 81 mg daily      Coordination of Care:  1. Have you been to the ER, urgent care clinic since your last visit? Hospitalized since your last visit? no    2. Have you seen or consulted any other health care providers outside of the [address] [place] [address] since your last visit? Include any pap smears or colon screening.  no

## 2023-07-18 ENCOUNTER — TELEPHONE (OUTPATIENT)
Age: 88
End: 2023-07-18

## 2023-07-18 NOTE — TELEPHONE ENCOUNTER
Pt stopped by the office because she needs a new hematology referral since Dr. Brain Mitchell is leaving the practice. Please advise.

## 2023-07-19 DIAGNOSIS — D64.9 ANEMIA, UNSPECIFIED TYPE: Primary | ICD-10-CM

## 2023-08-09 ENCOUNTER — HOSPITAL ENCOUNTER (INPATIENT)
Facility: HOSPITAL | Age: 88
LOS: 2 days | Discharge: HOME | DRG: 378 | End: 2023-08-11
Attending: EMERGENCY MEDICINE | Admitting: EMERGENCY MEDICINE
Payer: MEDICARE

## 2023-08-09 ENCOUNTER — APPOINTMENT (OUTPATIENT)
Facility: HOSPITAL | Age: 88
DRG: 378 | End: 2023-08-09
Payer: MEDICARE

## 2023-08-09 DIAGNOSIS — D64.9 ACUTE ON CHRONIC ANEMIA: ICD-10-CM

## 2023-08-09 DIAGNOSIS — K92.2 GASTROINTESTINAL HEMORRHAGE, UNSPECIFIED GASTROINTESTINAL HEMORRHAGE TYPE: Primary | ICD-10-CM

## 2023-08-09 PROBLEM — D62 ACUTE BLOOD LOSS ANEMIA: Status: ACTIVE | Noted: 2023-08-09

## 2023-08-09 PROBLEM — I10 ESSENTIAL HYPERTENSION: Status: ACTIVE | Noted: 2017-05-02

## 2023-08-09 PROBLEM — I50.32 DIASTOLIC CHF, CHRONIC (HCC): Status: ACTIVE | Noted: 2019-09-01

## 2023-08-09 PROBLEM — N17.9 ACUTE KIDNEY INJURY SUPERIMPOSED ON CHRONIC KIDNEY DISEASE (HCC): Status: ACTIVE | Noted: 2023-08-09

## 2023-08-09 PROBLEM — N18.9 ACUTE KIDNEY INJURY SUPERIMPOSED ON CHRONIC KIDNEY DISEASE (HCC): Status: ACTIVE | Noted: 2023-08-09

## 2023-08-09 LAB
ALBUMIN SERPL-MCNC: 3.3 G/DL (ref 3.4–5)
ALBUMIN/GLOB SERPL: 1.1 (ref 0.8–1.7)
ALP SERPL-CCNC: 35 U/L (ref 45–117)
ALT SERPL-CCNC: 10 U/L (ref 13–56)
ANION GAP SERPL CALC-SCNC: 10 MMOL/L (ref 3–18)
AST SERPL-CCNC: 14 U/L (ref 10–38)
BASOPHILS # BLD: 0 K/UL (ref 0–0.1)
BASOPHILS NFR BLD: 1 % (ref 0–2)
BILIRUB SERPL-MCNC: 0.3 MG/DL (ref 0.2–1)
BUN SERPL-MCNC: 110 MG/DL (ref 7–18)
BUN/CREAT SERPL: 36 (ref 12–20)
CALCIUM SERPL-MCNC: 8.7 MG/DL (ref 8.5–10.1)
CHLORIDE SERPL-SCNC: 108 MMOL/L (ref 100–111)
CO2 SERPL-SCNC: 22 MMOL/L (ref 21–32)
CREAT SERPL-MCNC: 3.06 MG/DL (ref 0.6–1.3)
DIFFERENTIAL METHOD BLD: ABNORMAL
EOSINOPHIL # BLD: 0.1 K/UL (ref 0–0.4)
EOSINOPHIL NFR BLD: 2 % (ref 0–5)
ERYTHROCYTE [DISTWIDTH] IN BLOOD BY AUTOMATED COUNT: 16.4 % (ref 11.6–14.5)
GLOBULIN SER CALC-MCNC: 3 G/DL (ref 2–4)
GLUCOSE SERPL-MCNC: 168 MG/DL (ref 74–99)
HCT VFR BLD AUTO: 21.1 % (ref 35–45)
HCT VFR BLD AUTO: 25 % (ref 35–45)
HGB BLD-MCNC: 6.7 G/DL (ref 12–16)
HGB BLD-MCNC: 8 G/DL (ref 12–16)
HISTORY CHECK: NORMAL
IMM GRANULOCYTES # BLD AUTO: 0 K/UL (ref 0–0.04)
IMM GRANULOCYTES NFR BLD AUTO: 0 % (ref 0–0.5)
INR PPP: 1.1 (ref 0.9–1.1)
LYMPHOCYTES # BLD: 1.1 K/UL (ref 0.9–3.6)
LYMPHOCYTES NFR BLD: 21 % (ref 21–52)
MCH RBC QN AUTO: 28 PG (ref 24–34)
MCHC RBC AUTO-ENTMCNC: 31.8 G/DL (ref 31–37)
MCV RBC AUTO: 88.3 FL (ref 78–100)
MONOCYTES # BLD: 0.4 K/UL (ref 0.05–1.2)
MONOCYTES NFR BLD: 7 % (ref 3–10)
NEUTS SEG # BLD: 3.6 K/UL (ref 1.8–8)
NEUTS SEG NFR BLD: 70 % (ref 40–73)
NRBC # BLD: 0 K/UL (ref 0–0.01)
NRBC BLD-RTO: 0 PER 100 WBC
PLATELET # BLD AUTO: 217 K/UL (ref 135–420)
PMV BLD AUTO: 9.6 FL (ref 9.2–11.8)
POTASSIUM SERPL-SCNC: 4.6 MMOL/L (ref 3.5–5.5)
PROT SERPL-MCNC: 6.3 G/DL (ref 6.4–8.2)
PROTHROMBIN TIME: 14.5 SEC (ref 11.9–14.7)
RBC # BLD AUTO: 2.39 M/UL (ref 4.2–5.3)
SODIUM SERPL-SCNC: 140 MMOL/L (ref 136–145)
TROPONIN I SERPL HS-MCNC: 27 NG/L (ref 0–54)
WBC # BLD AUTO: 5.2 K/UL (ref 4.6–13.2)

## 2023-08-09 PROCEDURE — 6360000002 HC RX W HCPCS

## 2023-08-09 PROCEDURE — 86920 COMPATIBILITY TEST SPIN: CPT

## 2023-08-09 PROCEDURE — 85018 HEMOGLOBIN: CPT

## 2023-08-09 PROCEDURE — 99285 EMERGENCY DEPT VISIT HI MDM: CPT

## 2023-08-09 PROCEDURE — 85610 PROTHROMBIN TIME: CPT

## 2023-08-09 PROCEDURE — 86901 BLOOD TYPING SEROLOGIC RH(D): CPT

## 2023-08-09 PROCEDURE — 86900 BLOOD TYPING SEROLOGIC ABO: CPT

## 2023-08-09 PROCEDURE — 1100000003 HC PRIVATE W/ TELEMETRY

## 2023-08-09 PROCEDURE — 36430 TRANSFUSION BLD/BLD COMPNT: CPT

## 2023-08-09 PROCEDURE — 93005 ELECTROCARDIOGRAM TRACING: CPT | Performed by: PHYSICIAN ASSISTANT

## 2023-08-09 PROCEDURE — P9016 RBC LEUKOCYTES REDUCED: HCPCS

## 2023-08-09 PROCEDURE — 0DB78ZX EXCISION OF STOMACH, PYLORUS, VIA NATURAL OR ARTIFICIAL OPENING ENDOSCOPIC, DIAGNOSTIC: ICD-10-PCS | Performed by: PHYSICIAN ASSISTANT

## 2023-08-09 PROCEDURE — 6370000000 HC RX 637 (ALT 250 FOR IP)

## 2023-08-09 PROCEDURE — 84484 ASSAY OF TROPONIN QUANT: CPT

## 2023-08-09 PROCEDURE — 36415 COLL VENOUS BLD VENIPUNCTURE: CPT

## 2023-08-09 PROCEDURE — A4216 STERILE WATER/SALINE, 10 ML: HCPCS

## 2023-08-09 PROCEDURE — 85025 COMPLETE CBC W/AUTO DIFF WBC: CPT

## 2023-08-09 PROCEDURE — 71045 X-RAY EXAM CHEST 1 VIEW: CPT

## 2023-08-09 PROCEDURE — 86850 RBC ANTIBODY SCREEN: CPT

## 2023-08-09 PROCEDURE — 2580000003 HC RX 258

## 2023-08-09 PROCEDURE — 99223 1ST HOSP IP/OBS HIGH 75: CPT

## 2023-08-09 PROCEDURE — 80053 COMPREHEN METABOLIC PANEL: CPT

## 2023-08-09 PROCEDURE — 30233N1 TRANSFUSION OF NONAUTOLOGOUS RED BLOOD CELLS INTO PERIPHERAL VEIN, PERCUTANEOUS APPROACH: ICD-10-PCS | Performed by: PHYSICIAN ASSISTANT

## 2023-08-09 PROCEDURE — 85014 HEMATOCRIT: CPT

## 2023-08-09 PROCEDURE — C9113 INJ PANTOPRAZOLE SODIUM, VIA: HCPCS

## 2023-08-09 RX ORDER — AMLODIPINE BESYLATE 10 MG/1
10 TABLET ORAL DAILY
Status: DISCONTINUED | OUTPATIENT
Start: 2023-08-09 | End: 2023-08-11 | Stop reason: HOSPADM

## 2023-08-09 RX ORDER — PRAVASTATIN SODIUM 20 MG
20 TABLET ORAL DAILY
Status: DISCONTINUED | OUTPATIENT
Start: 2023-08-09 | End: 2023-08-11 | Stop reason: HOSPADM

## 2023-08-09 RX ORDER — ACETAMINOPHEN 650 MG/1
650 SUPPOSITORY RECTAL EVERY 6 HOURS PRN
Status: DISCONTINUED | OUTPATIENT
Start: 2023-08-09 | End: 2023-08-11 | Stop reason: HOSPADM

## 2023-08-09 RX ORDER — SODIUM CHLORIDE 0.9 % (FLUSH) 0.9 %
5-40 SYRINGE (ML) INJECTION EVERY 12 HOURS SCHEDULED
Status: DISCONTINUED | OUTPATIENT
Start: 2023-08-09 | End: 2023-08-11 | Stop reason: HOSPADM

## 2023-08-09 RX ORDER — CLONIDINE HYDROCHLORIDE 0.1 MG/1
0.1 TABLET ORAL 2 TIMES DAILY
Status: DISCONTINUED | OUTPATIENT
Start: 2023-08-09 | End: 2023-08-11 | Stop reason: HOSPADM

## 2023-08-09 RX ORDER — HYDRALAZINE HYDROCHLORIDE 20 MG/ML
10 INJECTION INTRAMUSCULAR; INTRAVENOUS EVERY 6 HOURS PRN
Status: DISCONTINUED | OUTPATIENT
Start: 2023-08-09 | End: 2023-08-11 | Stop reason: HOSPADM

## 2023-08-09 RX ORDER — POLYETHYLENE GLYCOL 3350 17 G/17G
17 POWDER, FOR SOLUTION ORAL DAILY PRN
Status: DISCONTINUED | OUTPATIENT
Start: 2023-08-09 | End: 2023-08-11 | Stop reason: HOSPADM

## 2023-08-09 RX ORDER — ACETAMINOPHEN 325 MG/1
650 TABLET ORAL EVERY 6 HOURS PRN
Status: DISCONTINUED | OUTPATIENT
Start: 2023-08-09 | End: 2023-08-11 | Stop reason: HOSPADM

## 2023-08-09 RX ORDER — SODIUM CHLORIDE 9 MG/ML
INJECTION, SOLUTION INTRAVENOUS CONTINUOUS
Status: DISPENSED | OUTPATIENT
Start: 2023-08-09 | End: 2023-08-10

## 2023-08-09 RX ORDER — BUMETANIDE 1 MG/1
0.5 TABLET ORAL DAILY
Status: DISCONTINUED | OUTPATIENT
Start: 2023-08-10 | End: 2023-08-11 | Stop reason: HOSPADM

## 2023-08-09 RX ORDER — SODIUM CHLORIDE 9 MG/ML
INJECTION, SOLUTION INTRAVENOUS PRN
Status: DISCONTINUED | OUTPATIENT
Start: 2023-08-09 | End: 2023-08-11 | Stop reason: ALTCHOICE

## 2023-08-09 RX ORDER — ONDANSETRON 2 MG/ML
4 INJECTION INTRAMUSCULAR; INTRAVENOUS EVERY 6 HOURS PRN
Status: DISCONTINUED | OUTPATIENT
Start: 2023-08-09 | End: 2023-08-11 | Stop reason: HOSPADM

## 2023-08-09 RX ORDER — SODIUM CHLORIDE 0.9 % (FLUSH) 0.9 %
5-40 SYRINGE (ML) INJECTION PRN
Status: DISCONTINUED | OUTPATIENT
Start: 2023-08-09 | End: 2023-08-11 | Stop reason: HOSPADM

## 2023-08-09 RX ORDER — ONDANSETRON 4 MG/1
4 TABLET, ORALLY DISINTEGRATING ORAL EVERY 8 HOURS PRN
Status: DISCONTINUED | OUTPATIENT
Start: 2023-08-09 | End: 2023-08-11 | Stop reason: HOSPADM

## 2023-08-09 RX ORDER — BUMETANIDE 0.5 MG/1
0.5 TABLET ORAL DAILY
COMMUNITY
Start: 2023-07-15

## 2023-08-09 RX ORDER — HYDRALAZINE HYDROCHLORIDE 25 MG/1
25 TABLET, FILM COATED ORAL EVERY 8 HOURS SCHEDULED
Status: DISCONTINUED | OUTPATIENT
Start: 2023-08-09 | End: 2023-08-11 | Stop reason: HOSPADM

## 2023-08-09 RX ADMIN — HYDRALAZINE HYDROCHLORIDE 25 MG: 25 TABLET, FILM COATED ORAL at 22:57

## 2023-08-09 RX ADMIN — PRAVASTATIN SODIUM 20 MG: 20 TABLET ORAL at 18:26

## 2023-08-09 RX ADMIN — CLONIDINE HYDROCHLORIDE 0.1 MG: 0.1 TABLET ORAL at 22:57

## 2023-08-09 RX ADMIN — PANTOPRAZOLE SODIUM 40 MG: 40 INJECTION, POWDER, FOR SOLUTION INTRAVENOUS at 18:27

## 2023-08-09 RX ADMIN — SODIUM CHLORIDE: 9 INJECTION, SOLUTION INTRAVENOUS at 22:56

## 2023-08-09 RX ADMIN — AMLODIPINE BESYLATE 10 MG: 10 TABLET ORAL at 18:27

## 2023-08-09 ASSESSMENT — PAIN SCALES - GENERAL
PAINLEVEL_OUTOF10: 0
PAINLEVEL_OUTOF10: 0

## 2023-08-09 ASSESSMENT — ENCOUNTER SYMPTOMS
VOMITING: 0
DIARRHEA: 0
SORE THROAT: 0
ABDOMINAL PAIN: 0
SHORTNESS OF BREATH: 0

## 2023-08-09 NOTE — PLAN OF CARE
Problem: ABCDS Injury Assessment  Goal: Absence of physical injury  Outcome: Progressing     Problem: Safety - Adult  Goal: Free from fall injury  Outcome: Progressing     Problem: Skin/Tissue Integrity - Adult  Goal: Skin integrity remains intact  Outcome: Progressing

## 2023-08-09 NOTE — ED TRIAGE NOTES
Client reports having dark bm yesterday. Client take iron pills, denies any pain or discomfort. Denies any urinary symptoms. NAD. AXOX4.

## 2023-08-09 NOTE — ED PROVIDER NOTES
EMERGENCY DEPARTMENT HISTORY AND PHYSICAL EXAM      Date: 8/9/2023  Patient Name: Malika Gibson    History of Presenting Illness     Chief Complaint   Patient presents with    Rectal Bleeding       History (Context): Malika Gibson is a 80 y.o. female with significant past medical history of CKD, anemia, htn, hyperlipidemia, gout, DM,  presents ambulatory the ED today. Pt reports noticing dark red blood when she wiped yesterday. Pt reports today she noticed dark stool with BM this morning. Pt reports she has been taking iron pills for a long time\" and reports her stool looks different for the past two days. Denies taking blood thinners. Pt reports previous colonoscopy but she is unsure of specifics. Denies abdominal pain, vomiting, diarrhea, fever, chills. Denies hx GI bleed. Denies CP, SOB, dizziness.        PCP: Trinity Desouza MD    Current Facility-Administered Medications   Medication Dose Route Frequency Provider Last Rate Last Admin    0.9 % sodium chloride infusion   IntraVENous PRN VINCENT Muñiz        pantoprazole (PROTONIX) 40 mg in sodium chloride (PF) 0.9 % 10 mL injection  40 mg IntraVENous Q12H Stormy Power, APRN - NP        amLODIPine (NORVASC) tablet 10 mg  10 mg Oral Daily Stormy Power, APRN - NP        [place] ON 8/10/2023] bumetanide (BUMEX) tablet 0.5 mg  0.5 mg Oral Daily Stormy Power, APRN - NP        pravastatin (PRAVACHOL) tablet 20 mg  20 mg Oral Daily Stormy Power, APRN - NP        sodium chloride flush 0.9 % injection 5-40 mL  5-40 mL IntraVENous 2 times per day Stormy Power, APRN - NP        sodium chloride flush 0.9 % injection 5-40 mL  5-40 mL IntraVENous PRN Stormy Power, APRN - NP        ondansetron (ZOFRAN-ODT) disintegrating tablet 4 mg  4 mg Oral Q8H PRN Stormy Power, APRN - NP        Or    ondansetron [facility]) injection 4 mg  4 mg IntraVENous Q6H PRN Stormy Power, APRN - NP        polyethylene glycol (GLYCOLAX) packet 17 g  17 g Oral Daily PRN

## 2023-08-10 ENCOUNTER — ANESTHESIA (OUTPATIENT)
Facility: HOSPITAL | Age: 88
End: 2023-08-10
Payer: MEDICARE

## 2023-08-10 ENCOUNTER — ANESTHESIA EVENT (OUTPATIENT)
Facility: HOSPITAL | Age: 88
End: 2023-08-10
Payer: MEDICARE

## 2023-08-10 ENCOUNTER — APPOINTMENT (OUTPATIENT)
Facility: HOSPITAL | Age: 88
DRG: 378 | End: 2023-08-10
Payer: MEDICARE

## 2023-08-10 LAB
ABO + RH BLD: NORMAL
ALBUMIN SERPL-MCNC: 3.1 G/DL (ref 3.4–5)
ALBUMIN/GLOB SERPL: 0.9 (ref 0.8–1.7)
ALP SERPL-CCNC: 35 U/L (ref 45–117)
ALT SERPL-CCNC: 10 U/L (ref 13–56)
ANION GAP SERPL CALC-SCNC: 7 MMOL/L (ref 3–18)
AST SERPL-CCNC: 16 U/L (ref 10–38)
BASOPHILS # BLD: 0 K/UL (ref 0–0.1)
BASOPHILS NFR BLD: 1 % (ref 0–2)
BILIRUB SERPL-MCNC: 0.8 MG/DL (ref 0.2–1)
BLD PROD TYP BPU: NORMAL
BLOOD BANK DISPENSE STATUS: NORMAL
BLOOD GROUP ANTIBODIES SERPL: NORMAL
BPU ID: NORMAL
BUN SERPL-MCNC: 91 MG/DL (ref 7–18)
BUN/CREAT SERPL: 33 (ref 12–20)
CALCIUM SERPL-MCNC: 8.9 MG/DL (ref 8.5–10.1)
CALLED TO: NORMAL
CHLORIDE SERPL-SCNC: 112 MMOL/L (ref 100–111)
CO2 SERPL-SCNC: 22 MMOL/L (ref 21–32)
CREAT SERPL-MCNC: 2.75 MG/DL (ref 0.6–1.3)
CROSSMATCH RESULT: NORMAL
DIFFERENTIAL METHOD BLD: ABNORMAL
EKG ATRIAL RATE: 32 BPM
EKG ATRIAL RATE: 37 BPM
EKG DIAGNOSIS: NORMAL
EKG DIAGNOSIS: NORMAL
EKG P AXIS: 17 DEGREES
EKG P AXIS: 42 DEGREES
EKG P-R INTERVAL: 198 MS
EKG P-R INTERVAL: 206 MS
EKG Q-T INTERVAL: 546 MS
EKG Q-T INTERVAL: 548 MS
EKG QRS DURATION: 90 MS
EKG QRS DURATION: 96 MS
EKG QTC CALCULATION (BAZETT): 398 MS
EKG QTC CALCULATION (BAZETT): 430 MS
EKG R AXIS: -37 DEGREES
EKG R AXIS: -41 DEGREES
EKG T AXIS: -20 DEGREES
EKG T AXIS: 71 DEGREES
EKG VENTRICULAR RATE: 32 BPM
EKG VENTRICULAR RATE: 37 BPM
EOSINOPHIL # BLD: 0.1 K/UL (ref 0–0.4)
EOSINOPHIL NFR BLD: 2 % (ref 0–5)
ERYTHROCYTE [DISTWIDTH] IN BLOOD BY AUTOMATED COUNT: 15.7 % (ref 11.6–14.5)
FERRITIN SERPL-MCNC: 135 NG/ML (ref 8–388)
FOLATE SERPL-MCNC: 9.3 NG/ML (ref 3.1–17.5)
GLOBULIN SER CALC-MCNC: 3.5 G/DL (ref 2–4)
GLUCOSE BLD STRIP.AUTO-MCNC: 129 MG/DL (ref 70–110)
GLUCOSE BLD STRIP.AUTO-MCNC: 138 MG/DL (ref 70–110)
GLUCOSE SERPL-MCNC: 130 MG/DL (ref 74–99)
HCT VFR BLD AUTO: 24.7 % (ref 35–45)
HCT VFR BLD AUTO: 25.2 % (ref 35–45)
HGB BLD-MCNC: 7.9 G/DL (ref 12–16)
HGB BLD-MCNC: 8.1 G/DL (ref 12–16)
IMM GRANULOCYTES # BLD AUTO: 0 K/UL (ref 0–0.04)
IMM GRANULOCYTES NFR BLD AUTO: 0 % (ref 0–0.5)
INR PPP: 1.1 (ref 0.9–1.1)
IRON SATN MFR SERPL: 45 % (ref 20–50)
IRON SERPL-MCNC: 99 UG/DL (ref 50–175)
LYMPHOCYTES # BLD: 1 K/UL (ref 0.9–3.6)
LYMPHOCYTES NFR BLD: 16 % (ref 21–52)
MAGNESIUM SERPL-MCNC: 2.5 MG/DL (ref 1.6–2.6)
MCH RBC QN AUTO: 28.1 PG (ref 24–34)
MCHC RBC AUTO-ENTMCNC: 32 G/DL (ref 31–37)
MCV RBC AUTO: 87.9 FL (ref 78–100)
MONOCYTES # BLD: 0.5 K/UL (ref 0.05–1.2)
MONOCYTES NFR BLD: 7 % (ref 3–10)
NEUTS SEG # BLD: 4.8 K/UL (ref 1.8–8)
NEUTS SEG NFR BLD: 74 % (ref 40–73)
NRBC # BLD: 0 K/UL (ref 0–0.01)
NRBC BLD-RTO: 0 PER 100 WBC
PLATELET # BLD AUTO: 213 K/UL (ref 135–420)
PMV BLD AUTO: 9.3 FL (ref 9.2–11.8)
POTASSIUM SERPL-SCNC: 3.8 MMOL/L (ref 3.5–5.5)
PROT SERPL-MCNC: 6.6 G/DL (ref 6.4–8.2)
PROTHROMBIN TIME: 14.5 SEC (ref 11.9–14.7)
RBC # BLD AUTO: 2.81 M/UL (ref 4.2–5.3)
SODIUM SERPL-SCNC: 141 MMOL/L (ref 136–145)
SPECIMEN EXP DATE BLD: NORMAL
TIBC SERPL-MCNC: 221 UG/DL (ref 250–450)
TROPONIN I SERPL HS-MCNC: 43 NG/L (ref 0–54)
UNIT DIVISION: 0
VIT B12 SERPL-MCNC: 521 PG/ML (ref 211–911)
WBC # BLD AUTO: 6.4 K/UL (ref 4.6–13.2)

## 2023-08-10 PROCEDURE — 82746 ASSAY OF FOLIC ACID SERUM: CPT

## 2023-08-10 PROCEDURE — 2580000003 HC RX 258

## 2023-08-10 PROCEDURE — 7100000000 HC PACU RECOVERY - FIRST 15 MIN: Performed by: INTERNAL MEDICINE

## 2023-08-10 PROCEDURE — 36415 COLL VENOUS BLD VENIPUNCTURE: CPT

## 2023-08-10 PROCEDURE — 86235 NUCLEAR ANTIGEN ANTIBODY: CPT

## 2023-08-10 PROCEDURE — 76770 US EXAM ABDO BACK WALL COMP: CPT

## 2023-08-10 PROCEDURE — 88342 IMHCHEM/IMCYTCHM 1ST ANTB: CPT

## 2023-08-10 PROCEDURE — 3700000001 HC ADD 15 MINUTES (ANESTHESIA): Performed by: INTERNAL MEDICINE

## 2023-08-10 PROCEDURE — 6370000000 HC RX 637 (ALT 250 FOR IP): Performed by: INTERNAL MEDICINE

## 2023-08-10 PROCEDURE — 82607 VITAMIN B-12: CPT

## 2023-08-10 PROCEDURE — 82107 ALPHA-FETOPROTEIN L3: CPT

## 2023-08-10 PROCEDURE — 1100000003 HC PRIVATE W/ TELEMETRY

## 2023-08-10 PROCEDURE — 93010 ELECTROCARDIOGRAM REPORT: CPT | Performed by: INTERNAL MEDICINE

## 2023-08-10 PROCEDURE — 0W3P8ZZ CONTROL BLEEDING IN GASTROINTESTINAL TRACT, VIA NATURAL OR ARTIFICIAL OPENING ENDOSCOPIC: ICD-10-PCS | Performed by: INTERNAL MEDICINE

## 2023-08-10 PROCEDURE — 83550 IRON BINDING TEST: CPT

## 2023-08-10 PROCEDURE — 88305 TISSUE EXAM BY PATHOLOGIST: CPT

## 2023-08-10 PROCEDURE — 84484 ASSAY OF TROPONIN QUANT: CPT

## 2023-08-10 PROCEDURE — C9113 INJ PANTOPRAZOLE SODIUM, VIA: HCPCS

## 2023-08-10 PROCEDURE — 86038 ANTINUCLEAR ANTIBODIES: CPT

## 2023-08-10 PROCEDURE — 6370000000 HC RX 637 (ALT 250 FOR IP)

## 2023-08-10 PROCEDURE — 83735 ASSAY OF MAGNESIUM: CPT

## 2023-08-10 PROCEDURE — 82962 GLUCOSE BLOOD TEST: CPT

## 2023-08-10 PROCEDURE — 2709999900 HC NON-CHARGEABLE SUPPLY: Performed by: INTERNAL MEDICINE

## 2023-08-10 PROCEDURE — 85018 HEMOGLOBIN: CPT

## 2023-08-10 PROCEDURE — 85014 HEMATOCRIT: CPT

## 2023-08-10 PROCEDURE — 85610 PROTHROMBIN TIME: CPT

## 2023-08-10 PROCEDURE — 2720000010 HC SURG SUPPLY STERILE: Performed by: INTERNAL MEDICINE

## 2023-08-10 PROCEDURE — 3600007502: Performed by: INTERNAL MEDICINE

## 2023-08-10 PROCEDURE — 85025 COMPLETE CBC W/AUTO DIFF WBC: CPT

## 2023-08-10 PROCEDURE — 82728 ASSAY OF FERRITIN: CPT

## 2023-08-10 PROCEDURE — 3700000000 HC ANESTHESIA ATTENDED CARE: Performed by: INTERNAL MEDICINE

## 2023-08-10 PROCEDURE — 80053 COMPREHEN METABOLIC PANEL: CPT

## 2023-08-10 PROCEDURE — 83540 ASSAY OF IRON: CPT

## 2023-08-10 PROCEDURE — 6360000002 HC RX W HCPCS

## 2023-08-10 PROCEDURE — A4216 STERILE WATER/SALINE, 10 ML: HCPCS

## 2023-08-10 PROCEDURE — 3600007512: Performed by: INTERNAL MEDICINE

## 2023-08-10 PROCEDURE — 7100000010 HC PHASE II RECOVERY - FIRST 15 MIN: Performed by: INTERNAL MEDICINE

## 2023-08-10 PROCEDURE — 6360000002 HC RX W HCPCS: Performed by: NURSE ANESTHETIST, CERTIFIED REGISTERED

## 2023-08-10 PROCEDURE — 93005 ELECTROCARDIOGRAM TRACING: CPT | Performed by: INTERNAL MEDICINE

## 2023-08-10 RX ORDER — ONDANSETRON 2 MG/ML
4 INJECTION INTRAMUSCULAR; INTRAVENOUS
Status: CANCELLED | OUTPATIENT
Start: 2023-08-10 | End: 2023-08-11

## 2023-08-10 RX ORDER — ATROPINE SULFATE 0.1 MG/ML
1 INJECTION INTRAVENOUS EVERY 5 MIN PRN
Status: DISCONTINUED | OUTPATIENT
Start: 2023-08-10 | End: 2023-08-10

## 2023-08-10 RX ORDER — ATROPINE SULFATE 1 MG/ML
INJECTION, SOLUTION INTRAMUSCULAR; INTRAVENOUS; SUBCUTANEOUS
Status: DISCONTINUED
Start: 2023-08-10 | End: 2023-08-10 | Stop reason: WASHOUT

## 2023-08-10 RX ORDER — SODIUM CHLORIDE, SODIUM LACTATE, POTASSIUM CHLORIDE, CALCIUM CHLORIDE 600; 310; 30; 20 MG/100ML; MG/100ML; MG/100ML; MG/100ML
INJECTION, SOLUTION INTRAVENOUS CONTINUOUS
Status: CANCELLED | OUTPATIENT
Start: 2023-08-10

## 2023-08-10 RX ORDER — ATROPINE SULFATE 1 MG/ML
1 INJECTION, SOLUTION INTRAMUSCULAR; INTRAVENOUS; SUBCUTANEOUS EVERY 5 MIN PRN
Status: DISCONTINUED | OUTPATIENT
Start: 2023-08-10 | End: 2023-08-10

## 2023-08-10 RX ORDER — SUCRALFATE 1 G/1
1 TABLET ORAL
Status: DISCONTINUED | OUTPATIENT
Start: 2023-08-10 | End: 2023-08-11 | Stop reason: HOSPADM

## 2023-08-10 RX ORDER — PROPOFOL 10 MG/ML
INJECTION, EMULSION INTRAVENOUS PRN
Status: DISCONTINUED | OUTPATIENT
Start: 2023-08-10 | End: 2023-08-10 | Stop reason: SDUPTHER

## 2023-08-10 RX ADMIN — SODIUM CHLORIDE, PRESERVATIVE FREE 10 ML: 5 INJECTION INTRAVENOUS at 21:00

## 2023-08-10 RX ADMIN — PROPOFOL 10 MG: 10 INJECTION, EMULSION INTRAVENOUS at 13:32

## 2023-08-10 RX ADMIN — AMLODIPINE BESYLATE 10 MG: 10 TABLET ORAL at 09:46

## 2023-08-10 RX ADMIN — PROPOFOL 40 MG: 10 INJECTION, EMULSION INTRAVENOUS at 13:21

## 2023-08-10 RX ADMIN — CLONIDINE HYDROCHLORIDE 0.1 MG: 0.1 TABLET ORAL at 20:58

## 2023-08-10 RX ADMIN — ACETAMINOPHEN 325MG 650 MG: 325 TABLET ORAL at 21:10

## 2023-08-10 RX ADMIN — PROPOFOL 10 MG: 10 INJECTION, EMULSION INTRAVENOUS at 13:29

## 2023-08-10 RX ADMIN — PROPOFOL 10 MG: 10 INJECTION, EMULSION INTRAVENOUS at 13:26

## 2023-08-10 RX ADMIN — PANTOPRAZOLE SODIUM 40 MG: 40 INJECTION, POWDER, FOR SOLUTION INTRAVENOUS at 06:12

## 2023-08-10 RX ADMIN — SODIUM CHLORIDE, PRESERVATIVE FREE 10 ML: 5 INJECTION INTRAVENOUS at 09:51

## 2023-08-10 RX ADMIN — HYDRALAZINE HYDROCHLORIDE 25 MG: 25 TABLET, FILM COATED ORAL at 06:14

## 2023-08-10 RX ADMIN — PRAVASTATIN SODIUM 20 MG: 20 TABLET ORAL at 09:46

## 2023-08-10 RX ADMIN — SUCRALFATE 1 G: 1 TABLET ORAL at 16:44

## 2023-08-10 RX ADMIN — SUCRALFATE 1 G: 1 TABLET ORAL at 20:58

## 2023-08-10 RX ADMIN — PANTOPRAZOLE SODIUM 40 MG: 40 INJECTION, POWDER, FOR SOLUTION INTRAVENOUS at 15:19

## 2023-08-10 RX ADMIN — PROPOFOL 10 MG: 10 INJECTION, EMULSION INTRAVENOUS at 13:23

## 2023-08-10 RX ADMIN — HYDRALAZINE HYDROCHLORIDE 25 MG: 25 TABLET, FILM COATED ORAL at 20:58

## 2023-08-10 ASSESSMENT — PAIN SCALES - GENERAL
PAINLEVEL_OUTOF10: 0
PAINLEVEL_OUTOF10: 6
PAINLEVEL_OUTOF10: 0

## 2023-08-10 ASSESSMENT — PAIN DESCRIPTION - LOCATION: LOCATION: FOOT

## 2023-08-10 ASSESSMENT — PAIN DESCRIPTION - DESCRIPTORS: DESCRIPTORS: ACHING

## 2023-08-10 ASSESSMENT — PAIN - FUNCTIONAL ASSESSMENT: PAIN_FUNCTIONAL_ASSESSMENT: ACTIVITIES ARE NOT PREVENTED

## 2023-08-10 ASSESSMENT — PAIN DESCRIPTION - ORIENTATION: ORIENTATION: RIGHT;LEFT

## 2023-08-10 NOTE — PROGRESS NOTES
Patient assessed frequently when heart rate noted to be below mid 30s. Each time, pt fully awake, fully oriented, skin warm and dry, NAD noted. Hospitalist updated as needed.

## 2023-08-10 NOTE — PROGRESS NOTES
Review of Systems    Physical Exam  Constitutional:           Comments: Skin is clean, dry, and intact- no areas of concern.

## 2023-08-10 NOTE — ANESTHESIA PRE PROCEDURE
Department of Anesthesiology  Preprocedure Note       Name:  Britton Farris   Age:  80 y.o.  :  1933                                          MRN:  [id number]         Date:  8/10/2023      Surgeon: Rachelle Price):  Delmis Christine MD    Procedure: Procedure(s):  EGD ESOPHAGOGASTRODUODENOSCOPY    Medications prior to admission:   Prior to Admission medications    Medication Sig Start Date End Date Taking?  Authorizing Provider   bumetanide (BUMEX) 0.5 MG tablet Take 1 tablet by mouth daily 7/15/23   Historical Provider, MD   ferrous sulfate (IRON 325) 325 (65 Fe) MG tablet Take 1 tablet by mouth every morning (before breakfast)  Patient taking differently: Take 1 tablet by mouth daily (with breakfast) 23   Leonel Gallagher MD   cloNIDine (CATAPRES) 0.2 MG tablet Take 1 tablet by mouth 2 times daily 23   Leonel Gallagher MD   amLODIPine (NORVASC) 10 MG tablet Take 1 tablet by mouth daily 23   Leonel Gallagher MD   hydrALAZINE (APRESOLINE) 100 MG tablet Take 1 tablet by mouth 3 times daily 23   Leonel Gallagher MD   ASPIRIN 81 PO Take 1 tablet by mouth daily    Historical Provider, MD   Omega-3 Fatty Acids (FISH OIL) 1000 MG capsule Take 1 tablet by mouth daily    Historical Provider, MD Leigh Marina strip Use to check fasting blood sugar one hour prior to breakfast and 1 hour after dinner 23   Historical Provider, MD   vitamin D3 (CHOLECALCIFEROL) 125 MCG (5000 UT) TABS tablet Take 1 tablet by mouth daily    Ar Automatic Reconciliation   dorzolamide-timolol (COSOPT) 22.3-6.8 MG/ML ophthalmic solution Place 1 drop into the left eye every morning 22   Ar Automatic Reconciliation   latanoprost (XALATAN) 0.005 % ophthalmic solution Place 1 drop into both eyes daily 10/17/19   Ar Automatic Reconciliation   potassium chloride (KLOR-CON M) 20 MEQ extended release tablet Take 1 tablet by mouth daily 20   Ar Automatic Reconciliation   pravastatin (PRAVACHOL) 20 MG tablet Take 1

## 2023-08-10 NOTE — PERIOP NOTE
TRANSFER - OUT REPORT:    Verbal report given to [place], RN on [place]  being transferred to  for routine progression of patient care       Report consisted of patient's Situation, Background, Assessment and   Recommendations(SBAR). Information from the following report(s) Nurse Handoff Report was reviewed with the receiving nurse. Lines:   Peripheral IV 08/09/23 Left Antecubital (Active)   Site Assessment Clean, dry & intact 08/10/23 1159   Line Status Infusing 08/10/23 [facility] checked and tightened 08/10/23 1159   Phlebitis Assessment No symptoms 08/10/23 1159   Infiltration Assessment 0 08/10/23 1159   Alcohol Cap Used Yes 08/10/23 1159   Dressing Status Clean, dry & intact 08/10/23 1159   Dressing Type Transparent 08/10/23 1159        Opportunity for questions and clarification was provided.       Patient transported with:  Takkle

## 2023-08-10 NOTE — ANESTHESIA POSTPROCEDURE EVALUATION
Department of Anesthesiology  Postprocedure Note    Patient: Jason Church  MRN: [id number]  YOB: 1933  Date of evaluation: 8/10/2023      Procedure Summary     Date: 08/10/23 Room / Location: [facility] ENDO 03 / [facility] ENDOSCOPY    Anesthesia Start: 1315 Anesthesia Stop: 8771    Procedure: EGD ESOPHAGOGASTRODUODENOSCOPY with BXS (Upper GI Region) Diagnosis:       Bleeding esophageal varices, unspecified esophageal varices type ([address])      Gastrointestinal hemorrhage, unspecified gastrointestinal hemorrhage type      (Bleeding esophageal varices, unspecified esophageal varices type ([address]) [I85.01])      (Gastrointestinal hemorrhage, unspecified gastrointestinal hemorrhage type [K92.2])    Surgeons: Ebbie Bamberger, MD Responsible Provider: David Carter MD    Anesthesia Type: MAC ASA Status: 3          Anesthesia Type: MAC    Evens Phase I: Evens Score: 10    Evens Phase II: Evens Score: 10      Anesthesia Post Evaluation    Patient location during evaluation: PACU  Patient participation: complete - patient participated  Level of consciousness: sleepy but conscious  Pain score: 0  Airway patency: patent  Nausea & Vomiting: no nausea and no vomiting  Complications: no  Cardiovascular status: blood pressure returned to baseline  Respiratory status: acceptable  Hydration status: euvolemic  Pain management: adequate

## 2023-08-10 NOTE — PROGRESS NOTES
PT orders received and chart reviewed. PT eval attempted at 1154. Pt currently off floor at ENDO. Will follow up. Pt continues to be off floor at [phone] and [phone]. Will continue to follow.      Thank you for this referral.   Dre Ramirez PT DPT

## 2023-08-10 NOTE — ACP (ADVANCE CARE PLANNING)
Advance Care Planning     Advance Care Planning Inpatient Note  Spiritual Care Department    Today's Date: 8/10/2023  Unit: [facility] END[facility]    Received request from . Upon review of chart and communication with care team, patient's decision making abilities are not in question. . Patient was/were present in the room during visit. Goals of ACP Conversation:  Discuss advance care planning documents    Health Care Decision Makers:       Primary Decision Maker: GoodDalia gomez - Other Relative - [phone]    Secondary Decision Maker: Jermaine Clayton - [phone]  Summary:  Verified [address]    Advance Care Planning Documents (Patient Wishes):  Healthcare Power of /Advance Directive Appointment of [facility]  Living Will/Advance Directive     Assessment:  Patient seen as a new admit to the hospital in bed 463 today. Patient requested a prayer and a bible. .  There is an advance directive on file here for the patient. She is here due to acute blood loss and possible surgery. Prayer offered. Interventions:      Care Preferences Communicated:   No    Outcomes/Plan:  Existing advance directive reviewed with patient; no changes to patient's previously recorded wishes.     Electronically signed by Daniela Ayala, [facility] on 8/10/2023 at 11:16 AM

## 2023-08-10 NOTE — PROGRESS NOTES
Tele notified primary RN of pt's HR sustaining in mid to low 30s bpm.    When primary RN entered pt's room, pt was noted to be calm, quiet and awake. Primary RN notified MD Barrios and this RN. N.O. placed for STAT EKG and Atropine 1mg IVP Q5 min PRN to be utilized if pt HR is sustaining <35bpm while awake or pt becomes symptomatic with bradycardia. If PRN Atropine is administered notify MD.    While primary RN was performing STAT EKG,pt's HR began to sustain between 36-41bpm.   This RN informed primary RN to hold off on PRN Atropine for now unless above indications for medication occur again. MD DARCI BRODY informed that EKG had been performed and transmitted, as well as PRN Atropine being held for the time being. Upon this RN's review of telemetry monitor at nurse's station, it was noted that pt's HR frequently dips down to the low 30-high 20s. Pt does not sustain these HR for long periods. HR sustains low 30-high 20 for approx 10-20 seconds. Upon assessment of pt each time HR drops, pt is calm and is in NAD with HR sustaining 35-40 while in room. Additionally, nursing staff has been unable to remove Atropine from [address] [facility] [employer] d/t incorrect concentration (1mg/1mL) stocked in machine. MD Barrios updated on pt's frequent fluctuations, and plan to sit at bedside with medication to monitor and medicate if needed. Nursing Admin notified and medication with correct concentration (1mg/10mL) was delivered to unit. This RN to sit at pt's bedside and monitor HR. Will give Atropine 1mg IVP if pt's HR drops below 35 for >30 seconds.

## 2023-08-10 NOTE — PROGRESS NOTES
Pt not seen for skilled OT due to:     [ ]  Nausea/vomiting  [ ]  Eating  [ ]  Pain  [ ]  Pt lethargic  [x]  Off Unit  x 2 at Endoscopy  Other:    Will follow up with pt as time permits.     Thank you for this referral.  Debbie Koyanagi MS OTR/L

## 2023-08-10 NOTE — PLAN OF CARE
Problem: ABCDS Injury Assessment  Goal: Absence of physical injury  8/10/2023 1207 by Clayton Cardenas RN  Outcome: Progressing  8/10/2023 0811 by Mery Kasper RN  Outcome: Progressing     Problem: Safety - Adult  Goal: Free from fall injury  8/10/2023 1207 by Clayton Cardenas RN  Outcome: Progressing  8/10/2023 0811 by Mery Kasper RN  Outcome: Progressing     Problem: Skin/Tissue Integrity - Adult  Goal: Skin integrity remains intact  8/10/2023 1207 by Clayton Cardenas RN  Outcome: Progressing  Flowsheets (Taken 8/10/2023 0951)  Skin Integrity Remains Intact:   Monitor for areas of redness and/or skin breakdown   Assess vascular access sites hourly  8/10/2023 0811 by Mery Kasper RN  Outcome: Progressing  Flowsheets (Taken 8/9/2023 2027)  Skin Integrity Remains Intact: Monitor for areas of redness and/or skin breakdown     Problem: Pain  Goal: Verbalizes/displays adequate comfort level or baseline comfort level  8/10/2023 1207 by Clayton Cardenas RN  Outcome: Progressing  8/10/2023 0811 by Mery Kasper RN  Outcome: Progressing     Problem: Chronic Conditions and Co-morbidities  Goal: Patient's chronic conditions and co-morbidity symptoms are monitored and maintained or improved  8/10/2023 1207 by Clayton Cardenas RN  Outcome: Progressing  Flowsheets (Taken 8/10/2023 0951)  Care Plan - Patient's Chronic Conditions and Co-Morbidity Symptoms are Monitored and Maintained or Improved:   Collaborate with multidisciplinary team to address chronic and comorbid conditions and prevent exacerbation or deterioration   Monitor and assess patient's chronic conditions and comorbid symptoms for stability, deterioration, or improvement  8/10/2023 0811 by Mery Kasper RN  Outcome: Progressing  Flowsheets (Taken 8/9/2023 2027)  Care Plan - Patient's Chronic Conditions and Co-Morbidity Symptoms are Monitored and Maintained or Improved:   Monitor and assess patient's chronic conditions and comorbid symptoms for stability, deterioration, or

## 2023-08-10 NOTE — PLAN OF CARE
Problem: Safety - Adult  Goal: Free from fall injury  8/10/2023 0811 by Roula Flores RN  Outcome: Progressing  8/9/2023 1850 by Shiela Murphy RN  Outcome: Progressing     Problem: ABCDS Injury Assessment  Goal: Absence of physical injury  8/10/2023 0811 by Roula Flores RN  Outcome: Progressing  8/9/2023 1850 by Shiela Murphy RN  Outcome: Progressing

## 2023-08-10 NOTE — CONSULTS
[facility]    [facility] - Consult Note    Consultation request by Doreen Zimmerman MD for advice/opinion related to evaluating Lower GI bleed [K92.2]  Gastrointestinal hemorrhage, unspecified gastrointestinal hemorrhage type [K92.2]  Acute on chronic anemia [D64.9]    Date of  Admission: 8/9/2023  2:19 PM   Primary Care Physician:  Lisandra Nava MD     Assessment:     Patient Active Problem List   Diagnosis    Advance care planning    CKD (chronic kidney disease) stage 4, GFR 15-29 ml/min (HCC)    Nausea    Chronic renal failure, stage 3 (moderate) ([address])    Diabetes mellitus type 2, diet-controlled ([address])    Gout    Chronic gastritis without bleeding    Heart murmur    Anemia of chronic disease    CKD (chronic kidney disease) stage 3, GFR 30-59 ml/min (HCC)    Mixed hyperlipidemia    Elevated troponin    Type 2 diabetes mellitus with chronic kidney disease (HCC)    Diastolic CHF, chronic ([address])    Advanced directives, counseling/discussion    Sinus bradycardia    Essential hypertension    Type 2 diabetes mellitus with diabetic neuropathy (HCC)    Leg edema    Chronic renal disease, stage III ([address])    Frail elderly    Primary osteoarthritis involving multiple joints    Anaphylactic reaction due to unspecified food, initial encounter    Chronic renal failure    Diet-controlled type 2 diabetes mellitus (HCC)    Chronic diastolic heart failure (HCC)    Idiopathic gout    Edema of lower extremity    Lower GI bleed    Acute blood loss anemia    Acute kidney injury superimposed on chronic kidney disease ([address])     -Acute GI bleed/anemia  -Aortic insufficiency history  -CKD  -Bradycardia previously noted back in May 2022 which was asymptomatic.  -Patient is DNR    Primary cardiologist, Dr. Leopoldo Keller:     From cardiac standpoint, she has known history of significant bradycardia. If she develops irreversible, symptomatic bradycardia, she will need permanent pacemaker implantation. vitiligo, or suspicious lesions  Neuro: alert, oriented x3, affect appropriate, no focal neurological deficits, moves all extremities well, and no involuntary movements  Psych: non focal     Data Review:     Recent Labs     08/09/23  1527 08/09/23  2236 08/10/23  0521 08/10/23  0940   WBC 5.2  --  6.4  --    HGB 6.7* 8.0* 7.9* 8.1*   HCT 21.1* 25.0* 24.7* 25.2*     --  213  --      Recent Labs     08/09/23  1527 08/10/23  0521    141   K 4.6 3.8    112*   CO2 22 22   * 91*   MG  --  2.5   ALT 10* 10*   INR 1.1 1.1         Last Lipid:    Lab Results   Component Value Date/Time    CHOL 163 10/05/2022 11:47 AM    HDL 62 10/05/2022 11:47 AM       Signed By: John Tidwell MD     August 10, 2023

## 2023-08-10 NOTE — PROGRESS NOTES
INTERIM UPDATE - 0001 EST on 8/10/2023    Nursing Staff reports that Telemetry called reporting that Patient's Heart Rate was in the 30s bpm with 33 bpms being specifically mentioned. Patient was found to be awake in her room and asymptomatic. No EKG appears available in EHR for ER visit (if obtained it is not readily available in EHR for interpretation). Plan:  STAT EKG to evaluate rhythm. Ordered PRN Atropine. Consider Cardiological consultation if issue is not an isolated event. INTERIM UPDATE - 0028 EST on 8/10/2023    Patient's Heart Rate was in the high 20s bpm, per Nursing Staff. Nursing Staff reports that Patient was sustaining in high 20s bpm to low 30s bpm for approximately 5 minutes. Nursing Staff also reports some Pauses approximately 2.02 or 2.2 seconds in duration. Plan:   Will consult Cardiology for AM.

## 2023-08-10 NOTE — OP NOTE
Operative Note      Patient: Malika Gibson  YOB: 1933  MRN: [id number]    Date of Procedure: 8/10/2023    Pre-Op Diagnosis Codes: * Bleeding esophageal varices, unspecified esophageal varices type (HCC) [I85.01]     * Gastrointestinal hemorrhage, unspecified gastrointestinal hemorrhage type [K92.2]    Post-Op Diagnosis:  As suspected Deulafoy lesion       Procedure(s):  EGD ESOPHAGOGASTRODUODENOSCOPY with BXS  EGD and control of bleeding (bicap coagulation)    Surgeon(s):  MD Ammy Avery MD    Assistant:   * No surgical staff found *    Anesthesia: Monitor Anesthesia Care    Estimated Blood Loss (mL): Minimal    Complications: None    Specimens:   ID Type Source Tests Collected by Time Destination   A : Body and Fundus bxs Tissue Gastric SURGICAL PATHOLOGY Tanner Fry MD 8/10/2023 1324    B : Pre Pyloric Antrum Tissue Gastric SURGICAL PATHOLOGY Tanner Fry MD 8/10/2023 1332        Implants:  * No implants in log *      Drains: * No LDAs found *    Findings:   proximal gastritis, Deulafoy lesion of proximal body      Detailed Description of Procedure: There is no concern in the esophagus which appeared normal.  There was no evidence of varices. Intense erythema friability were noted in the body and fundus. Several biopsies were obtained. In addition there was a discrete flat area oozed bright red blood. This was treated with BiCap in the bleeding ceased. The duodenum appeared normal.  Random biopsies was obtained from the prepyloric antrum, rule out helical back to pylori.         Plan:   await Bx results  No NSAIDS  Continue PPI  Add Carafate for 2 weeks   Electronically signed by Darden Sicard, MD on 8/10/2023 at 1:36 PM

## 2023-08-11 VITALS
SYSTOLIC BLOOD PRESSURE: 139 MMHG | BODY MASS INDEX: 28.63 KG/M2 | HEART RATE: 56 BPM | OXYGEN SATURATION: 100 % | HEIGHT: 59 IN | TEMPERATURE: 97.7 F | DIASTOLIC BLOOD PRESSURE: 60 MMHG | RESPIRATION RATE: 18 BRPM | WEIGHT: 142 LBS

## 2023-08-11 LAB
ANION GAP SERPL CALC-SCNC: 7 MMOL/L (ref 3–18)
BASOPHILS # BLD: 0 K/UL (ref 0–0.1)
BASOPHILS NFR BLD: 0 % (ref 0–2)
BUN SERPL-MCNC: 72 MG/DL (ref 7–18)
BUN/CREAT SERPL: 31 (ref 12–20)
CALCIUM SERPL-MCNC: 8.7 MG/DL (ref 8.5–10.1)
CHLORIDE SERPL-SCNC: 112 MMOL/L (ref 100–111)
CO2 SERPL-SCNC: 22 MMOL/L (ref 21–32)
CREAT SERPL-MCNC: 2.29 MG/DL (ref 0.6–1.3)
DIFFERENTIAL METHOD BLD: ABNORMAL
EOSINOPHIL # BLD: 0.1 K/UL (ref 0–0.4)
EOSINOPHIL NFR BLD: 1 % (ref 0–5)
ERYTHROCYTE [DISTWIDTH] IN BLOOD BY AUTOMATED COUNT: 16.2 % (ref 11.6–14.5)
GLUCOSE SERPL-MCNC: 105 MG/DL (ref 74–99)
HCT VFR BLD AUTO: 26.4 % (ref 35–45)
HGB BLD-MCNC: 8.3 G/DL (ref 12–16)
IMM GRANULOCYTES # BLD AUTO: 0 K/UL (ref 0–0.04)
IMM GRANULOCYTES NFR BLD AUTO: 0 % (ref 0–0.5)
LYMPHOCYTES # BLD: 1 K/UL (ref 0.9–3.6)
LYMPHOCYTES NFR BLD: 17 % (ref 21–52)
MCH RBC QN AUTO: 27.9 PG (ref 24–34)
MCHC RBC AUTO-ENTMCNC: 31.4 G/DL (ref 31–37)
MCV RBC AUTO: 88.9 FL (ref 78–100)
MONOCYTES # BLD: 0.3 K/UL (ref 0.05–1.2)
MONOCYTES NFR BLD: 6 % (ref 3–10)
NEUTS SEG # BLD: 4.4 K/UL (ref 1.8–8)
NEUTS SEG NFR BLD: 76 % (ref 40–73)
NRBC # BLD: 0 K/UL (ref 0–0.01)
NRBC BLD-RTO: 0 PER 100 WBC
PLATELET # BLD AUTO: 223 K/UL (ref 135–420)
PMV BLD AUTO: 9.7 FL (ref 9.2–11.8)
POTASSIUM SERPL-SCNC: 3.7 MMOL/L (ref 3.5–5.5)
RBC # BLD AUTO: 2.97 M/UL (ref 4.2–5.3)
SODIUM SERPL-SCNC: 141 MMOL/L (ref 136–145)
WBC # BLD AUTO: 5.8 K/UL (ref 4.6–13.2)

## 2023-08-11 PROCEDURE — 85025 COMPLETE CBC W/AUTO DIFF WBC: CPT

## 2023-08-11 PROCEDURE — 94761 N-INVAS EAR/PLS OXIMETRY MLT: CPT

## 2023-08-11 PROCEDURE — A4216 STERILE WATER/SALINE, 10 ML: HCPCS

## 2023-08-11 PROCEDURE — 97161 PT EVAL LOW COMPLEX 20 MIN: CPT

## 2023-08-11 PROCEDURE — 6370000000 HC RX 637 (ALT 250 FOR IP)

## 2023-08-11 PROCEDURE — 2580000003 HC RX 258

## 2023-08-11 PROCEDURE — C9113 INJ PANTOPRAZOLE SODIUM, VIA: HCPCS

## 2023-08-11 PROCEDURE — 6360000002 HC RX W HCPCS

## 2023-08-11 PROCEDURE — 99239 HOSP IP/OBS DSCHRG MGMT >30: CPT | Performed by: INTERNAL MEDICINE

## 2023-08-11 PROCEDURE — 80048 BASIC METABOLIC PNL TOTAL CA: CPT

## 2023-08-11 PROCEDURE — 36415 COLL VENOUS BLD VENIPUNCTURE: CPT

## 2023-08-11 PROCEDURE — 6370000000 HC RX 637 (ALT 250 FOR IP): Performed by: INTERNAL MEDICINE

## 2023-08-11 RX ORDER — SUCRALFATE 1 G/1
1 TABLET ORAL
Qty: 120 TABLET | Refills: 3 | Status: SHIPPED | OUTPATIENT
Start: 2023-08-11

## 2023-08-11 RX ORDER — PANTOPRAZOLE SODIUM 40 MG/1
40 TABLET, DELAYED RELEASE ORAL
Qty: 60 TABLET | Refills: 2 | Status: SHIPPED | OUTPATIENT
Start: 2023-08-11 | End: 2023-09-10

## 2023-08-11 RX ADMIN — AMLODIPINE BESYLATE 10 MG: 10 TABLET ORAL at 10:24

## 2023-08-11 RX ADMIN — SUCRALFATE 1 G: 1 TABLET ORAL at 10:24

## 2023-08-11 RX ADMIN — PANTOPRAZOLE SODIUM 40 MG: 40 INJECTION, POWDER, FOR SOLUTION INTRAVENOUS at 05:07

## 2023-08-11 RX ADMIN — HYDRALAZINE HYDROCHLORIDE 25 MG: 25 TABLET, FILM COATED ORAL at 06:29

## 2023-08-11 RX ADMIN — CLONIDINE HYDROCHLORIDE 0.1 MG: 0.1 TABLET ORAL at 10:24

## 2023-08-11 RX ADMIN — SUCRALFATE 1 G: 1 TABLET ORAL at 05:12

## 2023-08-11 RX ADMIN — SODIUM CHLORIDE, PRESERVATIVE FREE 10 ML: 5 INJECTION INTRAVENOUS at 10:25

## 2023-08-11 RX ADMIN — PRAVASTATIN SODIUM 20 MG: 20 TABLET ORAL at 10:23

## 2023-08-11 ASSESSMENT — PAIN SCALES - GENERAL
PAINLEVEL_OUTOF10: 0
PAINLEVEL_OUTOF10: 0

## 2023-08-11 NOTE — PROGRESS NOTES
OT orders received and medical chart review completed. Pt reports she is at baseline as Mod I with ADLs and functional mobility w/ RW. Formal OT evaluation not indicated at this time. OT to sign off.

## 2023-08-11 NOTE — DISCHARGE SUMMARY
[facility]  [facility]       Patient: Elan Mirza Age: 80 y.o. : 1933 MR#: [id number] SSN: [id number]  PCP on record: Patti March MD  Admit date: 2023  Discharge date: 2023    Consults:  -FABBY Leone,-cardiology  - FABBY Meier,-gi  Procedures:-EGD on 8/10/23  -     Significant Diagnostic Studies:   -  Xray Result (most recent):  XR CHEST PORTABLE 2023    Narrative  INDICATION:  Dark bowel movements. COMPARISON:      FINDINGS: A portable AP radiograph of the chest was obtained at 1638 hours. The  patient is on a cardiac monitor. Similar prominence of the medial right superior  mediastinum. Cardiac silhouette is unchanged. Minimal streakiness of the lung  bases more typical for atelectasis than infiltrate, however please correct  clinically. Osseous structures are unchanged. Impression  Minimal streakiness of the lung bases more typical for atelectasis  than infiltrate, however please correlate clinically. -Renal ultrasound on 8/10/23: IMPRESSION:  1. Echogenic renal parenchyma, compatible with medical renal disease disease. Mild pelviectasis in both kidneys. 2. Bilateral renal cysts.   Discharge Diagnoses:           -GI bleed  -Acute blood loss anemia  -Acute on chronic kidney disease                                Patient Active Problem List   Diagnosis    Advance care planning    CKD (chronic kidney disease) stage 4, GFR 15-29 ml/min (HCC)    Nausea    Chronic renal failure, stage 3 (moderate) (HCC)    Diabetes mellitus type 2, diet-controlled (HCC)    Gout    Chronic gastritis without bleeding    Heart murmur    Anemia of chronic disease    CKD (chronic kidney disease) stage 3, GFR 30-59 ml/min (HCC)    Mixed hyperlipidemia    Elevated troponin    Type 2 diabetes mellitus with chronic kidney disease (HCC)    Diastolic CHF, chronic ([address])    Advanced directives, counseling/discussion    Sinus bradycardia    Essential []Rehab   []Home with family   []Alternate Facility:____________________      Discharge Medications:     Current Discharge Medication List        START taking these medications    Details   sucralfate (CARAFATE) 1 GM tablet Take 1 tablet by mouth 4 times daily (before meals and nightly)  Qty: 120 tablet, Refills: 3      pantoprazole (PROTONIX) 40 MG tablet Take 1 tablet by mouth 2 times daily (before meals)  Qty: 60 tablet, Refills: 2           CONTINUE these medications which have NOT CHANGED    Details   bumetanide (BUMEX) 0.5 MG tablet Take 1 tablet by mouth daily      ferrous sulfate (IRON 325) 325 (65 Fe) MG tablet Take 1 tablet by mouth every morning (before breakfast)  Qty: 90 tablet, Refills: 3      amLODIPine (NORVASC) 10 MG tablet Take 1 tablet by mouth daily  Qty: 90 tablet, Refills: 3      hydrALAZINE (APRESOLINE) 100 MG tablet Take 1 tablet by mouth 3 times daily  Qty: 270 tablet, Refills: 3      Omega-3 Fatty Acids (FISH OIL) 1000 MG capsule Take 1 tablet by mouth daily      ONETOUCH VERIO strip Use to check fasting blood sugar one hour prior to breakfast and 1 hour after dinner      vitamin D3 (CHOLECALCIFEROL) 125 MCG (5000 UT) TABS tablet Take 1 tablet by mouth daily      dorzolamide-timolol (COSOPT) 22.3-6.8 MG/ML ophthalmic solution Place 1 drop into the left eye every morning      latanoprost (XALATAN) 0.005 % ophthalmic solution Place 1 drop into both eyes daily      potassium chloride (KLOR-CON M) 20 MEQ extended release tablet Take 1 tablet by mouth daily      pravastatin (PRAVACHOL) 20 MG tablet Take 1 tablet by mouth daily           STOP taking these medications       cloNIDine (CATAPRES) 0.2 MG tablet Comments:   Reason for Stopping:         ASPIRIN 81 PO Comments:   Reason for Stopping: Follow-up Appointments:   1. Your PCP: Scarlet García MD, within 7-10days  2.          Please follow-up on tests/labs that are still pendin.     >30 minutes spent coordinating this

## 2023-08-11 NOTE — DISCHARGE INSTRUCTIONS
Don't take aspirin for about a week. Please make sure you measure your blood pressure at least once a day. If the top number is above 180 you need to call your doctor and if you have chest pain, headaches, nausea vomiting and high blood pressure you need to come back to the ED. If the top number of your blood pressure is less than 100 you need to stop taking your blood pressure medications and call your doctor. You need to come back to the ED if your blood pressure is low and you also experience lightheadedness, if you pass out or have shortness of breath and chest pain you need to come back to the ED. DISCHARGE SUMMARY from Nurse    PATIENT INSTRUCTIONS:    After general anesthesia or intravenous sedation, for 24 hours or while taking prescription Narcotics:  Limit your activities  Do not drive and operate hazardous machinery  Do not make important personal or business decisions  Do  not drink alcoholic beverages  If you have not urinated within 8 hours after discharge, please contact your surgeon on call. Report the following to your surgeon:  Excessive pain, swelling, redness or odor of or around the surgical area  Temperature over 100.5  Nausea and vomiting lasting longer than 4 hours or if unable to take medications  Any signs of decreased circulation or nerve impairment to extremity: change in color, persistent  numbness, tingling, coldness or increase pain  Any questions    What to do at Home:  Recommended activity: activity as tolerated,     If you experience any of the following symptoms chest pain, shortness of breath, fever greater than 100.5, nausea, vomiting, any noticeable bleeding, pain unrelieved by medication, please follow up with PCP. *  Please give a list of your current medications to your Primary Care Provider.     *  Please update this list whenever your medications are discontinued, doses are      changed, or new medications (including over-the-counter products) are

## 2023-08-11 NOTE — PROGRESS NOTES
Discharge teaching completed at bedside with patient. Opportunity provided for clarifying question. All answered to patient satisfaction. EKG leads removed. IV removed(bleeding at site, pressure applied until bleeding subsided) ID removed and shredded.

## 2023-08-11 NOTE — PROGRESS NOTES
Physician Progress Note      [facility]  CSN #:                  [id number]  :                       1933  ADMIT DATE:       2023 2:19 PM  [facility] DATE:  RESPONDING  PROVIDER #:        Kenia Mcdaniels MD          QUERY TEXT:    Patient admitted with GI bleed. Noted documentation of Acute Kidney Injury in   H&P on 2023. In order to support the diagnosis of FRANSICO, please include   additional clinical indicators in your documentation. ? Or please document if   the diagnosis of FRANSICO has been ruled out after further study. The medical record reflects the following:  Risk Factors: GIB, HTN    Clinical Indicators:  H&P noted ? Acute on Chronic Kidney Disease\"   - Cr 3.06-2.75 (Cr 2.38 in ) (Cr 2.31 in May)   -     Treatment: IVF - NS 75 ml/hr    Thank you,  Edgar Quispe RN, BSN, CRCR, CCDS, SMART  Clinical Documentation Improvement  [place]. [email or web address]  [phone] or via [employer]  Options provided:  -- Acute kidney injury evidenced by, Please document evidence as well as a   numerical baseline creatinine, if known.   -- Acute kidney injury ruled out after study  -- Other - I will add my own diagnosis  -- Disagree - Not applicable / Not valid  -- Disagree - Clinically unable to determine / Unknown  -- Refer to Clinical Documentation Reviewer    PROVIDER RESPONSE TEXT:    This patient has acute kidney injury as evidenced by creat increase above   baseline    Query created by: Edgar Quispe on 8/10/2023 1:23 PM      Electronically signed by:  Kenia Mcdaniels MD 2023 9:17 AM

## 2023-08-11 NOTE — PROGRESS NOTES
Comprehensive Nutrition Assessment    Type and Reason for Visit:  Initial, Positive Nutrition Screen    Nutrition Recommendations/Plan:   Add supplement: Glucerna Shake BID (220 kcal, 10 gm protein each)  Continue all other nutrition interventions. Encourage/ monitor po intake of meals and supplements. Malnutrition Assessment:  Malnutrition Status: At risk for malnutrition (Comment) (pt reported decreased appetite/ po intake and unplanned wt loss PTA) (08/10/23 2029)    Context:  Acute Illness       Nutrition History and Allergies:   Past medical hx:  anemia, CKD, DM, gastritis, gout, HLD, HTN, iron deficiency anemia. Wt trends PTA per chart hx:  132 lb on 4/15/21,  154 lb on 4/19/22,  146 lb on 11/29/22,  142 lb on 8/9/23. No known food allergies. Nutrition Assessment:    Pt off unit, then unavailable at times of visit. Admitted with c/o dark red blood while wiping, dark stool. Made NPO upon admission. S/p EGD with gastric body and fundus biopsy today. Recently started on full liquid diet. Referral received due to pt reporting experiencing unplanned wt loss and decreased po intake PTA. Plan to add supplement    Nutrition Related Findings:    BM 8/9. No edema. Pertinent meds:  protonix, carafate. Wound Type: None       Current Nutrition Intake & Therapies:    Average Meal Intake: Unable to assess  Average Supplements Intake: None Ordered  ADULT DIET; Full Liquid    Anthropometric Measures:  Height: 4' 11\" (149.9 cm)  Ideal Body Weight (IBW): 95 lbs (43 kg)    Admission Body Weight: 142 lb (64.4 kg)  Current Body Weight: 142 lb (64.4 kg), 149.5 % IBW. Weight Source: Not Specified  Current BMI (kg/m2): 28.7  Usual Body Weight: 135 lb (61.2 kg) (per chart hx, 7/3/23)  % Weight Change (Calculated): 5.2  Weight Adjustment For: No Adjustment  BMI Categories: Overweight (BMI 25.0-29. 9)    Estimated Daily Nutrient Needs:  Energy Requirements Based On: Formula (MSJ x1.2-1.3)  Weight Used for Energy

## 2023-08-11 NOTE — CARE COORDINATION
..D/C order noted for today. Orders reviewed. No needs identified at this time. Patient's family will transport home. SW remains available if needed.      ..  OLGA eMdina Care Manager

## 2023-08-11 NOTE — PROGRESS NOTES
Patient completed Mri screening form and then  requested nurse to come back to bedside. When this nurse entered the room patient stated she made a mistake and that she is claustrophobic. Patient refusing Mri at this time.  Mri and Dr. Chyrl Goltz notified of refusal.

## 2023-08-11 NOTE — PROGRESS NOTES
RN states patient is refusing mri at this time. If patient decide she want to complete mri nurse will inform mri department.  Patient is also in the process of being discharged from hospital.

## 2023-08-11 NOTE — PROGRESS NOTES
WWW[employer]  [phone]    Gastroenterology follow up-Progress note    Impression:  1. 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 Anemia  -Hx of RENETTA on po iron  -H/H on admission 6.7/21.1  -Currently 8.3/26.4 s/p 1 unit PRBC  -ferritin 135, iron 99, iron sat 45%, B12 normal  -EGD as above  -Last colonoscopy 10/27/14 notable for moderate diverticulosis and internal hemorrhoids  3. Cirrhosis  -likely metabolic - Hx of DM, HTN, HLD  -no history of varices, ascites, or HE  -HBV/HCV negative in 8/2020, no iron overload, ceruloplasmin neg  -hx of elevated AFP 8/2020 but normal since that time, No HCC on US, MRI multiple liver lesions of indeterminate etiology, not characteristic of HCC, stable at repeat 12/2021  -AFP pending  4. Bradycardia - Followed by Dr. Paige Saravia  5. CKD    Plan:  1. Continue PPI BID and carafate  2. Monitor H/H and transfuse for Hgb <7  3. Monitor MELD labs daily  4. MRI Liver w/wo cont when stable for [address] surveillance - order placed  5. Medical management as per primary team  6. Will follow up as outpatient for continued liver management    Chief Complaint: rectal bleeding      Subjective:  Denies abdominal pain, nausea, vomiting, tolerating diet, no further bleeding    ROS: Denies any fevers, chills, rash.      Eyes: conjunctiva normal, EOM normal   Neck: ROM normal, supple and trachea normal   Cardiovascular: heart normal, normal rate and regular rhythm   Pulmonary/Chest Wall: breath sounds normal and effort normal   Abdominal: appearance normal, bowel sounds normal and soft, non-acute, non-tender     Patient Active Problem List   Diagnosis    Advance care planning    CKD (chronic kidney disease) stage 4, GFR 15-29 ml/min (HCC)    Nausea    Chronic renal failure, stage 3 (moderate) (HCC)    Diabetes mellitus type 2, diet-controlled (HCC)    Gout    Chronic gastritis without

## 2023-08-14 ENCOUNTER — CARE COORDINATION (OUTPATIENT)
Facility: CLINIC | Age: 88
End: 2023-08-14

## 2023-08-14 NOTE — CARE COORDINATION
[facility] Care Transitions Initial Follow Up Call    Call within 2 business days of discharge: Yes    Care Transitions Outreach Attempt    CTN Attempted to reach patient for transitions of care follow up. Unable to reach patient. Left a voice message with office contact information . No Patient medical/health information left on message. Patient: Melany Hackett Patient : 1933 MRN: [id number]    Last Discharge [address]       Date Complaint Diagnosis Description Type Department Provider    23 Rectal Bleeding Gastrointestinal hemorrhage, unspecified gastrointestinal hemorrhage type . .. ED to Hosp-Admission (Discharged) (ADMITTED) Ten Bell MD; [facility]. .. Was this an external facility discharge?  No Discharge Facility: n/a    Noted following upcoming appointments from discharge chart review:   [facility] follow up appointment(s):   Future Appointments   Date Time Provider [address]   2023  9:30 AM Lisandra Nava MD [facility] BS AMB   2023  9:00 AM [facility] ECHO [address] BS AMB   2024  9:00 AM Brandi Cortez MD [id number] [address] BS AMB       Aisha Aguiar, JIMMIE

## 2023-08-15 ENCOUNTER — CARE COORDINATION (OUTPATIENT)
Facility: CLINIC | Age: 88
End: 2023-08-15

## 2023-08-15 LAB
AFP L3 MFR SERPL: NORMAL % (ref 0–9.9)
AFP SERPL-MCNC: 0.9 NG/ML (ref 0–8.7)
ANA TITR SER IF: POSITIVE
LABORATORY COMMENT REPORT: ABNORMAL

## 2023-08-15 NOTE — CARE COORDINATION
[facility] Care Transitions Initial Follow Up Call    Call within 2 business days of discharge: Yes    Care Transitions Outreach Attempt    CTN Attempted to reach patient for transitions of care follow up. Unable to reach patient. Left a voice message with office contact information . No Patient medical/health information left on message. Noted Pt. Elybernardot is not active at this time. I have been unable to reach Pt. On phone, This episode is closed and resolved. Patient: Rajinder Browne Patient : 1933 MRN: [id number]    Last Discharge [address] [facility]       Date Complaint Diagnosis Description Type Department Provider    23 Rectal Bleeding Gastrointestinal hemorrhage, unspecified gastrointestinal hemorrhage type . .. ED to Hosp-Admission (Discharged) (ADMITTED) Luz Cordova MD; [facility]. .. Was this an external facility discharge?  No Discharge Facility: n/a    Noted following upcoming appointments from discharge chart review:   [facility] follow up appointment(s):   Future Appointments   Date Time Provider [address]   2023  9:30 AM Angelo Horowitz MD FP BS AMB   2024  9:00 AM Anna Perry MD [facility] BS AMB       Aisha Cortés RN

## 2023-08-16 ENCOUNTER — OFFICE VISIT (OUTPATIENT)
Age: 88
End: 2023-08-16

## 2023-08-16 ENCOUNTER — HOSPITAL ENCOUNTER (OUTPATIENT)
Facility: HOSPITAL | Age: 88
Discharge: HOME | End: 2023-08-19
Payer: MEDICARE

## 2023-08-16 VITALS
SYSTOLIC BLOOD PRESSURE: 124 MMHG | BODY MASS INDEX: 26 KG/M2 | OXYGEN SATURATION: 100 % | HEIGHT: 59 IN | WEIGHT: 129 LBS | HEART RATE: 69 BPM | RESPIRATION RATE: 14 BRPM | DIASTOLIC BLOOD PRESSURE: 66 MMHG | TEMPERATURE: 97.8 F

## 2023-08-16 DIAGNOSIS — D63.8 ANEMIA OF CHRONIC DISEASE: ICD-10-CM

## 2023-08-16 DIAGNOSIS — R01.1 HEART MURMUR: ICD-10-CM

## 2023-08-16 DIAGNOSIS — N18.9 ACUTE KIDNEY INJURY SUPERIMPOSED ON CHRONIC KIDNEY DISEASE (HCC): ICD-10-CM

## 2023-08-16 DIAGNOSIS — N17.9 ACUTE KIDNEY INJURY SUPERIMPOSED ON CHRONIC KIDNEY DISEASE (HCC): ICD-10-CM

## 2023-08-16 DIAGNOSIS — N18.4 TYPE 2 DIABETES MELLITUS WITH STAGE 4 CHRONIC KIDNEY DISEASE, WITHOUT LONG-TERM CURRENT USE OF INSULIN (HCC): ICD-10-CM

## 2023-08-16 DIAGNOSIS — Z09 HOSPITAL DISCHARGE FOLLOW-UP: ICD-10-CM

## 2023-08-16 DIAGNOSIS — Z71.89 ENCOUNTER FOR MEDICATION REVIEW AND COUNSELING: ICD-10-CM

## 2023-08-16 DIAGNOSIS — I10 ESSENTIAL HYPERTENSION: ICD-10-CM

## 2023-08-16 DIAGNOSIS — K92.1 GASTROINTESTINAL HEMORRHAGE WITH MELENA: Primary | ICD-10-CM

## 2023-08-16 DIAGNOSIS — K92.1 GASTROINTESTINAL HEMORRHAGE WITH MELENA: ICD-10-CM

## 2023-08-16 DIAGNOSIS — E11.22 TYPE 2 DIABETES MELLITUS WITH STAGE 4 CHRONIC KIDNEY DISEASE, WITHOUT LONG-TERM CURRENT USE OF INSULIN (HCC): ICD-10-CM

## 2023-08-16 DIAGNOSIS — R00.1 SINUS BRADYCARDIA: ICD-10-CM

## 2023-08-16 LAB
ALBUMIN SERPL-MCNC: 3.7 G/DL (ref 3.4–5)
ALBUMIN/GLOB SERPL: 1 (ref 0.8–1.7)
ALP SERPL-CCNC: 37 U/L (ref 45–117)
ALT SERPL-CCNC: 9 U/L (ref 13–56)
ANION GAP SERPL CALC-SCNC: 12 MMOL/L (ref 3–18)
AST SERPL-CCNC: 15 U/L (ref 10–38)
BASOPHILS # BLD: 0 K/UL (ref 0–0.1)
BASOPHILS NFR BLD: 1 % (ref 0–2)
BILIRUB SERPL-MCNC: 0.7 MG/DL (ref 0.2–1)
BUN SERPL-MCNC: 75 MG/DL (ref 7–18)
BUN/CREAT SERPL: 23 (ref 12–20)
CALCIUM SERPL-MCNC: 9.3 MG/DL (ref 8.5–10.1)
CHLORIDE SERPL-SCNC: 106 MMOL/L (ref 100–111)
CO2 SERPL-SCNC: 21 MMOL/L (ref 21–32)
CREAT SERPL-MCNC: 3.24 MG/DL (ref 0.6–1.3)
DIFFERENTIAL METHOD BLD: ABNORMAL
EOSINOPHIL # BLD: 0.1 K/UL (ref 0–0.4)
EOSINOPHIL NFR BLD: 2 % (ref 0–5)
ERYTHROCYTE [DISTWIDTH] IN BLOOD BY AUTOMATED COUNT: 15.9 % (ref 11.6–14.5)
GLOBULIN SER CALC-MCNC: 3.6 G/DL (ref 2–4)
GLUCOSE SERPL-MCNC: 144 MG/DL (ref 74–99)
HCT VFR BLD AUTO: 29 % (ref 35–45)
HGB BLD-MCNC: 9 G/DL (ref 12–16)
IMM GRANULOCYTES # BLD AUTO: 0 K/UL (ref 0–0.04)
IMM GRANULOCYTES NFR BLD AUTO: 0 % (ref 0–0.5)
LYMPHOCYTES # BLD: 1 K/UL (ref 0.9–3.6)
LYMPHOCYTES NFR BLD: 21 % (ref 21–52)
MCH RBC QN AUTO: 28.5 PG (ref 24–34)
MCHC RBC AUTO-ENTMCNC: 31 G/DL (ref 31–37)
MCV RBC AUTO: 91.8 FL (ref 78–100)
MONOCYTES # BLD: 0.4 K/UL (ref 0.05–1.2)
MONOCYTES NFR BLD: 8 % (ref 3–10)
NEUTS SEG # BLD: 3.2 K/UL (ref 1.8–8)
NEUTS SEG NFR BLD: 69 % (ref 40–73)
NRBC # BLD: 0 K/UL (ref 0–0.01)
NRBC BLD-RTO: 0 PER 100 WBC
PLATELET # BLD AUTO: 259 K/UL (ref 135–420)
PMV BLD AUTO: 9.9 FL (ref 9.2–11.8)
POTASSIUM SERPL-SCNC: 3.7 MMOL/L (ref 3.5–5.5)
PROT SERPL-MCNC: 7.3 G/DL (ref 6.4–8.2)
RBC # BLD AUTO: 3.16 M/UL (ref 4.2–5.3)
SODIUM SERPL-SCNC: 139 MMOL/L (ref 136–145)
WBC # BLD AUTO: 4.6 K/UL (ref 4.6–13.2)

## 2023-08-16 PROCEDURE — 80053 COMPREHEN METABOLIC PANEL: CPT

## 2023-08-16 PROCEDURE — 85025 COMPLETE CBC W/AUTO DIFF WBC: CPT

## 2023-08-16 PROCEDURE — 36415 COLL VENOUS BLD VENIPUNCTURE: CPT

## 2023-08-16 RX ORDER — AMOXICILLIN 250 MG
1 CAPSULE ORAL DAILY
COMMUNITY

## 2023-08-17 NOTE — PROGRESS NOTES
Chief Complaint   Patient presents with    Follow-Up from [facility] for GI Bleed       1. \"Have you been to the ER, urgent care clinic since your last visit? Hospitalized since your last visit? \" Yes [facility] from 08/09/2023 to  08/11/2023 for GI hemorrhage    2. \"Have you seen or consulted any other health care providers outside of the [address] [place] [address] since your last visit? \" No     3. For patients aged 43-73: Has the patient had a colonoscopy / FIT/ Cologuard? NA - based on age      If the patient is female:    4. For patients aged 43-66: Has the patient had a mammogram within the past 2 years? NA - based on age or sex      11. For patients aged 21-65: Has the patient had a pap smear?  NA - based on age or sex    Health Maintenance Due   Topic Date Due    DTaP/Tdap/Td vaccine (1 - Tdap) Never done    Shingles vaccine (1 of 2) Never done    COVID-19 Vaccine (4 - Booster for Pfizer series) 01/25/2022    Flu vaccine (1) 08/01/2023    Annual Wellness Visit (AWV)  07/20/2023
goals.

## 2023-08-18 LAB
ANA SPECKLED TITR SER: NORMAL {TITER}
CENTROMERE B AB SER-ACNC: <0.2 AI (ref 0–0.9)
CHROMATIN AB SERPL-ACNC: <0.2 AI (ref 0–0.9)
DSDNA AB SER-ACNC: <1 IU/ML (ref 0–9)
ENA JO1 AB SER-ACNC: <0.2 AI (ref 0–0.9)
ENA RNP AB SER-ACNC: <0.2 AI (ref 0–0.9)
ENA SCL70 AB SER-ACNC: <0.2 AI (ref 0–0.9)
ENA SM AB SER-ACNC: <0.2 AI (ref 0–0.9)
ENA SS-A AB SER-ACNC: <0.2 AI (ref 0–0.9)
ENA SS-B AB SER-ACNC: <0.2 AI (ref 0–0.9)
Lab: NORMAL
NOTE: NORMAL

## 2023-08-25 ENCOUNTER — TELEPHONE (OUTPATIENT)
Age: 88
End: 2023-08-25

## 2023-08-25 ENCOUNTER — CARE COORDINATION (OUTPATIENT)
Facility: CLINIC | Age: 88
End: 2023-08-25

## 2023-08-25 RX ORDER — LIDOCAINE 50 MG/G
1 PATCH TOPICAL DAILY
Qty: 30 PATCH | Refills: 0 | Status: SHIPPED | OUTPATIENT
Start: 2023-08-25 | End: 2023-09-24

## 2023-08-25 NOTE — TELEPHONE ENCOUNTER
Fax received from [address] my meds stating prior auth is required for the Lidocaine 5% Patches. Submit a PA request  1. Go to key. [employer] and click \"Enter a Key\"  2. Patient last name: Rudy Scales      : 1933      Key: [id number]  3.  Click \"start a PA\", complete the form, and \"send to plan\"

## 2023-08-28 PROBLEM — D64.9 ACUTE ON CHRONIC ANEMIA: Status: ACTIVE | Noted: 2023-08-28

## 2023-08-29 ENCOUNTER — HOSPITAL ENCOUNTER (INPATIENT)
Facility: HOSPITAL | Age: 88
LOS: 1 days | Discharge: HOME HEALTH | DRG: 300 | End: 2023-08-31
Attending: EMERGENCY MEDICINE | Admitting: INTERNAL MEDICINE
Payer: MEDICARE

## 2023-08-29 ENCOUNTER — CARE COORDINATION (OUTPATIENT)
Facility: CLINIC | Age: 88
End: 2023-08-29

## 2023-08-29 DIAGNOSIS — M25.562 ACUTE PAIN OF LEFT KNEE: Primary | ICD-10-CM

## 2023-08-29 PROCEDURE — 99285 EMERGENCY DEPT VISIT HI MDM: CPT

## 2023-08-29 PROCEDURE — 6370000000 HC RX 637 (ALT 250 FOR IP): Performed by: PHYSICIAN ASSISTANT

## 2023-08-29 RX ORDER — PREDNISONE 20 MG/1
20 TABLET ORAL
Status: COMPLETED | OUTPATIENT
Start: 2023-08-29 | End: 2023-08-29

## 2023-08-29 RX ORDER — ACETAMINOPHEN 325 MG/1
650 TABLET ORAL
Status: COMPLETED | OUTPATIENT
Start: 2023-08-29 | End: 2023-08-29

## 2023-08-29 RX ADMIN — PREDNISONE 20 MG: 20 TABLET ORAL at 21:41

## 2023-08-29 RX ADMIN — DICLOFENAC SODIUM 2 G: 10 GEL TOPICAL at 22:12

## 2023-08-29 RX ADMIN — ACETAMINOPHEN 325MG 650 MG: 325 TABLET ORAL at 21:41

## 2023-08-29 ASSESSMENT — ENCOUNTER SYMPTOMS
WHEEZING: 0
COUGH: 0
RHINORRHEA: 0
VOMITING: 0
EYE REDNESS: 0
BACK PAIN: 0
SORE THROAT: 0
EYE DISCHARGE: 0
STRIDOR: 0
ABDOMINAL PAIN: 0
SHORTNESS OF BREATH: 0
NAUSEA: 0

## 2023-08-30 ENCOUNTER — APPOINTMENT (OUTPATIENT)
Facility: HOSPITAL | Age: 88
DRG: 300 | End: 2023-08-30
Payer: MEDICARE

## 2023-08-30 PROBLEM — N18.4 CKD (CHRONIC KIDNEY DISEASE) STAGE 4, GFR 15-29 ML/MIN (HCC): Status: ACTIVE | Noted: 2019-09-09

## 2023-08-30 PROBLEM — E11.9 DIABETES MELLITUS TYPE 2, DIET-CONTROLLED (HCC): Status: ACTIVE | Noted: 2017-08-02

## 2023-08-30 PROBLEM — I82.432 ACUTE DEEP VEIN THROMBOSIS (DVT) OF POPLITEAL VEIN OF LEFT LOWER EXTREMITY (HCC): Status: ACTIVE | Noted: 2023-08-30

## 2023-08-30 PROBLEM — R60.0 LEG EDEMA: Status: ACTIVE | Noted: 2019-07-25

## 2023-08-30 PROBLEM — I82.402 ACUTE DEEP VEIN THROMBOSIS (DVT) OF LEFT LOWER EXTREMITY, UNSPECIFIED VEIN (HCC): Status: ACTIVE | Noted: 2023-08-30

## 2023-08-30 PROBLEM — I82.4Z2 DVT, LOWER EXTREMITY, DISTAL, ACUTE, LEFT (HCC): Status: ACTIVE | Noted: 2023-08-30

## 2023-08-30 PROBLEM — R00.1 SINUS BRADYCARDIA: Status: ACTIVE | Noted: 2019-09-09

## 2023-08-30 LAB
ALBUMIN SERPL-MCNC: 3.4 G/DL (ref 3.4–5)
ALBUMIN/GLOB SERPL: 0.7 (ref 0.8–1.7)
ALP SERPL-CCNC: 46 U/L (ref 45–117)
ALT SERPL-CCNC: 15 U/L (ref 13–56)
ANION GAP SERPL CALC-SCNC: 9 MMOL/L (ref 3–18)
APTT PPP: 112.1 SEC (ref 23–36.4)
APTT PPP: 37.7 SEC (ref 23–36.4)
AST SERPL-CCNC: 28 U/L (ref 10–38)
BASOPHILS # BLD: 0 K/UL (ref 0–0.1)
BASOPHILS NFR BLD: 0 % (ref 0–2)
BILIRUB SERPL-MCNC: 0.3 MG/DL (ref 0.2–1)
BUN SERPL-MCNC: 106 MG/DL (ref 7–18)
BUN/CREAT SERPL: 31 (ref 12–20)
CALCIUM SERPL-MCNC: 9.5 MG/DL (ref 8.5–10.1)
CHLORIDE SERPL-SCNC: 104 MMOL/L (ref 100–111)
CO2 SERPL-SCNC: 21 MMOL/L (ref 21–32)
CREAT SERPL-MCNC: 3.4 MG/DL (ref 0.6–1.3)
DIFFERENTIAL METHOD BLD: ABNORMAL
EOSINOPHIL # BLD: 0 K/UL (ref 0–0.4)
EOSINOPHIL NFR BLD: 0 % (ref 0–5)
ERYTHROCYTE [DISTWIDTH] IN BLOOD BY AUTOMATED COUNT: 14.8 % (ref 11.6–14.5)
GLOBULIN SER CALC-MCNC: 5.1 G/DL (ref 2–4)
GLUCOSE BLD STRIP.AUTO-MCNC: 145 MG/DL (ref 70–110)
GLUCOSE SERPL-MCNC: 230 MG/DL (ref 74–99)
HCT VFR BLD AUTO: 31.9 % (ref 35–45)
HGB BLD-MCNC: 10.1 G/DL (ref 12–16)
IMM GRANULOCYTES # BLD AUTO: 0 K/UL (ref 0–0.04)
IMM GRANULOCYTES NFR BLD AUTO: 0 % (ref 0–0.5)
INR PPP: 1.1 (ref 0.9–1.1)
LYMPHOCYTES # BLD: 0.7 K/UL (ref 0.9–3.6)
LYMPHOCYTES NFR BLD: 10 % (ref 21–52)
MAGNESIUM SERPL-MCNC: 2.3 MG/DL (ref 1.6–2.6)
MCH RBC QN AUTO: 27.8 PG (ref 24–34)
MCHC RBC AUTO-ENTMCNC: 31.7 G/DL (ref 31–37)
MCV RBC AUTO: 87.9 FL (ref 78–100)
MONOCYTES # BLD: 0.1 K/UL (ref 0.05–1.2)
MONOCYTES NFR BLD: 1 % (ref 3–10)
NEUTS SEG # BLD: 6.5 K/UL (ref 1.8–8)
NEUTS SEG NFR BLD: 89 % (ref 40–73)
NRBC # BLD: 0 K/UL (ref 0–0.01)
NRBC BLD-RTO: 0 PER 100 WBC
NT PRO BNP: 4124 PG/ML (ref 0–1800)
PLATELET # BLD AUTO: 361 K/UL (ref 135–420)
PMV BLD AUTO: 9.8 FL (ref 9.2–11.8)
POTASSIUM SERPL-SCNC: 4 MMOL/L (ref 3.5–5.5)
PROT SERPL-MCNC: 8.5 G/DL (ref 6.4–8.2)
PROTHROMBIN TIME: 14.8 SEC (ref 11.9–14.7)
RBC # BLD AUTO: 3.63 M/UL (ref 4.2–5.3)
SODIUM SERPL-SCNC: 134 MMOL/L (ref 136–145)
TROPONIN I SERPL HS-MCNC: 100 NG/L (ref 0–54)
TROPONIN I SERPL HS-MCNC: 99 NG/L (ref 0–54)
URATE SERPL-MCNC: 11.9 MG/DL (ref 2.6–7.2)
WBC # BLD AUTO: 7.3 K/UL (ref 4.6–13.2)

## 2023-08-30 PROCEDURE — 73560 X-RAY EXAM OF KNEE 1 OR 2: CPT

## 2023-08-30 PROCEDURE — 85025 COMPLETE CBC W/AUTO DIFF WBC: CPT

## 2023-08-30 PROCEDURE — 36415 COLL VENOUS BLD VENIPUNCTURE: CPT

## 2023-08-30 PROCEDURE — 93005 ELECTROCARDIOGRAM TRACING: CPT | Performed by: STUDENT IN AN ORGANIZED HEALTH CARE EDUCATION/TRAINING PROGRAM

## 2023-08-30 PROCEDURE — 6370000000 HC RX 637 (ALT 250 FOR IP): Performed by: INTERNAL MEDICINE

## 2023-08-30 PROCEDURE — 82962 GLUCOSE BLOOD TEST: CPT

## 2023-08-30 PROCEDURE — 2500000003 HC RX 250 WO HCPCS: Performed by: RADIOLOGY

## 2023-08-30 PROCEDURE — 6360000004 HC RX CONTRAST MEDICATION: Performed by: RADIOLOGY

## 2023-08-30 PROCEDURE — 99222 1ST HOSP IP/OBS MODERATE 55: CPT | Performed by: INTERNAL MEDICINE

## 2023-08-30 PROCEDURE — 84484 ASSAY OF TROPONIN QUANT: CPT

## 2023-08-30 PROCEDURE — 37191 INS ENDOVAS VENA CAVA FILTR: CPT

## 2023-08-30 PROCEDURE — 97161 PT EVAL LOW COMPLEX 20 MIN: CPT

## 2023-08-30 PROCEDURE — 83880 ASSAY OF NATRIURETIC PEPTIDE: CPT

## 2023-08-30 PROCEDURE — 80053 COMPREHEN METABOLIC PANEL: CPT

## 2023-08-30 PROCEDURE — 85730 THROMBOPLASTIN TIME PARTIAL: CPT

## 2023-08-30 PROCEDURE — 83735 ASSAY OF MAGNESIUM: CPT

## 2023-08-30 PROCEDURE — 06H03DZ INSERTION OF INTRALUMINAL DEVICE INTO INFERIOR VENA CAVA, PERCUTANEOUS APPROACH: ICD-10-PCS | Performed by: RADIOLOGY

## 2023-08-30 PROCEDURE — 93970 EXTREMITY STUDY: CPT

## 2023-08-30 PROCEDURE — 2500000003 HC RX 250 WO HCPCS: Performed by: STUDENT IN AN ORGANIZED HEALTH CARE EDUCATION/TRAINING PROGRAM

## 2023-08-30 PROCEDURE — 84550 ASSAY OF BLOOD/URIC ACID: CPT

## 2023-08-30 PROCEDURE — 85610 PROTHROMBIN TIME: CPT

## 2023-08-30 PROCEDURE — 2580000003 HC RX 258: Performed by: INTERNAL MEDICINE

## 2023-08-30 PROCEDURE — 97165 OT EVAL LOW COMPLEX 30 MIN: CPT

## 2023-08-30 PROCEDURE — 1100000003 HC PRIVATE W/ TELEMETRY

## 2023-08-30 PROCEDURE — 6360000002 HC RX W HCPCS: Performed by: RADIOLOGY

## 2023-08-30 RX ORDER — SODIUM CHLORIDE 0.9 % (FLUSH) 0.9 %
5-40 SYRINGE (ML) INJECTION EVERY 12 HOURS SCHEDULED
Status: DISCONTINUED | OUTPATIENT
Start: 2023-08-30 | End: 2023-08-31 | Stop reason: HOSPADM

## 2023-08-30 RX ORDER — HYDRALAZINE HYDROCHLORIDE 50 MG/1
100 TABLET, FILM COATED ORAL 3 TIMES DAILY
Status: DISCONTINUED | OUTPATIENT
Start: 2023-08-30 | End: 2023-08-31 | Stop reason: HOSPADM

## 2023-08-30 RX ORDER — HEPARIN SODIUM 10000 [USP'U]/100ML
5-30 INJECTION, SOLUTION INTRAVENOUS CONTINUOUS
Status: DISCONTINUED | OUTPATIENT
Start: 2023-08-30 | End: 2023-08-31

## 2023-08-30 RX ORDER — ONDANSETRON 2 MG/ML
4 INJECTION INTRAMUSCULAR; INTRAVENOUS EVERY 6 HOURS PRN
Status: DISCONTINUED | OUTPATIENT
Start: 2023-08-30 | End: 2023-08-31 | Stop reason: HOSPADM

## 2023-08-30 RX ORDER — PRAVASTATIN SODIUM 20 MG
20 TABLET ORAL NIGHTLY
Status: DISCONTINUED | OUTPATIENT
Start: 2023-08-30 | End: 2023-08-31 | Stop reason: HOSPADM

## 2023-08-30 RX ORDER — HEPARIN SODIUM 1000 [USP'U]/ML
40 INJECTION, SOLUTION INTRAVENOUS; SUBCUTANEOUS PRN
Status: DISCONTINUED | OUTPATIENT
Start: 2023-08-30 | End: 2023-08-31

## 2023-08-30 RX ORDER — COLCHICINE 0.6 MG/1
1.2 CAPSULE ORAL ONCE
Status: COMPLETED | OUTPATIENT
Start: 2023-08-30 | End: 2023-08-30

## 2023-08-30 RX ORDER — SODIUM CHLORIDE 9 MG/ML
INJECTION, SOLUTION INTRAVENOUS PRN
Status: DISCONTINUED | OUTPATIENT
Start: 2023-08-30 | End: 2023-08-31 | Stop reason: HOSPADM

## 2023-08-30 RX ORDER — BUMETANIDE 1 MG/1
0.5 TABLET ORAL DAILY
Status: DISCONTINUED | OUTPATIENT
Start: 2023-08-31 | End: 2023-08-31 | Stop reason: HOSPADM

## 2023-08-30 RX ORDER — SENNA AND DOCUSATE SODIUM 50; 8.6 MG/1; MG/1
1 TABLET, FILM COATED ORAL NIGHTLY
Status: DISCONTINUED | OUTPATIENT
Start: 2023-08-30 | End: 2023-08-31 | Stop reason: HOSPADM

## 2023-08-30 RX ORDER — POLYETHYLENE GLYCOL 3350 17 G/17G
17 POWDER, FOR SOLUTION ORAL DAILY PRN
Status: DISCONTINUED | OUTPATIENT
Start: 2023-08-30 | End: 2023-08-31 | Stop reason: HOSPADM

## 2023-08-30 RX ORDER — PANTOPRAZOLE SODIUM 40 MG/1
40 TABLET, DELAYED RELEASE ORAL
Status: DISCONTINUED | OUTPATIENT
Start: 2023-08-30 | End: 2023-08-31 | Stop reason: HOSPADM

## 2023-08-30 RX ORDER — IODIXANOL 320 MG/ML
INJECTION, SOLUTION INTRAVASCULAR PRN
Status: COMPLETED | OUTPATIENT
Start: 2023-08-30 | End: 2023-08-30

## 2023-08-30 RX ORDER — ACETAMINOPHEN 325 MG/1
650 TABLET ORAL EVERY 6 HOURS PRN
Status: DISCONTINUED | OUTPATIENT
Start: 2023-08-30 | End: 2023-08-31 | Stop reason: HOSPADM

## 2023-08-30 RX ORDER — ONDANSETRON 4 MG/1
4 TABLET, ORALLY DISINTEGRATING ORAL EVERY 8 HOURS PRN
Status: DISCONTINUED | OUTPATIENT
Start: 2023-08-30 | End: 2023-08-31 | Stop reason: HOSPADM

## 2023-08-30 RX ORDER — SODIUM CHLORIDE 0.9 % (FLUSH) 0.9 %
5-40 SYRINGE (ML) INJECTION PRN
Status: DISCONTINUED | OUTPATIENT
Start: 2023-08-30 | End: 2023-08-31 | Stop reason: HOSPADM

## 2023-08-30 RX ORDER — LIDOCAINE HYDROCHLORIDE 10 MG/ML
INJECTION, SOLUTION EPIDURAL; INFILTRATION; INTRACAUDAL; PERINEURAL PRN
Status: COMPLETED | OUTPATIENT
Start: 2023-08-30 | End: 2023-08-30

## 2023-08-30 RX ORDER — ACETAMINOPHEN 650 MG/1
650 SUPPOSITORY RECTAL EVERY 6 HOURS PRN
Status: DISCONTINUED | OUTPATIENT
Start: 2023-08-30 | End: 2023-08-31 | Stop reason: HOSPADM

## 2023-08-30 RX ORDER — DORZOLAMIDE HYDROCHLORIDE AND TIMOLOL MALEATE 20; 5 MG/ML; MG/ML
1 SOLUTION/ DROPS OPHTHALMIC EVERY MORNING
Status: DISCONTINUED | OUTPATIENT
Start: 2023-08-31 | End: 2023-08-31 | Stop reason: HOSPADM

## 2023-08-30 RX ORDER — FENTANYL CITRATE 50 UG/ML
INJECTION, SOLUTION INTRAMUSCULAR; INTRAVENOUS PRN
Status: COMPLETED | OUTPATIENT
Start: 2023-08-30 | End: 2023-08-30

## 2023-08-30 RX ORDER — POTASSIUM CHLORIDE 1500 MG/1
TABLET, EXTENDED RELEASE ORAL
Status: ON HOLD | COMMUNITY
Start: 2023-08-22 | End: 2023-08-31 | Stop reason: HOSPADM

## 2023-08-30 RX ORDER — HEPARIN SODIUM 1000 [USP'U]/ML
80 INJECTION, SOLUTION INTRAVENOUS; SUBCUTANEOUS PRN
Status: DISCONTINUED | OUTPATIENT
Start: 2023-08-30 | End: 2023-08-31

## 2023-08-30 RX ORDER — COLCHICINE 0.6 MG/1
0.6 CAPSULE ORAL ONCE
Status: COMPLETED | OUTPATIENT
Start: 2023-08-30 | End: 2023-08-30

## 2023-08-30 RX ORDER — LATANOPROST 50 UG/ML
1 SOLUTION/ DROPS OPHTHALMIC EVERY EVENING
Status: DISCONTINUED | OUTPATIENT
Start: 2023-08-30 | End: 2023-08-31 | Stop reason: HOSPADM

## 2023-08-30 RX ORDER — SUCRALFATE 1 G/1
1 TABLET ORAL
Status: DISCONTINUED | OUTPATIENT
Start: 2023-08-30 | End: 2023-08-31 | Stop reason: HOSPADM

## 2023-08-30 RX ADMIN — FENTANYL CITRATE 25 MCG: 50 INJECTION INTRAMUSCULAR; INTRAVENOUS at 13:03

## 2023-08-30 RX ADMIN — FENTANYL CITRATE 25 MCG: 50 INJECTION INTRAMUSCULAR; INTRAVENOUS at 12:57

## 2023-08-30 RX ADMIN — HYDRALAZINE HYDROCHLORIDE 100 MG: 50 TABLET, FILM COATED ORAL at 21:16

## 2023-08-30 RX ADMIN — PANTOPRAZOLE SODIUM 40 MG: 40 TABLET, DELAYED RELEASE ORAL at 12:17

## 2023-08-30 RX ADMIN — SENNOSIDES AND DOCUSATE SODIUM 1 TABLET: 50; 8.6 TABLET ORAL at 21:17

## 2023-08-30 RX ADMIN — PRAVASTATIN SODIUM 20 MG: 20 TABLET ORAL at 21:16

## 2023-08-30 RX ADMIN — IODIXANOL 10 ML: 320 INJECTION, SOLUTION INTRAVASCULAR at 13:15

## 2023-08-30 RX ADMIN — SODIUM CHLORIDE, PRESERVATIVE FREE 10 ML: 5 INJECTION INTRAVENOUS at 21:17

## 2023-08-30 RX ADMIN — COLCHICINE 0.6 MG: 0.6 CAPSULE ORAL at 18:30

## 2023-08-30 RX ADMIN — LIDOCAINE HYDROCHLORIDE 5 ML: 10 INJECTION, SOLUTION EPIDURAL; INFILTRATION; INTRACAUDAL; PERINEURAL at 13:00

## 2023-08-30 RX ADMIN — HEPARIN SODIUM 18 UNITS/KG/HR: 10000 INJECTION, SOLUTION INTRAVENOUS at 12:19

## 2023-08-30 RX ADMIN — LATANOPROST 1 DROP: 50 SOLUTION OPHTHALMIC at 17:58

## 2023-08-30 RX ADMIN — SUCRALFATE 1 G: 1 TABLET ORAL at 12:17

## 2023-08-30 RX ADMIN — COLCHICINE 1.2 MG: 0.6 CAPSULE ORAL at 17:56

## 2023-08-30 RX ADMIN — FENTANYL CITRATE 25 MCG: 50 INJECTION INTRAMUSCULAR; INTRAVENOUS at 12:50

## 2023-08-30 RX ADMIN — PANTOPRAZOLE SODIUM 40 MG: 40 TABLET, DELAYED RELEASE ORAL at 17:04

## 2023-08-30 RX ADMIN — HYDRALAZINE HYDROCHLORIDE 100 MG: 50 TABLET, FILM COATED ORAL at 15:53

## 2023-08-30 RX ADMIN — SUCRALFATE 1 G: 1 TABLET ORAL at 17:04

## 2023-08-30 RX ADMIN — SUCRALFATE 1 G: 1 TABLET ORAL at 21:16

## 2023-08-30 RX ADMIN — FENTANYL CITRATE 25 MCG: 50 INJECTION INTRAMUSCULAR; INTRAVENOUS at 13:07

## 2023-08-30 RX ADMIN — POLYETHYLENE GLYCOL 3350 17 G: 17 POWDER, FOR SOLUTION ORAL at 17:04

## 2023-08-30 ASSESSMENT — PAIN DESCRIPTION - ORIENTATION
ORIENTATION: RIGHT;LEFT
ORIENTATION: LEFT
ORIENTATION: RIGHT;LEFT

## 2023-08-30 ASSESSMENT — PAIN SCALES - GENERAL
PAINLEVEL_OUTOF10: 10
PAINLEVEL_OUTOF10: 0

## 2023-08-30 ASSESSMENT — PAIN DESCRIPTION - DESCRIPTORS
DESCRIPTORS: ACHING
DESCRIPTORS: THROBBING

## 2023-08-30 ASSESSMENT — PAIN DESCRIPTION - PAIN TYPE: TYPE: ACUTE PAIN

## 2023-08-30 ASSESSMENT — PAIN DESCRIPTION - ONSET: ONSET: ON-GOING

## 2023-08-30 ASSESSMENT — PAIN DESCRIPTION - LOCATION
LOCATION: KNEE
LOCATION: LEG
LOCATION: KNEE

## 2023-08-30 ASSESSMENT — PAIN DESCRIPTION - FREQUENCY: FREQUENCY: INTERMITTENT

## 2023-08-30 NOTE — PROGRESS NOTES
Advance Care Planning     Advance Care Planning Inpatient Note  Spiritual Care Department    Today's Date: 8/30/2023  Unit: Adri[facility] Parkinson    Received request from admission screening. Upon review of chart and communication with care team, patient's decision making abilities are not in question. . Patient was/were present in the room during visit.       Health Care Decision Makers:       Primary Decision Maker: Cornelius Mehta - Other Relative - [phone]    Secondary Decision Maker: Pippa Abreu - [phone]  Summary:  Verified Documents      Advance Care Planning Documents (Patient Wishes):  Healthcare Power of /Advance Directive Appointment of [address] [place]  Living Will/Advance Directive     Assessment:     08/30/23 1724   Encounter Summary   Encounter Overview/Reason  Initial Encounter   Service Provided For: Patient   Referral/Consult From:  [address] Family members   Last Encounter  08/30/23  (IV-SA-KP)   Complexity of Encounter Moderate   Begin Time 1720   End Time  1727   Total Time Calculated 7 min   Spiritual/Emotional needs   Type Spiritual Support   Advance Care Planning   Type Care Preferences Addressed  (DNR on file)   Assessment/Intervention/Outcome   Assessment Hopeful;Coping   Intervention Active listening;Prayer (assurance of)/[place];Sustaining Presence/Ministry of presence   Outcome Encouraged;Comfort   Plan and Referrals   Plan/Referrals Continue to visit, (comment)         Interventions:  Reviewed but did not complete ACP document    Care Preferences Communicated:   No    Outcomes/Plan:  ACP Discussion: Completed    Electronically signed by Mina Monk, [address] [place] [address] on 8/30/2023 at 5:29 PM

## 2023-08-30 NOTE — H&P
have been missed, and remained in the body of the document. If questions arise, please contact our department.

## 2023-08-30 NOTE — ED NOTES
I assumed care of the patient primarily from VINCENT Ferrer at the end of her shift. Patient is a 66-year-old woman who presented to the ED with left knee pain and swelling. She is having difficulty ambulating. At the time of turnover, she is awaiting vascular duplex of her lower extremity and potentially case management, PT and OT if she is unable to ambulate at home.      Jessica Donaldson MD  08/30/23 4154

## 2023-08-30 NOTE — ED NOTES
PT arrived to the floor from fast track. Pt c/o leg pain x 1 week. Denies chest pain.  at bedside assessing pt.       Balta Altman RN  08/30/23 9603

## 2023-08-30 NOTE — ED NOTES
Pt and spouse provided with recliners for comfort as pt is awaiting a ride home in the am     Priya Bell  08/30/23 0677

## 2023-08-30 NOTE — ED NOTES
TRANSFER - OUT REPORT:    Verbal report given to Tabitha RN on [place]  being transferred to [facility] for routine progression of patient care       Report consisted of patient's Situation, Background, Assessment and   Recommendations(SBAR). Information from the following report(s) ED Encounter Summary, ED SBAR, [facility], and Recent Results was reviewed with the receiving nurse. [place] Assessment:    Presents to emergency department  because of falls (Syncope, seizure, or loss of consciousness): No  Age > 70: Yes  Altered Mental Status, Intoxication with alcohol or substance confusion (Disorientation, impaired judgment, poor safety awaremess, or inability to follow instructions): No  Impaired Mobility: Ambulates or transfers with assistive devices or assistance; Unable to ambulate or transer.: Yes  Nursing Judgement: Yes          Lines:   Peripheral IV 08/30/23 Left Antecubital (Active)   Site Assessment Clean, dry & intact 08/30/23 1039   Line Status Blood return noted 08/30/23 1039   Line Care Chlorhexidine wipes 08/30/23 1039   Phlebitis Assessment No symptoms 08/30/23 1039   Infiltration Assessment 0 08/30/23 1039   Alcohol Cap Used No 08/30/23 1039   Dressing Status Clean, dry & intact 08/30/23 1039   Dressing Type Transparent 08/30/23 1039       Peripheral IV 08/30/23 Right Antecubital (Active)        Opportunity for questions and clarification was provided. Patient transported with patient belongings.           Rubin Knight RN  08/30/23 1010

## 2023-08-30 NOTE — ED NOTES
Patient is waiting for DVT scan with vascular. Possible Case Management needed. Patient sitting in chair with legs elevated. Eyes open, blankets covering up to chin. Skin is warm and dry to touch. No respiratory distress observed. Melissa Lamar, Charge Nurse aware.       Teja Vo RN  08/30/23 9204

## 2023-08-30 NOTE — PROCEDURES
RADIOLOGY POST PROCEDURE NOTE     August 30, 2023       4:03 PM     Preoperative Diagnosis:   DVTs and GI bleeding. Contraindication to anticoagulation. Postoperative Diagnosis:  Same. :  Dr. Kassi Reyna    Assistant:  None. Type of Anesthesia: 1% local lidocaine and IV moderate sedation with Versed and fentanyl. Procedure/Description: Image guided IV filter placement. Findings:   No bleeding. Estimated blood Loss: Minimal    Specimen Removed: No    Blood transfusions:  None. Implants: Infrarenal IVC filter argon option Elite MRI and CT compatible, retrievable. Complications: None    Condition: Stable    Discharge Plan:  continue present therapy.     Ana Sauceda MD

## 2023-08-30 NOTE — PROGRESS NOTES
TRANSFER - OUT REPORT:    Verbal report given to Jason Maza RN on [place]  being transferred to ER for routine progression of patient care       Report consisted of patient's Situation, Background, Assessment and   Recommendations(SBAR). Information from the following report(s) Nurse Handoff Report was reviewed with the receiving nurse. [place] Fall Assessment:    Presents to emergency department  because of falls (Syncope, seizure, or loss of consciousness): No  Age > 70: Yes  Altered Mental Status, Intoxication with alcohol or substance confusion (Disorientation, impaired judgment, poor safety awaremess, or inability to follow instructions): No  Impaired Mobility: Ambulates or transfers with assistive devices or assistance; Unable to ambulate or transer.: Yes  Nursing Judgement: Yes          Lines:   Peripheral IV 08/30/23 Left Antecubital (Active)   Site Assessment Clean, dry & intact 08/30/23 1039   Line Status Blood return noted 08/30/23 1039   Line Care Chlorhexidine wipes 08/30/23 1039   Phlebitis Assessment No symptoms 08/30/23 1039   Infiltration Assessment 0 08/30/23 1039   Alcohol Cap Used No 08/30/23 1039   Dressing Status Clean, dry & intact 08/30/23 1039   Dressing Type Transparent 08/30/23 1039       Peripheral IV 08/30/23 Right Antecubital (Active)        Opportunity for questions and clarification was provided.       Patient transported with:

## 2023-08-30 NOTE — CONSULTS
tablet Take 1 tablet by mouth 3 times daily 5/31/23   Pamella Nowak MD   Omega-3 Fatty Acids (FISH OIL) 1000 MG capsule Take 1 tablet by mouth daily    Historical Provider, MD Maria C Pinzon strip Use to check fasting blood sugar one hour prior to breakfast and 1 hour after dinner 1/18/23   Historical Provider, MD   dorzolamide-timolol (COSOPT) 22.3-6.8 MG/ML ophthalmic solution Place 1 drop into the left eye every morning 6/28/22   Ar Automatic Reconciliation   latanoprost (XALATAN) 0.005 % ophthalmic solution Place 1 drop into both eyes daily 10/17/19   Ar Automatic Reconciliation   potassium chloride (KLOR-CON M) 20 MEQ extended release tablet Take 1 tablet by mouth daily 7/22/20   Ar Automatic Reconciliation   pravastatin (PRAVACHOL) 20 MG tablet Take 1 tablet by mouth daily 10/10/22   Ar Automatic Reconciliation       Allergies:  No Known Allergies    Social History:  Social History     Socioeconomic History    Marital status:      Spouse name: Not on file    Number of children: Not on file    Years of education: Not on file    Highest education level: Not on file   Occupational History    Not on file   Tobacco Use    Smoking status: Never    Smokeless tobacco: Never   Vaping Use    Vaping Use: Never used   Substance and Sexual Activity    Alcohol use: No    Drug use: No    Sexual activity: Not Currently     Partners: Male     Birth control/protection: None   Other Topics Concern    Not on file   Social History Narrative    Not on file     Social Determinants of Health     Financial Resource Strain: Medium Risk    Difficulty of Paying Living Expenses: Somewhat hard   Food Insecurity: Food Insecurity Present    Worried About Running Out of Food in the Last Year: Never true    Ran Out of Food in the Last Year: Often true   Transportation Needs: Unknown    Lack of Transportation (Medical): Not on file    Lack of Transportation (Non-Medical):  No   Physical Activity: Not on file   Stress: Not on file

## 2023-08-30 NOTE — ED NOTES
Patient signed out to me pending duplex of her lower extremity. She presented for left leg pain has been going on for about a week. Describes as cramping is more so in her calf. On my exam patient does have some minor edema to her bilateral lower extremities but no overlying skin changes, otherwise neurovascularly intact. Does have a positive Homans' sign. Duplex ultrasound came back positive for acute DVT of the left popliteal vein. I do think that this explains the symptoms. Because this blood work will be sent. CBC shows a normocytic anemia however uptrending from previous. CMP within normal limits the exception of an elevated BUN and creatinine however not markedly changed from her previous. Glucose also slightly elevated at 230. INR within normal limits. Review of the EMR shows that patient was admitted 2 weeks ago with a GI bleed requiring transfusion. She states that she has not noticed any recurrence of bleeding and has been taking her medications for this at home without complications. However I do have concerns that she could have significant bleeding again with starting a blood thinner. Also concerning is the fact that she states this leg pain is making it difficult for her to ambulate. She is 80years old and already walks around with a walker and has been having significant more issues over the past couple days and therefore think that she is at high risk for falls. Both of these make me concerned to just send the patient home on blood thinners and because of this I spoke with the hospitalist for admission. We will start the patient on heparin as this will be easier to turn off if she does have recurrence of bleeding and she can be transitioned over the next couple days if no bleeding is noted. We will also consult IR to see if she would be a good candidate for an IVC filter. Patient is admitted to the hospitalist service.   Patient is in agreement with the plan to be admitted

## 2023-08-30 NOTE — ED NOTES
Report received from [place], [address] from [employer]. S/p IVC filter placement at R IJ. Heparin drip was stopped at 1235 and can be resumed at 1530. Pt needs to be in bedrest for several hrs.      Redd Espinal RN  08/30/23 9876

## 2023-08-30 NOTE — ED PROVIDER NOTES
EMERGENCY DEPARTMENT HISTORY AND PHYSICAL EXAM    Date: 8/29/2023  Patient Name: Latia Carty    History of Presenting Illness     Chief Complaint   Patient presents with    Knee Pain     Pt states her knee has been bothering her for the last week with no relief. Pt denies any trauma to the area and states she has had arthritis in the past. Pt states the pain is \"so bad\" and she can not sleep at night          History Provided By: patient     Chief Complaint: knee pain   Duration: 2 days  Timing: Acute  Location: Left knee  Quality: Throbbing  Severity: Moderate to severe  Modifying Factors: Worse on ambulation  Associated Symptoms: Swelling      Additional History (Context): Latia Carty is a 80 y.o. female with PMH diabetes chronic kidney disease hypertension hyperlipidemia and gout who presents with c/o acute onset worsening left knee pain. Patient has a history of gout. She denies any known trauma or injury to the leg. Patient does have pitting edema to the bilateral lower extremities and states she does take diuretic medication. She states her swelling is at baseline. Patient reports that the pain is from the left knee down to the left ankle. She is wearing compression stockings at this time. Denies chest pain and shortness of breath. No other complaints reported at this time. PCP: Haley Leslie MD    Current Facility-Administered Medications   Medication Dose Route Frequency Provider Last Rate Last Admin    diclofenac sodium (VOLTAREN) 1 % gel 2 g  2 g Topical NOW April BRANDON Miranda PA-C         Current Outpatient Medications   Medication Sig Dispense Refill    diclofenac sodium (VOLTAREN) 1 % GEL Apply 4 g topically 4 times daily 480 g 0    lidocaine (LIDODERM) 5 % Place 1 patch onto the skin daily 12 hours on, 12 hours off.  30 patch 0    Cholecalciferol (VITAMIN D) 10 MCG (400 UNIT) CAPS Capsule Take 1 capsule by mouth daily      senna-docusate (STOOL SOFTENER/LAXATIVE) 8.6-50 MG per

## 2023-08-31 ENCOUNTER — HOME HEALTH ADMISSION (OUTPATIENT)
Age: 88
End: 2023-08-31
Payer: MEDICARE

## 2023-08-31 VITALS
HEIGHT: 59 IN | OXYGEN SATURATION: 96 % | HEART RATE: 85 BPM | WEIGHT: 137.56 LBS | SYSTOLIC BLOOD PRESSURE: 168 MMHG | DIASTOLIC BLOOD PRESSURE: 78 MMHG | BODY MASS INDEX: 27.73 KG/M2 | TEMPERATURE: 98.6 F | RESPIRATION RATE: 18 BRPM

## 2023-08-31 LAB
ANION GAP SERPL CALC-SCNC: 10 MMOL/L (ref 3–18)
APTT PPP: 143.4 SEC (ref 23–36.4)
BUN SERPL-MCNC: 93 MG/DL (ref 7–18)
BUN/CREAT SERPL: 35 (ref 12–20)
CALCIUM SERPL-MCNC: 9.5 MG/DL (ref 8.5–10.1)
CHLORIDE SERPL-SCNC: 107 MMOL/L (ref 100–111)
CO2 SERPL-SCNC: 24 MMOL/L (ref 21–32)
CREAT SERPL-MCNC: 2.67 MG/DL (ref 0.6–1.3)
EKG ATRIAL RATE: 74 BPM
EKG DIAGNOSIS: NORMAL
EKG P AXIS: 21 DEGREES
EKG P-R INTERVAL: 212 MS
EKG Q-T INTERVAL: 414 MS
EKG QRS DURATION: 76 MS
EKG QTC CALCULATION (BAZETT): 459 MS
EKG R AXIS: -56 DEGREES
EKG T AXIS: 17 DEGREES
EKG VENTRICULAR RATE: 74 BPM
ERYTHROCYTE [DISTWIDTH] IN BLOOD BY AUTOMATED COUNT: 15 % (ref 11.6–14.5)
GLUCOSE BLD STRIP.AUTO-MCNC: 182 MG/DL (ref 70–110)
GLUCOSE SERPL-MCNC: 115 MG/DL (ref 74–99)
HCT VFR BLD AUTO: 28.4 % (ref 35–45)
HGB BLD-MCNC: 9 G/DL (ref 12–16)
MAGNESIUM SERPL-MCNC: 2.5 MG/DL (ref 1.6–2.6)
MCH RBC QN AUTO: 28 PG (ref 24–34)
MCHC RBC AUTO-ENTMCNC: 31.7 G/DL (ref 31–37)
MCV RBC AUTO: 88.5 FL (ref 78–100)
NRBC # BLD: 0 K/UL (ref 0–0.01)
NRBC BLD-RTO: 0 PER 100 WBC
PLATELET # BLD AUTO: 304 K/UL (ref 135–420)
PMV BLD AUTO: 9.9 FL (ref 9.2–11.8)
POTASSIUM SERPL-SCNC: 3.2 MMOL/L (ref 3.5–5.5)
RBC # BLD AUTO: 3.21 M/UL (ref 4.2–5.3)
SODIUM SERPL-SCNC: 141 MMOL/L (ref 136–145)
WBC # BLD AUTO: 7.2 K/UL (ref 4.6–13.2)

## 2023-08-31 PROCEDURE — 83735 ASSAY OF MAGNESIUM: CPT

## 2023-08-31 PROCEDURE — 93010 ELECTROCARDIOGRAM REPORT: CPT | Performed by: INTERNAL MEDICINE

## 2023-08-31 PROCEDURE — 80048 BASIC METABOLIC PNL TOTAL CA: CPT

## 2023-08-31 PROCEDURE — 82962 GLUCOSE BLOOD TEST: CPT

## 2023-08-31 PROCEDURE — 99238 HOSP IP/OBS DSCHRG MGMT 30/<: CPT | Performed by: STUDENT IN AN ORGANIZED HEALTH CARE EDUCATION/TRAINING PROGRAM

## 2023-08-31 PROCEDURE — 85730 THROMBOPLASTIN TIME PARTIAL: CPT

## 2023-08-31 PROCEDURE — 6370000000 HC RX 637 (ALT 250 FOR IP): Performed by: INTERNAL MEDICINE

## 2023-08-31 PROCEDURE — 2580000003 HC RX 258: Performed by: INTERNAL MEDICINE

## 2023-08-31 PROCEDURE — 85027 COMPLETE CBC AUTOMATED: CPT

## 2023-08-31 PROCEDURE — 94761 N-INVAS EAR/PLS OXIMETRY MLT: CPT

## 2023-08-31 PROCEDURE — 36415 COLL VENOUS BLD VENIPUNCTURE: CPT

## 2023-08-31 RX ADMIN — SUCRALFATE 1 G: 1 TABLET ORAL at 11:22

## 2023-08-31 RX ADMIN — HYDRALAZINE HYDROCHLORIDE 100 MG: 50 TABLET, FILM COATED ORAL at 08:57

## 2023-08-31 RX ADMIN — SODIUM CHLORIDE, PRESERVATIVE FREE 10 ML: 5 INJECTION INTRAVENOUS at 09:22

## 2023-08-31 RX ADMIN — ACETAMINOPHEN 325MG 650 MG: 325 TABLET ORAL at 00:14

## 2023-08-31 RX ADMIN — ACETAMINOPHEN 325MG 650 MG: 325 TABLET ORAL at 11:22

## 2023-08-31 RX ADMIN — BUMETANIDE 0.5 MG: 1 TABLET ORAL at 08:56

## 2023-08-31 RX ADMIN — LATANOPROST 1 DROP: 50 SOLUTION OPHTHALMIC at 11:22

## 2023-08-31 RX ADMIN — DORZOLAMIDE HYDROCHLORIDE AND TIMOLOL MALEATE 1 DROP: 20; 5 SOLUTION/ DROPS OPHTHALMIC at 11:42

## 2023-08-31 RX ADMIN — PANTOPRAZOLE SODIUM 40 MG: 40 TABLET, DELAYED RELEASE ORAL at 05:53

## 2023-08-31 RX ADMIN — SUCRALFATE 1 G: 1 TABLET ORAL at 05:53

## 2023-08-31 ASSESSMENT — PAIN DESCRIPTION - ORIENTATION
ORIENTATION: LEFT
ORIENTATION: RIGHT

## 2023-08-31 ASSESSMENT — PAIN DESCRIPTION - LOCATION
LOCATION: KNEE
LOCATION: HEAD

## 2023-08-31 ASSESSMENT — PAIN SCALES - GENERAL
PAINLEVEL_OUTOF10: 3
PAINLEVEL_OUTOF10: 6
PAINLEVEL_OUTOF10: 0

## 2023-08-31 ASSESSMENT — PAIN DESCRIPTION - DESCRIPTORS
DESCRIPTORS: DULL
DESCRIPTORS: ACHING

## 2023-08-31 NOTE — CARE COORDINATION
CM spoke with patient, updated with discharge order for today. Patient gave [address] verbal consent for [facility]. Patient said her  will transport her home today at time of discharge. Patient said she will need either her Rollator or Rolling Walker to get from the car into the house. CM spoke with Luis Edwards with [facility], updated that patient is discharging today. Ruby Call said they can take patient. CM put patient in the queue for [facility]. SHAYAN spoke with patient's  Nara Yusuf [phone], and updated that patient has discharge order, and asked that he bring Rollator or [place] Farideh for patient to get from car into the home. Patient's  said he will be at hospital within an hour. SHAYAN spoke with [facility], and updated with the discharge plan for today.              Sveta Eason, RN  Case Management [phone]

## 2023-08-31 NOTE — CARE COORDINATION
Discharge order noted for today. Pt has been accepted to [facility] agency. Met with patient and spoke with patient's  and are agreeable to the transition plan today. Transport has been arranged through patient's . Patient's  home health  orders have been forwarded to [facility] home health  agency via [id number]. Updated bedside RN, Maddy Jacobs,  to the transition plan.   Discharge information has been documented on the AVS.             Aakash Moser, RN  Case Management [phone]

## 2023-08-31 NOTE — PROGRESS NOTES
conducted a Follow up consultation and Spiritual Assessment for Moisés, who is a 80 y.o.,female. The  provided the following Interventions:  Continued the relationship of care and support. Listened empathically. Offered prayer and assurance of continued prayer on patients behalf. Chart reviewed. The following outcomes were achieved:  Patient expressed gratitude for 's visit. Assessment:  There are no further spiritual or Bahai issues which require Spiritual Care Services interventions at this time. Plan:  Chaplains will continue to follow and will provide pastoral care on an as needed/requested basis.  recommends bedside caregivers page  on duty if patient shows signs of acute spiritual or emotional distress.        [address] [facility]   [phone]

## 2023-08-31 NOTE — PROGRESS NOTES
Comprehensive Nutrition Assessment    Type and Reason for Visit:  Initial, Positive Nutrition Screen    Nutrition Recommendations/Plan:   Modify Current Diet- Rec 4 Carb Choices; Low Sodium (2 gm) diet d/c Low Fat/Low Chol/High Fiber/DALTON for better PO acceptance. Start Oral Nutrition Supplement- Rec adding Glucerna (each provides 220 kcal, 10g protein) once daily. Continue to monitor tolerance of PO, compliance of oral supplements, weight, labs, and plan of care during admission. Malnutrition Assessment:  Malnutrition Status: At risk for malnutrition (Comment) (CHF, reduced intake prior to admission) (08/31/23 1013)      Nutrition History and Allergies: PMHx: DMT2, HTN, HFpEF, CKD4, hyperlipidemia, proximal gastritis and anemia. Pt reports poor appetite, consuming small meals x unknown time frame d/t pain in leg. Prior to that time period, pt reports normal appetite. Pt reports UBW of 140 lbs x 1 month and weight loss. Wt hx: 137 lbs (08/31/23) obtained by RDE, 135 lbs (07/03/23), weight stable x 1 month, 142 lbs (05/31/23), weight loss of 5 lbs (3.5%) x 3 months, 144 lbs (02/28/23), weight loss of 7 lbs (4.9%) x 6 months. Pt with non-significant weight loss. NKFA. Nutrition Assessment:    Pt presents with complaints of left knee pain and left lower leg swelling. Pt admitted for DVT, lower extremity, distal, acute, left. Positive nutrition screen noted, MST: wt loss (14-23#) and decreased appetite. Pt s/p Image guided IV filter placement 8/30. Pt seen at bedside. Pt reports fair intake. Based on p's reported intake, 51-75% of breakfast consumed today d/t dislike of certain foods. PO not documented in Flowsheets. Will add oral supplements to increase calorie/protein intake opportunity, pt agreeable. Will continue to monitor intake. Nutrition Related Findings:    Last BM (including prior to admit): 08/30/23. Edema: None. Pertinent Meds: Bumex, protonix, senokot, IV heparin, glycolax (prn).

## 2023-08-31 NOTE — CARE COORDINATION
08/31/23 1308   Readmission Assessment   Number of Days since last admission? 8-30 days   Previous Disposition Home with Family   Who is being Interviewed Patient   What was the patient's/caregiver's perception as to why they think they needed to return back to the hospital? Other (Comment)  (Patient had Left knee pain and swelling.)   Did you visit your Primary Care Physician after you left the hospital, before you returned this time? Yes   Did you see a specialist, such as Cardiac, Pulmonary, Orthopedic Physician, etc. after you left the hospital? No   Who advised the patient to return to the hospital? Self-referral   Does the patient report anything that got in the way of taking their medications? No   In our efforts to provide the best possible care to you and others like you, can you think of anything that we could have done to help you after you left the hospital the first time, so that you might not have needed to return so soon?  Other (Comment)  (Nothing at this time.)

## 2023-08-31 NOTE — CARE COORDINATION
08/31/23 1309   Service Assessment   Patient Orientation Alert and Oriented;Person;Place;Situation;Self   Cognition Alert   History Provided By Patient   Primary Caregiver Self   Support Systems Spouse/Significant Other   Patient's Healthcare Decision Maker is: Named in Juan Luis E Nicolasa Marlow   PCP Verified by CM Yes   Prior Functional Level Independent in ADLs/IADLs;Mobility   Current Functional Level Assistance with the following:;Mobility   Can patient return to prior living arrangement Yes   Ability to make needs known: Good   Family able to assist with home care needs: Yes   Financial Resources [facility] Resources None   Social/Functional History   Lives With Spouse   Type of [address] [place] [address] Dr One level   [address] to enter with rails   Entrance Stairs - Number of Steps 4 Steps to enter the home. Entrance Stairs - Rails Both   Bathroom Shower/Tub Tub/Shower unit   Bathroom Toilet Standard   Bathroom Equipment Tub transfer bench   Bathroom Accessibility Accessible   Home Equipment Cane;Walker, rolling;Rollator   Receives Help From Family   ADL Assistance Needs assistance   Toileting Needs assistance   Homemaking Assistance Needs assistance   Ambulation Assistance Needs assistance   Transfer Assistance Needs assistance   Active  No   Patient's  Info Patient's  transports patient. Mode of Transportation Car;Truck   Occupation Retired   Discharge Planning   Type of [facility] Prior To Admission Durable Medical Equipment   Current DME Prior to [facility] (Comment)  ([facility]   DME Ordered?  No   Potential Assistance Purchasing Medications No   Type of Home Care Services PT;OT;Skilled Therapy;Nursing Services   Patient expects to be discharged to: House  (with Home Health services, and Family Assistance.)   Services At/After

## 2023-08-31 NOTE — PLAN OF CARE
Problem: Discharge Planning  Goal: Discharge to home or other facility with appropriate resources  8/31/2023 1258 by Mikki Bagley RN  Outcome: Adequate for Discharge  8/31/2023 1202 by Mikki Bagley RN  Outcome: Progressing     Problem: Safety - Adult  Goal: Free from fall injury  8/31/2023 1258 by Mikki Bagley RN  Outcome: Adequate for Discharge  8/31/2023 1202 by Mikki Bagley RN  Outcome: Progressing     Problem: ABCDS Injury Assessment  Goal: Absence of physical injury  8/31/2023 1258 by Mikki Bagley RN  Outcome: Adequate for Discharge  8/31/2023 1202 by Mikki Bagley RN  Outcome: Progressing     Problem: Pain  Goal: Verbalizes/displays adequate comfort level or baseline comfort level  Outcome: Adequate for Discharge  Flowsheets (Taken 8/31/2023 0856)  Verbalizes/displays adequate comfort level or baseline comfort level: Assess pain using appropriate pain scale     Problem: Nutrition Deficit:  Goal: Optimize nutritional status  Outcome: Adequate for Discharge  Flowsheets (Taken 8/31/2023 0814 by Candice Lopez RD)  Nutrient intake appropriate for improving, restoring, or maintaining nutritional needs: Monitor oral intake, labs, and treatment plans
Problem: Discharge Planning  Goal: Discharge to home or other facility with appropriate resources  8/31/2023 1258 by Niki Taylor RN  Outcome: Adequate for Discharge  8/31/2023 1202 by Niki Taylor RN  Outcome: Progressing     Problem: Safety - Adult  Goal: Free from fall injury  8/31/2023 1258 by Niki Taylor RN  Outcome: Adequate for Discharge  8/31/2023 1202 by Niki Taylor RN  Outcome: Progressing     Problem: ABCDS Injury Assessment  Goal: Absence of physical injury  8/31/2023 1258 by Niki Taylor RN  Outcome: Adequate for Discharge  8/31/2023 1202 by Niki Taylor RN  Outcome: Progressing     Problem: Pain  Goal: Verbalizes/displays adequate comfort level or baseline comfort level  Outcome: Adequate for Discharge  Flowsheets (Taken 8/31/2023 0856)  Verbalizes/displays adequate comfort level or baseline comfort level: Assess pain using appropriate pain scale     Problem: Nutrition Deficit:  Goal: Optimize nutritional status  Outcome: Adequate for Discharge  Flowsheets (Taken 8/31/2023 0814 by Dorothy Sims RD)  Nutrient intake appropriate for improving, restoring, or maintaining nutritional needs: Monitor oral intake, labs, and treatment plans     Problem: Chronic Conditions and Co-morbidities  Goal: Patient's chronic conditions and co-morbidity symptoms are monitored and maintained or improved  Outcome: Adequate for Discharge     Problem: Nutrition Deficit:  Goal: Optimize nutritional status  Outcome: Adequate for Discharge  Flowsheets (Taken 8/31/2023 0814 by Dorothy Sims RD)  Nutrient intake appropriate for improving, restoring, or maintaining nutritional needs: Monitor oral intake, labs, and treatment plans
Problem: Discharge Planning  Goal: Discharge to home or other facility with appropriate resources  Outcome: Progressing     Problem: Safety - Adult  Goal: Free from fall injury  Outcome: Progressing     Problem: ABCDS Injury Assessment  Goal: Absence of physical injury  Outcome: Progressing
Problem: Physical Therapy - Adult  Goal: By Discharge: Performs mobility at highest level of function for planned discharge setting. See evaluation for individualized goals. Description: Physical Therapy Goals:  Initiated 8/30/2023 to be met within 7-10 days. 1.  Patient will move from supine to sit and sit to supine  in bed with modified independence. 2.  Patient will transfer from bed to chair and chair to bed with modified independence using the least restrictive device. 3.  Patient will perform sit to stand with modified independence. 4.  Patient will ambulate with modified independence for 150 feet with the least restrictive device. 5.  Patient will ascend/descend 4 stairs with handrail(s) with supervision/set-up. PLOF: Independent with mobility without AD, has been using wheeled walker for the past few days. Lives with spouse in single story home with 4 [address]. Outcome: Progressing     PHYSICAL THERAPY EVALUATION    Patient: Britton Farris (32 y.o. female)  Date: 8/30/2023  Primary Diagnosis: No admission diagnoses are documented for this encounter. Precautions: Fall Risk    ASSESSMENT :  Patient received in recliner with spouse present and agreeable to PT evaluation. Patient reports increased pain in left leg has been ongoing for about one week. She has been using wheeled walker for support at home. Left leg AROM generally decreased, though functional compared to right leg. Min A sit to stand to RW. She ambulates 10 ft to the transport chair with shuffled steps. VC's for management of RW when back up into the chair. Evaluation limited by transport arriving to take patient for testing. DEFICITS/IMPAIRMENTS:    , Body Structures, Functions, Activity Limitations Requiring Skilled Therapeutic Intervention: Decreased functional mobility ; Decreased strength;Decreased tolerance to work activity; Decreased balance; Increased pain    Patient will benefit from skilled intervention to address
SUMMARY:     Past Medical History:   Diagnosis Date    Anemia     Carotid bruit 12/07/2013    Carotid Duplex: 1. Bilateral 1-49% stenosis of the internal carotid arteries. 2. No significant stenosis in the external carotid arteries bilaterally. 3. Antegrade flow in both vetebral arteries. 4. Normal flow in both subclavian arteries. Plaque Morphology: 1. Hyperechoic plaque in the bulb and right ICA. 2. Hyperechoic plaque in the bulb and left ICA. CKD (chronic kidney disease) stage 3, GFR 30-59 ml/min ([facility])     Diabetes ([address])     NIDDM    Gastritis 10/27/2014    Duodenum Bx: No Specific Pathologic Abnormality. No Villous Abnormality. 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 No dysplasia or malignancy identified. Bacterial Organisms c/w H. Pylori are present. Z-Line Bx: GE mucosa w/ Acute/Chronic Inflammation & Reactive Changes. Focal Specialized Type Connors Mucosa Identified. No Dysplasia or Malignancy Identified.      Gout 12/10/2009    Elevated Uric Acid Level    Hyperlipidemia     Hypertension     RENETTA (iron deficiency anemia)      Past Surgical History:   Procedure Laterality Date    COLONOSCOPY  10/27/2014    Dr. Demetrius Meng  10/27/2014    Dr. Demetrius Meng 8/10/2023    EGD ESOPHAGOGASTRODUODENOSCOPY with BXS performed by Rain Ann MD at [address] [place] [address] Situation:   Social/Functional History  Lives With: Spouse  Type of Home: House  Home Layout: One level  Home Access: Stairs to enter with rails  Entrance Stairs - Number of Steps: 4  Bathroom Shower/Tub: Tub/Shower unit  Bathroom Equipment: Shower chair  Home Equipment: rosalva Hinojosa Rollator  [x]  Right hand dominant   []  Left hand dominant    Cognitive/Behavioral Status:  Orientation  Orientation Level: Oriented to place;Oriented to situation;Oriented to person  Cognition  Overall Cognitive Status: WNL    Skin:

## 2023-08-31 NOTE — PROGRESS NOTES
Medication List        START taking these medications      diclofenac sodium 1 % Gel  Commonly known as: VOLTAREN  Apply 4 g topically 4 times daily            CONTINUE taking these medications      amLODIPine 10 MG tablet  Commonly known as: NORVASC  Take 1 tablet by mouth daily     bumetanide 0.5 MG tablet  Commonly known as: BUMEX     Cholecalciferol 10 MCG (400 UNIT) Caps Capsule  Commonly known as: Vitamin D     dorzolamide-timolol 22.3-6.8 MG/ML ophthalmic solution  Commonly known as: COSOPT     hydrALAZINE 100 MG tablet  Commonly known as: APRESOLINE  Take 1 tablet by mouth 3 times daily     latanoprost 0.005 % ophthalmic solution  Commonly known as: XALATAN     lidocaine 5 %  Commonly known as: LIDODERM  Place 1 patch onto the skin daily 12 hours on, 12 hours off.      OneTouch Verio strip  Generic drug: blood glucose test strips     pantoprazole 40 MG tablet  Commonly known as: PROTONIX  Take 1 tablet by mouth 2 times daily (before meals)     potassium chloride 20 MEQ extended release tablet  Commonly known as: KLOR-CON M     pravastatin 20 MG tablet  Commonly known as: PRAVACHOL     senna-docusate 8.6-50 MG per tablet  Commonly known as: PERICOLACE     sucralfate 1 GM tablet  Commonly known as: CARAFATE  Take 1 tablet by mouth 4 times daily (before meals and nightly)            STOP taking these medications      ferrous sulfate 325 (65 Fe) MG tablet  Commonly known as: IRON 325     fish oil 1000 MG capsule     potassium chloride 20 MEQ Tbcr extended release tablet  Commonly known as: KLOR-CON M               Where to Get Your Medications        These medications were sent to [address] [place] [address],[address]      Phone: [phone]   diclofenac sodium 1 % Gel

## 2023-09-01 ENCOUNTER — CARE COORDINATION (OUTPATIENT)
Facility: CLINIC | Age: 88
End: 2023-09-01

## 2023-09-01 NOTE — HOME CARE
Late Entry:  9/1/23  Received home health referral for [facility] for (SN, PT, OT). Discharge orders noted for Thursday 8/31/23. Spoke with patient's spouse via phone;  patient identifiers verified. Explained home health care services and routines. Demographics verified including insurance, phone and address confirmed. Caregivers available spouse as primary - and other family available.   Orders noted and arranged and sent to central intake and scheduling on 8/31/23.       ----  Delia Dozier

## 2023-09-01 NOTE — CARE COORDINATION
Care Transitions Outreach Attempt    Call within 2 business days of discharge: Yes   Attempted to reach patient for transitions of care follow up. Unable to reach patient. At the listed home and mobile emmett numbers. Care Transitions Nurse ( CTN) attempted to contact patient via telephone call to the listed home and mobile phone numbers. There was no response to either phone call. A voicemail message was left requesting a non-emergency return telephone call. CTN  contact information provided. Patient: Melany Hackett Patient : 1933 MRN: [id number]    Last Discharge [address]       Date Complaint Diagnosis Description Type Department Provider    23 Knee Pain Acute pain of left knee ED to Hosp-Admission (Discharged) (ADMITTED) [id number] Christel Keys DO; Mishel WALKER Burn. .. Was this an external facility discharge?  No   Discharge [facility]     Noted following upcoming appointments from discharge chart review:   [facility] follow up appointment(s):   Future Appointments   Date Time Provider [facility] [address]   2023  1:00 PM Lisandra Nava MD [facility] BS AMB   2023 10:00 AM Cris Ernandez, [address] [facility] BS AMB   2024  9:00 AM Brandi Cortez MD [facility] BS AMB

## 2023-09-02 ENCOUNTER — HOME CARE VISIT (OUTPATIENT)
Age: 88
End: 2023-09-02

## 2023-09-02 PROCEDURE — G0299 HHS/HOSPICE OF RN EA 15 MIN: HCPCS

## 2023-09-02 PROCEDURE — 0221000100 HH NO PAY CLAIM PROCEDURE

## 2023-09-03 VITALS
RESPIRATION RATE: 18 BRPM | OXYGEN SATURATION: 97 % | HEART RATE: 74 BPM | TEMPERATURE: 97 F | SYSTOLIC BLOOD PRESSURE: 128 MMHG | DIASTOLIC BLOOD PRESSURE: 76 MMHG

## 2023-09-03 ASSESSMENT — ENCOUNTER SYMPTOMS
DYSPNEA ACTIVITY LEVEL: AFTER AMBULATING 10 - 20 FT
HEMOPTYSIS: 0
PAIN LOCATION - PAIN QUALITY: DULL ACHE

## 2023-09-04 ENCOUNTER — HOME CARE VISIT (OUTPATIENT)
Age: 88
End: 2023-09-04

## 2023-09-04 VITALS
OXYGEN SATURATION: 99 % | DIASTOLIC BLOOD PRESSURE: 82 MMHG | TEMPERATURE: 97.7 F | RESPIRATION RATE: 16 BRPM | SYSTOLIC BLOOD PRESSURE: 142 MMHG | HEART RATE: 75 BPM

## 2023-09-04 PROCEDURE — G0151 HHCP-SERV OF PT,EA 15 MIN: HCPCS

## 2023-09-04 NOTE — HOME HEALTH
Physician Caitlyn Mcleod MD notified of patient admission to home health 9/2/23 and plan of care including anticipated frequency of 1d1, 2w1, 1w2,  2 prn and treatments/interventions/modalities of teaching disease and medication management. Discharge planning discussed with patient and caregiver. Discharge planning as follows: Pt/CG will be able to manage disease and medication independently, and health condition stable Pt/Caregiver did verbalize understanding of discharge planning. Next MD appointment 9/7/2023 1:00 PM with Caitlyn Mcleod MD.  Patient/caregiver encouraged/instructed to keep appointment as lack of follow through with physician appointment could result in discontinuation of service. m

## 2023-09-05 ENCOUNTER — HOME CARE VISIT (OUTPATIENT)
Age: 88
End: 2023-09-05

## 2023-09-05 ENCOUNTER — CARE COORDINATION (OUTPATIENT)
Facility: CLINIC | Age: 88
End: 2023-09-05

## 2023-09-05 PROCEDURE — G0300 HHS/HOSPICE OF LPN EA 15 MIN: HCPCS

## 2023-09-05 RX ORDER — PRAVASTATIN SODIUM 20 MG
TABLET ORAL
Qty: 90 TABLET | Refills: 0 | Status: SHIPPED | OUTPATIENT
Start: 2023-09-05 | End: 2023-09-07 | Stop reason: SDUPTHER

## 2023-09-06 ENCOUNTER — HOME CARE VISIT (OUTPATIENT)
Age: 88
End: 2023-09-06

## 2023-09-06 VITALS
TEMPERATURE: 97.6 F | OXYGEN SATURATION: 98 % | SYSTOLIC BLOOD PRESSURE: 135 MMHG | DIASTOLIC BLOOD PRESSURE: 82 MMHG | HEART RATE: 81 BPM | RESPIRATION RATE: 18 BRPM

## 2023-09-06 PROCEDURE — G0157 HHC PT ASSISTANT EA 15: HCPCS

## 2023-09-07 ENCOUNTER — OFFICE VISIT (OUTPATIENT)
Age: 88
End: 2023-09-07

## 2023-09-07 VITALS
HEART RATE: 79 BPM | SYSTOLIC BLOOD PRESSURE: 134 MMHG | OXYGEN SATURATION: 99 % | WEIGHT: 126 LBS | RESPIRATION RATE: 16 BRPM | TEMPERATURE: 99.1 F | DIASTOLIC BLOOD PRESSURE: 74 MMHG | BODY MASS INDEX: 25.4 KG/M2 | HEIGHT: 59 IN

## 2023-09-07 VITALS
DIASTOLIC BLOOD PRESSURE: 66 MMHG | HEART RATE: 87 BPM | RESPIRATION RATE: 18 BRPM | OXYGEN SATURATION: 100 % | SYSTOLIC BLOOD PRESSURE: 149 MMHG | TEMPERATURE: 98.1 F

## 2023-09-07 DIAGNOSIS — N18.9 ACUTE KIDNEY INJURY SUPERIMPOSED ON CKD (HCC): ICD-10-CM

## 2023-09-07 DIAGNOSIS — Z09 HOSPITAL DISCHARGE FOLLOW-UP: ICD-10-CM

## 2023-09-07 DIAGNOSIS — N18.4 TYPE 2 DIABETES MELLITUS WITH STAGE 4 CHRONIC KIDNEY DISEASE, WITHOUT LONG-TERM CURRENT USE OF INSULIN (HCC): ICD-10-CM

## 2023-09-07 DIAGNOSIS — E11.22 TYPE 2 DIABETES MELLITUS WITH STAGE 4 CHRONIC KIDNEY DISEASE, WITHOUT LONG-TERM CURRENT USE OF INSULIN (HCC): ICD-10-CM

## 2023-09-07 DIAGNOSIS — D64.9 ACUTE ON CHRONIC ANEMIA: ICD-10-CM

## 2023-09-07 DIAGNOSIS — E11.40 TYPE 2 DIABETES MELLITUS WITH DIABETIC NEUROPATHY, WITHOUT LONG-TERM CURRENT USE OF INSULIN (HCC): ICD-10-CM

## 2023-09-07 DIAGNOSIS — N17.9 ACUTE KIDNEY INJURY SUPERIMPOSED ON CKD (HCC): ICD-10-CM

## 2023-09-07 DIAGNOSIS — I82.432 ACUTE DEEP VEIN THROMBOSIS (DVT) OF POPLITEAL VEIN OF LEFT LOWER EXTREMITY (HCC): Primary | ICD-10-CM

## 2023-09-07 DIAGNOSIS — E11.9 DIABETES MELLITUS TYPE 2, DIET-CONTROLLED (HCC): ICD-10-CM

## 2023-09-07 DIAGNOSIS — I50.32 DIASTOLIC CHF, CHRONIC (HCC): ICD-10-CM

## 2023-09-07 DIAGNOSIS — N18.4 CKD (CHRONIC KIDNEY DISEASE) STAGE 4, GFR 15-29 ML/MIN (HCC): ICD-10-CM

## 2023-09-07 DIAGNOSIS — K92.2 GASTROINTESTINAL HEMORRHAGE, UNSPECIFIED GASTROINTESTINAL HEMORRHAGE TYPE: ICD-10-CM

## 2023-09-07 RX ORDER — PRAVASTATIN SODIUM 20 MG
20 TABLET ORAL NIGHTLY
Qty: 90 TABLET | Refills: 3 | Status: SHIPPED | OUTPATIENT
Start: 2023-09-07

## 2023-09-07 ASSESSMENT — PATIENT HEALTH QUESTIONNAIRE - PHQ9
SUM OF ALL RESPONSES TO PHQ9 QUESTIONS 1 & 2: 0
2. FEELING DOWN, DEPRESSED OR HOPELESS: 0
1. LITTLE INTEREST OR PLEASURE IN DOING THINGS: 0
SUM OF ALL RESPONSES TO PHQ QUESTIONS 1-9: 0

## 2023-09-07 NOTE — HOME HEALTH
Skilled reason for visit:DM, DVT, HTN, Medication Management     Caregiver involvement: Spouse assist patient transportation Patient independent around the home     Medications reviewed and all medications are available in the home this visit. The following education was provided regarding medications: ( Bumex)This drug is a strong fluid-lowering drug (diuretic). Sometimes too much water and electrolytes (like potassium) in the blood may be lost. This can lead to severe health problems. Your doctor will follow you closely to change the dose to match your body's needs. MD notified of any discrepancies/look a-like medications/medication interactions: n/a  Medications are effective at this time. Home health supplies by type and quantity ordered/delivered this visit include: n/a    Patient education provided this visit: See Care plan      Sharps education provided: n/a    Patient level of understanding of education provided: see care plan     Patient response to procedure performed:  n/a    Agency Progress toward goals: Progressing well     Patient's Progress towards personal goals: Continue to build endurance     Home exercise program: keep all f/u appt     Continued need for the following skills: Nursing. Plan for next visit: education     Patient and/or caregiver notified and agrees to changes in the Plan of Care: Yes. The following discharge planning was discussed with the pt/caregiver: Discharge when skilled level of care is no longer needed.

## 2023-09-07 NOTE — HOME HEALTH
SUBJECTIVE:Patient with no changes with medications and no falls were reported by pt/CG. Blood sugar was 128 this am.  CAREGIVER INVOLVEMENT/ASSISTANCE NEEDED FOR: Patient resides with  and has niece nearby who assist with ADL's and transfers as needed. HOME HEALTH SUPPLIES BY TYPE AND QUANTITY ORDERED/DELIVERED THIS VISIT INCLUDE: none   OBJECTIVE: See interventions. PATIENT RESPONSE TO TREATMENT: No increase in pain during activities. PATIENT LEVEL OF UNDERSTANDING OF EDUCATION PROVIDED: Patient/CG required reinforcement on transfer training, HEP 2-3/x a day, fall prevention techniques, energy conservation, signs/sx of hyper/hypoglycemia, daily feet checks, proper footwear, proper nutritional diet and repositioning frequently to reduce risk for pressure sores. ASSESSMENT OF PROGRESS TOWARD GOALS: Patient is progressing towards goals. Patient required occasional rest breaks during activities due to fatigue. Patient demonstrated standing and sitting thera ex with verbal cueing for proper technique. Patient transferred from sit<->stand with verbal cueing for UE placement and proper technique. Patient Spo2>96% throughout activities. CONTINUED NEED FOR THE FOLLOWING SKILLS: Continuation of PT to further improve patient balance, functional mobility and strength to decrease pts risk for falls. PLAN FOR NEXT VISIT: Progress with thera ex in standing. THE FOLLOWING DISCHARGE PLANNING WAS DISCUSSED WITH THE PATIENT/CAREGIVER: D/C from HHPT when goals are met or max potential benefit achieved.

## 2023-09-08 ENCOUNTER — HOSPITAL ENCOUNTER (EMERGENCY)
Facility: HOSPITAL | Age: 88
Discharge: HOME | End: 2023-09-08
Payer: MEDICARE

## 2023-09-08 ENCOUNTER — HOME CARE VISIT (OUTPATIENT)
Age: 88
End: 2023-09-08

## 2023-09-08 ENCOUNTER — CARE COORDINATION (OUTPATIENT)
Facility: CLINIC | Age: 88
End: 2023-09-08

## 2023-09-08 ENCOUNTER — APPOINTMENT (OUTPATIENT)
Facility: HOSPITAL | Age: 88
End: 2023-09-08
Payer: MEDICARE

## 2023-09-08 VITALS
DIASTOLIC BLOOD PRESSURE: 67 MMHG | RESPIRATION RATE: 20 BRPM | SYSTOLIC BLOOD PRESSURE: 122 MMHG | BODY MASS INDEX: 25.4 KG/M2 | HEIGHT: 59 IN | HEART RATE: 76 BPM | OXYGEN SATURATION: 96 % | WEIGHT: 126 LBS | TEMPERATURE: 98.1 F

## 2023-09-08 VITALS
SYSTOLIC BLOOD PRESSURE: 133 MMHG | HEART RATE: 91 BPM | TEMPERATURE: 98.6 F | RESPIRATION RATE: 18 BRPM | DIASTOLIC BLOOD PRESSURE: 80 MMHG | OXYGEN SATURATION: 98 %

## 2023-09-08 DIAGNOSIS — K62.5 RECTAL BLEEDING: ICD-10-CM

## 2023-09-08 DIAGNOSIS — R77.8 ELEVATED TROPONIN: ICD-10-CM

## 2023-09-08 DIAGNOSIS — R11.0 NAUSEA: Primary | ICD-10-CM

## 2023-09-08 LAB
ALBUMIN SERPL-MCNC: 3.9 G/DL (ref 3.4–5)
ALBUMIN/GLOB SERPL: 1 (ref 0.8–1.7)
ALP SERPL-CCNC: 43 U/L (ref 45–117)
ALT SERPL-CCNC: 21 U/L (ref 13–56)
ANION GAP SERPL CALC-SCNC: 9 MMOL/L (ref 3–18)
AST SERPL-CCNC: 43 U/L (ref 10–38)
BASOPHILS # BLD: 0 K/UL (ref 0–0.1)
BASOPHILS NFR BLD: 0 % (ref 0–2)
BILIRUB SERPL-MCNC: 0.5 MG/DL (ref 0.2–1)
BUN SERPL-MCNC: 73 MG/DL (ref 7–18)
BUN/CREAT SERPL: 23 (ref 12–20)
CALCIUM SERPL-MCNC: 9.9 MG/DL (ref 8.5–10.1)
CHLORIDE SERPL-SCNC: 104 MMOL/L (ref 100–111)
CK SERPL-CCNC: 194 U/L (ref 26–192)
CO2 SERPL-SCNC: 28 MMOL/L (ref 21–32)
CREAT SERPL-MCNC: 3.13 MG/DL (ref 0.6–1.3)
DIFFERENTIAL METHOD BLD: ABNORMAL
EKG ATRIAL RATE: 87 BPM
EKG DIAGNOSIS: NORMAL
EKG P AXIS: 17 DEGREES
EKG P-R INTERVAL: 196 MS
EKG Q-T INTERVAL: 376 MS
EKG QRS DURATION: 86 MS
EKG QTC CALCULATION (BAZETT): 452 MS
EKG R AXIS: -45 DEGREES
EKG T AXIS: 76 DEGREES
EKG VENTRICULAR RATE: 87 BPM
EOSINOPHIL # BLD: 0 K/UL (ref 0–0.4)
EOSINOPHIL NFR BLD: 0 % (ref 0–5)
ERYTHROCYTE [DISTWIDTH] IN BLOOD BY AUTOMATED COUNT: 15.1 % (ref 11.6–14.5)
GLOBULIN SER CALC-MCNC: 4 G/DL (ref 2–4)
GLUCOSE SERPL-MCNC: 167 MG/DL (ref 74–99)
HCT VFR BLD AUTO: 32.3 % (ref 35–45)
HEMOCCULT STL QL: POSITIVE
HGB BLD-MCNC: 10.2 G/DL (ref 12–16)
IMM GRANULOCYTES # BLD AUTO: 0 K/UL (ref 0–0.04)
IMM GRANULOCYTES NFR BLD AUTO: 0 % (ref 0–0.5)
LIPASE SERPL-CCNC: 101 U/L (ref 73–393)
LYMPHOCYTES # BLD: 1.2 K/UL (ref 0.9–3.6)
LYMPHOCYTES NFR BLD: 10 % (ref 21–52)
MAGNESIUM SERPL-MCNC: 2.3 MG/DL (ref 1.6–2.6)
MCH RBC QN AUTO: 27.7 PG (ref 24–34)
MCHC RBC AUTO-ENTMCNC: 31.6 G/DL (ref 31–37)
MCV RBC AUTO: 87.8 FL (ref 78–100)
MONOCYTES # BLD: 0.6 K/UL (ref 0.05–1.2)
MONOCYTES NFR BLD: 5 % (ref 3–10)
NEUTS SEG # BLD: 10.3 K/UL (ref 1.8–8)
NEUTS SEG NFR BLD: 84 % (ref 40–73)
NRBC # BLD: 0 K/UL (ref 0–0.01)
NRBC BLD-RTO: 0 PER 100 WBC
PLATELET # BLD AUTO: 283 K/UL (ref 135–420)
PMV BLD AUTO: 9.9 FL (ref 9.2–11.8)
POTASSIUM SERPL-SCNC: 3.3 MMOL/L (ref 3.5–5.5)
PROT SERPL-MCNC: 7.9 G/DL (ref 6.4–8.2)
RBC # BLD AUTO: 3.68 M/UL (ref 4.2–5.3)
SODIUM SERPL-SCNC: 141 MMOL/L (ref 136–145)
TROPONIN I SERPL HS-MCNC: 296 NG/L (ref 0–54)
TROPONIN I SERPL HS-MCNC: 302 NG/L (ref 0–54)
WBC # BLD AUTO: 12.2 K/UL (ref 4.6–13.2)

## 2023-09-08 PROCEDURE — 80053 COMPREHEN METABOLIC PANEL: CPT

## 2023-09-08 PROCEDURE — 93005 ELECTROCARDIOGRAM TRACING: CPT | Performed by: EMERGENCY MEDICINE

## 2023-09-08 PROCEDURE — 83690 ASSAY OF LIPASE: CPT

## 2023-09-08 PROCEDURE — 84484 ASSAY OF TROPONIN QUANT: CPT

## 2023-09-08 PROCEDURE — 96365 THER/PROPH/DIAG IV INF INIT: CPT

## 2023-09-08 PROCEDURE — 93010 ELECTROCARDIOGRAM REPORT: CPT | Performed by: INTERNAL MEDICINE

## 2023-09-08 PROCEDURE — 96366 THER/PROPH/DIAG IV INF ADDON: CPT

## 2023-09-08 PROCEDURE — G0300 HHS/HOSPICE OF LPN EA 15 MIN: HCPCS

## 2023-09-08 PROCEDURE — 6360000002 HC RX W HCPCS: Performed by: EMERGENCY MEDICINE

## 2023-09-08 PROCEDURE — 71045 X-RAY EXAM CHEST 1 VIEW: CPT

## 2023-09-08 PROCEDURE — 85025 COMPLETE CBC W/AUTO DIFF WBC: CPT

## 2023-09-08 PROCEDURE — 83735 ASSAY OF MAGNESIUM: CPT

## 2023-09-08 PROCEDURE — 96375 TX/PRO/DX INJ NEW DRUG ADDON: CPT

## 2023-09-08 PROCEDURE — C9113 INJ PANTOPRAZOLE SODIUM, VIA: HCPCS | Performed by: EMERGENCY MEDICINE

## 2023-09-08 PROCEDURE — 99285 EMERGENCY DEPT VISIT HI MDM: CPT

## 2023-09-08 PROCEDURE — 2580000003 HC RX 258: Performed by: EMERGENCY MEDICINE

## 2023-09-08 PROCEDURE — 6370000000 HC RX 637 (ALT 250 FOR IP): Performed by: EMERGENCY MEDICINE

## 2023-09-08 PROCEDURE — 82550 ASSAY OF CK (CPK): CPT

## 2023-09-08 PROCEDURE — A4216 STERILE WATER/SALINE, 10 ML: HCPCS | Performed by: EMERGENCY MEDICINE

## 2023-09-08 PROCEDURE — 82272 OCCULT BLD FECES 1-3 TESTS: CPT

## 2023-09-08 RX ORDER — ASPIRIN 81 MG/1
324 TABLET, CHEWABLE ORAL DAILY
Status: DISCONTINUED | OUTPATIENT
Start: 2023-09-08 | End: 2023-09-09 | Stop reason: HOSPADM

## 2023-09-08 RX ORDER — MAGNESIUM SULFATE IN WATER 40 MG/ML
2000 INJECTION, SOLUTION INTRAVENOUS
Status: COMPLETED | OUTPATIENT
Start: 2023-09-08 | End: 2023-09-08

## 2023-09-08 RX ORDER — ONDANSETRON 2 MG/ML
4 INJECTION INTRAMUSCULAR; INTRAVENOUS
Status: COMPLETED | OUTPATIENT
Start: 2023-09-08 | End: 2023-09-08

## 2023-09-08 RX ORDER — ONDANSETRON 8 MG/1
8 TABLET, ORALLY DISINTEGRATING ORAL 3 TIMES DAILY PRN
Qty: 12 TABLET | Refills: 1 | Status: SHIPPED | OUTPATIENT
Start: 2023-09-08

## 2023-09-08 RX ADMIN — ASPIRIN 324 MG: 81 TABLET, CHEWABLE ORAL at 15:44

## 2023-09-08 RX ADMIN — PANTOPRAZOLE SODIUM 40 MG: 40 INJECTION, POWDER, FOR SOLUTION INTRAVENOUS at 14:07

## 2023-09-08 RX ADMIN — ONDANSETRON 4 MG: 2 INJECTION INTRAMUSCULAR; INTRAVENOUS at 14:07

## 2023-09-08 RX ADMIN — MAGNESIUM SULFATE HEPTAHYDRATE 2000 MG: 40 INJECTION, SOLUTION INTRAVENOUS at 16:43

## 2023-09-08 ASSESSMENT — ENCOUNTER SYMPTOMS
BLOOD IN STOOL: 1
ABDOMINAL PAIN: 0
ALLERGIC/IMMUNOLOGIC NEGATIVE: 1
VOMITING: 0
NAUSEA: 1
RESPIRATORY NEGATIVE: 1
NAUSEA: 1

## 2023-09-08 NOTE — DISCHARGE INSTRUCTIONS
Please make sure that you are taking your Protonix. Please be sure to follow-up with Dr. Aleyda Bender or Dr. Ruby Torres at the beginning of next week.

## 2023-09-08 NOTE — CARE COORDINATION
Transitions of Care     Per chart review, patient appears to be in the [facility] ED for evaluation / treatment. Please verify as needed or for additional information. Care Transitions Nurse ( CTN)  will attempt to monitor for discharge and follow up per discharge disposition.

## 2023-09-08 NOTE — ED PROVIDER NOTES
[facility] EMERGENCY DEPT  EMERGENCY DEPARTMENT ENCOUNTER      Pt Name: Martha Del Toro  MRN: [id number]  [address] [facility] 8/27/1933  Date of evaluation: 9/8/2023  Provider: Rodríguez Miller       Chief Complaint   Patient presents with    Nausea    Rectal Bleeding         HISTORY OF PRESENT ILLNESS   (Location/Symptom, Timing/Onset, Context/Setting, Quality, Duration, Modifying Factors, Severity)  Note limiting factors. Martha Del Toro is a 80 y.o. female who presents to the emergency department with a complaint of nauseousness since yesterday. Denies abdominal pain and actual vomiting. She has been able to tolerate her p.o. medications. She is also reporting bright red blood in her stools today. Denies rectal pain. Pt was d/c'd w/in past 2 weeks:  Acute DVT of popliteal vein of LLE. Patient started on heparin GTT. IVC filter was placed on 8/30 by IR. Normocytic anemia, recent hospitalization for GI bleed s/p transfusion. Stable Hgb/Hct at this time   Recent GI bleed, EGD with proximal gastritis, Deulafoy lesion s/p BiCAP treatment, on chronic PPI   HTN, essential. Continue current regimen. Chronic Diastolic CHF, chronic (HCC), elevated proBNP, no evidence of acute decompensation. Sinus bradycardia   CKD (chronic kidney disease) stage 4, GFR 15-29 ml/min  Diabetes mellitus type 2, diet-controlled ([address] [facility]    Nursing Notes were reviewed. REVIEW OF SYSTEMS    (2-9 systems for level 4, 10 or more for level 5)     Review of Systems   Constitutional: Negative. HENT: Negative. Respiratory: Negative. Cardiovascular: Negative. Gastrointestinal:  Positive for blood in stool and nausea. Negative for abdominal pain and vomiting. Endocrine: Negative. Genitourinary: Negative. Musculoskeletal: Negative. Skin: Negative. Allergic/Immunologic: Negative. Neurological: Negative. Hematological:  Does not bruise/bleed easily. Psychiatric/Behavioral: Negative.        Except as Thought Content: Thought content normal.         Judgment: Judgment normal.       DIAGNOSTIC RESULTS     EKG: All EKG's are interpreted by the Emergency Department Physician who either signs or Co-signs this chart in the absence of a cardiologist.    EKG shows normal sinus rhythm with a ventricular rate of 87. Over 196 cures ration 86 QTc 452. No ST elevations depressions or T wave inversions. Repeat EKG shows normal sinus rhythm ventricular rate 87. #196 QRS duration 94 and a QTc of 527. No ST elevations depressions or T wave inversions. RADIOLOGY:   Non-plain film images such as CT, Ultrasound and MRI are read by the radiologist. Plain radiographic images are visualized and preliminarily interpreted by the emergency physician with the below findings:      Interpretation per the Radiologist below, if available at the time of this note:    XR CHEST PORTABLE   Final Result   1.   Hypoinflation                      ED BEDSIDE ULTRASOUND:   Performed by ED Physician - none    LABS:  Labs Reviewed   CBC WITH AUTO DIFFERENTIAL - Abnormal; Notable for the following components:       Result Value    RBC 3.68 (*)     Hemoglobin 10.2 (*)     Hematocrit 32.3 (*)     RDW 15.1 (*)     Neutrophils % 84 (*)     Lymphocytes % 10 (*)     Neutrophils Absolute 10.3 (*)     All other components within normal limits   COMPREHENSIVE METABOLIC PANEL - Abnormal; Notable for the following components:    Potassium 3.3 (*)     Glucose 167 (*)     BUN 73 (*)     Creatinine 3.13 (*)     Bun/Cre Ratio 23 (*)     Est, Glom Filt Rate 14 (*)     AST 43 (*)     Alk Phosphatase 43 (*)     All other components within normal limits   TROPONIN - Abnormal; Notable for the following components:    Troponin, High Sensitivity 296 (*)     All other components within normal limits   OCCULT BLOOD, FECAL - Abnormal; Notable for the following components:    POC Occult Blood, Fecal Positive (*)     All other components within normal limits

## 2023-09-09 LAB
EKG ATRIAL RATE: 87 BPM
EKG DIAGNOSIS: NORMAL
EKG P AXIS: 0 DEGREES
EKG P-R INTERVAL: 196 MS
EKG Q-T INTERVAL: 438 MS
EKG QRS DURATION: 94 MS
EKG QTC CALCULATION (BAZETT): 527 MS
EKG R AXIS: -48 DEGREES
EKG T AXIS: 59 DEGREES
EKG VENTRICULAR RATE: 87 BPM

## 2023-09-09 PROCEDURE — 93010 ELECTROCARDIOGRAM REPORT: CPT | Performed by: INTERNAL MEDICINE

## 2023-09-09 NOTE — HOME HEALTH
Skilled reason for visit:DM, DVT, HTN, Medication Management          Caregiver involvement: Spouse assist patient transportation Patient independent around the home          Medications reviewed and all medications are available in the home this visit. The following education was provided regarding medications: Medication reviewed no changes christopher or shannon BARNHART notified of any discrepancies/look a-like medications/medication interactions: n/a    Medications are effective at this time. Home health supplies by type and quantity ordered/delivered this visit include: n/a         Patient education provided this visit: See Care plan           Sharps education provided: n/a         Patient level of understanding of education provided: see care plan          Patient response to procedure performed:  n/a         Agency Progress toward goals: Progressing well          Patient's Progress towards personal goals: Continue to build endurance          Home exercise program: keep all f/u appt          Continued need for the following skills: Nursing. Plan for next visit: education          Patient and/or caregiver notified and agrees to changes in the Plan of Care: Yes. The following discharge planning was discussed with the pt/caregiver: Discharge when skilled level of care is no longer needed.   Patient spoke with MD during the visit and recommended the patient go to ER for nausea and blood in stool

## 2023-09-11 ENCOUNTER — CARE COORDINATION (OUTPATIENT)
Facility: CLINIC | Age: 88
End: 2023-09-11

## 2023-09-12 ENCOUNTER — OFFICE VISIT (OUTPATIENT)
Age: 88
End: 2023-09-12
Payer: MEDICARE

## 2023-09-12 ENCOUNTER — HOME CARE VISIT (OUTPATIENT)
Age: 88
End: 2023-09-12
Payer: MEDICARE

## 2023-09-12 VITALS
DIASTOLIC BLOOD PRESSURE: 64 MMHG | HEIGHT: 59 IN | BODY MASS INDEX: 25 KG/M2 | HEART RATE: 82 BPM | SYSTOLIC BLOOD PRESSURE: 124 MMHG | WEIGHT: 124 LBS | OXYGEN SATURATION: 98 %

## 2023-09-12 VITALS
TEMPERATURE: 98.7 F | OXYGEN SATURATION: 99 % | RESPIRATION RATE: 18 BRPM | HEART RATE: 80 BPM | SYSTOLIC BLOOD PRESSURE: 100 MMHG | DIASTOLIC BLOOD PRESSURE: 87 MMHG

## 2023-09-12 DIAGNOSIS — I50.32 DIASTOLIC CHF, CHRONIC (HCC): ICD-10-CM

## 2023-09-12 DIAGNOSIS — I82.4Z2 DVT, LOWER EXTREMITY, DISTAL, ACUTE, LEFT (HCC): ICD-10-CM

## 2023-09-12 DIAGNOSIS — R00.1 BRADYCARDIA: ICD-10-CM

## 2023-09-12 DIAGNOSIS — I35.1 NONRHEUMATIC AORTIC VALVE INSUFFICIENCY: ICD-10-CM

## 2023-09-12 DIAGNOSIS — E78.2 MIXED HYPERLIPIDEMIA: ICD-10-CM

## 2023-09-12 DIAGNOSIS — I10 ESSENTIAL HYPERTENSION: Primary | ICD-10-CM

## 2023-09-12 PROCEDURE — G0300 HHS/HOSPICE OF LPN EA 15 MIN: HCPCS

## 2023-09-12 PROCEDURE — G8427 DOCREV CUR MEDS BY ELIG CLIN: HCPCS | Performed by: NURSE PRACTITIONER

## 2023-09-12 PROCEDURE — 1090F PRES/ABSN URINE INCON ASSESS: CPT | Performed by: NURSE PRACTITIONER

## 2023-09-12 PROCEDURE — 1123F ACP DISCUSS/DSCN MKR DOCD: CPT | Performed by: NURSE PRACTITIONER

## 2023-09-12 PROCEDURE — 1111F DSCHRG MED/CURRENT MED MERGE: CPT | Performed by: NURSE PRACTITIONER

## 2023-09-12 PROCEDURE — G8417 CALC BMI ABV UP PARAM F/U: HCPCS | Performed by: NURSE PRACTITIONER

## 2023-09-12 PROCEDURE — 99214 OFFICE O/P EST MOD 30 MIN: CPT | Performed by: NURSE PRACTITIONER

## 2023-09-12 PROCEDURE — 93000 ELECTROCARDIOGRAM COMPLETE: CPT | Performed by: NURSE PRACTITIONER

## 2023-09-12 PROCEDURE — 1036F TOBACCO NON-USER: CPT | Performed by: NURSE PRACTITIONER

## 2023-09-12 ASSESSMENT — ENCOUNTER SYMPTOMS
CONSTIPATION: 1
BLOOD IN STOOL: 0
SHORTNESS OF BREATH: 0
NAUSEA: 1
DIARRHEA: 0
WHEEZING: 0
COUGH: 0
VOMITING: 0
CHEST TIGHTNESS: 0
ABDOMINAL DISTENTION: 0

## 2023-09-12 ASSESSMENT — PATIENT HEALTH QUESTIONNAIRE - PHQ9
SUM OF ALL RESPONSES TO PHQ9 QUESTIONS 1 & 2: 0
1. LITTLE INTEREST OR PLEASURE IN DOING THINGS: 0
SUM OF ALL RESPONSES TO PHQ QUESTIONS 1-9: 0
2. FEELING DOWN, DEPRESSED OR HOPELESS: 0
SUM OF ALL RESPONSES TO PHQ QUESTIONS 1-9: 0

## 2023-09-12 NOTE — PATIENT INSTRUCTIONS
Continue present regimen. Please verify your medications with the list attached to the after visit summary  Follow-up with Dr. Melissa Calvert as scheduled and as needed  Please call the office if you have any chest pain, tightness, heaviness, shortness of breath.

## 2023-09-12 NOTE — PROGRESS NOTES
Chana Benítez presents today for   Chief Complaint   Patient presents with    Follow-up     hospital       Chana Benítez preferred language for health care discussion is english/other. Is someone accompanying this pt? no    Is the patient using any DME equipment during OV? no    Depression Screening:  Depression: Not at risk (9/7/2023)    PHQ-2     PHQ-2 Score: 0        Learning Assessment:  Who is the primary learner? Patient    What is the preferred language for health care of the primary learner? ENGLISH    How does the primary learner prefer to learn new concepts? DEMONSTRATION    Answered By patient    Relationship to Learner SELF           Pt currently taking Anticoagulant therapy? no    Pt currently taking Antiplatelet therapy ? no      Coordination of Care:  1. Have you been to the ER, urgent care clinic since your last visit? Hospitalized since your last visit? no    2. Have you seen or consulted any other health care providers outside of the [address] [place] [address] since your last visit? Include any pap smears or colon screening.  no
but she declines at present. Advised to let us know if she begins to experience chest pain/pressure/tightness, shortness of breath. No changes were made to her medications. Time:  30 minutes (review of several hospitalization records, providing reassurance, answering questions)    She will follow-up with Dr. Wilton Leyva as scheduled and as needed. Mayra OROZCO, FNP-BC    Please note:  Portions of this chart were created with Dragon medical speech to text program.  Unrecognized errors may be present.

## 2023-09-12 NOTE — HOME HEALTH
Skilled reason for visit:DM, DVT, HTN, Medication Management       Caregiver involvement: Spouse assist patient transportation Patient independent around the home       Medications reviewed and all medications are available in the home this visit. The following education was provided regarding medications: Medication reviewed no changes christopher or shannon BARNHART notified of any discrepancies/look a-like medications/medication interactions: n/a         Medications are effective at this time. Home health supplies by type and quantity ordered/delivered this visit include: n/a       Patient education provided this visit: See Care plan        Sharps education provided: n/a      Patient level of understanding of education provided: see care plan       Patient response to procedure performed:  n/a      Agency Progress toward goals: Goals Met      Patient's Progress towards personal goals: Goals Met    Home exercise program: keep all f/u appt     Continued need for the following skills: PT         Plan for next visit: n/a        Patient and/or caregiver notified and agrees to changes in the Plan of Care: Yes.          The following discharge planning was discussed with the pt/caregiver: Discharge skilled level of care is no longer needed

## 2023-09-13 ENCOUNTER — HOME CARE VISIT (OUTPATIENT)
Age: 88
End: 2023-09-13
Payer: MEDICARE

## 2023-09-13 PROCEDURE — G0157 HHC PT ASSISTANT EA 15: HCPCS

## 2023-09-14 ENCOUNTER — CARE COORDINATION (OUTPATIENT)
Facility: CLINIC | Age: 88
End: 2023-09-14

## 2023-09-14 VITALS
SYSTOLIC BLOOD PRESSURE: 143 MMHG | RESPIRATION RATE: 18 BRPM | DIASTOLIC BLOOD PRESSURE: 79 MMHG | TEMPERATURE: 97.4 F | HEART RATE: 76 BPM | OXYGEN SATURATION: 98 %

## 2023-09-14 NOTE — CARE COORDINATION
Care Transitions Follow Up     Continuity of Care     With patient's prior verbal permission, CTN contacted office staff member with Dr.K. Pfeiffer's office. (nephrology). It was related to CTN that patient was seen in June ( 2023). Patient on call list for November. With patient's prior verbal permission, [facility] staff member was contacted. CTN called [facility]  staff to follow up with any appointment scheduling needs. It was related to CTN that patient did not attend appointment scheduled in August of 2023. CTN will attempt to follow up with patient regarding rescheduling appointment. Per Chart review,  patient attended  cardiology follow up appointment on 9-12-23        Per prior report, patient stated that GI follow up appointment is scheduled. CTN will continue with care transitions outreach for approximately 30 days post hospital discharge   ( 8-31-23). Chart routed to PCP for review.

## 2023-09-15 ENCOUNTER — HOME CARE VISIT (OUTPATIENT)
Age: 88
End: 2023-09-15
Payer: MEDICARE

## 2023-09-15 PROCEDURE — G0157 HHC PT ASSISTANT EA 15: HCPCS

## 2023-09-15 NOTE — HOME HEALTH
SUBJECTIVE:Patient with no changes with medications and no falls were reported by pt/CG. Blood sugar was 110 this am.   CAREGIVER INVOLVEMENT/ASSISTANCE NEEDED FOR: Patient resides with  and has niece nearby who assist with ADL's and transfers as needed. HOME HEALTH SUPPLIES BY TYPE AND QUANTITY ORDERED/DELIVERED THIS VISIT INCLUDE: none   OBJECTIVE: See interventions. PATIENT RESPONSE TO TREATMENT: No increase in pain during activities. PATIENT LEVEL OF UNDERSTANDING OF EDUCATION PROVIDED: Patient/CG required reinforcement on transfer training, HEP 2-3/x a day, fall prevention techniques, energy conservation, signs/sx of hyper/hypoglycemia, daily feet checks, proper footwear, proper nutritional diet and repositioning frequently to reduce risk for pressure sores. ASSESSMENT OF PROGRESS TOWARD GOALS: Patient is progressing towards goals. Patient transferred from sit<->stand with decrease verbal cueing for proper technique. Patient with 2x ~1 minute rest breaks due to fatigue. No increase in pain during activities. Patient ambulated increase distances with verbal cueing for posture and proper foot placement. CONTINUED NEED FOR THE FOLLOWING SKILLS: Continuation of PT to further improve patient balance, functional mobility and strength to decrease pts risk for falls. PLAN FOR NEXT VISIT: Progress with balance activities and stair training next visit. THE FOLLOWING DISCHARGE PLANNING WAS DISCUSSED WITH THE PATIENT/CAREGIVER: D/C from HHPT when goals are met or max potential benefit achieved.

## 2023-09-16 VITALS
DIASTOLIC BLOOD PRESSURE: 80 MMHG | TEMPERATURE: 97.3 F | RESPIRATION RATE: 18 BRPM | OXYGEN SATURATION: 97 % | HEART RATE: 75 BPM | SYSTOLIC BLOOD PRESSURE: 134 MMHG

## 2023-09-17 NOTE — HOME HEALTH
SUBJECTIVE: Patient with no changes with medications and no falls were reported by pt/CG. Blood sugar was 128 this am. Patient has follow-up with internist on 9/20/23. CAREGIVER INVOLVEMENT/ASSISTANCE NEEDED FOR: Patient resides with  and has niece nearby who assist with ADL's and transfers as needed. HOME HEALTH SUPPLIES BY TYPE AND QUANTITY ORDERED/DELIVERED THIS VISIT INCLUDE: none   OBJECTIVE: See interventions. PATIENT RESPONSE TO TREATMENT: No increase in pain during activities. PATIENT LEVEL OF UNDERSTANDING OF EDUCATION PROVIDED: Patient/CG required reinforcement on transfer training, HEP 2-3/x a day, fall prevention techniques, energy conservation, signs/sx of hyper/hypoglycemia, daily feet checks, proper footwear, proper nutritional diet and repositioning frequently to reduce risk for pressure sores. ASSESSMENT OF PROGRESS TOWARD GOALS: Patient is progressing towards goals. Patient ambulated on indoor/outdoor surfaces with cane with verbal cueing for posture and proper technique with cane placement. Patient transferred sit<->stand with use of UE's and cueing for proper technique. Patient improved on Tinetti balance and gait assessment 21/28. Patient navigated up/down 4 steps to enter/exit home with use of bilateral railing and cueing for placement of cane and foot sequencing. CONTINUED NEED FOR THE FOLLOWING SKILLS: Continuation of PT to further improve patient balance, functional mobility and strength to decrease pts risk for falls. PLAN FOR NEXT VISIT: Progress with standing activities to improve functional mobility and strength of LE's. THE FOLLOWING DISCHARGE PLANNING WAS DISCUSSED WITH THE PATIENT/CAREGIVER: D/C from HHPT when goals are met or max potential benefit achieved.

## 2023-09-18 ENCOUNTER — HOME CARE VISIT (OUTPATIENT)
Age: 88
End: 2023-09-18
Payer: MEDICARE

## 2023-09-18 PROCEDURE — G0157 HHC PT ASSISTANT EA 15: HCPCS

## 2023-09-18 NOTE — PROGRESS NOTES
Pharmacy Progress Note - Comprehensive Medication Review    S/O: Ms. Lakesha Cleary is a 80 y.o. female , referred by Deanna Villafana MD , was seen today for a comprehensive medication review. Medication Adherence/Access:  - Pt did bring in home medications to visit. - Pt reports compliance to med regimen.   - Pt uses pill box/organizer at home: yes  - Pt denies barriers to accessing/affording medications  - Pt does have prescription coverage/insurance ; Uses [facility]      Wt Readings from Last 3 Encounters:   09/12/23 124 lb (56.2 kg)   09/08/23 126 lb (57.2 kg)   09/07/23 126 lb (57.2 kg)     BP Readings from Last 3 Encounters:   09/15/23 134/80   09/13/23 (!) 143/79   09/12/23 124/64     Pulse Readings from Last 3 Encounters:   09/15/23 75   09/13/23 76   09/12/23 82       Past Medical History:   Diagnosis Date    Anemia     Carotid bruit 12/07/2013    Carotid Duplex: 1. Bilateral 1-49% stenosis of the internal carotid arteries. 2. No significant stenosis in the external carotid arteries bilaterally. 3. Antegrade flow in both vetebral arteries. 4. Normal flow in both subclavian arteries. Plaque Morphology: 1. Hyperechoic plaque in the bulb and right ICA. 2. Hyperechoic plaque in the bulb and left ICA. Chronic diastolic heart failure ([address]) 9/1/2019    CKD (chronic kidney disease) stage 3, GFR 30-59 ml/min ([facility])     Diabetes ([address])     NIDDM    Diet-controlled type 2 diabetes mellitus ([address]) 8/2/2017    Edema of lower extremity 7/25/2019    Gastritis 10/27/2014    Duodenum Bx: No Specific Pathologic Abnormality. No Villous Abnormality. 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 No dysplasia or malignancy identified. Bacterial Organisms c/w H. Pylori are present. Z-Line Bx: GE mucosa w/ Acute/Chronic Inflammation & Reactive Changes. Focal Specialized Type Connors Mucosa Identified. No Dysplasia or Malignancy Identified.      Gastrointestinal hemorrhage 8/9/2023

## 2023-09-19 ENCOUNTER — CARE COORDINATION (OUTPATIENT)
Facility: CLINIC | Age: 88
End: 2023-09-19

## 2023-09-19 VITALS
SYSTOLIC BLOOD PRESSURE: 142 MMHG | TEMPERATURE: 97.4 F | OXYGEN SATURATION: 99 % | RESPIRATION RATE: 18 BRPM | DIASTOLIC BLOOD PRESSURE: 82 MMHG | HEART RATE: 77 BPM

## 2023-09-19 NOTE — HOME HEALTH
SUBJECTIVE: Patient with no changes with medications and no falls were reported by pt/CG. Follow-up with MD on 9/20. Patient notes she was able to go out to dinner with her  yesterday. CAREGIVER INVOLVEMENT/ASSISTANCE NEEDED FOR: Patient resides with  and has niece nearby who assist with ADL's and transfers as needed. HOME HEALTH SUPPLIES BY TYPE AND QUANTITY ORDERED/DELIVERED THIS VISIT INCLUDE: none   OBJECTIVE: See interventions. PATIENT RESPONSE TO TREATMENT: No increase in pain during activities. PATIENT LEVEL OF UNDERSTANDING OF EDUCATION PROVIDED: Patient/CG required reinforcement on transfer training, HEP 2-3/x a day, fall prevention techniques, energy conservation, signs/sx of hyper/hypoglycemia, daily feet checks, proper footwear, proper nutritional diet and repositioning frequently to reduce risk for pressure sores. ASSESSMENT OF PROGRESS TOWARD GOALS: Patient is progressing towards goals. Patient ambulated increase distances on indoor/outdoor surfaces with ~25% verbal cueing for cane placement and proper technique. Patient demonstrated thera ex in standing and sitting to improve functional mobility and strength of LE's with ~50% verbal cueing for technique. Patient navigated up/down 4 steps to enter/exit home with improved foot placement. CONTINUED NEED FOR THE FOLLOWING SKILLS: Continuation of PT to further improve patient balance, functional mobility and strength to decrease pts risk for falls. PLAN FOR NEXT VISIT: Progress with balance activities and perform Tinetti gait and balance assessment. THE FOLLOWING DISCHARGE PLANNING WAS DISCUSSED WITH THE PATIENT/CAREGIVER: D/C from HHPT in 2 visits due to goals met.

## 2023-09-19 NOTE — CARE COORDINATION
Care [facility] Care Transitions Follow Up Call    Patient Current Location:  [facility] Transition Nurse contacted the patient by telephone to follow up after admission on 23 . Verified name and  with patient as identifiers. Patient: Jessica Oden  Patient : 1933   MRN: [id number]      Reason for Admission:    Per Chart Review  - DVT, lower extremity, distal, acute, left       Discharge Date: 23 RARS: Readmission Risk Score: 18.3      Needs to be reviewed by the provider   Additional needs identified to be addressed with provider: No  none             Method of communication with provider: none. Patient reports:   - She is feeling pretty good   - She does not have any concerns, questions, or need for CTN assistance at this time. Discussed follow-up appointments. - Per chart review, patient attended Cardiology follow up  appointment. Though per discussion with patient she she did not  endorse attending  a medical appointment last week. CTN discussed follow up appointments with   GI  Nephrology  [facility]. Patient relates she has a medical appointment tomorrow. Patient has previously related that she had a GI follow up appointment scheduled. Patient states that she has a follow up appointment scheduled with PCP in October. She ( patient ) reports Nephrology appointment will be worked on next. She (patient ) is unsure about a follow up appointment with [facility] ( follow up requested per prior communication from [facility]). Follow Up  Future Appointments   Date Time Provider [address]   2023 To Be Determined Joseluis Shields,  [facility]   2023 10:00 AM Earl Benitez, [address] [facility] BS AMB   2023 To Be Determined Pancho Sharma,  [facility]   10/24/2023 10:45 AM Jose Guadalupe Cárdenas MD [facility] BS AMB   2024  9:00 AM Joyce Brady MD [facility] BS AMB   .      Patients top risk

## 2023-09-20 ENCOUNTER — HOME CARE VISIT (OUTPATIENT)
Age: 88
End: 2023-09-20
Payer: MEDICARE

## 2023-09-20 ENCOUNTER — PHARMACY VISIT (OUTPATIENT)
Age: 88
End: 2023-09-20

## 2023-09-20 DIAGNOSIS — Z71.89 ENCOUNTER FOR MEDICATION REVIEW AND COUNSELING: Primary | ICD-10-CM

## 2023-09-20 RX ORDER — ALLOPURINOL 100 MG/1
100 TABLET ORAL DAILY PRN
COMMUNITY

## 2023-09-20 RX ORDER — AMOXICILLIN 500 MG
1 CAPSULE ORAL EVERY MORNING
COMMUNITY

## 2023-09-20 RX ORDER — FERROUS SULFATE 325(65) MG
325 TABLET ORAL
COMMUNITY

## 2023-09-20 RX ORDER — POLYETHYLENE GLYCOL 3350 17 G/17G
17 POWDER, FOR SOLUTION ORAL DAILY PRN
COMMUNITY

## 2023-09-21 ENCOUNTER — HOME CARE VISIT (OUTPATIENT)
Age: 88
End: 2023-09-21
Payer: MEDICARE

## 2023-09-21 VITALS
HEART RATE: 75 BPM | OXYGEN SATURATION: 98 % | TEMPERATURE: 98.4 F | DIASTOLIC BLOOD PRESSURE: 76 MMHG | SYSTOLIC BLOOD PRESSURE: 140 MMHG | RESPIRATION RATE: 16 BRPM

## 2023-09-21 PROCEDURE — G0152 HHCP-SERV OF OT,EA 15 MIN: HCPCS

## 2023-09-21 ASSESSMENT — ENCOUNTER SYMPTOMS: PAIN LOCATION - PAIN QUALITY: ACHE

## 2023-09-21 NOTE — CASE COMMUNICATION
Occupational Therapy Evaluation    1w1    Ms. Fiona Mendoza is using a SC for ambulation in her home. She is able to safely ambulate throughout the home and over thresholds. Pt reports that she last showered yesterday and was able to demonstrate a safe tub transfer with use of her tub bench. Demonstrated how to use tub bench without stepping in/out for increased safety. Pt able to return demo. She denies having assitance from her spouse fo r ADL tasks. She is gathering her own clothing and dresses self while seated. She uses a toilet seat riser for safety with toilet transfers. She denies having any recent falls. Ms. Fiona Mendoza continues to prepare daily meals for she and her spouse. She denies having any functional limiations or functional decline with daily routine. No further skilled OT is indicated at this time.

## 2023-09-22 ENCOUNTER — HOME CARE VISIT (OUTPATIENT)
Age: 88
End: 2023-09-22
Payer: MEDICARE

## 2023-09-22 PROCEDURE — G0157 HHC PT ASSISTANT EA 15: HCPCS

## 2023-09-23 ENCOUNTER — HOSPITAL ENCOUNTER (EMERGENCY)
Facility: HOSPITAL | Age: 88
Discharge: HOME | End: 2023-09-23
Payer: MEDICARE

## 2023-09-23 ENCOUNTER — APPOINTMENT (OUTPATIENT)
Facility: HOSPITAL | Age: 88
End: 2023-09-23
Payer: MEDICARE

## 2023-09-23 VITALS
SYSTOLIC BLOOD PRESSURE: 134 MMHG | TEMPERATURE: 97.3 F | DIASTOLIC BLOOD PRESSURE: 76 MMHG | HEART RATE: 80 BPM | OXYGEN SATURATION: 100 % | RESPIRATION RATE: 18 BRPM

## 2023-09-23 VITALS
WEIGHT: 126 LBS | OXYGEN SATURATION: 96 % | TEMPERATURE: 98.3 F | HEART RATE: 82 BPM | BODY MASS INDEX: 18.04 KG/M2 | HEIGHT: 70 IN | SYSTOLIC BLOOD PRESSURE: 126 MMHG | DIASTOLIC BLOOD PRESSURE: 78 MMHG | RESPIRATION RATE: 18 BRPM

## 2023-09-23 DIAGNOSIS — M15.9 PRIMARY OSTEOARTHRITIS INVOLVING MULTIPLE JOINTS: Primary | ICD-10-CM

## 2023-09-23 PROCEDURE — 73562 X-RAY EXAM OF KNEE 3: CPT

## 2023-09-23 PROCEDURE — 73502 X-RAY EXAM HIP UNI 2-3 VIEWS: CPT

## 2023-09-23 PROCEDURE — 99283 EMERGENCY DEPT VISIT LOW MDM: CPT

## 2023-09-23 RX ORDER — ACETAMINOPHEN 500 MG
500 TABLET ORAL 4 TIMES DAILY PRN
Qty: 30 TABLET | Refills: 1 | Status: SHIPPED | OUTPATIENT
Start: 2023-09-23

## 2023-09-23 ASSESSMENT — ENCOUNTER SYMPTOMS
ALLERGIC/IMMUNOLOGIC NEGATIVE: 1
EYES NEGATIVE: 1
GASTROINTESTINAL NEGATIVE: 1
RESPIRATORY NEGATIVE: 1

## 2023-09-23 NOTE — ED TRIAGE NOTES
Client reports intermittent sharp pain from r. Hip downto r knee over past 2 days. Denies fall or other truama. Ambulatory with cane into triage. Pain 10/10.

## 2023-09-23 NOTE — DISCHARGE INSTRUCTIONS
You have been diagnosed with arthritis today. Your x-rays show no fracture of either your knee or your hip. To continue to walk well, you should consider following up with your primary care doctor as well as the orthopedic surgeon. Your primary care doctor can also order physical therapy but your orthopedic surgical recommendation can also give you joint injections and have other therapeutics including specialized bracing devices that might relieve your symptoms. Tylenol has been sent to your pharmacy.

## 2023-09-25 ENCOUNTER — HOME CARE VISIT (OUTPATIENT)
Age: 88
End: 2023-09-25
Payer: MEDICARE

## 2023-09-25 VITALS
DIASTOLIC BLOOD PRESSURE: 80 MMHG | OXYGEN SATURATION: 99 % | RESPIRATION RATE: 16 BRPM | HEART RATE: 75 BPM | TEMPERATURE: 98.3 F | SYSTOLIC BLOOD PRESSURE: 110 MMHG

## 2023-09-25 PROCEDURE — G0151 HHCP-SERV OF PT,EA 15 MIN: HCPCS

## 2023-09-25 NOTE — HOME HEALTH
DC ACTIONS NARRATIVE   Pt. clinically discharged and documentation finalized for completion of PT discharge. Caregiver involvement: Pt  is primary caregiver at this time and has been assisting with medical appointments and some ADL's. Medications reconciled and all medications are available in the home this visit. The following education was provided regarding medications, medication interactions, and look a like medications: NA Medications are effective at this time. Home health supplies by type and quantity ordered/delivered this visit include: NA  Patient education provided this visit: safety with safety with functional mobility  Current Functional Status and progress towards goals:   Strength: Pt. BLE strength is 4/5 which is an improvement from the initial evaluation strength of 3+/5. This allows the patient increased functional independence and mobility  BED MOBILITY: Pt. is independent with all bed mobility     TRANSFERS: Pt. is independent with all transfers with no AD which demonstrates an improvement from the initial evaluation score of min A with SPC AD. This allows the patient increased functional independence and mobility   GAIT/WC MOBILITY: Pt. is able to ambulate >250 feet inside over even and outside over uneven surfaces with mod I with SPC AD. This represents an improvement from the initial evaluation of 50 feet with SPC AD on level surfaces with min A. STAIRS: 4 stairs mod I with hand-rail   BALANCE: Patient's final Tinetti is 23/28 which is an improvement from the initial evaluation score of 16/28. This allows the patient to be functionally more independent and have a decreased fall risk   Progress toward goals: Patient has met  goals, see interventions for details. Pt. was able to return demonstrate all mobility training and HEP shown independently. Patient response to treatment and education: Patient tolerated treatment well, no complaint of new pain noted post treatment.    Home

## 2023-09-26 ENCOUNTER — OFFICE VISIT (OUTPATIENT)
Age: 88
End: 2023-09-26
Payer: MEDICARE

## 2023-09-26 VITALS — BODY MASS INDEX: 26.61 KG/M2 | TEMPERATURE: 98.2 F | HEIGHT: 59 IN | WEIGHT: 132 LBS

## 2023-09-26 DIAGNOSIS — M62.81 ABNORMAL GAIT DUE TO MUSCLE WEAKNESS: ICD-10-CM

## 2023-09-26 DIAGNOSIS — R54 ADVANCED AGE: ICD-10-CM

## 2023-09-26 DIAGNOSIS — M17.11 ARTHRITIS OF RIGHT KNEE: ICD-10-CM

## 2023-09-26 DIAGNOSIS — R26.9 ABNORMAL GAIT DUE TO MUSCLE WEAKNESS: ICD-10-CM

## 2023-09-26 DIAGNOSIS — M16.11 ARTHRITIS OF RIGHT HIP: Primary | ICD-10-CM

## 2023-09-26 DIAGNOSIS — Z91.81 AT RISK FOR INJURY RELATED TO FALL: ICD-10-CM

## 2023-09-26 PROCEDURE — G8417 CALC BMI ABV UP PARAM F/U: HCPCS | Performed by: PHYSICIAN ASSISTANT

## 2023-09-26 PROCEDURE — 1036F TOBACCO NON-USER: CPT | Performed by: PHYSICIAN ASSISTANT

## 2023-09-26 PROCEDURE — 1111F DSCHRG MED/CURRENT MED MERGE: CPT | Performed by: PHYSICIAN ASSISTANT

## 2023-09-26 PROCEDURE — 1090F PRES/ABSN URINE INCON ASSESS: CPT | Performed by: PHYSICIAN ASSISTANT

## 2023-09-26 PROCEDURE — 99204 OFFICE O/P NEW MOD 45 MIN: CPT | Performed by: PHYSICIAN ASSISTANT

## 2023-09-26 PROCEDURE — G8428 CUR MEDS NOT DOCUMENT: HCPCS | Performed by: PHYSICIAN ASSISTANT

## 2023-09-26 PROCEDURE — 1123F ACP DISCUSS/DSCN MKR DOCD: CPT | Performed by: PHYSICIAN ASSISTANT

## 2023-09-26 NOTE — PROGRESS NOTES
Patient: Lea Lagos                MRN: [id number]       SSN: [id number]  YOB: 1933        AGE: 80 y.o. SEX: female      OFFICE NOTE DICTATED    PCP: Ganga Laird MD  09/26/23    Chief Complaint   Patient presents with    Hip Pain     Right         HISTORY:    Lea Lagos is a 80 y.o. female presents to the office for follow-up after an emergency room visit for right hip and right knee pain. Patient was x-rayed in the emergency room and found to have osteoarthritic change to the right hip and right knee. She was ordered physical therapy and referred to orthopedics for recommendations continuity of care. She denies any trauma. She does use a single post cane on the left for ambulation assistance. Today she reports no pain in the hip or knee. She completed physical therapy as of yesterday. Therapy was in home. No flowsheet data found. No results found for: \"HBA1C\", \"HEA4OVPV\"      9/26/2023     9:13 AM 9/23/2023     2:16 PM 9/12/2023     1:26 PM 9/8/2023    12:37 PM 9/7/2023    10:24 AM 8/31/2023    10:19 AM 8/30/2023     2:30 PM   Weight Metrics   Weight 132 lb 126 lb 124 lb 126 lb 126 lb 137 lb 9 oz 121 lb   BMI (Calculated) 26.7 kg/m2 18.1 kg/m2 25.1 kg/m2 25.5 kg/m2 25.5 kg/m2 27.8 kg/m2 24.5 kg/m2          Problem List Items Addressed This Visit    None  Visit Diagnoses       Arthritis of right hip    -  Primary    Arthritis of right knee        Body mass index (BMI) 26.0-26.9, adult        At risk for injury related to fall        Advanced age        Abnormal gait due to muscle weakness                PAST MEDICAL HISTORY:       Diagnosis Date    Anemia     Carotid bruit 12/07/2013    Carotid Duplex: 1. Bilateral 1-49% stenosis of the internal carotid arteries. 2. No significant stenosis in the external carotid arteries bilaterally. 3. Antegrade flow in both vetebral arteries. 4. Normal flow in both subclavian arteries. Plaque Morphology: 1.  Hyperechoic

## 2023-09-27 ENCOUNTER — CARE COORDINATION (OUTPATIENT)
Facility: CLINIC | Age: 88
End: 2023-09-27

## 2023-09-27 NOTE — CARE COORDINATION
[facility] Care Transitions Follow Up Call    Patient Current Location:  [facility] Transition Nurse contacted the patient by telephone to follow up after admission on 23. Verified name and  with patient as identifiers. Patient: Jose Sim  Patient : 1933   MRN: [id number]      Discharge Date: 23 RARS: Readmission Risk Score: 18.3      Needs to be reviewed by the provider   Additional needs identified to be addressed with provider: No  none             Method of communication with provider: none. Patient reports:   - She went to the doctor yesterday. - She is to go back as needed  - She has no pain at this time. Addressed/Reviewed  changes since last contact:    - ED visit 23 per chart review and discussion with patient. Follow Up  Future Appointments   Date Time Provider [address]   10/24/2023 10:45 AM Cynthia Christianson MD [facility] AMB   2024  9:00 AM Yovani Roger MD [facility]       Care Transition Nurse reviewed medical action plan with patient and discussed any barriers to care and/or understanding of plan of care after discharge. Discussed appropriate site of care based on symptoms and resources available to patient including: PCP  Specialist  After hours contact number-Medical provider office phone number   When to call 911. The patient agrees to contact the PCP office / specialty provider office staff for questions related to their healthcare. Advance Care Planning:   reviewed and current    Advance Care Planning   Healthcare Decision Maker:    Primary Decision Maker: Palmira Rodriguez - Other Relative - [phone]    Secondary Decision Maker: Dre Serrano - [phone]    Click here to complete Healthcare Decision Makers including selection of the Healthcare Decision Maker Relationship (ie \"Primary\").                 Patients top risk factors for readmission:   - Medical Condition       Interventions to address risk factors:   1) CTN reviewed

## 2023-10-03 ENCOUNTER — CARE COORDINATION (OUTPATIENT)
Facility: CLINIC | Age: 88
End: 2023-10-03

## 2023-10-03 NOTE — CARE COORDINATION
Care Transitions     Per chart review:   - Patient has not been admitted into the [facility] setting within 30 days of hospital discharge ( 8-31-23). No further outreach planned at this time. Handoff    Care Transition Nurse identified needs for Ambulatory Care Management. The following challenges have been identified          - History of ED visits since hospital discharge          -  9/8/23 and 9/23/23            - Patient may need assistance with schedling follow up appointments. See below in this note. Patients top risk factors for readmission:        Medical Condition        Admission 8/29/23 related to DVT        ED visits x2 within approximately the last 30 days. CTN reviewed ACP with patient   Would ACM review as well  for additional follow up, with patient if it within the workflow? [facility] with [facility] up appointments as follows:   - KANDACE Hightower,         patient related appt. Scheduled.   -Heme VOA             Patient may need to schedule f/u appt. -Nephro k cardona        on call list for November ?   - Irene Abdi,-  attended cardiology f/u 9/12/23     Please see Care Transitions progress note of 9/14/23 for additional information as needed. Ambulatory Care Management referral submitted via secure staff messaging to VENICE Stanley RN ACM and the [employer]. Care Transitions program resolved.

## 2023-10-04 ENCOUNTER — CARE COORDINATION (OUTPATIENT)
Facility: CLINIC | Age: 88
End: 2023-10-04

## 2023-10-13 ENCOUNTER — CARE COORDINATION (OUTPATIENT)
Facility: CLINIC | Age: 88
End: 2023-10-13

## 2023-10-20 ENCOUNTER — CARE COORDINATION (OUTPATIENT)
Facility: CLINIC | Age: 88
End: 2023-10-20

## 2023-10-20 NOTE — CARE COORDINATION
Ambulatory Care Coordination Note  10/20/2023    Patient Current Location:  Home:  [facility] [address] [facility] [address]     ACM contacted the patient by telephone. Verified name and  with patient as identifiers. Provided introduction to self, and explanation of the ACM role. Challenges to be reviewed by the provider   Additional needs identified to be addressed with provider: No  none               Method of communication with provider: none. ACM: Ruddy Rodriguez RN     1. Hx of DM2, CKD III, neuropathy, hyperuricemia, htn, LLE, gout, anemia, aortic regurg, dCHF, HLD  2. Aware of upcoming appts  3. Pt states doing well  4. No questions/issues at this time. Offered patient enrollment in the Remote Patient Monitoring (RPM) program for in-home monitoring: NA. Lab Results       None                 Goals Addressed                   This Visit's Progress     Conditions and Symptoms   On track     I will schedule office visits, as directed by my provider. I will keep my appointment or reschedule if I have to cancel. I will notify my provider of any barriers to my plan of care. I will notify my provider of any symptoms that indicate a worsening of my condition. Barriers: none  Plan for overcoming my barriers: N/A  Confidence: 10/10  Anticipated Goal Completion Date: 24         Medication Management   On track     I will take my medication as directed. I will notify my provider of any problems with medications, like adverse effects or side effects. I will notify my provider/Care Coordinator if I am unable to afford my medications. I will notify my provider for advice before I stop taking any of my medication. Barriers: none  Plan for overcoming my barriers: N/A  Confidence: 10/10  Anticipated Goal Completion Date: 24       Reduce Risk of Hospitalization   On track     I will take appropriate steps to reduce my risk of Hospitalization to include:   Take all of medications as

## 2023-10-23 ENCOUNTER — HOSPITAL ENCOUNTER (OUTPATIENT)
Facility: HOSPITAL | Age: 88
Discharge: HOME | End: 2023-10-26
Payer: MEDICARE

## 2023-10-23 DIAGNOSIS — E11.9 DIABETES MELLITUS TYPE 2, DIET-CONTROLLED (HCC): ICD-10-CM

## 2023-10-23 LAB
ALBUMIN SERPL-MCNC: 3.7 G/DL (ref 3.4–5)
ALBUMIN/GLOB SERPL: 1 (ref 0.8–1.7)
ALP SERPL-CCNC: 56 U/L (ref 45–117)
ALT SERPL-CCNC: 22 U/L (ref 13–56)
ANION GAP SERPL CALC-SCNC: 8 MMOL/L (ref 3–18)
AST SERPL-CCNC: 33 U/L (ref 10–38)
BASOPHILS # BLD: 0 K/UL (ref 0–0.1)
BASOPHILS NFR BLD: 1 % (ref 0–2)
BILIRUB SERPL-MCNC: 0.5 MG/DL (ref 0.2–1)
BUN SERPL-MCNC: 79 MG/DL (ref 7–18)
BUN/CREAT SERPL: 34 (ref 12–20)
CALCIUM SERPL-MCNC: 9.2 MG/DL (ref 8.5–10.1)
CHLORIDE SERPL-SCNC: 107 MMOL/L (ref 100–111)
CO2 SERPL-SCNC: 25 MMOL/L (ref 21–32)
CREAT SERPL-MCNC: 2.34 MG/DL (ref 0.6–1.3)
DIFFERENTIAL METHOD BLD: ABNORMAL
EOSINOPHIL # BLD: 0.1 K/UL (ref 0–0.4)
EOSINOPHIL NFR BLD: 2 % (ref 0–5)
ERYTHROCYTE [DISTWIDTH] IN BLOOD BY AUTOMATED COUNT: 16.4 % (ref 11.6–14.5)
EST. AVERAGE GLUCOSE BLD GHB EST-MCNC: 123 MG/DL
GLOBULIN SER CALC-MCNC: 3.7 G/DL (ref 2–4)
GLUCOSE SERPL-MCNC: 116 MG/DL (ref 74–99)
HBA1C MFR BLD: 5.9 % (ref 4.2–5.6)
HCT VFR BLD AUTO: 28.8 % (ref 35–45)
HGB BLD-MCNC: 9 G/DL (ref 12–16)
IMM GRANULOCYTES # BLD AUTO: 0 K/UL (ref 0–0.04)
IMM GRANULOCYTES NFR BLD AUTO: 0 % (ref 0–0.5)
LYMPHOCYTES # BLD: 1.5 K/UL (ref 0.9–3.6)
LYMPHOCYTES NFR BLD: 24 % (ref 21–52)
MCH RBC QN AUTO: 26.6 PG (ref 24–34)
MCHC RBC AUTO-ENTMCNC: 31.3 G/DL (ref 31–37)
MCV RBC AUTO: 85.2 FL (ref 78–100)
MONOCYTES # BLD: 0.5 K/UL (ref 0.05–1.2)
MONOCYTES NFR BLD: 8 % (ref 3–10)
NEUTS SEG # BLD: 4.1 K/UL (ref 1.8–8)
NEUTS SEG NFR BLD: 66 % (ref 40–73)
NRBC # BLD: 0 K/UL (ref 0–0.01)
NRBC BLD-RTO: 0 PER 100 WBC
PLATELET # BLD AUTO: 260 K/UL (ref 135–420)
PMV BLD AUTO: 10.8 FL (ref 9.2–11.8)
POTASSIUM SERPL-SCNC: 3.6 MMOL/L (ref 3.5–5.5)
PROT SERPL-MCNC: 7.4 G/DL (ref 6.4–8.2)
RBC # BLD AUTO: 3.38 M/UL (ref 4.2–5.3)
SODIUM SERPL-SCNC: 140 MMOL/L (ref 136–145)
WBC # BLD AUTO: 6.2 K/UL (ref 4.6–13.2)

## 2023-10-23 PROCEDURE — 83036 HEMOGLOBIN GLYCOSYLATED A1C: CPT

## 2023-10-23 PROCEDURE — 80053 COMPREHEN METABOLIC PANEL: CPT

## 2023-10-23 PROCEDURE — 85025 COMPLETE CBC W/AUTO DIFF WBC: CPT

## 2023-10-23 PROCEDURE — 36415 COLL VENOUS BLD VENIPUNCTURE: CPT

## 2023-10-24 ENCOUNTER — OFFICE VISIT (OUTPATIENT)
Age: 88
End: 2023-10-24
Payer: MEDICARE

## 2023-10-24 VITALS
BODY MASS INDEX: 26.81 KG/M2 | HEIGHT: 59 IN | HEART RATE: 72 BPM | OXYGEN SATURATION: 98 % | TEMPERATURE: 97.7 F | WEIGHT: 133 LBS | DIASTOLIC BLOOD PRESSURE: 80 MMHG | SYSTOLIC BLOOD PRESSURE: 140 MMHG | RESPIRATION RATE: 16 BRPM

## 2023-10-24 DIAGNOSIS — E78.2 MIXED HYPERLIPIDEMIA: ICD-10-CM

## 2023-10-24 DIAGNOSIS — E11.40 TYPE 2 DIABETES MELLITUS WITH DIABETIC NEUROPATHY, WITHOUT LONG-TERM CURRENT USE OF INSULIN (HCC): ICD-10-CM

## 2023-10-24 DIAGNOSIS — I10 ESSENTIAL HYPERTENSION: ICD-10-CM

## 2023-10-24 DIAGNOSIS — N18.4 TYPE 2 DIABETES MELLITUS WITH STAGE 4 CHRONIC KIDNEY DISEASE, WITHOUT LONG-TERM CURRENT USE OF INSULIN (HCC): ICD-10-CM

## 2023-10-24 DIAGNOSIS — Z23 NEEDS FLU SHOT: ICD-10-CM

## 2023-10-24 DIAGNOSIS — Z00.00 MEDICARE ANNUAL WELLNESS VISIT, SUBSEQUENT: Primary | ICD-10-CM

## 2023-10-24 DIAGNOSIS — E11.9 DIABETES MELLITUS TYPE 2, DIET-CONTROLLED (HCC): ICD-10-CM

## 2023-10-24 DIAGNOSIS — I82.4Z2 DVT, LOWER EXTREMITY, DISTAL, ACUTE, LEFT (HCC): ICD-10-CM

## 2023-10-24 DIAGNOSIS — D63.8 ANEMIA OF CHRONIC DISEASE: ICD-10-CM

## 2023-10-24 DIAGNOSIS — E11.22 TYPE 2 DIABETES MELLITUS WITH STAGE 4 CHRONIC KIDNEY DISEASE, WITHOUT LONG-TERM CURRENT USE OF INSULIN (HCC): ICD-10-CM

## 2023-10-24 PROCEDURE — 1090F PRES/ABSN URINE INCON ASSESS: CPT | Performed by: FAMILY MEDICINE

## 2023-10-24 PROCEDURE — 3044F HG A1C LEVEL LT 7.0%: CPT | Performed by: FAMILY MEDICINE

## 2023-10-24 PROCEDURE — G8417 CALC BMI ABV UP PARAM F/U: HCPCS | Performed by: FAMILY MEDICINE

## 2023-10-24 PROCEDURE — G8427 DOCREV CUR MEDS BY ELIG CLIN: HCPCS | Performed by: FAMILY MEDICINE

## 2023-10-24 PROCEDURE — G8484 FLU IMMUNIZE NO ADMIN: HCPCS | Performed by: FAMILY MEDICINE

## 2023-10-24 PROCEDURE — G0439 PPPS, SUBSEQ VISIT: HCPCS | Performed by: FAMILY MEDICINE

## 2023-10-24 PROCEDURE — PBSHW INFLUENZA, FLUAD, (AGE 65 Y+), IM, PF, 0.5 ML: Performed by: FAMILY MEDICINE

## 2023-10-24 PROCEDURE — 1036F TOBACCO NON-USER: CPT | Performed by: FAMILY MEDICINE

## 2023-10-24 PROCEDURE — G0008 ADMIN INFLUENZA VIRUS VAC: HCPCS | Performed by: FAMILY MEDICINE

## 2023-10-24 PROCEDURE — 99214 OFFICE O/P EST MOD 30 MIN: CPT | Performed by: FAMILY MEDICINE

## 2023-10-24 PROCEDURE — 1123F ACP DISCUSS/DSCN MKR DOCD: CPT | Performed by: FAMILY MEDICINE

## 2023-10-24 RX ORDER — FERROUS SULFATE 325(65) MG
325 TABLET ORAL
Qty: 30 TABLET | Status: CANCELLED | OUTPATIENT
Start: 2023-10-24

## 2023-10-24 RX ORDER — AMLODIPINE BESYLATE 10 MG/1
10 TABLET ORAL DAILY
Qty: 90 TABLET | Refills: 3 | Status: SHIPPED | OUTPATIENT
Start: 2023-10-24

## 2023-10-24 ASSESSMENT — PATIENT HEALTH QUESTIONNAIRE - PHQ9
SUM OF ALL RESPONSES TO PHQ9 QUESTIONS 1 & 2: 0
1. LITTLE INTEREST OR PLEASURE IN DOING THINGS: 0
SUM OF ALL RESPONSES TO PHQ QUESTIONS 1-9: 0
SUM OF ALL RESPONSES TO PHQ QUESTIONS 1-9: 0
2. FEELING DOWN, DEPRESSED OR HOPELESS: 0
SUM OF ALL RESPONSES TO PHQ QUESTIONS 1-9: 0
SUM OF ALL RESPONSES TO PHQ QUESTIONS 1-9: 0

## 2023-10-24 ASSESSMENT — LIFESTYLE VARIABLES
HOW OFTEN DO YOU HAVE A DRINK CONTAINING ALCOHOL: NEVER
HOW MANY STANDARD DRINKS CONTAINING ALCOHOL DO YOU HAVE ON A TYPICAL DAY: PATIENT DOES NOT DRINK

## 2023-10-24 NOTE — PROGRESS NOTES
1. \"Have you been to the ER, urgent care clinic since your last visit? Hospitalized since your last visit? \" 09/23/2023 ER right hip and knee pain  09/08/2023 ER nausea and rectal bleed    2. \"Have you seen or consulted any other health care providers outside of the [address] [place] [address] since your last visit? \" 09/26/2023 Tyrone KAUR, clark CampoD, Brandy Nick NP cards      3. For patients aged 43-73: Has the patient had a colonoscopy / FIT/ Cologuard? NA - based on age      If the patient is female:    4. For patients aged 43-66: Has the patient had a mammogram within the past 2 years? NA - based on age or sex      11. For patients aged 21-65: Has the patient had a pap smear? NA - based on age or sex    Fluad 0.5 ml given IM in left deltoid. Lot # [id number], exp date 06/30/2024. Patient tolerated injection well. No adverse reaction noted.
hyperlipidemia E78.2    Advanced directives, counseling/discussion Z71.89    Controlled type 2 diabetes mellitus with stage 3 chronic kidney disease, without long-term current use of insulin (HCC) E11.22, N18.3         Current Outpatient Medications:     amLODIPine (NORVASC) 10 MG tablet, Take 1 tablet by mouth daily, Disp: 90 tablet, Rfl: 3    acetaminophen (TYLENOL) 500 MG tablet, Take 1 tablet by mouth 4 times daily as needed for Pain, Disp: 30 tablet, Rfl: 1    ferrous sulfate (IRON 325) 325 (65 Fe) MG tablet, Take 1 tablet by mouth daily (with breakfast), Disp: , Rfl:     allopurinol (ZYLOPRIM) 100 MG tablet, Take 1 tablet by mouth daily as needed (gout), Disp: , Rfl:     Omega-3 Fatty Acids (FISH OIL) 1200 MG CAPS, Take 1,200 mg by mouth every morning, Disp: , Rfl:     ondansetron (ZOFRAN-ODT) 8 MG TBDP disintegrating tablet, Take 1 tablet by mouth 3 times daily as needed for Nausea or Vomiting, Disp: 12 tablet, Rfl: 1    pravastatin (PRAVACHOL) 20 MG tablet, Take 1 tablet by mouth nightly, Disp: 90 tablet, Rfl: 3    Cholecalciferol (VITAMIN D) 10 MCG (400 UNIT) CAPS Capsule, Take 1 capsule by mouth daily, Disp: , Rfl:     sucralfate (CARAFATE) 1 GM tablet, Take 1 tablet by mouth 4 times daily (before meals and nightly), Disp: 120 tablet, Rfl: 3    pantoprazole (PROTONIX) 40 MG tablet, Take 1 tablet by mouth 2 times daily (before meals), Disp: 60 tablet, Rfl: 2    bumetanide (BUMEX) 0.5 MG tablet, Take 1 tablet by mouth daily, Disp: , Rfl:     hydrALAZINE (APRESOLINE) 100 MG tablet, Take 1 tablet by mouth 3 times daily, Disp: 270 tablet, Rfl: 3    dorzolamide-timolol (COSOPT) 22.3-6.8 MG/ML ophthalmic solution, Place 1 drop into the left eye every morning, Disp: , Rfl:     latanoprost (XALATAN) 0.005 % ophthalmic solution, Place 1 drop into both eyes daily, Disp: , Rfl:     potassium chloride (KLOR-CON M) 20 MEQ extended release tablet, Take 1 tablet by mouth daily, Disp: , Rfl:     diclofenac sodium

## 2023-10-30 ENCOUNTER — CARE COORDINATION (OUTPATIENT)
Facility: CLINIC | Age: 88
End: 2023-10-30

## 2023-10-30 NOTE — CARE COORDINATION
Ambulatory Care Coordination Note  10/30/2023    Patient Current Location:  Home:  [facility] [address]     ACM contacted the patient by telephone. Verified name and  with patient as identifiers. Provided introduction to self, and explanation of the ACM role. Challenges to be reviewed by the provider   Additional needs identified to be addressed with provider: No  none               Method of communication with provider: none. ACM: Herb Qiu RN    1. Hx of DM2, CKD III, neuropathy, hyperuricemia, htn, LLE, gout, anemia, aortic regurg, dCHF, HLD  2. Aware of upcoming appts  3. Pt states doing well  4. No questions/issues at this time. 5. PCP recommended f/u w/ pt's Eric Benoit. Left VM will continue to outreach. Offered patient enrollment in the Remote Patient Monitoring (RPM) program for in-home monitoring: NA. Lab Results       None            Care Coordination Interventions    Referral from Primary Care Provider: Yes  Suggested Interventions and Community Resources          Goals Addressed                   This Visit's Progress     Conditions and Symptoms   On track     I will schedule office visits, as directed by my provider. I will keep my appointment or reschedule if I have to cancel. I will notify my provider of any barriers to my plan of care. I will notify my provider of any symptoms that indicate a worsening of my condition. Barriers: none  Plan for overcoming my barriers: N/A  Confidence: 10/10  Anticipated Goal Completion Date: 24         Medication Management   On track     I will take my medication as directed. I will notify my provider of any problems with medications, like adverse effects or side effects. I will notify my provider/Care Coordinator if I am unable to afford my medications. I will notify my provider for advice before I stop taking any of my medication.     Barriers: none  Plan for overcoming my barriers: N/A  Confidence:

## 2023-11-02 RX ORDER — FERROUS SULFATE 325(65) MG
TABLET ORAL
Qty: 90 TABLET | Refills: 0 | Status: SHIPPED | OUTPATIENT
Start: 2023-11-02

## 2023-11-07 ENCOUNTER — CARE COORDINATION (OUTPATIENT)
Facility: CLINIC | Age: 88
End: 2023-11-07

## 2023-11-07 NOTE — CARE COORDINATION
Ambulatory Care Coordination Note  2023    Patient Current Location:  Home:  [facility] [address] [facility] [address]     ACM contacted the patient by telephone. Verified name and  with patient as identifiers. Provided introduction to self, and explanation of the ACM role. Challenges to be reviewed by the provider   Additional needs identified to be addressed with provider: No  none               Method of communication with provider: none. ACM: Gabe Caldera RN    1. Hx of DM2, CKD III, neuropathy, hyperuricemia, htn, LLE, gout, anemia, aortic regurg, dCHF, HLD  2. Spoke to pt's Preston Montesse. Verified upcoming appts and discussed need for f/u w/ neph, gi, and hemo. Jeanna Hedykeiko states will call neph and GI. Pt appears to need new referral for Hemo as her last one's office closed. Will discuss w/ PCP. 3. States pt is doing well otherwise. 4. No other questions/issues at this time. Offered patient enrollment in the Remote Patient Monitoring (RPM) program for in-home monitoring: NA. Lab Results       None            Care Coordination Interventions    Referral from Primary Care Provider: Yes  Suggested Interventions and Community Resources          Goals Addressed                   This Visit's Progress     Conditions and Symptoms   On track     I will schedule office visits, as directed by my provider. I will keep my appointment or reschedule if I have to cancel. I will notify my provider of any barriers to my plan of care. I will notify my provider of any symptoms that indicate a worsening of my condition. Barriers: none  Plan for overcoming my barriers: N/A  Confidence: 10/10  Anticipated Goal Completion Date: 24         Medication Management   On track     I will take my medication as directed. I will notify my provider of any problems with medications, like adverse effects or side effects. I will notify my provider/Care Coordinator if I am unable to afford my medications.   I

## 2023-11-14 ENCOUNTER — CARE COORDINATION (OUTPATIENT)
Facility: CLINIC | Age: 88
End: 2023-11-14

## 2023-11-14 ENCOUNTER — TELEPHONE (OUTPATIENT)
Age: 88
End: 2023-11-14

## 2023-11-14 NOTE — TELEPHONE ENCOUNTER
[facility] nurse navigator called stating that pt needs a new referral to hematology since her office closed. Please advise.  Thank you

## 2023-11-14 NOTE — CARE COORDINATION
medications. I will notify my provider for advice before I stop taking any of my medication. Barriers: none  Plan for overcoming my barriers: N/A  Confidence: 10/10  Anticipated Goal Completion Date: 1/4/24       Reduce Risk of Hospitalization   On track     I will take appropriate steps to reduce my risk of Hospitalization to include: Take all of medications as prescribed  Visit w/ all of my recommended specialists. Visit w/ my PCP as recommended. Avoid activities that can trigger my chronic conditions.     Barriers: none  Plan for overcoming my barriers: N/A  Confidence: 10/10  Anticipated Goal Completion Date: 1/4/24                Future Appointments   Date Time Provider [address]   1/9/2024  9:00 AM Jamil Johns [facility] BS AMB   1/24/2024 10:00 AM Ton Vitale MD [facility] BS AMB

## 2023-11-17 NOTE — TELEPHONE ENCOUNTER
Referral has been faxed to [phone] for appt request. All records, labs and insurance card have been faxed with referral request.

## 2023-11-21 ENCOUNTER — CARE COORDINATION (OUTPATIENT)
Facility: CLINIC | Age: 88
End: 2023-11-21

## 2023-11-21 NOTE — CARE COORDINATION
10/10  Anticipated Goal Completion Date: 1/4/24       Reduce Risk of Hospitalization   On track     I will take appropriate steps to reduce my risk of Hospitalization to include: Take all of medications as prescribed  Visit w/ all of my recommended specialists. Visit w/ my PCP as recommended. Avoid activities that can trigger my chronic conditions.     Barriers: none  Plan for overcoming my barriers: N/A  Confidence: 10/10  Anticipated Goal Completion Date: 1/4/24                Future Appointments   Date Time Provider [address]   1/9/2024  9:00 AM Una Naylor MD [facility] BS AMB   1/24/2024 10:00 AM Ganga Laird MD [facility] BS AMB

## 2023-12-05 ENCOUNTER — CARE COORDINATION (OUTPATIENT)
Facility: CLINIC | Age: 88
End: 2023-12-05

## 2023-12-11 ENCOUNTER — CARE COORDINATION (OUTPATIENT)
Facility: CLINIC | Age: 88
End: 2023-12-11

## 2023-12-24 NOTE — TELEPHONE ENCOUNTER
Dr. Rakesh Cruz, patient is requesting a new referral to hematology since Dr. Mike Dawson is leaving practice. Thank you. Patient's potassium level this AM was 3.4 mmol/L. Notified MD Nidhi Urbina. See new order.

## 2024-01-08 ENCOUNTER — CARE COORDINATION (OUTPATIENT)
Facility: CLINIC | Age: 89
End: 2024-01-08

## 2024-01-08 SDOH — ECONOMIC STABILITY: FOOD INSECURITY: WITHIN THE PAST 12 MONTHS, YOU WORRIED THAT YOUR FOOD WOULD RUN OUT BEFORE YOU GOT MONEY TO BUY MORE.: NEVER TRUE

## 2024-01-08 SDOH — ECONOMIC STABILITY: INCOME INSECURITY: IN THE LAST 12 MONTHS, WAS THERE A TIME WHEN YOU WERE NOT ABLE TO PAY THE MORTGAGE OR RENT ON TIME?: NO

## 2024-01-08 SDOH — ECONOMIC STABILITY: FOOD INSECURITY: WITHIN THE PAST 12 MONTHS, THE FOOD YOU BOUGHT JUST DIDN'T LAST AND YOU DIDN'T HAVE MONEY TO GET MORE.: NEVER TRUE

## 2024-01-08 SDOH — ECONOMIC STABILITY: HOUSING INSECURITY: IN THE LAST 12 MONTHS, HOW MANY PLACES HAVE YOU LIVED?: 1

## 2024-01-08 NOTE — CARE COORDINATION
Ambulatory Care Coordination Note  2024    Patient Current Location:  Home: [address] [place] [id number]     ACM contacted the patient and family by telephone. Verified name and  with patient and family as identifiers. Provided introduction to self, and explanation of the ACM role.     Challenges to be reviewed by the provider   Additional needs identified to be addressed with provider: No  none               Method of communication with provider: none.    ACM: Adolfo Jackson RN    1. Hx of DM2, CKD III, neuropathy, hyperuricemia, htn, LLE, gout, anemia, aortic regurg, dCHF, HLD  2. Pt States doing well.   3. No other questions/issues at this time.    Offered patient enrollment in the Remote Patient Monitoring (RPM) program for in-home monitoring: NA.    Lab Results       None            Care Coordination Interventions    Referral from Primary Care Provider: Yes  Suggested Interventions and Community Resources          Goals Addressed                   This Visit's Progress     Conditions and Symptoms   On track     I will schedule office visits, as directed by my provider.  I will keep my appointment or reschedule if I have to cancel.  I will notify my provider of any barriers to my plan of care.  I will notify my provider of any symptoms that indicate a worsening of my condition.    Barriers: none  Plan for overcoming my barriers: N/A  Confidence: 10/10  Anticipated Goal Completion Date: 24         Medication Management   On track     I will take my medication as directed.  I will notify my provider of any problems with medications, like adverse effects or side effects.  I will notify my provider/Care Coordinator if I am unable to afford my medications.  I will notify my provider for advice before I stop taking any of my medication.    Barriers: none  Plan for overcoming my barriers: N/A  Confidence: 10/10  Anticipated Goal Completion Date: 24       Reduce Risk of Hospitalization

## 2024-01-15 ENCOUNTER — TELEPHONE (OUTPATIENT)
Age: 89
End: 2024-01-15

## 2024-01-15 NOTE — TELEPHONE ENCOUNTER
Pt stopped by the office and states she has had a dry cough for over a week and it is not getting better. Pt is requesting a rx to get some relief. Pt has an appt scheduled on 1/24/2024. Please advise.

## 2024-01-22 ENCOUNTER — CARE COORDINATION (OUTPATIENT)
Facility: CLINIC | Age: 89
End: 2024-01-22

## 2024-01-22 NOTE — CARE COORDINATION
Attempted to contact patient for Complex Care follow up. No answer, left message with contact information provided. Will attempt to contact at a later time.

## 2024-01-23 ENCOUNTER — HOSPITAL ENCOUNTER (OUTPATIENT)
Facility: HOSPITAL | Age: 89
Discharge: HOME | End: 2024-01-26
Payer: MEDICARE

## 2024-01-23 DIAGNOSIS — E11.9 DIABETES MELLITUS TYPE 2, DIET-CONTROLLED (HCC): ICD-10-CM

## 2024-01-23 LAB
ALBUMIN SERPL-MCNC: 3.6 G/DL (ref 3.4–5)
ALBUMIN/GLOB SERPL: 1 (ref 0.8–1.7)
ALP SERPL-CCNC: 41 U/L (ref 45–117)
ALT SERPL-CCNC: 10 U/L (ref 13–56)
ANION GAP SERPL CALC-SCNC: 6 MMOL/L (ref 3–18)
AST SERPL-CCNC: 17 U/L (ref 10–38)
BASOPHILS # BLD: 0 K/UL (ref 0–0.1)
BASOPHILS NFR BLD: 1 % (ref 0–2)
BILIRUB SERPL-MCNC: 0.7 MG/DL (ref 0.2–1)
BUN SERPL-MCNC: 55 MG/DL (ref 7–18)
BUN/CREAT SERPL: 21 (ref 12–20)
CALCIUM SERPL-MCNC: 9.4 MG/DL (ref 8.5–10.1)
CHLORIDE SERPL-SCNC: 110 MMOL/L (ref 100–111)
CO2 SERPL-SCNC: 25 MMOL/L (ref 21–32)
CREAT SERPL-MCNC: 2.58 MG/DL (ref 0.6–1.3)
DIFFERENTIAL METHOD BLD: ABNORMAL
EOSINOPHIL # BLD: 0.1 K/UL (ref 0–0.4)
EOSINOPHIL NFR BLD: 3 % (ref 0–5)
ERYTHROCYTE [DISTWIDTH] IN BLOOD BY AUTOMATED COUNT: 16.7 % (ref 11.6–14.5)
EST. AVERAGE GLUCOSE BLD GHB EST-MCNC: 108 MG/DL
GLOBULIN SER CALC-MCNC: 3.6 G/DL (ref 2–4)
GLUCOSE SERPL-MCNC: 102 MG/DL (ref 74–99)
HBA1C MFR BLD: 5.4 % (ref 4.2–5.6)
HCT VFR BLD AUTO: 29.2 % (ref 35–45)
HGB BLD-MCNC: 8.9 G/DL (ref 12–16)
IMM GRANULOCYTES # BLD AUTO: 0 K/UL (ref 0–0.04)
IMM GRANULOCYTES NFR BLD AUTO: 0 % (ref 0–0.5)
LYMPHOCYTES # BLD: 1.2 K/UL (ref 0.9–3.6)
LYMPHOCYTES NFR BLD: 28 % (ref 21–52)
MCH RBC QN AUTO: 26.1 PG (ref 24–34)
MCHC RBC AUTO-ENTMCNC: 30.5 G/DL (ref 31–37)
MCV RBC AUTO: 85.6 FL (ref 78–100)
MONOCYTES # BLD: 0.4 K/UL (ref 0.05–1.2)
MONOCYTES NFR BLD: 8 % (ref 3–10)
NEUTS SEG # BLD: 2.7 K/UL (ref 1.8–8)
NEUTS SEG NFR BLD: 61 % (ref 40–73)
NRBC # BLD: 0 K/UL (ref 0–0.01)
NRBC BLD-RTO: 0 PER 100 WBC
PLATELET # BLD AUTO: 300 K/UL (ref 135–420)
PMV BLD AUTO: 9.1 FL (ref 9.2–11.8)
POTASSIUM SERPL-SCNC: 3.6 MMOL/L (ref 3.5–5.5)
PROT SERPL-MCNC: 7.2 G/DL (ref 6.4–8.2)
RBC # BLD AUTO: 3.41 M/UL (ref 4.2–5.3)
SODIUM SERPL-SCNC: 141 MMOL/L (ref 136–145)
WBC # BLD AUTO: 4.5 K/UL (ref 4.6–13.2)

## 2024-01-23 PROCEDURE — 80053 COMPREHEN METABOLIC PANEL: CPT

## 2024-01-23 PROCEDURE — 85025 COMPLETE CBC W/AUTO DIFF WBC: CPT

## 2024-01-23 PROCEDURE — 83036 HEMOGLOBIN GLYCOSYLATED A1C: CPT

## 2024-01-23 PROCEDURE — 36415 COLL VENOUS BLD VENIPUNCTURE: CPT

## 2024-01-24 ENCOUNTER — OFFICE VISIT (OUTPATIENT)
Age: 89
End: 2024-01-24
Payer: MEDICARE

## 2024-01-24 VITALS
SYSTOLIC BLOOD PRESSURE: 134 MMHG | DIASTOLIC BLOOD PRESSURE: 80 MMHG | OXYGEN SATURATION: 98 % | TEMPERATURE: 98 F | HEART RATE: 78 BPM | HEIGHT: 59 IN | RESPIRATION RATE: 16 BRPM | WEIGHT: 139 LBS | BODY MASS INDEX: 28.02 KG/M2

## 2024-01-24 DIAGNOSIS — I50.32 CHRONIC DIASTOLIC HEART FAILURE (HCC): ICD-10-CM

## 2024-01-24 DIAGNOSIS — N18.4 CKD (CHRONIC KIDNEY DISEASE) STAGE 4, GFR 15-29 ML/MIN (HCC): ICD-10-CM

## 2024-01-24 DIAGNOSIS — I10 ESSENTIAL HYPERTENSION: Primary | ICD-10-CM

## 2024-01-24 DIAGNOSIS — I50.32 DIASTOLIC CHF, CHRONIC (HCC): ICD-10-CM

## 2024-01-24 DIAGNOSIS — I82.432 ACUTE DEEP VEIN THROMBOSIS (DVT) OF POPLITEAL VEIN OF LEFT LOWER EXTREMITY (HCC): ICD-10-CM

## 2024-01-24 DIAGNOSIS — D64.9 ACUTE ON CHRONIC ANEMIA: ICD-10-CM

## 2024-01-24 DIAGNOSIS — I85.01 BLEEDING ESOPHAGEAL VARICES, UNSPECIFIED ESOPHAGEAL VARICES TYPE (HCC): ICD-10-CM

## 2024-01-24 DIAGNOSIS — D63.8 ANEMIA OF CHRONIC DISEASE: ICD-10-CM

## 2024-01-24 DIAGNOSIS — R60.0 LEG EDEMA: ICD-10-CM

## 2024-01-24 DIAGNOSIS — E11.40 TYPE 2 DIABETES MELLITUS WITH DIABETIC NEUROPATHY, WITHOUT LONG-TERM CURRENT USE OF INSULIN (HCC): ICD-10-CM

## 2024-01-24 DIAGNOSIS — I82.4Z2 DVT, LOWER EXTREMITY, DISTAL, ACUTE, LEFT (HCC): ICD-10-CM

## 2024-01-24 PROCEDURE — 1090F PRES/ABSN URINE INCON ASSESS: CPT | Performed by: FAMILY MEDICINE

## 2024-01-24 PROCEDURE — G8484 FLU IMMUNIZE NO ADMIN: HCPCS | Performed by: FAMILY MEDICINE

## 2024-01-24 PROCEDURE — 1036F TOBACCO NON-USER: CPT | Performed by: FAMILY MEDICINE

## 2024-01-24 PROCEDURE — 1123F ACP DISCUSS/DSCN MKR DOCD: CPT | Performed by: FAMILY MEDICINE

## 2024-01-24 PROCEDURE — 3044F HG A1C LEVEL LT 7.0%: CPT | Performed by: FAMILY MEDICINE

## 2024-01-24 PROCEDURE — 99214 OFFICE O/P EST MOD 30 MIN: CPT | Performed by: FAMILY MEDICINE

## 2024-01-24 PROCEDURE — G8427 DOCREV CUR MEDS BY ELIG CLIN: HCPCS | Performed by: FAMILY MEDICINE

## 2024-01-24 PROCEDURE — G8417 CALC BMI ABV UP PARAM F/U: HCPCS | Performed by: FAMILY MEDICINE

## 2024-01-24 RX ORDER — PANTOPRAZOLE SODIUM 40 MG/1
40 TABLET, DELAYED RELEASE ORAL
Qty: 180 TABLET | Refills: 0 | Status: SHIPPED | OUTPATIENT
Start: 2024-01-24 | End: 2024-02-23

## 2024-01-24 ASSESSMENT — PATIENT HEALTH QUESTIONNAIRE - PHQ9
SUM OF ALL RESPONSES TO PHQ QUESTIONS 1-9: 0
SUM OF ALL RESPONSES TO PHQ QUESTIONS 1-9: 0
2. FEELING DOWN, DEPRESSED OR HOPELESS: 0
SUM OF ALL RESPONSES TO PHQ9 QUESTIONS 1 & 2: 0
SUM OF ALL RESPONSES TO PHQ QUESTIONS 1-9: 0
SUM OF ALL RESPONSES TO PHQ QUESTIONS 1-9: 0
1. LITTLE INTEREST OR PLEASURE IN DOING THINGS: 0

## 2024-01-25 NOTE — PROGRESS NOTES
\"Have you been to the ER, urgent care clinic since your last visit?  Hospitalized since your last visit?\"    NO    “Have you seen or consulted any other health care providers outside of [facility] since your last visit?”    NO         
Comment:     (NOTE)  HbA1C Interpretive Ranges  <5.7              Normal  5.7 - 6.4         Consider Prediabetes  >6.5              Consider Diabetes             ASSESSMENT/PLAN  Diagnoses and all orders for this visit:  Essential hypertension  controlled  Cont   norvasc,hydralazine  On bumex for leg edema  Avoid bb with h/o 1st deg av block- cardiology following    Controlled type 2 diabetes mellitus with stage 3 chronic kidney disease, without long-term current use of insulin (HCC)   diet controlled  a1c 5.4 (however with anemia)  Cbc/cmp/a1c prior to next visit  monitoring    Leg edema  Advised compression stockings   Low salt diet,      Mixed hyperlipidemia  On pravachol    Anemia of chronic disease  Hgb 9's  W/ hx CKD, GI bleed and anemia of chronic disease,  No new symptoms  Says has procedure with GI deb, will request records from dr. Sims  Unsure if she has seen hematology, will refer again  Check cbc/iron profile/ferritin  Has appt with nephro K cardona 2/20/2024- epo?  Debora ROCHA assisting (paul benton  # [phone], email [email or web address])  Complex case manager saul following    Heart murmur  Aortic regurg  Asymptomatic  Monitoring, following cards appt w/ dr. anders    Diastolic chf, chronic  Appears compensated  On bumex/kcl  Intolerant to BB  Keep appt cards 1/26/2024    CKD 4  Avoid nsaids, conversation regarding HD with worsening proteinuria, pt wishes to cont conservative management  monitoring  Following dr. sin cardona, appt 2/20/2024    Acute left distal DVT (HCC)  8/2023, Off anticoag due to GI bleed      BRING MED BOTTLES EVERY VISIT, has met with pharm D who assisted in labeling and organizing.     Follow-up Disposition:  Ff-up in 3  months  Patient understands plan of care. Patient has provided input and agrees with goals.

## 2024-01-30 RX ORDER — PANTOPRAZOLE SODIUM 40 MG/1
TABLET, DELAYED RELEASE ORAL
Qty: 180 TABLET | Refills: 0 | OUTPATIENT
Start: 2024-01-30

## 2024-02-05 ENCOUNTER — CARE COORDINATION (OUTPATIENT)
Facility: CLINIC | Age: 89
End: 2024-02-05

## 2024-02-05 NOTE — CARE COORDINATION
Ambulatory Care Coordination Note  2024    Patient Current Location:  Home: [address] [place] [id number]     ACM contacted the patient and family by telephone. Verified name and  with patient and family as identifiers. Provided introduction to self, and explanation of the ACM role.     Challenges to be reviewed by the provider   Additional needs identified to be addressed with provider: No  none               Method of communication with provider: none.    ACM: Adolfo Jackson RN    1. Hx of DM2, CKD III, neuropathy, hyperuricemia, htn, LLE, gout, anemia, aortic regurg, dCHF, HLD  2. Pt States doing well.   3. Noted patient w/ missed Card appt. States office was having power difficulties and they will call her to reschedule.  4. Will refer to MSW seeking aid in home and resources assistance.  5. Verified patient had visit w/ Heme provider ([facility]) on 24 and is scheduled for f/u on 24.  6. No other questions/issues at this time.    Offered patient enrollment in the Remote Patient Monitoring (RPM) program for in-home monitoring: NA.    Lab Results       None            Care Coordination Interventions    Referral from Primary Care Provider: Yes  Suggested Interventions and Community Resources          Goals Addressed                   This Visit's Progress     Conditions and Symptoms   On track     I will schedule office visits, as directed by my provider.  I will keep my appointment or reschedule if I have to cancel.  I will notify my provider of any barriers to my plan of care.  I will notify my provider of any symptoms that indicate a worsening of my condition.    Barriers: none  Plan for overcoming my barriers: N/A  Confidence: 10/10  Anticipated Goal Completion Date: 24         Medication Management   On track     I will take my medication as directed.  I will notify my provider of any problems with medications, like adverse effects or side effects.  I will notify my

## 2024-02-07 ENCOUNTER — CARE COORDINATION (OUTPATIENT)
Facility: CLINIC | Age: 89
End: 2024-02-07

## 2024-02-07 NOTE — CARE COORDINATION
PREETI DOWNEY spoke with patient and spouse who denied the need for an aide in their own. Patient and spouse report they are doing well in home, have meals can prepare meals for self and spouse. Patient completes her ADL/IADL with spouse assistance at times. Has DME of a cane, walker, shower chair.  No concerns with utilities, food, safety in home. Patient and spouse both identified Dalia reno as a support who visits the home weekly to assist with housekeeping. Patient and spouse both denied the need for a home aide and  assistance.     PLAN:  Contact niece for additional support.   Contact referring ACM to inquire on additional information for referral. (Completed)     OLGA Mistry  Care Management

## 2024-02-12 ENCOUNTER — APPOINTMENT (OUTPATIENT)
Facility: HOSPITAL | Age: 89
End: 2024-02-12
Attending: STUDENT IN AN ORGANIZED HEALTH CARE EDUCATION/TRAINING PROGRAM
Payer: MEDICARE

## 2024-02-12 ENCOUNTER — APPOINTMENT (OUTPATIENT)
Facility: HOSPITAL | Age: 89
End: 2024-02-12
Payer: MEDICARE

## 2024-02-12 ENCOUNTER — HOSPITAL ENCOUNTER (EMERGENCY)
Facility: HOSPITAL | Age: 89
Discharge: HOME | End: 2024-02-12
Attending: STUDENT IN AN ORGANIZED HEALTH CARE EDUCATION/TRAINING PROGRAM
Payer: MEDICARE

## 2024-02-12 VITALS
BODY MASS INDEX: 27.42 KG/M2 | HEART RATE: 81 BPM | HEIGHT: 59 IN | RESPIRATION RATE: 18 BRPM | DIASTOLIC BLOOD PRESSURE: 73 MMHG | TEMPERATURE: 98.2 F | SYSTOLIC BLOOD PRESSURE: 161 MMHG | WEIGHT: 136 LBS | OXYGEN SATURATION: 96 %

## 2024-02-12 DIAGNOSIS — R60.0 LOCALIZED EDEMA: Primary | ICD-10-CM

## 2024-02-12 LAB
ALBUMIN SERPL-MCNC: 3.5 G/DL (ref 3.4–5)
ALBUMIN/GLOB SERPL: 0.8 (ref 0.8–1.7)
ALP SERPL-CCNC: 47 U/L (ref 45–117)
ALT SERPL-CCNC: 10 U/L (ref 13–56)
ANION GAP SERPL CALC-SCNC: 6 MMOL/L (ref 3–18)
AST SERPL-CCNC: 17 U/L (ref 10–38)
BASOPHILS # BLD: 0 K/UL (ref 0–0.1)
BASOPHILS NFR BLD: 0 % (ref 0–2)
BILIRUB SERPL-MCNC: 0.7 MG/DL (ref 0.2–1)
BUN SERPL-MCNC: 68 MG/DL (ref 7–18)
BUN/CREAT SERPL: 24 (ref 12–20)
CALCIUM SERPL-MCNC: 9.3 MG/DL (ref 8.5–10.1)
CHLORIDE SERPL-SCNC: 110 MMOL/L (ref 100–111)
CO2 SERPL-SCNC: 23 MMOL/L (ref 21–32)
CREAT SERPL-MCNC: 2.83 MG/DL (ref 0.6–1.3)
D DIMER PPP FEU-MCNC: 2.44 UG/ML(FEU)
DIFFERENTIAL METHOD BLD: ABNORMAL
ECHO BSA: 1.6 M2
EOSINOPHIL # BLD: 0 K/UL (ref 0–0.4)
EOSINOPHIL NFR BLD: 0 % (ref 0–5)
ERYTHROCYTE [DISTWIDTH] IN BLOOD BY AUTOMATED COUNT: 16.6 % (ref 11.6–14.5)
GLOBULIN SER CALC-MCNC: 4.6 G/DL (ref 2–4)
GLUCOSE SERPL-MCNC: 142 MG/DL (ref 74–99)
HCT VFR BLD AUTO: 28.3 % (ref 35–45)
HGB BLD-MCNC: 8.8 G/DL (ref 12–16)
IMM GRANULOCYTES # BLD AUTO: 0 K/UL (ref 0–0.04)
IMM GRANULOCYTES NFR BLD AUTO: 0 % (ref 0–0.5)
LYMPHOCYTES # BLD: 1.3 K/UL (ref 0.9–3.6)
LYMPHOCYTES NFR BLD: 15 % (ref 21–52)
MAGNESIUM SERPL-MCNC: 2.1 MG/DL (ref 1.6–2.6)
MCH RBC QN AUTO: 26.7 PG (ref 24–34)
MCHC RBC AUTO-ENTMCNC: 31.1 G/DL (ref 31–37)
MCV RBC AUTO: 86 FL (ref 78–100)
MONOCYTES # BLD: 0.5 K/UL (ref 0.05–1.2)
MONOCYTES NFR BLD: 7 % (ref 3–10)
NEUTS SEG # BLD: 6.4 K/UL (ref 1.8–8)
NEUTS SEG NFR BLD: 78 % (ref 40–73)
NRBC # BLD: 0 K/UL (ref 0–0.01)
NRBC BLD-RTO: 0 PER 100 WBC
NT PRO BNP: 2828 PG/ML (ref 0–1800)
PLATELET # BLD AUTO: 259 K/UL (ref 135–420)
PMV BLD AUTO: 10.2 FL (ref 9.2–11.8)
POTASSIUM SERPL-SCNC: 4 MMOL/L (ref 3.5–5.5)
PROT SERPL-MCNC: 8.1 G/DL (ref 6.4–8.2)
RBC # BLD AUTO: 3.29 M/UL (ref 4.2–5.3)
SODIUM SERPL-SCNC: 139 MMOL/L (ref 136–145)
URATE SERPL-MCNC: 9.1 MG/DL (ref 2.6–7.2)
WBC # BLD AUTO: 8.3 K/UL (ref 4.6–13.2)

## 2024-02-12 PROCEDURE — 73130 X-RAY EXAM OF HAND: CPT

## 2024-02-12 PROCEDURE — 83880 ASSAY OF NATRIURETIC PEPTIDE: CPT

## 2024-02-12 PROCEDURE — 93971 EXTREMITY STUDY: CPT

## 2024-02-12 PROCEDURE — 83735 ASSAY OF MAGNESIUM: CPT

## 2024-02-12 PROCEDURE — 85025 COMPLETE CBC W/AUTO DIFF WBC: CPT

## 2024-02-12 PROCEDURE — 80053 COMPREHEN METABOLIC PANEL: CPT

## 2024-02-12 PROCEDURE — 6370000000 HC RX 637 (ALT 250 FOR IP): Performed by: STUDENT IN AN ORGANIZED HEALTH CARE EDUCATION/TRAINING PROGRAM

## 2024-02-12 PROCEDURE — 94761 N-INVAS EAR/PLS OXIMETRY MLT: CPT

## 2024-02-12 PROCEDURE — 96374 THER/PROPH/DIAG INJ IV PUSH: CPT

## 2024-02-12 PROCEDURE — 99284 EMERGENCY DEPT VISIT MOD MDM: CPT

## 2024-02-12 PROCEDURE — 6360000002 HC RX W HCPCS: Performed by: STUDENT IN AN ORGANIZED HEALTH CARE EDUCATION/TRAINING PROGRAM

## 2024-02-12 PROCEDURE — 84550 ASSAY OF BLOOD/URIC ACID: CPT

## 2024-02-12 PROCEDURE — 85379 FIBRIN DEGRADATION QUANT: CPT

## 2024-02-12 PROCEDURE — 73100 X-RAY EXAM OF WRIST: CPT

## 2024-02-12 PROCEDURE — 93971 EXTREMITY STUDY: CPT | Performed by: INTERNAL MEDICINE

## 2024-02-12 RX ORDER — LIDOCAINE 4 G/G
1 PATCH TOPICAL
Status: DISCONTINUED | OUTPATIENT
Start: 2024-02-12 | End: 2024-02-12 | Stop reason: HOSPADM

## 2024-02-12 RX ORDER — KETOROLAC TROMETHAMINE 15 MG/ML
15 INJECTION, SOLUTION INTRAMUSCULAR; INTRAVENOUS ONCE
Status: COMPLETED | OUTPATIENT
Start: 2024-02-12 | End: 2024-02-12

## 2024-02-12 RX ORDER — ACETAMINOPHEN 500 MG
1000 TABLET ORAL
Status: COMPLETED | OUTPATIENT
Start: 2024-02-12 | End: 2024-02-12

## 2024-02-12 RX ADMIN — ACETAMINOPHEN 1000 MG: 500 TABLET ORAL at 16:08

## 2024-02-12 RX ADMIN — KETOROLAC TROMETHAMINE 15 MG: 15 INJECTION, SOLUTION INTRAMUSCULAR; INTRAVENOUS at 13:01

## 2024-02-12 NOTE — ED NOTES
Pt to ED today w/ co of R arm and hand swollen X1 week. States swelling has gotten progressively worse over the past week and is painful to touch and has limited mobility due to pain in entire arm, nupur wrist/fingers. States she has not fallen or injured her hand/arm. Hand is visibly very swollen.

## 2024-02-12 NOTE — DISCHARGE INSTRUCTIONS
You have to keep your hand elevated above the level of your heart to help the fluid drain out.  Use ice on for 10 minutes off for 30 minutes as often as possible to help with the swelling.  Use Ace wrap during the day to help to decrease swelling.

## 2024-02-12 NOTE — ED PROVIDER NOTES
[facility] EMERGENCY DEPT  EMERGENCY DEPARTMENT ENCOUNTER      Pt Name: Estela Garcia  MRN: [id number]  Birthdate 8/27/1933  Date of evaluation: 2/12/2024  Provider: Sandra Canela MD    CHIEF COMPLAINT       Chief Complaint   Patient presents with    Hand Pain         HISTORY OF PRESENT ILLNESS   (Location/Symptom, Timing/Onset, Context/Setting, Quality, Duration, Modifying Factors, Severity)  Note limiting factors.   Estela Garcia is a 89 y.o. female who presents to the emergency department for pain and swelling in her right hand.  States been going on for about a week but is continued to get worse.  She states that she has been using Tylenol and a topical cream with no SNF improvement in her symptoms.  States that initially started with pain in her hand but she feels pain in her whole arm.  Denies any fever, sweats or chills.  Denies any trauma or injury.  States she has a history of gout and was told she had gout in the past but has never had it in her hand.     Nursing Notes were reviewed.    REVIEW OF SYSTEMS    (2-9 systems for level 4, 10 or more for level 5)     Constitutional: [no fever]  Respiratory: [no SOB]  Cardio: [no chest pain]  MSK: [no back pain]  Skin: [no rashes]  Neuro: [no headache]    Except as noted above the remainder of the review of systems was reviewed and negative.       PAST MEDICAL HISTORY     Past Medical History:   Diagnosis Date    Anemia     Carotid bruit 12/07/2013    Carotid Duplex: 1. Bilateral 1-49% stenosis of the internal carotid arteries. 2. No significant stenosis in the external carotid arteries bilaterally. 3. Antegrade flow in both vetebral arteries. 4. Normal flow in both subclavian arteries. Plaque Morphology: 1. Hyperechoic plaque in the bulb and right ICA. 2. Hyperechoic plaque in the bulb and left ICA.     Chronic diastolic heart failure (HCC) 9/1/2019    CKD (chronic kidney disease) stage 3, GFR 30-59 ml/min ([facility])     Diabetes ([facility])     NIDDM    Diet-controlled type 2

## 2024-02-14 ENCOUNTER — CARE COORDINATION (OUTPATIENT)
Facility: CLINIC | Age: 89
End: 2024-02-14

## 2024-02-14 NOTE — CARE COORDINATION
SW spoke with Mrs. Garcia. 2 identifiers confirmed. Mrs. Garcia stated  took he rto ED because of hand swelling and arthritis. States she is feeling better today and has an appointment on Friday with orthopedic doctor. Mrs. Garcia still declines PREETI services but gave consent for PREETI to speak with niece about possible aide in home.     Selena Weston, MSW  Care Management

## 2024-02-14 NOTE — CARE COORDINATION
Attempted to reach/POA to discuss referral with patient. SW left message and attempt at later date.    Selena Weston MSW  Care Management

## 2024-02-15 NOTE — PROGRESS NOTES
Patient: Estela Garcia                MRN: [id number]       SSN: [id number]  YOB: 1933        AGE: 89 y.o.        SEX: female      PCP: Dunia Malik MD  02/16/24    Chief Complaint   Patient presents with    Elbow Pain    Hand Pain     right     HISTORY:  Estela Garcia is a 89 y.o. female who is seen for 2 week history of right elbow and hand pain and swelling. She was seen at [facility] ED 2/12/24 for severe hand swelling and pain where a venous vascular study was negative for DVT.  Xrays of the right wrist and hand revealed moderate osteoarthritis. Uric acid value at ED visit was 9.1. She is currently taking Allopurinol for hyperuricemia.     She was previously seen by VINCENT Hansen for right hip pain.     She was previously seen by Dr. Prado for bilateral lower extremity numbness.     Occupation, etc: Ms. Garcia is retired. 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 She lives with her  in [place]. She has nieces that take care of her. She does not have any children or pets.   Wt Readings from Last 3 Encounters:   02/16/24 61.7 kg (136 lb)   02/12/24 61.7 kg (136 lb)   01/24/24 63 kg (139 lb)      Body mass index is 27.47 kg/m².    Patient Active Problem List   Diagnosis    Advance care planning    CKD (chronic kidney disease) stage 4, GFR 15-29 ml/min ([facility])    Nausea    Diabetes mellitus type 2, diet-controlled (HCC)    Gout    Chronic gastritis without bleeding    Heart murmur    Anemia of chronic disease    Mixed hyperlipidemia    Elevated troponin    Type 2 diabetes mellitus with chronic kidney disease (HCC)    Diastolic CHF, chronic ([facility])    Advanced directives, counseling/discussion    Sinus bradycardia    Essential hypertension    Type 2 diabetes mellitus with diabetic neuropathy ([facility])    Leg edema    Frail elderly    Primary osteoarthritis involving multiple joints    Anaphylactic reaction due to unspecified food, initial

## 2024-02-16 ENCOUNTER — OFFICE VISIT (OUTPATIENT)
Age: 89
End: 2024-02-16
Payer: MEDICARE

## 2024-02-16 ENCOUNTER — TELEPHONE (OUTPATIENT)
Age: 89
End: 2024-02-16

## 2024-02-16 VITALS — TEMPERATURE: 97.5 F | BODY MASS INDEX: 27.42 KG/M2 | WEIGHT: 136 LBS | HEIGHT: 59 IN

## 2024-02-16 DIAGNOSIS — M19.031 PRIMARY OSTEOARTHRITIS OF RIGHT WRIST: Primary | ICD-10-CM

## 2024-02-16 DIAGNOSIS — M10.9 GOUTY ARTHRITIS: ICD-10-CM

## 2024-02-16 DIAGNOSIS — M19.041 OSTEOARTHRITIS OF RIGHT HAND, UNSPECIFIED OSTEOARTHRITIS TYPE: ICD-10-CM

## 2024-02-16 DIAGNOSIS — M10.9 ACUTE GOUT OF RIGHT ELBOW, UNSPECIFIED CAUSE: ICD-10-CM

## 2024-02-16 DIAGNOSIS — M10.9 ACUTE GOUT OF RIGHT HAND, UNSPECIFIED CAUSE: ICD-10-CM

## 2024-02-16 PROCEDURE — 1036F TOBACCO NON-USER: CPT | Performed by: SPECIALIST

## 2024-02-16 PROCEDURE — G8427 DOCREV CUR MEDS BY ELIG CLIN: HCPCS | Performed by: SPECIALIST

## 2024-02-16 PROCEDURE — 1090F PRES/ABSN URINE INCON ASSESS: CPT | Performed by: SPECIALIST

## 2024-02-16 PROCEDURE — G8417 CALC BMI ABV UP PARAM F/U: HCPCS | Performed by: SPECIALIST

## 2024-02-16 PROCEDURE — 99214 OFFICE O/P EST MOD 30 MIN: CPT | Performed by: SPECIALIST

## 2024-02-16 PROCEDURE — G8484 FLU IMMUNIZE NO ADMIN: HCPCS | Performed by: SPECIALIST

## 2024-02-16 PROCEDURE — 1123F ACP DISCUSS/DSCN MKR DOCD: CPT | Performed by: SPECIALIST

## 2024-02-16 RX ORDER — INDOMETHACIN 50 MG/1
50 CAPSULE ORAL 2 TIMES DAILY WITH MEALS
Qty: 6 CAPSULE | Refills: 0 | Status: SHIPPED | OUTPATIENT
Start: 2024-02-16 | End: 2024-02-19

## 2024-02-16 RX ORDER — POTASSIUM CHLORIDE 1500 MG/1
TABLET, EXTENDED RELEASE ORAL
COMMUNITY
Start: 2024-02-08

## 2024-02-16 NOTE — TELEPHONE ENCOUNTER
Pt states that she went to Orthopedic today and they put her on   indomethacin (INDOCIN) 50 MG capsule  for 3 days and then to call her PCP so that she can order something else if need. Pt states that her hand is real swollen and she can barely move it. Please advise.

## 2024-02-19 ENCOUNTER — TRANSCRIBE ORDERS (OUTPATIENT)
Facility: HOSPITAL | Age: 89
End: 2024-02-19

## 2024-02-19 ENCOUNTER — CARE COORDINATION (OUTPATIENT)
Facility: CLINIC | Age: 89
End: 2024-02-19

## 2024-02-19 ENCOUNTER — HOSPITAL ENCOUNTER (OUTPATIENT)
Facility: HOSPITAL | Age: 89
Discharge: HOME | End: 2024-02-22
Payer: MEDICARE

## 2024-02-19 DIAGNOSIS — M10.9 GOUTY ARTHRITIS: Primary | ICD-10-CM

## 2024-02-19 DIAGNOSIS — E87.6 HYPOKALEMIA: ICD-10-CM

## 2024-02-19 DIAGNOSIS — R80.1 PERSISTENT PROTEINURIA: ICD-10-CM

## 2024-02-19 DIAGNOSIS — N25.81 SECONDARY HYPERPARATHYROIDISM OF RENAL ORIGIN (HCC): ICD-10-CM

## 2024-02-19 DIAGNOSIS — N18.4 CHRONIC KIDNEY DISEASE, STAGE IV (SEVERE) (HCC): ICD-10-CM

## 2024-02-19 DIAGNOSIS — I12.9 CHRONIC KIDNEY DISEASE DUE TO BENIGN HYPERTENSION: ICD-10-CM

## 2024-02-19 DIAGNOSIS — N18.4 CHRONIC KIDNEY DISEASE, STAGE IV (SEVERE) (HCC): Primary | ICD-10-CM

## 2024-02-19 LAB
ALBUMIN SERPL-MCNC: 3.3 G/DL (ref 3.4–5)
ANION GAP SERPL CALC-SCNC: 6 MMOL/L (ref 3–18)
BUN SERPL-MCNC: 63 MG/DL (ref 7–18)
BUN/CREAT SERPL: 22 (ref 12–20)
CALCIUM SERPL-MCNC: 9.3 MG/DL (ref 8.5–10.1)
CALCIUM SERPL-MCNC: 9.7 MG/DL (ref 8.5–10.1)
CHLORIDE SERPL-SCNC: 109 MMOL/L (ref 100–111)
CO2 SERPL-SCNC: 24 MMOL/L (ref 21–32)
CREAT SERPL-MCNC: 2.85 MG/DL (ref 0.6–1.3)
CREAT UR-MCNC: 34 MG/DL (ref 30–125)
ERYTHROCYTE [DISTWIDTH] IN BLOOD BY AUTOMATED COUNT: 16 % (ref 11.6–14.5)
FERRITIN SERPL-MCNC: 181 NG/ML (ref 8–388)
GLUCOSE SERPL-MCNC: 106 MG/DL (ref 74–99)
HCT VFR BLD AUTO: 25.8 % (ref 35–45)
HGB BLD-MCNC: 8.2 G/DL (ref 12–16)
IRON SATN MFR SERPL: 7 % (ref 20–50)
IRON SERPL-MCNC: 17 UG/DL (ref 50–175)
MCH RBC QN AUTO: 27.1 PG (ref 24–34)
MCHC RBC AUTO-ENTMCNC: 31.8 G/DL (ref 31–37)
MCV RBC AUTO: 85.1 FL (ref 78–100)
MICROALBUMIN UR-MCNC: 26.8 MG/DL (ref 0–3)
MICROALBUMIN/CREAT UR-RTO: 788 MG/G (ref 0–30)
NRBC # BLD: 0 K/UL (ref 0–0.01)
NRBC BLD-RTO: 0 PER 100 WBC
PHOSPHATE SERPL-MCNC: 3.1 MG/DL (ref 2.5–4.9)
PLATELET # BLD AUTO: 366 K/UL (ref 135–420)
PMV BLD AUTO: 9.6 FL (ref 9.2–11.8)
POTASSIUM SERPL-SCNC: 4.6 MMOL/L (ref 3.5–5.5)
PTH-INTACT SERPL-MCNC: 55.7 PG/ML (ref 18.4–88)
RBC # BLD AUTO: 3.03 M/UL (ref 4.2–5.3)
SODIUM SERPL-SCNC: 139 MMOL/L (ref 136–145)
TIBC SERPL-MCNC: 233 UG/DL (ref 250–450)
WBC # BLD AUTO: 7.4 K/UL (ref 4.6–13.2)

## 2024-02-19 PROCEDURE — 82570 ASSAY OF URINE CREATININE: CPT

## 2024-02-19 PROCEDURE — 83970 ASSAY OF PARATHORMONE: CPT

## 2024-02-19 PROCEDURE — 36415 COLL VENOUS BLD VENIPUNCTURE: CPT

## 2024-02-19 PROCEDURE — 82043 UR ALBUMIN QUANTITATIVE: CPT

## 2024-02-19 PROCEDURE — 80069 RENAL FUNCTION PANEL: CPT

## 2024-02-19 PROCEDURE — 83550 IRON BINDING TEST: CPT

## 2024-02-19 PROCEDURE — 85027 COMPLETE CBC AUTOMATED: CPT

## 2024-02-19 PROCEDURE — 83540 ASSAY OF IRON: CPT

## 2024-02-19 PROCEDURE — 82728 ASSAY OF FERRITIN: CPT

## 2024-02-19 RX ORDER — METHYLPREDNISOLONE 4 MG/1
TABLET ORAL
Qty: 1 KIT | Refills: 0 | Status: SHIPPED | OUTPATIENT
Start: 2024-02-19 | End: 2024-02-25

## 2024-02-19 RX ORDER — LIDOCAINE 50 MG/G
1 PATCH TOPICAL DAILY
Qty: 30 PATCH | Refills: 0 | Status: SHIPPED | OUTPATIENT
Start: 2024-02-19 | End: 2024-03-20

## 2024-02-19 NOTE — TELEPHONE ENCOUNTER
Spoke with patient and she is aware that Dr. Malik has sent in a prescription for a medrol dose chrissie and lidoderm patches. Patient has been scheduled with Dr. Malik on 02/21/2024 at 1:15 pm. Patient verbalizes understanding.

## 2024-02-19 NOTE — CARE COORDINATION
Ambulatory Care Coordination Note  2024    Patient Current Location:  Home: [address] [place] [id number]     ACM contacted the patient and family by telephone. Verified name and  with patient and family as identifiers. Provided introduction to self, and explanation of the ACM role.     Challenges to be reviewed by the provider   Additional needs identified to be addressed with provider: No  none               Method of communication with provider: none.    ACM: Adolfo Jackson RN    1. Hx of DM2, CKD III, neuropathy, hyperuricemia, htn, LLE, gout, anemia, aortic regurg, dCHF, HLD  2. Pt States doing well.   3. Assisted w/ scheduling f/u w/ GI provider, Dr. ISABELL Sims of [facility] on 24 at 1:20.   4. States doing well.  5. No other questions/issues at this time.  6. Needs card reschedule. Will address on next outreach.    Offered patient enrollment in the Remote Patient Monitoring (RPM) program for in-home monitoring: NA.    Lab Results       None            Care Coordination Interventions    Referral from Primary Care Provider: Yes  Suggested Interventions and Community Resources          Goals Addressed                   This Visit's Progress     Conditions and Symptoms   On track     I will schedule office visits, as directed by my provider.  I will keep my appointment or reschedule if I have to cancel.  I will notify my provider of any barriers to my plan of care.  I will notify my provider of any symptoms that indicate a worsening of my condition.    Barriers: none  Plan for overcoming my barriers: N/A  Confidence: 10/10  Anticipated Goal Completion Date: 24         Medication Management   On track     I will take my medication as directed.  I will notify my provider of any problems with medications, like adverse effects or side effects.  I will notify my provider/Care Coordinator if I am unable to afford my medications.  I will notify my provider for advice before

## 2024-02-20 ENCOUNTER — CARE COORDINATION (OUTPATIENT)
Facility: CLINIC | Age: 89
End: 2024-02-20

## 2024-02-20 NOTE — CARE COORDINATION
Attempted to contact patient niece, Dalia Vu, for Complex Care Management. No answer, left message and provided contact information. Will attempt contact at a later time.    OLGA Mistry  Care Management

## 2024-02-21 ENCOUNTER — OFFICE VISIT (OUTPATIENT)
Age: 89
End: 2024-02-21
Payer: MEDICARE

## 2024-02-21 VITALS
WEIGHT: 133 LBS | HEART RATE: 76 BPM | OXYGEN SATURATION: 97 % | TEMPERATURE: 97.9 F | BODY MASS INDEX: 26.81 KG/M2 | SYSTOLIC BLOOD PRESSURE: 152 MMHG | DIASTOLIC BLOOD PRESSURE: 76 MMHG | HEIGHT: 59 IN | RESPIRATION RATE: 16 BRPM

## 2024-02-21 DIAGNOSIS — I10 ESSENTIAL HYPERTENSION: ICD-10-CM

## 2024-02-21 DIAGNOSIS — I50.32 DIASTOLIC CHF, CHRONIC (HCC): ICD-10-CM

## 2024-02-21 DIAGNOSIS — M79.641 RIGHT HAND PAIN: Primary | ICD-10-CM

## 2024-02-21 DIAGNOSIS — N18.4 CKD (CHRONIC KIDNEY DISEASE) STAGE 4, GFR 15-29 ML/MIN (HCC): ICD-10-CM

## 2024-02-21 DIAGNOSIS — R60.0 LEG EDEMA: ICD-10-CM

## 2024-02-21 DIAGNOSIS — M10.9 GOUT, UNSPECIFIED CAUSE, UNSPECIFIED CHRONICITY, UNSPECIFIED SITE: ICD-10-CM

## 2024-02-21 DIAGNOSIS — I50.32 CHRONIC DIASTOLIC HEART FAILURE (HCC): ICD-10-CM

## 2024-02-21 DIAGNOSIS — E11.9 DIET-CONTROLLED TYPE 2 DIABETES MELLITUS (HCC): ICD-10-CM

## 2024-02-21 PROCEDURE — G8484 FLU IMMUNIZE NO ADMIN: HCPCS | Performed by: FAMILY MEDICINE

## 2024-02-21 PROCEDURE — 3044F HG A1C LEVEL LT 7.0%: CPT | Performed by: FAMILY MEDICINE

## 2024-02-21 PROCEDURE — 1090F PRES/ABSN URINE INCON ASSESS: CPT | Performed by: FAMILY MEDICINE

## 2024-02-21 PROCEDURE — 1123F ACP DISCUSS/DSCN MKR DOCD: CPT | Performed by: FAMILY MEDICINE

## 2024-02-21 PROCEDURE — 1036F TOBACCO NON-USER: CPT | Performed by: FAMILY MEDICINE

## 2024-02-21 PROCEDURE — G8427 DOCREV CUR MEDS BY ELIG CLIN: HCPCS | Performed by: FAMILY MEDICINE

## 2024-02-21 PROCEDURE — 99214 OFFICE O/P EST MOD 30 MIN: CPT | Performed by: FAMILY MEDICINE

## 2024-02-21 PROCEDURE — G8417 CALC BMI ABV UP PARAM F/U: HCPCS | Performed by: FAMILY MEDICINE

## 2024-02-21 SDOH — ECONOMIC STABILITY: FOOD INSECURITY: WITHIN THE PAST 12 MONTHS, THE FOOD YOU BOUGHT JUST DIDN'T LAST AND YOU DIDN'T HAVE MONEY TO GET MORE.: NEVER TRUE

## 2024-02-21 SDOH — ECONOMIC STABILITY: INCOME INSECURITY: HOW HARD IS IT FOR YOU TO PAY FOR THE VERY BASICS LIKE FOOD, HOUSING, MEDICAL CARE, AND HEATING?: NOT HARD AT ALL

## 2024-02-21 SDOH — ECONOMIC STABILITY: FOOD INSECURITY: WITHIN THE PAST 12 MONTHS, YOU WORRIED THAT YOUR FOOD WOULD RUN OUT BEFORE YOU GOT MONEY TO BUY MORE.: NEVER TRUE

## 2024-02-21 ASSESSMENT — PATIENT HEALTH QUESTIONNAIRE - PHQ9
SUM OF ALL RESPONSES TO PHQ QUESTIONS 1-9: 0
1. LITTLE INTEREST OR PLEASURE IN DOING THINGS: 0
SUM OF ALL RESPONSES TO PHQ9 QUESTIONS 1 & 2: 0
SUM OF ALL RESPONSES TO PHQ QUESTIONS 1-9: 0
2. FEELING DOWN, DEPRESSED OR HOPELESS: 0

## 2024-02-21 ASSESSMENT — ANXIETY QUESTIONNAIRES
GAD7 TOTAL SCORE: 0
5. BEING SO RESTLESS THAT IT IS HARD TO SIT STILL: 0
IF YOU CHECKED OFF ANY PROBLEMS ON THIS QUESTIONNAIRE, HOW DIFFICULT HAVE THESE PROBLEMS MADE IT FOR YOU TO DO YOUR WORK, TAKE CARE OF THINGS AT HOME, OR GET ALONG WITH OTHER PEOPLE: NOT DIFFICULT AT ALL
2. NOT BEING ABLE TO STOP OR CONTROL WORRYING: 0
4. TROUBLE RELAXING: 0
7. FEELING AFRAID AS IF SOMETHING AWFUL MIGHT HAPPEN: 0
1. FEELING NERVOUS, ANXIOUS, OR ON EDGE: 0
3. WORRYING TOO MUCH ABOUT DIFFERENT THINGS: 0
6. BECOMING EASILY ANNOYED OR IRRITABLE: 0

## 2024-02-22 ENCOUNTER — TELEPHONE (OUTPATIENT)
Age: 89
End: 2024-02-22

## 2024-02-22 NOTE — PROGRESS NOTES
\"Have you been to the ER, urgent care clinic since your last visit?  Hospitalized since your last visit?\"    YES - When: approximately 9 days ago.  Where and Why: [facility] ED for hand pain and edema.    “Have you seen or consulted any other health care providers outside of [facility] since your last visit?”    YES - When: approximately 2 days ago.  [facility]         
        Imaging results last 24 hrs :No results found.    Imaging results impression onlyNo results found.   No orders to display       A1c:   Hemoglobin A1C   Date Value Ref Range Status   01/23/2024 5.4 4.2 - 5.6 % Final     Comment:     (NOTE)  HbA1C Interpretive Ranges  <5.7              Normal  5.7 - 6.4         Consider Prediabetes  >6.5              Consider Diabetes     10/23/2023 5.9 (H) 4.2 - 5.6 % Final     Comment:     (NOTE)  HbA1C Interpretive Ranges  <5.7              Normal  5.7 - 6.4         Consider Prediabetes  >6.5              Consider Diabetes     05/16/2023 6.5 (H) 4.2 - 5.6 % Final     Comment:     (NOTE)  HbA1C Interpretive Ranges  <5.7              Normal  5.7 - 6.4         Consider Prediabetes  >6.5              Consider Diabetes             ASSESSMENT/PLAN  Diagnoses and all orders for this visit:  Right hand pain  OA +/- gouty arthritis  Start voltaren gel, lidoderm patches OTC  To start OT, recommended by ortho  Currently on allopurinol  Will not cont nsaids/medrol dose pack  due to potential side effects w/ CKD and hx GI bleed    Essential hypertension  controlled  Cont   norvasc,hydralazine  On bumex for leg edema  Avoid bb with h/o 1st deg av block- cardiology following    Controlled type 2 diabetes mellitus with stage 3 chronic kidney disease, without long-term current use of insulin (HCC)   diet controlled  a1c 5.4 (however with anemia)  Cbc/cmp/a1c prior to next visit  monitoring    Leg edema  Advised compression stockings   Low salt diet,      Mixed hyperlipidemia  On pravachol    Anemia of chronic disease  Hgb now 8's  W/ hx CKD, GI bleed and anemia of chronic disease  Following VOA and plan for some type on infusion (?procrit vs iron or both) will request records  Following nephrology CAROL ROCHA assisting (paul benton  # [phone], email [email or web address])  Complex case manager saul following, we discussed options/resources for her and elderly

## 2024-02-22 NOTE — TELEPHONE ENCOUNTER
Pt has Zeina Vallejo on her HIPAA from 2022 and it should not . Daughter Dalia Vu is not able to be reached. Pt would like Dr Malik to Zeina Nguyen instead since she is more attainable for pt. Please advise.

## 2024-02-23 ENCOUNTER — CARE COORDINATION (OUTPATIENT)
Facility: CLINIC | Age: 89
End: 2024-02-23

## 2024-02-23 NOTE — CARE COORDINATION
MSW received call from patient FABBY reno. MSW explained purpose of call and role of MSW. Ms. Vu is in agreement with  assistance.     Debora visits home weekly to bring prepared meals, cleaning & support patient/spouse as needed. Patient/spouse utilizes local food [employer] and utilizes neighbors for support.     Per Ms. Vu, patient/spouse are in need of assistance in home and have tried in past. Niece applied for Medicaid for patient/spouse however was denied but unsure reason why.   MSW explained Medicaid process, income restrictions & need for LTSS/UAI screening. MSW offered resource of [employer] for MOW & Homemaker program.  would like to speak with patient first before [employer] becoming involved.     MSW provided Ms. Vu with number and web site to apply for LTC Medicaid & she will apply on patient behalf.     Identified Needs:  PCA services in home.   Meals on Wheels  LTC Medicaid application assistance     Social Work Plan:  Apply for LTC Medicaid-(Number provided to niece to apply for LTC Medicaid)  Referral to [employer] for Meals on Wheels-referral made by MSW; services pending  Referral to [facility] for LTSS/UAI (Left message with agency)     Method of Communication With Provider (if appropriate): Chart Routing       Goals Addressed                   This Visit's Progress     Community Resource Goal        I will engage with MSW to complete LTC Medicaid application, LTSS/UAI screening & referral for [employer].    Barriers: financial, lack of support, and overwhelmed by complexity of regimen  Plan for overcoming my barriers: Engage with MSW to complete service goals.  Confidence: 8/10  Anticipated Goal Completion Date: 05/31/2024

## 2024-02-23 NOTE — CARE COORDINATION
MSW contacted patient. Patient reports doing well today but had not had breakfast and taken medicine for the day. MSW spoke with with patient and spouse about the importance of having assistance in home. Patient/spouse expressed they are willing to have LTSS/UAI screening completed for aide in home.     MSW explained aides in home is not to intrude but to provide support and ensure safety in home.     MSW contacted [facility] to request LTSS/UAI screening & assess for APS involvment.     MSW waiting call back from APS for LTSS/UAI information & assess for APS.       ACM, PCP updated on plan.     OLGA Mistry  Care Management

## 2024-02-26 ENCOUNTER — CARE COORDINATION (OUTPATIENT)
Facility: CLINIC | Age: 89
End: 2024-02-26

## 2024-02-27 ENCOUNTER — CARE COORDINATION (OUTPATIENT)
Facility: CLINIC | Age: 89
End: 2024-02-27

## 2024-02-27 NOTE — CARE COORDINATION
MSW received call from [facility] [place] [facility], to take referral information for LTSS/UAI screening.   DSS will contact patient/family to schedule day and time to schedule screening.    OLGA Mistry  Care Management

## 2024-02-29 ENCOUNTER — CARE COORDINATION (OUTPATIENT)
Facility: CLINIC | Age: 89
End: 2024-02-29

## 2024-02-29 NOTE — CARE COORDINATION
[facility] Care Transitions Initial Follow Up Call    Call within 2 business days of discharge: Yes    Patient Current Location:  [facility] Transition Nurse contacted the patient by telephone to perform post hospital discharge assessment. Verified name and  with patient as identifiers. Provided introduction to self, and explanation of the Care Transition Nurse role. Patient: Torrie West Patient : 1933   MRN: [id number]      Reason for Admission ( Per chart review):   DVT, Lower Extremity, distal, acute, left   Please see discharge summary / progress notes for additional verification, discharge diagnoses, or information as needed. Discharge Date: 23 RARS: Readmission Risk Score: 18.3      Last Discharge [address] [facility] [facility]       Date Complaint Diagnosis Description Type Department Provider    23 Knee Pain Acute pain of left knee ED to Hosp-Admission (Discharged) (ADMITTED) [id number] Christel Doll DO; Mishel Bourne. .. Was this an external facility discharge? No   [facility]charge [facility]     Challenges to be reviewed by the provider   Additional needs identified to be addressed with provider: No  none               Method of communication with provider: none. Patient reports:   - She is feeling pretty good   - Home health staff members have been out to visit with her. - Spouse is a support system for her. Care Transition Nurse reviewed discharge instructions, medical action plan, and red flags with patient who verbalized understanding. The patient was given an opportunity to ask questions and does not have any further questions or concerns at this time. Were discharge instructions available to patient? Yes. Reviewed appropriate site of care based on symptoms and resources available to patient including: PCP  After hours contact number-medical provider office phone number   [facility]  When to call [phone].  The patient agrees to contact the PCP office
Wheelchair/Stroller

## 2024-02-29 NOTE — CARE COORDINATION
MSW received voicemail from patient niece requesting call. MSW left message with contact information and hours available.     OLGA Mistry  Care Management

## 2024-03-01 ENCOUNTER — CARE COORDINATION (OUTPATIENT)
Facility: CLINIC | Age: 89
End: 2024-03-01

## 2024-03-01 NOTE — CARE COORDINATION
MSW spoke with rowdy Vu regarding Medicaid application and Meals on Wheels. MSW provided number to [facility] and emailed LTC Medicaid application with appendix D attached.     MSW spoke with Yemi at [facility] and made second referral for Meals on Wheels. Appointment scheduled for 3/7/24 between 10-10:15 with niece/patient/spouse.     Patient/spouse LTSS screening is waiting to be scheduled with [facility].     MSW will continue to follow for support to patient/spouse/family.       OLGA Mistry  Care Management

## 2024-03-04 ENCOUNTER — CARE COORDINATION (OUTPATIENT)
Facility: CLINIC | Age: 89
End: 2024-03-04

## 2024-03-06 ENCOUNTER — CARE COORDINATION (OUTPATIENT)
Facility: CLINIC | Age: 89
End: 2024-03-06

## 2024-03-06 NOTE — CARE COORDINATION
MSW received call from FABBY reno. MSW confirmed identifiers. Niece reports she is completing LT Medicaid application and submitting to [facility]. [facility] Department scheduled LTSS/UAI screening for 3/20 for both pt/spouse. Niece reports she has not heard from [employer]. MSW provided number again to contact for follow up and support in connecting this resource.       OLGA Mistry  Care Management

## 2024-03-11 ENCOUNTER — CARE COORDINATION (OUTPATIENT)
Facility: CLINIC | Age: 89
End: 2024-03-11

## 2024-03-11 NOTE — CARE COORDINATION
10/10  Anticipated Goal Completion Date: 1/4/24       Reduce Risk of Hospitalization   On track     I will take appropriate steps to reduce my risk of Hospitalization to include:  Take all of medications as prescribed  Visit w/ all of my recommended specialists.  Visit w/ my PCP as recommended.  Avoid activities that can trigger my chronic conditions.    Barriers: none  Plan for overcoming my barriers: N/A  Confidence: 10/10  Anticipated Goal Completion Date: 1/4/24                Future Appointments   Date Time Provider Department Center   4/23/2024 11:40 AM Kiko Belle MD [facility] BS AMB   6/21/2024 10:00 AM Dunia Malik MD [facility] BS AMB

## 2024-03-18 ENCOUNTER — CARE COORDINATION (OUTPATIENT)
Facility: CLINIC | Age: 89
End: 2024-03-18

## 2024-03-19 ENCOUNTER — CARE COORDINATION (OUTPATIENT)
Facility: CLINIC | Age: 89
End: 2024-03-19

## 2024-03-19 NOTE — CARE COORDINATION
MSW completed outreach call with patient. Confirmed name and date of birth.     Patient reports she is doing well and attending appointments. Spouse has been transporting her to and from appointments. Reports she did hear from [employer] Meals on Wheels however she was not eligible due to not being bed bound. MSW encouraged patient to be honest with LTSS screeners tomorrow about what assistance she needs (bathing supervision, meal preparation, laundry assistance,etc. ) Pt understands and is looking forward to being screened. Niece will also be present during assessment tomorrow. MSW will continue to follow and provide resources.     OLGA Mistry  Care Management

## 2024-03-21 ENCOUNTER — HOSPITAL ENCOUNTER (OUTPATIENT)
Facility: HOSPITAL | Age: 89
Setting detail: RECURRING SERIES
Discharge: HOME | End: 2024-03-24
Payer: MEDICARE

## 2024-03-21 PROCEDURE — 97166 OT EVAL MOD COMPLEX 45 MIN: CPT

## 2024-03-21 PROCEDURE — 97535 SELF CARE MNGMENT TRAINING: CPT

## 2024-03-21 NOTE — PROGRESS NOTES
[facility]  [address] [facility], [address] Ph:[phone] Fx: [phone]  Plan of Care / Statement of Necessity for Occupational Therapy Services     Patient Name: Estela Garcia : 1933   Medical   Diagnosis: Right hand pain [M79.641] Treatment Diagnosis: M25.521  RIGHT ELBOW PAIN, M79.631  Pain in right forearm, M25.531  RIGHT WRIST PAIN, and M79.641  Pain in right hand      Onset Date: \"A month ago\" Payor :  Payor: HUMANA MEDICARE / Plan: HUMANA GOLD PLUS HMO / Product Type: *No Product type* /    Referral Source: French Villa MD Start of Care (SOC): 3/21/2024   Prior Hospitalization: See medical history Provider #: [id number]   Prior Level of Function: 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 female who presents to evaluation for the right wrist/hand. Pt presented to the ED d/t severe swelling and pain. Patient reports no pain at evaluation and reports her chief c/o is the swelling and decreased mobility. Pt states she can't use her right hand at all at home. Patient reports they use ice intermittently with some relief. Pt does not use oral/topical medications.     Imagin24 RUE VENOUS VASC DUPLEX [facility] ED    No evidence of acute and chronic deep vein thrombosis in the right upper extremity veins examined.    For comparison purposes, the left internal jugular vein was investigated. This vein appeared to show normal color filling and Doppler interrogation showed phasic, spontaneous, and somewhat pulsatile flow.     RADIOGRAPHS:   24 XR RT WRIST 2V [facility] ED  No acute fracture or dislocation. Significant diffuse demineralization and  moderate osteoarthritis. Vascular calcifications.     24 XR RT HAND 3V [facility] ED  Moderate

## 2024-03-21 NOTE — PROGRESS NOTES
OCCUPATIONAL THERAPY - DAILY TREATMENT NOTE    Patient Name: Estela CREWS Askew    Date: 3/21/2024    : 1933  Insurance: Payor: [employer] MEDICARE / Plan: HUMANA GOLD PLUS HMO / Product Type: *No Product type* /        Ins Auth:     Next PN Due By:  24                        Patient  verified Yes     Visit #   Current / Total 1 12   Time   In / Out 1000 1040   Total Treatment Time 40   Pain   In / Out 0 0   Subjective Functional Status/Changes: See POC     TREATMENT AREA =  Right hand pain [M79.641]    OBJECTIVE    EVAL - 30 minutes    Therapeutic Procedures:  Tx Min Billable or 1:1 Min (if diff from Tx Min) Procedure, Rationale, Specifics        10  14916 Self Care/Home Management (timed):  improve patient knowledge and understanding of pain reducing techniques, positioning, posture/ergonomics, activity modification, diagnosis/prognosis, and physical therapy expectations, procedures and progression  to increase ability to complete ADLs/IADLs independently  (see flow sheet as applicable)  Education on built up , administered some for trial  Small edema glove   Tendon glide HEP with demo and practice                      TOTAL TREATMENT TIME:  (Total Time - Paraffin/Vaso/Fluido)               Billed concurrently with other treatments Patient Education:  Reviewed HEP     Objective Information/Functional Measures/Assessment    See POC         Assessment/Plan:    See POC             []  See Progress Note/Re certification     Patient will continue to benefit from skilled OT services to modify and progress therapeutic interventions, analyze and address functional mobility deficits, analyze and address ROM deficits, analyze and address strength deficits, analyze and address soft tissue restrictions, analyze and cue for proper movement patterns, and instruct in home and community integration  to attain remaining goals.   Progress toward goals / Updated goals:  Short Term Goals: To be accomplished in 2

## 2024-03-25 ENCOUNTER — HOSPITAL ENCOUNTER (OUTPATIENT)
Facility: HOSPITAL | Age: 89
Setting detail: RECURRING SERIES
Discharge: HOME | End: 2024-03-28
Payer: MEDICARE

## 2024-03-25 PROCEDURE — 97140 MANUAL THERAPY 1/> REGIONS: CPT

## 2024-03-25 PROCEDURE — 97530 THERAPEUTIC ACTIVITIES: CPT

## 2024-03-25 PROCEDURE — 97110 THERAPEUTIC EXERCISES: CPT

## 2024-03-25 NOTE — PROGRESS NOTES
OCCUPATIONAL THERAPY - DAILY TREATMENT NOTE    Patient Name: Estela CREWS Askew    Date: 3/25/2024    : 1933  Insurance: Payor: HUMANA MEDICARE / Plan: HUMANA GOLD PLUS HMO / Product Type: *No Product type* /        Ins Auth:     Next PN Due By:  24                        Patient  verified Yes     Visit #   Current / Total 2 12   Time   In / Out 1120 1158   Total Treatment Time 38   Pain   In / Out 0 0   Subjective Functional Status/Changes: They are just very stiff      TREATMENT AREA =  Right hand pain [M79.641]  Right wrist pain [M25.531]  Right forearm pain [M79.631]  Right elbow pain [M25.521]    OBJECTIVE    Therapeutic Procedures:  Tx Min Billable or 1:1 Min (if diff from Tx Min) Procedure, Rationale, Specifics   20  17981 Therapeutic Exercise (timed):  increase ROM, strength, coordination, and proprioception to , pinch (see flow sheet as applicable)    Right:  -Towel Scrunches  - Wrist Stabilizer   - Soft theraputty- Manipulated with Right hand to reveal pegs   - Digit abduction/ adduction   - Digit hyperextension   - Digit flexion/extension- grasp  - Ball Rolls      10  16658 Therapeutic Activity (timed):  use of dynamic activities replicating functional movements to Improve understanding of diagnosis (see flow sheet as applicable)    Pt ed on gout- administered info graph on foods to avoid- advised patient to check with MD    1/4 in peg removal from soft theraputty using Right hand      8  64757 Manual Therapy (timed):  decrease pain, increase ROM, increase tissue extensibility, and decrease edema to improve functional use. The manual therapy interventions were performed at a separate and distinct time from the therapeutic activities interventions . (see flow sheet as applicable)    Edema Massage  to right hand on wedge   Review of use of edema glove                          TOTAL 1:1 TREATMENT TIME:  (Total Time - Paraffin/Vaso/Fluido)        38           Billed concurrently with other

## 2024-03-27 ENCOUNTER — CARE COORDINATION (OUTPATIENT)
Facility: CLINIC | Age: 89
End: 2024-03-27

## 2024-03-27 NOTE — CARE COORDINATION
MSW completed outreach for complex case management. Confirmed name and date of birth with patient. Patient reports she is doing well and completing occupational therapy 1-2/week for hand and arm. Patient feels like OT is helping with her pain. Patient confirmed [facility] completed LTSS screening and she is waiting outcome to be mailed to her. Spouse also completed screening too. Niece was present during screening for support. Patient reports eating well and taking all prescribed medications. Patient stated she feels her and spouse have support between neighbors, Yazidi members and a social club patient belongs to. Patient did state she would like an aide in home if she is approved for Medicaid. Patient is not clear of the status of her Medicaid application and states My niece Dalia is handling the paperwork. Patient states she is pleased with everyone checking on her and spouse. MSW provide opportunity for questions. No questions at this time. MSW schedule next outreach.       Identified Needs:  PCA services in home.   Meals on Wheels  [facility] Medicaid application assistance      Social Work Plan:  Apply for [facility] Medicaid-(APPLICATION SUBMITTED AND PENDING  Referral to [facility] for Meals on Wheels-referral made by MSW. (PATIENT REPORTS NOT ELIGIBLE FOR SERVICES; MSW WILL INQUIRE ON REASON )  Referral to [facility] for LTSS/UAI (COMPLETED AND PENDING)

## 2024-03-29 ENCOUNTER — CARE COORDINATION (OUTPATIENT)
Facility: CLINIC | Age: 89
End: 2024-03-29

## 2024-03-29 NOTE — CARE COORDINATION
Patient has graduated from the Complex Care program on 3/29/24.  Patient/family has the ability to self-manage at this time.  Care management goals have been completed. No further Ambulatory Care Manager follow up scheduled.     Goals Addressed                   This Visit's Progress     COMPLETED: Conditions and Symptoms        I will schedule office visits, as directed by my provider.  I will keep my appointment or reschedule if I have to cancel.  I will notify my provider of any barriers to my plan of care.  I will notify my provider of any symptoms that indicate a worsening of my condition.    Barriers: none  Plan for overcoming my barriers: N/A  Confidence: 10/10  Anticipated Goal Completion Date: 1/4/24         COMPLETED: Medication Management        I will take my medication as directed.  I will notify my provider of any problems with medications, like adverse effects or side effects.  I will notify my provider/Care Coordinator if I am unable to afford my medications.  I will notify my provider for advice before I stop taking any of my medication.    Barriers: none  Plan for overcoming my barriers: N/A  Confidence: 10/10  Anticipated Goal Completion Date: 1/4/24       COMPLETED: Reduce Risk of Hospitalization        I will take appropriate steps to reduce my risk of Hospitalization to include:  Take all of medications as prescribed  Visit w/ all of my recommended specialists.  Visit w/ my PCP as recommended.  Avoid activities that can trigger my chronic conditions.    Barriers: none  Plan for overcoming my barriers: N/A  Confidence: 10/10  Anticipated Goal Completion Date: 1/4/24                Patient has Ambulatory Care Manager's contact information for any further questions, concerns, or needs.  Patients upcoming visits:    Future Appointments   Date Time Provider Department Center   4/11/2024 10:00 AM Becki Jimenez OT [facility]   4/23/2024 11:40 AM Kiko Belle MD [facility] BS AMB   6/21/2024 10:00

## 2024-04-02 ENCOUNTER — HOSPITAL ENCOUNTER (EMERGENCY)
Facility: HOSPITAL | Age: 89
Discharge: HOME | End: 2024-04-02
Payer: MEDICARE

## 2024-04-02 ENCOUNTER — APPOINTMENT (OUTPATIENT)
Facility: HOSPITAL | Age: 89
End: 2024-04-02
Payer: MEDICARE

## 2024-04-02 VITALS
TEMPERATURE: 98.2 F | BODY MASS INDEX: 29.43 KG/M2 | RESPIRATION RATE: 17 BRPM | HEART RATE: 76 BPM | SYSTOLIC BLOOD PRESSURE: 158 MMHG | HEIGHT: 59 IN | WEIGHT: 146 LBS | OXYGEN SATURATION: 96 % | DIASTOLIC BLOOD PRESSURE: 76 MMHG

## 2024-04-02 DIAGNOSIS — M10.9 ACUTE GOUT OF LEFT HAND, UNSPECIFIED CAUSE: Primary | ICD-10-CM

## 2024-04-02 PROCEDURE — 6370000000 HC RX 637 (ALT 250 FOR IP): Performed by: PHYSICIAN ASSISTANT

## 2024-04-02 PROCEDURE — 99283 EMERGENCY DEPT VISIT LOW MDM: CPT

## 2024-04-02 PROCEDURE — 73120 X-RAY EXAM OF HAND: CPT

## 2024-04-02 RX ORDER — COLCHICINE 0.6 MG/1
1.2 CAPSULE ORAL
Status: COMPLETED | OUTPATIENT
Start: 2024-04-02 | End: 2024-04-02

## 2024-04-02 RX ORDER — COLCHICINE 0.6 MG/1
CAPSULE ORAL
Qty: 30 CAPSULE | Refills: 0 | Status: SHIPPED | OUTPATIENT
Start: 2024-04-02

## 2024-04-02 RX ADMIN — COLCHICINE 1.2 MG: 0.6 CAPSULE ORAL at 14:16

## 2024-04-02 ASSESSMENT — ENCOUNTER SYMPTOMS
SHORTNESS OF BREATH: 0
DIARRHEA: 0
ABDOMINAL PAIN: 0
SORE THROAT: 0
VOMITING: 0

## 2024-04-02 ASSESSMENT — PAIN DESCRIPTION - LOCATION: LOCATION: HAND

## 2024-04-02 ASSESSMENT — PAIN SCALES - GENERAL: PAINLEVEL_OUTOF10: 10

## 2024-04-02 NOTE — ED TRIAGE NOTES
Client reports having pain in l. Hand over past week. Client has arthritis and gout in area. Unable to make a fist. Pulses present. Hand warm to touch. Pain 10/10.

## 2024-04-02 NOTE — ED PROVIDER NOTES
[facility] [address]      Phone: [phone]   colchicine 0.6 MG capsule         Disposition: Discharged    Patient condition at time of disposition: Stable    DISCHARGE NOTE:   Pt has been reexamined. Patient has no new complaints, changes, or physical findings.  Care plan outlined and precautions discussed.  Results were reviewed with the patient. All medications were reviewed with the patient. All of pt's questions and concerns were addressed.  Alarm symptoms and return precautions associated with chief complaint and evaluation were reviewed with the patient in detail.  The patient demonstrated adequate understanding.  Patient was instructed to follow up with PCP, as well as given strict return precautions to the ED upon further deterioration. Patient is ready for discharge home.      Dragon Disclaimer     Please note that this dictation was completed with StudyCloud, the computer voice recognition software.  Quite often unanticipated grammatical, syntax, homophones, and other interpretive errors are inadvertently transcribed by the computer software.  Please disregard these errors.  Please excuse any errors that have escaped final proofreading.      Jeanne Mccarthy PA-C, PA  04/02/24 1809

## 2024-04-03 ENCOUNTER — CARE COORDINATION (OUTPATIENT)
Facility: CLINIC | Age: 89
End: 2024-04-03

## 2024-04-03 NOTE — CARE COORDINATION
MSW completed outreach with patient. Confirmed name and date of birth. Patient states she went to ED yesterday due to gout in hand. Patient received prescription however the cost was $96. MSW reviewed notes. PCP sent prescription of Colchicine to [facility]. Patient confirmed pharmacy.  MSW attempted to reach pharmacy however no one answered telephone line. MSW left a message with LTSS screening nurse, Ms. Anders for update on screening process.  Patient is still waiting for response of LTSS screening and Medicaid. Patient states her and spouse have food and preparing meals, she feels safe in home with spouse support and niece support.     Identified Needs:  PCA services in home.   Meals on Wheels  [place] Medicaid application assistance      Social Work Plan:  Apply for [place] Medicaid-(APPLICATION SUBMITTED AND PENDING  Referral to [facility] for Meals on Wheels-referral made by MSW. (PATIENT REPORTS NOT ELIGIBLE FOR SERVICES; NOT MEET CRITERIA OF BEING BED BOUND )  Referral to [facility] for LTSS/UAI (COMPLETED AND PENDING)       MSW will continue to follow patient and provide resources as needed.       OLGA Mistry  Care Management

## 2024-04-11 ENCOUNTER — HOSPITAL ENCOUNTER (OUTPATIENT)
Facility: HOSPITAL | Age: 89
Setting detail: RECURRING SERIES
Discharge: HOME | End: 2024-04-14
Payer: MEDICARE

## 2024-04-11 ENCOUNTER — CARE COORDINATION (OUTPATIENT)
Facility: CLINIC | Age: 89
End: 2024-04-11

## 2024-04-11 PROCEDURE — 97110 THERAPEUTIC EXERCISES: CPT

## 2024-04-11 PROCEDURE — 97140 MANUAL THERAPY 1/> REGIONS: CPT

## 2024-04-11 NOTE — PROGRESS NOTES
the Right hand, demonstrating increased coordination for buttoning a shirt.  Status at Eval: 30 seconds     Pt will have 20 pounds of  in the Right hand to allow for functional grasp for all ADL activities including dressing, bathing and self care.  Status at Eval: 14#   Current Status (3/25/2024): Introduces soft theraputty     Patient will increase  Right hand 3pt, lateral, and tip pinch strength by 2 pounds or greater to improve participation in meal preparation and home management tasks.  Status at Eval: 3pt= 4#; lateral= 4#; tip= 3#   Current Status (3/25/2024): Introduces soft theraputty       PLAN  [x]  Continue Plan of Care  []  Upgrade activities as tolerated    []  Discharge due to :    []  Other:      Therapist: Becki Jimenez OT    Date: 4/11/2024 Time: 8:36 AM     Future Appointments   Date Time Provider Department Center   4/11/2024 10:00 AM Becki Jimenez OT [facility]   4/23/2024 11:40 AM Kiko Belle MD [facility] BS AMB   6/21/2024 10:00 AM Dunia Malik MD [facility] BS AMB

## 2024-04-11 NOTE — CARE COORDINATION
Attempted to contact patient for Complex Care Management. No answer, left message and provided contact information. Will attempt contact at a later time.

## 2024-04-16 ENCOUNTER — HOSPITAL ENCOUNTER (OUTPATIENT)
Facility: HOSPITAL | Age: 89
Setting detail: RECURRING SERIES
Discharge: HOME | End: 2024-04-19
Payer: MEDICARE

## 2024-04-16 PROCEDURE — 97140 MANUAL THERAPY 1/> REGIONS: CPT

## 2024-04-16 PROCEDURE — 97110 THERAPEUTIC EXERCISES: CPT

## 2024-04-16 NOTE — PROGRESS NOTES
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 Patient with decreased MAIER of the right elbow (-20). Pt with increased ability to complete a composite fist. Patient with decreased Right  strength (+1), increased 3pt pinch strength (+1), increased lateral pinch strength (+2), and increased tip pinch strength (+1). Pt with increased FM speed per 9-hole peg test.         CURRENT STATUS  Patient will be independent with HEP for right UE ROM/stretches/strengthening to increase ROM and strength for ADL/IADL tasks.   Status at Eval: initiated tendon glide HEP  24: median nerve glide HEP; administered soft putty with 10 pegs for home use  Status at PN (24): goal met/      Patient will be independent in demonstrating and performing activity modification strategies to aid in success for work, self-care, meal preparation and home management tasks.   Status at Eval: education on built up , administered red built up  for trial   Status at PN (24): goal met.      Patient will be independent in demonstrating and performing edema management strategies to promote healing and improve participation in ADLs/IADLs.   Status at Eval: 
modalities and massage for relief of symptoms to aid in success in ADLs/IADLs.     Status at Eval: Not initiated  Status at PN (4/16/24): continue to address.         Long Term Goals: To be accomplished in 4-6 weeks  Patient will increased MAIER of the right elbow  by 10 degs or more in order to increase ability to reach for ADL items.  Status at eval: 60 deg  Status at PN (4/16/24): 40 deg, goal not met.     Pt will demonstrate increased ability to complete a full composite fist in order to grasp onto ADL items.    Status at Eval: 0-1.5 pulp to palm distance  Status at PN (4/16/24): 0.0-1.0 cm pulp to palm distance     Pt will show a 5 second decrease in 9-hole peg test with the Right hand, demonstrating increased coordination for buttoning a shirt.  Status at Eval: 30 seconds  Status at PN (4/16/24): 27 seconds, progressing, goal not met    Pt will have 20 pounds of  in the Right hand to allow for functional grasp for all ADL activities including dressing, bathing and self care.  Status at Eval: 14#   Current Status (3/25/2024): Introduces soft theraputty  Status at PN (4/16/24): 13#, goal not met.      Patient will increase  Right hand 3pt, lateral, and tip pinch strength by 2 pounds or greater to improve participation in meal preparation and home management tasks.  Status at Eval: 3pt= 4#; lateral= 4#; tip= 3#   Current Status (3/25/2024): Introduces soft theraputty  Status at PN (4/16/24): 3pt= 5#; lateral= 6#; tip= 4#; goal met for lateral, continue to address.        PLAN  [x]  Continue Plan of Care  []  Upgrade activities as tolerated    []  Discharge due to :    []  Other:      Therapist: Becki Jimenez OT    Date: 4/16/2024 Time: 10:32 AM     Future Appointments   Date Time Provider Department Center   4/16/2024 12:40 PM Becki Jimenez OT [facility]   4/23/2024 11:40 AM Kiko Belle MD [facility]   4/24/2024 10:40 AM Becki Jimenez OT [facility]   4/29/2024 11:20 AM Becki Jimenez, OT

## 2024-04-18 ENCOUNTER — CARE COORDINATION (OUTPATIENT)
Facility: CLINIC | Age: 89
End: 2024-04-18

## 2024-04-19 ENCOUNTER — CARE COORDINATION (OUTPATIENT)
Facility: CLINIC | Age: 89
End: 2024-04-19

## 2024-04-23 ENCOUNTER — OFFICE VISIT (OUTPATIENT)
Age: 89
End: 2024-04-23
Payer: MEDICARE

## 2024-04-23 ENCOUNTER — CARE COORDINATION (OUTPATIENT)
Facility: CLINIC | Age: 89
End: 2024-04-23

## 2024-04-23 VITALS
BODY MASS INDEX: 27.82 KG/M2 | HEART RATE: 65 BPM | HEIGHT: 59 IN | DIASTOLIC BLOOD PRESSURE: 80 MMHG | WEIGHT: 138 LBS | OXYGEN SATURATION: 96 % | SYSTOLIC BLOOD PRESSURE: 170 MMHG

## 2024-04-23 DIAGNOSIS — N18.30 STAGE 3 CHRONIC KIDNEY DISEASE, UNSPECIFIED WHETHER STAGE 3A OR 3B CKD (HCC): ICD-10-CM

## 2024-04-23 DIAGNOSIS — I10 ESSENTIAL HYPERTENSION: Primary | ICD-10-CM

## 2024-04-23 DIAGNOSIS — E78.2 MIXED HYPERLIPIDEMIA: ICD-10-CM

## 2024-04-23 DIAGNOSIS — I50.32 DIASTOLIC CHF, CHRONIC (HCC): ICD-10-CM

## 2024-04-23 DIAGNOSIS — I35.1 NONRHEUMATIC AORTIC VALVE INSUFFICIENCY: ICD-10-CM

## 2024-04-23 PROCEDURE — G8417 CALC BMI ABV UP PARAM F/U: HCPCS | Performed by: INTERNAL MEDICINE

## 2024-04-23 PROCEDURE — 99214 OFFICE O/P EST MOD 30 MIN: CPT | Performed by: INTERNAL MEDICINE

## 2024-04-23 PROCEDURE — 93000 ELECTROCARDIOGRAM COMPLETE: CPT | Performed by: INTERNAL MEDICINE

## 2024-04-23 PROCEDURE — 1090F PRES/ABSN URINE INCON ASSESS: CPT | Performed by: INTERNAL MEDICINE

## 2024-04-23 PROCEDURE — 1036F TOBACCO NON-USER: CPT | Performed by: INTERNAL MEDICINE

## 2024-04-23 PROCEDURE — 1123F ACP DISCUSS/DSCN MKR DOCD: CPT | Performed by: INTERNAL MEDICINE

## 2024-04-23 PROCEDURE — G8427 DOCREV CUR MEDS BY ELIG CLIN: HCPCS | Performed by: INTERNAL MEDICINE

## 2024-04-23 ASSESSMENT — ENCOUNTER SYMPTOMS
NAUSEA: 0
COUGH: 0
SORE THROAT: 0
VOMITING: 0
SHORTNESS OF BREATH: 0
ABDOMINAL DISTENTION: 0
ABDOMINAL PAIN: 0

## 2024-04-23 ASSESSMENT — PATIENT HEALTH QUESTIONNAIRE - PHQ9
SUM OF ALL RESPONSES TO PHQ QUESTIONS 1-9: 0
1. LITTLE INTEREST OR PLEASURE IN DOING THINGS: NOT AT ALL
SUM OF ALL RESPONSES TO PHQ QUESTIONS 1-9: 0
SUM OF ALL RESPONSES TO PHQ QUESTIONS 1-9: 0
2. FEELING DOWN, DEPRESSED OR HOPELESS: NOT AT ALL
SUM OF ALL RESPONSES TO PHQ9 QUESTIONS 1 & 2: 0
SUM OF ALL RESPONSES TO PHQ QUESTIONS 1-9: 0

## 2024-04-23 NOTE — CARE COORDINATION
MSW completed outreach with patient. Confirmed name and date of birth. LMSW followed with patient on LTSS Medicaid screening. Patient states, her and spouse are not eligible or LTC Medicaid services. Per screening results, patient is high functional and does not meet level of care criteria to be approved. Patient understood. Patient states she does not feel she needs any assistance in home at this time. Her and spouse take care of each other. Patient family prepared meals and brought them over to house for patient and spouse. Patient states she has good family support.     Mrs. Garcia continue to express concern of pain in hand and arm due to gout. She is taking medications appropriately for pain. She had a visit with cardiology today and is satisfied with appointment.     PLAN:  MSW will review goals, discuss with patient and niece as she is point of contact as support. Adjust goals as needed.     OLGA Mistry  Care Management

## 2024-04-23 NOTE — PROGRESS NOTES
Estela Garcia presents today for   Chief Complaint   Patient presents with    Follow-up     9 month       Estela Garcia preferred language for health care discussion is english/other.    Is someone accompanying this pt? no    Is the patient using any DME equipment during OV? no    Depression Screening:  Depression: Not at risk (4/23/2024)    PHQ-2     PHQ-2 Score: 0        Learning Assessment:  Who is the primary learner? Patient    What is the preferred language for health care of the primary learner? ENGLISH    How does the primary learner prefer to learn new concepts? DEMONSTRATION    Answered By patient    Relationship to Learner SELF           Pt currently taking Anticoagulant therapy? no    Pt currently taking Antiplatelet therapy ? no      Coordination of Care:  1. Have you been to the ER, urgent care clinic since your last visit? Hospitalized since your last visit? no    2. Have you seen or consulted any other health care providers outside of the [facility] since your last visit? Include any pap smears or colon screening. no    
Problems Paternal Grandfather     No Known Problems Other          Review of Systems   Constitutional:  Negative for chills, fatigue and fever.   HENT:  Negative for congestion, hearing loss, nosebleeds and sore throat.    Eyes:  Negative for visual disturbance.   Respiratory:  Negative for cough and shortness of breath.    Cardiovascular:  Negative for chest pain, palpitations and leg swelling.   Gastrointestinal:  Negative for abdominal distention, abdominal pain, nausea and vomiting.   Endocrine: Negative for cold intolerance and heat intolerance.   Genitourinary:  Negative for dysuria.   Musculoskeletal:  Positive for arthralgias.   Skin:  Negative for rash.   Neurological:  Negative for dizziness, syncope, weakness and headaches.   Hematological:  Does not bruise/bleed easily.   Psychiatric/Behavioral:  Negative for suicidal ideas.          BP (!) 170/80 (Site: Right Upper Arm, Position: Sitting, Cuff Size: Large Adult)   Pulse 65   Ht 1.499 m (4' 11\")   Wt 62.6 kg (138 lb)   SpO2 96%   BMI 27.87 kg/m²     Objective:   Physical Exam  Constitutional:       General: She is not in acute distress.  HENT:      Head: Normocephalic.   Neck:      Vascular: No carotid bruit or JVD.   Cardiovascular:      Rate and Rhythm: Normal rate and regular rhythm. No extrasystoles are present.     Heart sounds: Murmur heard.      Blowing early diastolic murmur is present with a grade of 1/4 at the upper right sternal border radiating to the apex.      No gallop.   Pulmonary:      Effort: Pulmonary effort is normal.      Breath sounds: No wheezing, rhonchi or rales.   Abdominal:      General: Bowel sounds are normal. There is no distension.      Palpations: Abdomen is soft.      Tenderness: There is no abdominal tenderness.   Musculoskeletal:         General: Swelling (Trace bilateral lower extremity to the ankles) present. No deformity.   Skin:     General: Skin is warm and dry.      Findings: No rash.   Neurological:

## 2024-04-24 ENCOUNTER — OFFICE VISIT (OUTPATIENT)
Age: 89
End: 2024-04-24
Payer: MEDICARE

## 2024-04-24 ENCOUNTER — HOSPITAL ENCOUNTER (OUTPATIENT)
Facility: HOSPITAL | Age: 89
Setting detail: RECURRING SERIES
Discharge: HOME | End: 2024-04-27
Payer: MEDICARE

## 2024-04-24 VITALS
HEIGHT: 59 IN | SYSTOLIC BLOOD PRESSURE: 132 MMHG | RESPIRATION RATE: 16 BRPM | BODY MASS INDEX: 26.81 KG/M2 | TEMPERATURE: 98 F | HEART RATE: 67 BPM | DIASTOLIC BLOOD PRESSURE: 64 MMHG | OXYGEN SATURATION: 99 % | WEIGHT: 133 LBS

## 2024-04-24 DIAGNOSIS — M25.441 SWELLING OF JOINT OF RIGHT HAND: Primary | ICD-10-CM

## 2024-04-24 DIAGNOSIS — E79.0 HYPERURICEMIA: ICD-10-CM

## 2024-04-24 DIAGNOSIS — N18.4 CKD (CHRONIC KIDNEY DISEASE) STAGE 4, GFR 15-29 ML/MIN (HCC): ICD-10-CM

## 2024-04-24 DIAGNOSIS — M10.9 GOUTY ARTHRITIS: ICD-10-CM

## 2024-04-24 PROCEDURE — 1036F TOBACCO NON-USER: CPT | Performed by: FAMILY MEDICINE

## 2024-04-24 PROCEDURE — G8417 CALC BMI ABV UP PARAM F/U: HCPCS | Performed by: FAMILY MEDICINE

## 2024-04-24 PROCEDURE — 99214 OFFICE O/P EST MOD 30 MIN: CPT | Performed by: FAMILY MEDICINE

## 2024-04-24 PROCEDURE — 1090F PRES/ABSN URINE INCON ASSESS: CPT | Performed by: FAMILY MEDICINE

## 2024-04-24 PROCEDURE — G8428 CUR MEDS NOT DOCUMENT: HCPCS | Performed by: FAMILY MEDICINE

## 2024-04-24 PROCEDURE — 1123F ACP DISCUSS/DSCN MKR DOCD: CPT | Performed by: FAMILY MEDICINE

## 2024-04-24 RX ORDER — METHYLPREDNISOLONE 4 MG/1
TABLET ORAL
Qty: 1 KIT | Refills: 0 | Status: SHIPPED | OUTPATIENT
Start: 2024-04-24 | End: 2024-04-30

## 2024-04-24 NOTE — PROGRESS NOTES
Missed Outpatient Occupational Therapy Visit     Patient Name: Estela CREWS Askew  Date:2024  : 1933  [x]  Patient  Verified  Payor: [employer] MEDICARE / Plan: [employer] GOLD PLUS HMO / Product Type: *No Product type* /        SUBJECTIVE  Pain Level (0-10 scale): 8  Any medication changes, allergies to medications, adverse drug reactions, diagnosis change, or new procedure performed?: [x] No    [] Yes (see summary sheet for update)        Patient arrived for Occupational Therapy treatment., However, patient is unable to participate due to:    []  Nausea/vomiting  []  Pain  []  Pt lethargic  [x] Other: pt reporting pain on entire right side starting in shoulder, unable to remove edema glove or touch UE without pain, advised pt to call physician.       Subjective/Objective : \"I ain't doing so well today yoav. I woke up at 5 AM crying in pain in my arm and have been up ever since. I don't know what is going on.\"        Becki Jimenez OTR/L 2024  10:52 AM    Future Appointments   Date Time Provider Department Center   2024 11:20 [facility]   2024 10:00 AM Sara Reilly OTA [facility]   2024 10:00 AM Dunia Malik MD [facility] BS AMB   10/21/2024 11:00 AM Farhana Muse APRN - NP [facility] BS AMB   2025 11:20 AM Kiko Belle MD [facility] BS AMB

## 2024-04-24 NOTE — PROGRESS NOTES
Estela Garcia, 89 y.o.,  female    SUBJECTIVE  Ff-up right hand pain     R hand swelling and pain- persistent, wakes her up at night. Topical colchicine, voltaren gel and lidoderm patches ineffective. has has seen ortho, to start OT and short course indocin.she has CKD 4 with hyperuricemia, DM and hx of GI bleed.       ROS:  See HPI, all others negative        Patient Active Problem List   Diagnosis Code    Essential hypertension I10    Mixed hyperlipidemia E78.2    Advanced directives, counseling/discussion Z71.89    Controlled type 2 diabetes mellitus with stage 3 chronic kidney disease, without long-term current use of insulin ([facility]) E11.22, N18.3         Current Outpatient Medications:     methylPREDNISolone (MEDROL DOSEPACK) 4 MG tablet, Take by mouth., Disp: 1 kit, Rfl: 0    allopurinol (ZYLOPRIM) 100 MG tablet, Take 1 tablet by mouth daily as needed (gout), Disp: 30 tablet, Rfl: 0    colchicine (MITIGARE) 0.6 MG capsule, Take 1 tab twice daily until symptoms resolve, Disp: 30 capsule, Rfl: 0    diclofenac sodium (VOLTAREN) 1 % GEL, Apply 4 g topically 4 times daily, Disp: 100 g, Rfl: 0    SV IRON 325 MG tablet, TAKE 1 TABLET BY MOUTH ONCE DAILY IN THE MORNING BEFORE BREAKFAST, Disp: 90 tablet, Rfl: 0    amLODIPine (NORVASC) 10 MG tablet, Take 1 tablet by mouth daily, Disp: 90 tablet, Rfl: 3    acetaminophen (TYLENOL) 500 MG tablet, Take 1 tablet by mouth 4 times daily as needed for Pain, Disp: 30 tablet, Rfl: 1    Omega-3 Fatty Acids (FISH OIL) 1200 MG CAPS, Take 1,200 mg by mouth every morning, Disp: , Rfl:     pravastatin (PRAVACHOL) 20 MG tablet, Take 1 tablet by mouth nightly, Disp: 90 tablet, Rfl: 3    Cholecalciferol (VITAMIN D) 10 MCG (400 UNIT) CAPS Capsule, Take 1 capsule by mouth daily, Disp: , Rfl:     sucralfate (CARAFATE) 1 GM tablet, Take 1 tablet by mouth 4 times daily (before meals and nightly), Disp: 120 tablet, Rfl: 3    bumetanide (BUMEX) 0.5 MG tablet, Take 1 tablet by mouth daily, Disp:

## 2024-04-24 NOTE — PROGRESS NOTES
\"Have you been to the ER, urgent care clinic since your last visit?  Hospitalized since your last visit?\"    04/02/2024 Gout    “Have you seen or consulted any other health care providers outside of [facility] since your last visit?”    Dr. Belle            Click Here for Release of Records Request

## 2024-05-06 RX ORDER — ALLOPURINOL 100 MG/1
TABLET ORAL
Qty: 90 TABLET | Refills: 0 | Status: SHIPPED | OUTPATIENT
Start: 2024-05-06

## 2024-05-07 ENCOUNTER — CARE COORDINATION (OUTPATIENT)
Facility: CLINIC | Age: 89
End: 2024-05-07

## 2024-05-07 NOTE — CARE COORDINATION
Attempted to contact patient for Complex Care Management. Left messages at both numbers listed in chart and left message with Dalia reno.     No answer, left message and provided contact information. No HIPAA information left on voicemail.     Will attempt contact at a later time.      OLGA Mistry  Care Management

## 2024-05-07 NOTE — CARE COORDINATION
Other care/services in place and adequate   Are current services involved with this patient well-coordinated? (Include coordination with other services you are now recommendation): Required care/services in place and adequately coordinated   Suggested Interventions and Community Resources               , and   General Assessment    Do you have any symptoms that are causing concern?: Yes  Progression since Onset: Unchanged  Reported Symptoms: Pain (Comment: gout wrist pain)          Care Planning:    Goals Addressed                   This Visit's Progress     Conditions and Symptoms        I will schedule office visits, as directed by my provider.  I will keep my appointment or reschedule if I have to cancel.  I will notify my provider of any barriers to my plan of care.  I will notify my provider of any symptoms that indicate a worsening of my condition.    Barriers: none  Plan for overcoming my barriers: N/A  Confidence: 10/10  Anticipated Goal Completion Date: 8/7/24         Medication Management        I will take my medication as directed.  I will notify my provider of any problems with medications, like adverse effects or side effects.  I will notify my provider/Care Coordinator if I am unable to afford my medications.  I will notify my provider for advice before I stop taking any of my medication.    Barriers: none  Plan for overcoming my barriers: N/A  Confidence: 10/10  Anticipated Goal Completion Date: 8/7/24       Reduce Risk of Hospitalization        I will take appropriate steps to reduce my risk of Hospitalization to include:  Take all of medications as prescribed  Visit w/ all of my recommended specialists.  Visit w/ my PCP as recommended.  Avoid activities that can trigger my chronic conditions.    Barriers: none  Plan for overcoming my barriers: N/A  Confidence: 10/10  Anticipated Goal Completion Date: 8/7/24                 Advance Care Planning:   Not reviewed during this call     Medications

## 2024-05-13 ENCOUNTER — APPOINTMENT (OUTPATIENT)
Facility: HOSPITAL | Age: 89
End: 2024-05-13
Payer: MEDICARE

## 2024-05-13 ENCOUNTER — HOSPITAL ENCOUNTER (EMERGENCY)
Facility: HOSPITAL | Age: 89
Discharge: HOME | End: 2024-05-13
Payer: MEDICARE

## 2024-05-13 VITALS
SYSTOLIC BLOOD PRESSURE: 148 MMHG | WEIGHT: 133 LBS | BODY MASS INDEX: 26.81 KG/M2 | RESPIRATION RATE: 20 BRPM | HEIGHT: 59 IN | TEMPERATURE: 98.6 F | OXYGEN SATURATION: 98 % | HEART RATE: 60 BPM | DIASTOLIC BLOOD PRESSURE: 70 MMHG

## 2024-05-13 DIAGNOSIS — S42.034A CLOSED NONDISPLACED FRACTURE OF ACROMIAL END OF RIGHT CLAVICLE, INITIAL ENCOUNTER: ICD-10-CM
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
## 2024-05-13 PROCEDURE — 73030 X-RAY EXAM OF SHOULDER: CPT

## 2024-05-13 PROCEDURE — 73700 CT LOWER EXTREMITY W/O DYE: CPT

## 2024-05-13 PROCEDURE — 70450 CT HEAD/BRAIN W/O DYE: CPT

## 2024-05-13 PROCEDURE — 72192 CT PELVIS W/O DYE: CPT

## 2024-05-13 PROCEDURE — 99284 EMERGENCY DEPT VISIT MOD MDM: CPT

## 2024-05-13 PROCEDURE — 6370000000 HC RX 637 (ALT 250 FOR IP): Performed by: PHYSICIAN ASSISTANT

## 2024-05-13 PROCEDURE — 72125 CT NECK SPINE W/O DYE: CPT

## 2024-05-13 RX ORDER — ACETAMINOPHEN 325 MG/1
650 TABLET ORAL
Status: COMPLETED | OUTPATIENT
Start: 2024-05-13 | End: 2024-05-13

## 2024-05-13 RX ORDER — ACETAMINOPHEN 325 MG/1
650 TABLET ORAL EVERY 6 HOURS PRN
Qty: 60 TABLET | Refills: 0 | Status: SHIPPED | OUTPATIENT
Start: 2024-05-13

## 2024-05-13 RX ADMIN — ACETAMINOPHEN 650 MG: 325 TABLET ORAL at 19:26

## 2024-05-13 ASSESSMENT — ENCOUNTER SYMPTOMS
VOMITING: 0
RHINORRHEA: 0
SHORTNESS OF BREATH: 0
EYE REDNESS: 0
STRIDOR: 0
COUGH: 0
ABDOMINAL PAIN: 0
NAUSEA: 0
EYE DISCHARGE: 0
WHEEZING: 0
BACK PAIN: 0
SORE THROAT: 0

## 2024-05-13 ASSESSMENT — PAIN SCALES - GENERAL
PAINLEVEL_OUTOF10: 4
PAINLEVEL_OUTOF10: 4
PAINLEVEL_OUTOF10: 10

## 2024-05-13 ASSESSMENT — PAIN DESCRIPTION - LOCATION: LOCATION: SHOULDER

## 2024-05-13 ASSESSMENT — PAIN DESCRIPTION - ORIENTATION: ORIENTATION: RIGHT

## 2024-05-13 NOTE — ED TRIAGE NOTES
Pt wheeled to triage c/o fall in bathroom on right hip this am. No LOC. No blood thinners. Pt is hurting from neck down. Pt has walked since fall.

## 2024-05-14 NOTE — ED PROVIDER NOTES
seconds.  Patient has some mild edema across the dorsum of the hand however she reports a history of gout and states this is baseline for her hand swelling.  No hand or wrist forearm elbow or humerus tenderness to palpation noted.  Patient has some tenderness to palpation to the anterior shoulder over the AC joint.  No obvious upper extremity deformity appreciated.  Patient is able to lift the right arm to approximately 30 degrees before she experiences pain.  No midline point tenderness to palpation noted along the posterior cervical spine.  Patient does have some tenderness to palpation and hypertonicity along the right sternocleidomastoid region.  Range of motion of the cervical spine is intact.  No radicular symptoms elicited.  RLE: No obvious deformity noted.  Range of motion of the hip is intact.  Patient does have some mild tenderness to palpation to the lateral aspect of the hip.  She was able to ambulate without difficulty.  No midline lumbar or thoracic tenderness to palpation noted.   Skin:     General: Skin is warm and dry.      Findings: No rash.   Neurological:      General: No focal deficit present.      Mental Status: She is alert and oriented to person, place, and time. Mental status is at baseline.      Motor: No weakness.      Coordination: Coordination normal.      Comments: Patient was able to ambulate to the bathroom and back.  Mild antalgic gait. No focal neurological deficit noted. No facial droop, slurred speech, or evidence of altered mentation noted on exam.     Psychiatric:         Mood and Affect: Mood normal.         Behavior: Behavior normal.         Thought Content: Thought content normal.                Diagnostic Study Results     Labs -   No results found for this or any previous visit (from the past 12 hour(s)).    Radiologic Studies -   CT HEAD WO CONTRAST   Final Result   1.  No acute intracranial process identified. Old right frontal encephalomalacia   and left cerebellar

## 2024-05-15 ENCOUNTER — CARE COORDINATION (OUTPATIENT)
Facility: CLINIC | Age: 89
End: 2024-05-15

## 2024-05-15 NOTE — CARE COORDINATION
4:00 pm  No return by end of day. SW will attempt to reach patient again on next outreach.         12:45 pm  LMSW placed call to complete outreach with patient for Complex Care Management. No answer, left message and provided contact information. No HIPAA left on voicemail. Will attempt contact at a later time.    OLGA Mistry  Care Management

## 2024-05-16 ENCOUNTER — OFFICE VISIT (OUTPATIENT)
Age: 89
End: 2024-05-16
Payer: MEDICARE

## 2024-05-16 VITALS — HEIGHT: 59 IN | RESPIRATION RATE: 16 BRPM | BODY MASS INDEX: 26.86 KG/M2

## 2024-05-16 DIAGNOSIS — M89.8X1 CLAVICLE PAIN: Primary | ICD-10-CM

## 2024-05-16 DIAGNOSIS — S42.034A CLOSED NONDISPLACED FRACTURE OF ACROMIAL END OF RIGHT CLAVICLE, INITIAL ENCOUNTER: ICD-10-CM

## 2024-05-16 DIAGNOSIS — Z91.81 HISTORY OF RECENT FALL: ICD-10-CM

## 2024-05-16 PROCEDURE — 73000 X-RAY EXAM OF COLLAR BONE: CPT | Performed by: ORTHOPAEDIC SURGERY

## 2024-05-16 PROCEDURE — 1090F PRES/ABSN URINE INCON ASSESS: CPT | Performed by: ORTHOPAEDIC SURGERY

## 2024-05-16 PROCEDURE — 1123F ACP DISCUSS/DSCN MKR DOCD: CPT | Performed by: ORTHOPAEDIC SURGERY

## 2024-05-16 PROCEDURE — G8427 DOCREV CUR MEDS BY ELIG CLIN: HCPCS | Performed by: ORTHOPAEDIC SURGERY

## 2024-05-16 PROCEDURE — 99203 OFFICE O/P NEW LOW 30 MIN: CPT | Performed by: ORTHOPAEDIC SURGERY

## 2024-05-16 PROCEDURE — 1036F TOBACCO NON-USER: CPT | Performed by: ORTHOPAEDIC SURGERY

## 2024-05-16 PROCEDURE — G8417 CALC BMI ABV UP PARAM F/U: HCPCS | Performed by: ORTHOPAEDIC SURGERY

## 2024-05-16 NOTE — PROGRESS NOTES
Estlea Garcia  8/27/1933   Chief Complaint   Patient presents with    New Patient     Right clavicle fracture        HISTORY OF PRESENT ILLNESS  Estela Garcia is a 89 y.o. female who presents today for evaluation of right clavicle.  Pain is a 8/10. Pain has been present since 5/13/24 following an injury. Pt slipped and fell in the bathroom landing on her right hip and arm. She was seen by the ED and was placed in a sling.     Has tried following treatments: Injections:No; Brace:Yes; Therapy:No; Cane/Crutch:No      No Known Allergies     Past Medical History:   Diagnosis Date    Anemia     Carotid bruit 12/07/2013    Carotid Duplex: 1. Bilateral 1-49% stenosis of the internal carotid arteries. 2. No significant stenosis in the external carotid arteries bilaterally. 3. Antegrade flow in both vetebral arteries. 4. Normal flow in both subclavian arteries. Plaque Morphology: 1. Hyperechoic plaque in the bulb and right ICA. 2. Hyperechoic plaque in the bulb and left ICA.     Chronic diastolic heart failure ([facility]) 9/1/2019    CKD (chronic kidney disease) stage 3, GFR 30-59 ml/min ([facility])     Diabetes ([facility])     NIDDM    Diet-controlled type 2 diabetes mellitus ([facility]) 8/2/2017    Edema of lower extremity 7/25/2019    Gastritis 10/27/2014    Duodenum Bx: No Specific Pathologic Abnormality. No Villous Abnormality. 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 No dysplasia or malignancy identified. Bacterial Organisms c/w H. Pylori are present. Z-Line Bx: GE mucosa w/ Acute/Chronic Inflammation & Reactive Changes. Focal Specialized Type Connors Mucosa Identified. No Dysplasia or Malignancy Identified.     Gastrointestinal hemorrhage 8/9/2023    Gout 12/10/2009    Elevated Uric Acid Level    Hyperlipidemia     Hypertension     RENETTA (iron deficiency anemia)       Social History       Tobacco History       Smoking Status  Never      Smokeless Tobacco Use  Never

## 2024-05-17 ENCOUNTER — CARE COORDINATION (OUTPATIENT)
Facility: CLINIC | Age: 89
End: 2024-05-17

## 2024-05-17 ENCOUNTER — TELEPHONE (OUTPATIENT)
Facility: HOSPITAL | Age: 89
End: 2024-05-17

## 2024-05-17 NOTE — CARE COORDINATION
Attempted to contact patient for Complex Care Management. No answer, left message and provided contact information. No HIPAA information left on voicemail. SW will mail unable to contact letter. Will attempt contact at a later time.    OLGA Mistry  Care Management

## 2024-05-17 NOTE — TELEPHONE ENCOUNTER
Attempted to contact patient re: scheduling/return to OT as patient has not been seen in clinic since 4/16. Number provided is no longer in service/disconnected. Pt to be discharded from OT at this time.

## 2024-05-20 RX ORDER — PANTOPRAZOLE SODIUM 40 MG/1
TABLET, DELAYED RELEASE ORAL
Qty: 180 TABLET | Refills: 0 | Status: SHIPPED | OUTPATIENT
Start: 2024-05-20 | End: 2024-05-22

## 2024-05-22 RX ORDER — PANTOPRAZOLE SODIUM 40 MG/1
TABLET, DELAYED RELEASE ORAL
Qty: 180 TABLET | Refills: 0 | Status: SHIPPED | OUTPATIENT
Start: 2024-05-22

## 2024-05-23 ENCOUNTER — CARE COORDINATION (OUTPATIENT)
Facility: CLINIC | Age: 89
End: 2024-05-23

## 2024-05-23 NOTE — CARE COORDINATION
Ambulatory Care Coordination Note     5/23/2024 10:53 AM     Patient outreach attempt by this ACM today to perform care management follow up . ACM was unable to reach the patient by telephone today; voicemail full and unable to leave a message.      ACM: Adolfo Jackson RN     Care Summary Note:     PCP/Specialist follow up:   Future Appointments         Provider Specialty Dept Phone    6/13/2024 11:30 AM Jose L Hill MD Orthopedic Surgery [phone]    6/21/2024 2:15 PM Dunia Malik MD Family Medicine [phone]    10/21/2024 11:00 AM Farhana Muse APRN - NP Cardiology [phone]    4/25/2025 11:20 AM Kiko Belle MD Cardiology [phone]

## 2024-05-29 ENCOUNTER — CARE COORDINATION (OUTPATIENT)
Facility: CLINIC | Age: 89
End: 2024-05-29

## 2024-05-29 RX ORDER — FERROUS SULFATE 325(65) MG
TABLET ORAL
Qty: 90 TABLET | Refills: 1 | Status: SHIPPED | OUTPATIENT
Start: 2024-05-29

## 2024-05-29 RX ORDER — HYDRALAZINE HYDROCHLORIDE 100 MG/1
100 TABLET, FILM COATED ORAL 3 TIMES DAILY
Qty: 270 TABLET | Refills: 1 | Status: SHIPPED | OUTPATIENT
Start: 2024-05-29

## 2024-05-29 NOTE — CARE COORDINATION
Ambulatory Care Coordination Note     5/29/2024 10:02 AM     patient outreach attempt by this ACM today to perform care management follow up . ACM was unable to reach the patient by telephone today; left voice message requesting a return phone call to this ACM.     ACM: Adolfo Jackson RN     Care Summary Note:       PCP/Specialist follow up:   Future Appointments         Provider Specialty Dept Phone    6/3/2024 11:30 AM Dunia Malik MD Family Medicine [phone]    6/13/2024 11:30 AM Jose L Hill MD Orthopedic Surgery [phone]    6/21/2024 2:15 PM Dunia Malik MD Family Medicine [phone]    10/21/2024 11:00 AM Farhana Muse APRN - NP Cardiology [phone]    4/25/2025 11:20 AM Kiko Belle MD Cardiology [phone]

## 2024-05-31 ENCOUNTER — CARE COORDINATION (OUTPATIENT)
Facility: CLINIC | Age: 89
End: 2024-05-31

## 2024-05-31 NOTE — CARE COORDINATION
CASE SUMMARY & & CLOSURE:  SW completed multiple outreach with patient, spouse and family member/niece, Delaney. Delaney was able to apply for Medicaid and Long Term Care screening. Patient is over resources for Medicaid & did not meet LTC criteria for assistance in home.     Patient was connected to Senior Services however decline assistance for aide and did not meet Meals On Wheels criteria for services.     SW completed multiple attempts and sent Unable to Reach letter to home.     No response to letter or phone calls.     Case closed: Lost to follow up    April OLGA Weston  Care Management

## 2024-06-03 ENCOUNTER — CARE COORDINATION (OUTPATIENT)
Facility: CLINIC | Age: 89
End: 2024-06-03

## 2024-06-03 ENCOUNTER — OFFICE VISIT (OUTPATIENT)
Facility: CLINIC | Age: 89
End: 2024-06-03

## 2024-06-03 VITALS
DIASTOLIC BLOOD PRESSURE: 74 MMHG | WEIGHT: 133 LBS | BODY MASS INDEX: 26.81 KG/M2 | RESPIRATION RATE: 16 BRPM | SYSTOLIC BLOOD PRESSURE: 138 MMHG | OXYGEN SATURATION: 98 % | TEMPERATURE: 97 F | HEIGHT: 59 IN | HEART RATE: 67 BPM

## 2024-06-03 DIAGNOSIS — R60.0 EDEMA OF LOWER EXTREMITY: ICD-10-CM

## 2024-06-03 DIAGNOSIS — S42.001S CLOSED NONDISPLACED FRACTURE OF RIGHT CLAVICLE, UNSPECIFIED PART OF CLAVICLE, SEQUELA: Primary | ICD-10-CM

## 2024-06-03 DIAGNOSIS — R01.1 HEART MURMUR: ICD-10-CM

## 2024-06-03 DIAGNOSIS — N18.4 CKD (CHRONIC KIDNEY DISEASE) STAGE 4, GFR 15-29 ML/MIN (HCC): ICD-10-CM

## 2024-06-03 DIAGNOSIS — I10 ESSENTIAL HYPERTENSION: ICD-10-CM

## 2024-06-03 DIAGNOSIS — N18.4 TYPE 2 DIABETES MELLITUS WITH STAGE 4 CHRONIC KIDNEY DISEASE, WITHOUT LONG-TERM CURRENT USE OF INSULIN (HCC): ICD-10-CM

## 2024-06-03 DIAGNOSIS — I50.32 DIASTOLIC CHF, CHRONIC (HCC): ICD-10-CM

## 2024-06-03 DIAGNOSIS — E11.22 TYPE 2 DIABETES MELLITUS WITH STAGE 4 CHRONIC KIDNEY DISEASE, WITHOUT LONG-TERM CURRENT USE OF INSULIN (HCC): ICD-10-CM

## 2024-06-03 DIAGNOSIS — I50.32 CHRONIC DIASTOLIC HEART FAILURE (HCC): ICD-10-CM

## 2024-06-03 PROBLEM — I82.402 ACUTE DEEP VEIN THROMBOSIS (DVT) OF LEFT LOWER EXTREMITY, UNSPECIFIED VEIN (HCC): Status: RESOLVED | Noted: 2023-08-30 | Resolved: 2024-06-03

## 2024-06-03 PROBLEM — Z71.89 ADVANCED DIRECTIVES, COUNSELING/DISCUSSION: Status: RESOLVED | Noted: 2017-05-04 | Resolved: 2024-06-03

## 2024-06-03 PROBLEM — D62 ACUTE BLOOD LOSS ANEMIA: Status: RESOLVED | Noted: 2023-08-09 | Resolved: 2024-06-03

## 2024-06-03 PROBLEM — I82.432 ACUTE DEEP VEIN THROMBOSIS (DVT) OF POPLITEAL VEIN OF LEFT LOWER EXTREMITY (HCC): Status: RESOLVED | Noted: 2023-08-30 | Resolved: 2024-06-03

## 2024-06-03 PROBLEM — Z71.89 ADVANCE CARE PLANNING: Status: RESOLVED | Noted: 2018-07-25 | Resolved: 2024-06-03

## 2024-06-03 PROBLEM — R79.89 ELEVATED TROPONIN: Status: RESOLVED | Noted: 2019-09-01 | Resolved: 2024-06-03

## 2024-06-03 PROBLEM — D64.9 ACUTE ON CHRONIC ANEMIA: Status: RESOLVED | Noted: 2023-08-28 | Resolved: 2024-06-03

## 2024-06-03 NOTE — CARE COORDINATION
Ambulatory Care Coordination Note     6/3/2024 10:06 AM     patient outreach attempt by this ACM today to perform care management follow up . ACM was unable to reach the patient by telephone today; voicemail full and unable to leave a message.      ACM: Adolfo Jackson RN

## 2024-06-03 NOTE — PROGRESS NOTES
Estela Garcia, 89 y.o.,  female    SUBJECTIVE  Ff-up ED right clavicular fracture    ED visit reviewed presented with after a fall in the bathroom CT showing right comminuted nondisplaced clavicular fracture.  Has seen Ortho recommending Tylenol.  She is now residing with her goddaughter Zeina Vallejo [phone] and god grand daughter Estephania Flores [phone] and reports home is safe and suitable for their needs.    HTN/CKD 4 GFR .   She continues to take hydralazine, norvasc and as needed Bumex.  With iron and Epogen she is following dr. CAROL cardona nephrology,reviewed note not a candidate for dialysis.  Monitoring    Asymptomatic bradycardia/aortic regurgitation- She has h/o AV block and advised against BB.  Following cardiology Dr. Belle     DM w/ CKD 3 and neuorpathy-  diet controlled, She Reports FBG  1teens.    HL-on pravachol     GERD w/ hx GI bleed requiring admission 9/2023,EGD showing proximal gastritis and dieulafoy lesion s/p bicap treatment. Currently protonix BID, pt reports no pain no longer with bleeding, she is off anticoag for hx DVT. She says she is following dr. Sims     Anemia- ACD/iron def, hgb 9-10's, hx acute GI bleeding sept 2023, now off anticoag, she is following ZEINA SERRATO:  See HPI, all others negative        Patient Active Problem List   Diagnosis Code    Essential hypertension I10    Mixed hyperlipidemia E78.2    Advanced directives, counseling/discussion Z71.89    Controlled type 2 diabetes mellitus with stage 3 chronic kidney disease, without long-term current use of insulin ([facility]) E11.22, N18.3         Current Outpatient Medications:     hydrALAZINE (APRESOLINE) 100 MG tablet, TAKE 1 TABLET BY MOUTH THREE TIMES DAILY, Disp: 270 tablet, Rfl: 1    ferrous sulfate (SV IRON) 325 (65 Fe) MG tablet, TAKE 1 TABLET BY MOUTH ONCE DAILY IN THE MORNING BEFORE BREAKFAST, Disp: 90 tablet, Rfl: 1    pantoprazole (PROTONIX) 40 MG tablet, TAKE 1 TABLET BY MOUTH TWICE DAILY BEFORE MEAL(S),

## 2024-06-10 ENCOUNTER — CARE COORDINATION (OUTPATIENT)
Facility: CLINIC | Age: 89
End: 2024-06-10

## 2024-06-10 SDOH — ECONOMIC STABILITY: TRANSPORTATION INSECURITY
IN THE PAST 12 MONTHS, HAS THE LACK OF TRANSPORTATION KEPT YOU FROM MEDICAL APPOINTMENTS OR FROM GETTING MEDICATIONS?: NO

## 2024-06-10 SDOH — ECONOMIC STABILITY: TRANSPORTATION INSECURITY
IN THE PAST 12 MONTHS, HAS LACK OF TRANSPORTATION KEPT YOU FROM MEETINGS, WORK, OR FROM GETTING THINGS NEEDED FOR DAILY LIVING?: NO

## 2024-06-10 NOTE — CARE COORDINATION
No     Lack of Transportation (Non-Medical): No   Physical Activity: Insufficiently Active (10/24/2023)    Exercise Vital Sign     Days of Exercise per Week: 3 days     Minutes of Exercise per Session: 20 min   Stress: No Stress Concern Present (1/8/2024)    [employer] [place] [employer] - Occupational Stress Questionnaire     Feeling of Stress : Only a little   Social Connections: Feeling Socially Integrated (9/25/2023)    OASIS : Social Isolation     Frequency of experiencing loneliness or isolation: Never   Intimate Partner Violence: Not At Risk (1/8/2024)    Humiliation, Afraid, Rape, and Kick questionnaire     Fear of Current or Ex-Partner: No     Emotionally Abused: No     Physically Abused: No     Sexually Abused: No   Depression: Not at risk (4/23/2024)    PHQ-2     PHQ-2 Score: 0   Housing Stability: Low Risk  (2/21/2024)    Housing Stability Vital Sign     Unable to Pay for Housing in the Last Year: No     Number of Places Lived in the Last Year: 1     Unstable Housing in the Last Year: No   Interpersonal Safety: Not At Risk (1/8/2024)    Humiliation, Afraid, Rape, and Kick questionnaire     Fear of Current or Ex-Partner: No     Emotionally Abused: No     Physically Abused: No     Sexually Abused: No   Utilities: Not At Risk (1/8/2024)    [facility] Utilities     Threatened with loss of utilities: No        Medications Reviewed:   Patient denies any changes with medications and reports taking all medications as prescribed.    Advance Care Planning:   Reviewed and current     Care Planning:    Goals Addressed                   This Visit's Progress     Conditions and Symptoms   On track     I will schedule office visits, as directed by my provider.  I will keep my appointment or reschedule if I have to cancel.  I will notify my provider of any barriers to my plan of care.  I will notify my provider of any symptoms that indicate a worsening of my condition.    Barriers: none  Plan for overcoming my

## 2024-06-10 NOTE — CARE COORDINATION
Ambulatory Care Coordination Note     6/10/2024 9:33 AM     Patient and family outreach attempt by this ACM today to perform care management follow up . ACM was unable to reach the patient by telephone today; voicemail full and unable to leave a message.      ACM: Adolfo Jackson RN

## 2024-06-13 ENCOUNTER — OFFICE VISIT (OUTPATIENT)
Age: 89
End: 2024-06-13
Payer: COMMERCIAL

## 2024-06-13 DIAGNOSIS — S42.034A CLOSED NONDISPLACED FRACTURE OF ACROMIAL END OF RIGHT CLAVICLE, INITIAL ENCOUNTER: Primary | ICD-10-CM

## 2024-06-13 PROCEDURE — 99212 OFFICE O/P EST SF 10 MIN: CPT | Performed by: ORTHOPAEDIC SURGERY

## 2024-06-13 PROCEDURE — 1123F ACP DISCUSS/DSCN MKR DOCD: CPT | Performed by: ORTHOPAEDIC SURGERY

## 2024-06-13 PROCEDURE — G8427 DOCREV CUR MEDS BY ELIG CLIN: HCPCS | Performed by: ORTHOPAEDIC SURGERY

## 2024-06-13 PROCEDURE — 1036F TOBACCO NON-USER: CPT | Performed by: ORTHOPAEDIC SURGERY

## 2024-06-13 PROCEDURE — G8417 CALC BMI ABV UP PARAM F/U: HCPCS | Performed by: ORTHOPAEDIC SURGERY

## 2024-06-13 PROCEDURE — 1090F PRES/ABSN URINE INCON ASSESS: CPT | Performed by: ORTHOPAEDIC SURGERY

## 2024-06-13 NOTE — PROGRESS NOTES
clavicle, initial encounter  S42.034A            PLAN:   1. Patient presented with right shoulder pain following a fall. CT demonstrated a comminuted right clavicle fracture at the sternoclavicular joint. Patient is doing very well and has improvement in her symptoms. Continue HEP as tolerated and avoid heavy lifting. Continue with Tylenol 1000 mg Q6H PRN for pain. We will see her back as needed. Advised to return if pain worsens.  Risk factors include: DM, CKD, anemia, gout, CHF   2. No cortisone injection indicated today   3. No Physical/Occupational Therapy indicated today  4. No diagnostic test indicated today:   5. No durable medical equipment indicated today  6. No referral indicated today   7. No medications indicated today:   8. No Narcotic indicated today     RTC PRN, return if pain worsens    By signing my name below, I SCOTT Paredes, attest that this documentation has been prepared under the direction and in the presence of Jose L Hill MD  Electronically signed: SCOTT Paredes. 6/13/2024 9:43 AM EDT    I, Jose L Hill MD, personally performed the services described in this documentation. I have authorized the scribe to complete the medical record entries input within this chart. I have reviewed the chart and agree that the record reflects my personal performance and is accurate and complete. [Electronically Signed: Jose L Hill MD. 6/13/2024 9:43 AM EDT]      Jose L Hill M.D.   [facility]

## 2024-06-17 ENCOUNTER — CARE COORDINATION (OUTPATIENT)
Facility: CLINIC | Age: 89
End: 2024-06-17

## 2024-06-17 NOTE — CARE COORDINATION
Ambulatory Care Coordination Note     6/17/2024 10:41 AM     family, Dalia Vu  outreach attempt by this AC today to perform care management follow up . ACM was unable to reach the family,    by telephone today; left voice message requesting a return phone call to this ACM.     ACM: Adolfo Jackson RN

## 2024-06-18 ENCOUNTER — CARE COORDINATION (OUTPATIENT)
Facility: CLINIC | Age: 89
End: 2024-06-18

## 2024-06-18 NOTE — CARE COORDINATION
LUCERO received referral from PRIETO Kwok requesting to contact patient siddharthajoshua Dalia Vu. Ms. Vu is requesting SW phone call. SW will contact Ms. Vu at number provided.     Selena Weston LMSW  Care Management

## 2024-06-18 NOTE — CARE COORDINATION
Ambulatory Care Coordination Note     2024 9:24 AM     Patient Current Location:  Home: [address] [place] [id number]     ACM contacted the family, Niece Dalia Vu, (PHI form verified; on file) by telephone. Verified name and  with family as identifiers.         ACM: Adolfo Jackson RN     Challenges to be reviewed by the provider   Additional needs identified to be addressed with provider No  none               Method of communication with provider: none.    Care Summary Note:     1. Hx of DM2, CKD III, neuropathy, hyperuricemia, htn, LLE, gout, anemia, aortic regurg, dCHF, HLD  2. Recent fall and clavicle fx.  3. Now staying w/ god dtr.  4. Per niece doing ok.  5. Niece requests contact w/ MSW. Relayed to MSW.  6. No other questions/issues at this time.    Offered patient enrollment in the Remote Patient Monitoring (RPM) program for in-home monitoring: Patient is not eligible for RPM program because: patient does not have qualifying diagnosis.     Assessments Completed:   General Assessment    Do you have any symptoms that are causing concern?: No          Medications Reviewed:   Patient denies any changes with medications and reports taking all medications as prescribed.    Advance Care Planning:   Reviewed and current     Care Planning:    Goals Addressed                   This Visit's Progress     Conditions and Symptoms   On track     I will schedule office visits, as directed by my provider.  I will keep my appointment or reschedule if I have to cancel.  I will notify my provider of any barriers to my plan of care.  I will notify my provider of any symptoms that indicate a worsening of my condition.    Barriers: none  Plan for overcoming my barriers: N/A  Confidence: 10/10  Anticipated Goal Completion Date: 24         Medication Management   On track     I will take my medication as directed.  I will notify my provider of any problems with medications, like adverse effects or

## 2024-06-19 ENCOUNTER — CARE COORDINATION (OUTPATIENT)
Facility: CLINIC | Age: 89
End: 2024-06-19

## 2024-06-19 NOTE — CARE COORDINATION
MAIRSW received voicemail and email from patient FABBY reno. Ms. Vu is requesting Medicaid application for patient. Patient was previously denied for LTSS screening and Medicaid. Per voicemail, patient condition has changed and requiring additional personal care support.     SW attempted to reach Ms. Vu at cell and work number provided. Left voice message.   PREETI sent confidential email with Medicaid application attached and phone number to [facility] to request LTSS screening.     SW attempted to reach [facility] however office is closed due to holiday.     OLGA Mistry  Care Management

## 2024-06-28 ENCOUNTER — CARE COORDINATION (OUTPATIENT)
Facility: CLINIC | Age: 89
End: 2024-06-28

## 2024-06-28 NOTE — CARE COORDINATION
LUCERO completed outreach with patient niece, Dalia Vu. Patient is known to  with previous engagement. Permission to speak with niece on applying for benefits and care in home.     Per niece, she is in agreement with  services again. Mrs. Vu states both patient and spouse temporarily reside with family members and are getting 24 hour assistance and support.     Mrs. Vu has applied for LT Medicaid and requested LTSS/UAI screenings. SW left a message with [facility] for screening dates. [facility] Medicaid worker assigned to case is Ms. DIMITRI Aguilar. [phone] ext [phone]. Niece updated with information to contact benefits worker.     SW will continue to support family and patient.    OLGA Mistry  Care Management

## 2024-07-09 ENCOUNTER — CARE COORDINATION (OUTPATIENT)
Facility: CLINIC | Age: 89
End: 2024-07-09

## 2024-07-09 NOTE — CARE COORDINATION
Ambulatory Care Coordination Note     7/9/2024 9:56 AM     Zeina Purcell  outreach attempt by this ACM today to perform care management follow up . ACM was unable to reach the family,    by telephone today; left voice message requesting a return phone call to this ACM.     ACM: Adolfo Jackson RN

## 2024-07-24 ENCOUNTER — CARE COORDINATION (OUTPATIENT)
Facility: CLINIC | Age: 89
End: 2024-07-24

## 2024-07-24 NOTE — CARE COORDINATION
9:30 am  Mrs. Vu responded by email stating she has not heard from Medicaid worker, Ms. Aguilar. PREETI left message for Ms. Aguilar and supervisor, Ms. Moyer requesting to call family or SW back to assist with Medicaid application.     PREETI offered additional resources & recommendations via email: Joshua ESPARZA with link to review program and contact information, [facility] Services [facility], [facility], follow up with PCP regarding possible home health with aide, [employer] resources for transportation and [employer] device.     PREETI explained each program, barriers and most programs have cost and eligibility requirements. Offered to provide private pay personal care listing.     Plan:  Support patient and family with connecting to Medicaid worker and continue to provide support with resources.                9:00 am  Attempted to contact patient niece, Dalia Vu, for Complex Care Management. No answer, left message and provided contact information. PREETI also sent confidential email to Mrs. Vu for update. Per last outreach, Mrs. Vu was reapplying for LTC Medicaid and completing LTSS/UAI screening.       Will attempt contact at a later time.

## 2024-07-31 ENCOUNTER — CARE COORDINATION (OUTPATIENT)
Facility: CLINIC | Age: 89
End: 2024-07-31

## 2024-07-31 NOTE — CARE COORDINATION
Follow Up Social Work Note - Ambulatory  7/31/2024        LMSW confirmed with patient niece, Ms. Dalia Vu, regarding Medicaid application.     Mrs. Vu confirmed she spoke with Ms. Aguilar at [facility] and determination is pending. SW explained if faced with barriers, please contact SW for assistance and review additional resources from [employer], [employer] and [employer] for support.    Mrs. Vu understood and will continue to engage with SW.     Plan:  Support patient and family with connecting to Medicaid worker and continue to provide support with resources.     Selena Weston LMSW  Care Management

## 2024-08-12 ENCOUNTER — CARE COORDINATION (OUTPATIENT)
Facility: CLINIC | Age: 89
End: 2024-08-12

## 2024-08-12 NOTE — CARE COORDINATION
medications.  I will notify my provider for advice before I stop taking any of my medication.    Barriers: none  Plan for overcoming my barriers: N/A  Confidence: 10/10  Anticipated Goal Completion Date: 8/7/24       Reduce Risk of Hospitalization   On track     I will take appropriate steps to reduce my risk of Hospitalization to include:  Take all of medications as prescribed  Visit w/ all of my recommended specialists.  Visit w/ my PCP as recommended.  Avoid activities that can trigger my chronic conditions.    Barriers: none  Plan for overcoming my barriers: N/A  Confidence: 10/10  Anticipated Goal Completion Date: 8/7/24                 PCP/Specialist follow up:   Future Appointments         Provider Specialty Dept Phone    9/23/2024 11:15 AM Dunia Malik MD Family Medicine [phone]    10/21/2024 11:00 AM Farhana Muse APRN - NP Cardiology [phone]    4/25/2025 11:20 AM Kiko Belle MD Cardiology [phone]            Follow Up:   Plan for next ACM outreach in approximately 2 weeks to complete:  - goal progression.   Caregiver is agreeable to this plan.

## 2024-08-30 ENCOUNTER — CARE COORDINATION (OUTPATIENT)
Facility: CLINIC | Age: 89
End: 2024-08-30

## 2024-08-30 NOTE — CARE COORDINATION
10/10  Anticipated Goal Completion Date: 8/7/24       COMPLETED: Reduce Risk of Hospitalization        I will take appropriate steps to reduce my risk of Hospitalization to include:  Take all of medications as prescribed  Visit w/ all of my recommended specialists.  Visit w/ my PCP as recommended.  Avoid activities that can trigger my chronic conditions.    Barriers: none  Plan for overcoming my barriers: N/A  Confidence: 10/10  Anticipated Goal Completion Date: 8/7/24                 PCP/Specialist follow up:   Future Appointments         Provider Specialty Dept Phone    9/23/2024 11:15 AM Dunia Malik MD Family Medicine [phone]    10/21/2024 11:00 AM Farhana Muse APRN - NP Cardiology [phone]    4/25/2025 11:20 AM Kiko Belle MD Cardiology [phone]            Follow Up:   No further Ambulatory Care Management follow-up scheduled at this time.  Caregiver has Ambulatory Care Manager's contact information for any further questions, concerns or needs.

## 2024-09-23 ENCOUNTER — OFFICE VISIT (OUTPATIENT)
Facility: CLINIC | Age: 89
End: 2024-09-23
Payer: MEDICARE

## 2024-09-23 VITALS
TEMPERATURE: 97.3 F | OXYGEN SATURATION: 99 % | WEIGHT: 133 LBS | HEART RATE: 70 BPM | HEIGHT: 59 IN | RESPIRATION RATE: 16 BRPM | BODY MASS INDEX: 26.81 KG/M2 | DIASTOLIC BLOOD PRESSURE: 78 MMHG | SYSTOLIC BLOOD PRESSURE: 138 MMHG

## 2024-09-23 DIAGNOSIS — N18.4 TYPE 2 DIABETES MELLITUS WITH STAGE 4 CHRONIC KIDNEY DISEASE, WITHOUT LONG-TERM CURRENT USE OF INSULIN (HCC): ICD-10-CM

## 2024-09-23 DIAGNOSIS — I50.32 CHRONIC DIASTOLIC HEART FAILURE (HCC): ICD-10-CM

## 2024-09-23 DIAGNOSIS — R54 FRAIL ELDERLY: ICD-10-CM

## 2024-09-23 DIAGNOSIS — E78.2 MIXED HYPERLIPIDEMIA: ICD-10-CM

## 2024-09-23 DIAGNOSIS — E11.9 DIET-CONTROLLED TYPE 2 DIABETES MELLITUS (HCC): ICD-10-CM

## 2024-09-23 DIAGNOSIS — R60.0 LEG EDEMA: ICD-10-CM

## 2024-09-23 DIAGNOSIS — I10 ESSENTIAL HYPERTENSION: Primary | ICD-10-CM

## 2024-09-23 DIAGNOSIS — I50.32 DIASTOLIC CHF, CHRONIC (HCC): ICD-10-CM

## 2024-09-23 DIAGNOSIS — E11.22 TYPE 2 DIABETES MELLITUS WITH STAGE 4 CHRONIC KIDNEY DISEASE, WITHOUT LONG-TERM CURRENT USE OF INSULIN (HCC): ICD-10-CM

## 2024-09-23 PROCEDURE — 1090F PRES/ABSN URINE INCON ASSESS: CPT | Performed by: FAMILY MEDICINE

## 2024-09-23 PROCEDURE — 1036F TOBACCO NON-USER: CPT | Performed by: FAMILY MEDICINE

## 2024-09-23 PROCEDURE — G8417 CALC BMI ABV UP PARAM F/U: HCPCS | Performed by: FAMILY MEDICINE

## 2024-09-23 PROCEDURE — G8427 DOCREV CUR MEDS BY ELIG CLIN: HCPCS | Performed by: FAMILY MEDICINE

## 2024-09-23 PROCEDURE — 3044F HG A1C LEVEL LT 7.0%: CPT | Performed by: FAMILY MEDICINE

## 2024-09-23 PROCEDURE — 99215 OFFICE O/P EST HI 40 MIN: CPT | Performed by: FAMILY MEDICINE

## 2024-09-23 PROCEDURE — 1123F ACP DISCUSS/DSCN MKR DOCD: CPT | Performed by: FAMILY MEDICINE

## 2024-09-23 RX ORDER — AMLODIPINE BESYLATE 10 MG/1
10 TABLET ORAL DAILY
Qty: 90 TABLET | Refills: 3 | Status: CANCELLED | OUTPATIENT
Start: 2024-09-23

## 2024-09-23 RX ORDER — ACETAMINOPHEN 325 MG/1
650 TABLET ORAL EVERY 6 HOURS PRN
Qty: 60 TABLET | Refills: 0 | Status: SHIPPED | OUTPATIENT
Start: 2024-09-23 | End: 2024-09-23 | Stop reason: SDUPTHER

## 2024-09-23 RX ORDER — HYDRALAZINE HYDROCHLORIDE 100 MG/1
100 TABLET, FILM COATED ORAL 3 TIMES DAILY
Qty: 270 TABLET | Refills: 1 | Status: SHIPPED | OUTPATIENT
Start: 2024-09-23

## 2024-09-23 RX ORDER — FERROUS SULFATE 325(65) MG
TABLET ORAL
Qty: 90 TABLET | Refills: 1 | Status: CANCELLED | OUTPATIENT
Start: 2024-09-23

## 2024-09-23 RX ORDER — LATANOPROST 50 UG/ML
1 SOLUTION/ DROPS OPHTHALMIC DAILY
Status: CANCELLED | OUTPATIENT
Start: 2024-09-23

## 2024-09-23 RX ORDER — PRAVASTATIN SODIUM 20 MG
20 TABLET ORAL NIGHTLY
Qty: 90 TABLET | Refills: 3 | Status: SHIPPED | OUTPATIENT
Start: 2024-09-23 | End: 2024-09-23 | Stop reason: SDUPTHER

## 2024-09-23 RX ORDER — PRAVASTATIN SODIUM 20 MG
20 TABLET ORAL NIGHTLY
Qty: 90 TABLET | Refills: 3 | Status: SHIPPED | OUTPATIENT
Start: 2024-09-23

## 2024-09-23 RX ORDER — HYDRALAZINE HYDROCHLORIDE 100 MG/1
100 TABLET, FILM COATED ORAL 3 TIMES DAILY
Qty: 270 TABLET | Refills: 1 | Status: CANCELLED | OUTPATIENT
Start: 2024-09-23

## 2024-09-23 RX ORDER — ACETAMINOPHEN 325 MG/1
650 TABLET ORAL EVERY 6 HOURS PRN
Qty: 60 TABLET | Refills: 0 | Status: SHIPPED | OUTPATIENT
Start: 2024-09-23

## 2024-09-23 RX ORDER — AMLODIPINE BESYLATE 10 MG/1
10 TABLET ORAL DAILY
Qty: 90 TABLET | Refills: 3 | Status: SHIPPED | OUTPATIENT
Start: 2024-09-23

## 2024-09-23 RX ORDER — AMOXICILLIN 500 MG
1 CAPSULE ORAL EVERY MORNING
Status: CANCELLED | OUTPATIENT
Start: 2024-09-23

## 2024-09-23 RX ORDER — PANTOPRAZOLE SODIUM 40 MG/1
TABLET, DELAYED RELEASE ORAL
Qty: 180 TABLET | Refills: 0 | Status: CANCELLED | OUTPATIENT
Start: 2024-09-23

## 2024-09-23 RX ORDER — DORZOLAMIDE HYDROCHLORIDE AND TIMOLOL MALEATE 20; 5 MG/ML; MG/ML
1 SOLUTION/ DROPS OPHTHALMIC EVERY MORNING
Status: CANCELLED | OUTPATIENT
Start: 2024-09-23

## 2024-09-23 RX ORDER — BUMETANIDE 0.5 MG/1
0.5 TABLET ORAL DAILY
Qty: 30 TABLET | Status: CANCELLED | OUTPATIENT
Start: 2024-09-23

## 2024-09-23 RX ORDER — POTASSIUM CHLORIDE 1500 MG/1
20 TABLET, EXTENDED RELEASE ORAL DAILY
Qty: 60 TABLET | Status: CANCELLED | OUTPATIENT
Start: 2024-09-23

## 2024-09-30 ENCOUNTER — CARE COORDINATION (OUTPATIENT)
Facility: CLINIC | Age: 89
End: 2024-09-30

## 2024-09-30 NOTE — TELEPHONE ENCOUNTER
This patient contacted office for the following prescriptions to be filled:    Medication requested :   ferrous sulfate (SV IRON) 325 (65 Fe) MG tablet  Qty 90     pantoprazole (PROTONIX) 40 MG tablet  QTY 90   PCP:  King  Pharmacy or Print:  [facility]   Mail order or Local pharmacy T[facility]     Scheduled appointment if not seen by current providers in office: LOV 9/23/2024 irish 11/5/2024

## 2024-09-30 NOTE — CARE COORDINATION
Ambulatory Care Coordination Note     2024 11:03 AM     Patient Current Location:  Home: [address] [place] [id number]     This patient was received as a referral from Provider.    ACM contacted the family by telephone. Verified name and  with family as identifiers. Spoke to Zeina Vallejo, verbal permission from patient to speak to Zeina. Provided introduction to self, and explanation of the ACM role.   Family accepted care management services at this time.          ACM: Adolfo Jackson RN     Challenges to be reviewed by the provider   Additional needs identified to be addressed with provider No  none               Method of communication with provider: none.    Has the patient been seen in the ED since your last call? no    Care Summary Note:     1. Hx of DM2, CKD III, neuropathy, hyperuricemia, htn, LLE, gout, anemia, aortic regurg, dCHF, HLD  2. Per niece doing ok.  3. Of note, at recent PCP appt, medications ordered to [facility] vs [facility].  Family picked up medications at the [facility], but will need a new order to [facility] @   4. No other questions/issues at this time.    Offered patient enrollment in the Remote Patient Monitoring (RPM) program for in-home monitoring: Patient is not eligible for RPM program because: patient does not have qualifying diagnosis.     Assessments Completed:   Ambulatory Care Coordination Assessment    Care Coordination Protocol  Referral from Primary Care Provider: Yes  Week 1 - Initial Assessment     Do you have all of your prescriptions and are they filled?: Yes  Barriers to medication adherence: None  Are you able to afford your medications?: Yes  How often do you have trouble taking your medications the way you have been told to take them?: I always take them as prescribed.     Do you have Home O2 Therapy?: No      Is patient able to live independently?: Yes     Current Housing: Private Residence  Who do you live with?: Partner/Spouse/SO  Are you an

## 2024-10-01 ENCOUNTER — CARE COORDINATION (OUTPATIENT)
Facility: CLINIC | Age: 89
End: 2024-10-01

## 2024-10-01 RX ORDER — PANTOPRAZOLE SODIUM 40 MG/1
40 TABLET, DELAYED RELEASE ORAL DAILY
Qty: 90 TABLET | Refills: 3 | Status: SHIPPED | OUTPATIENT
Start: 2024-10-01

## 2024-10-01 RX ORDER — FERROUS SULFATE 325(65) MG
325 TABLET ORAL
Qty: 90 TABLET | Refills: 1 | Status: SHIPPED | OUTPATIENT
Start: 2024-10-01

## 2024-10-01 NOTE — CARE COORDINATION
hydrALAZINE (APRESOLINE) 100 MG tablet Take 1 tablet by mouth 3 times daily  270 tablet 1    ferrous sulfate (SV IRON) 325 (65 Fe) MG tablet TAKE 1 TABLET BY MOUTH ONCE DAILY IN THE MORNING BEFORE BREAKFAST  90 tablet 1    pantoprazole (PROTONIX) 40 MG tablet TAKE 1 TABLET BY MOUTH TWICE DAILY BEFORE MEAL(S)  180 tablet 0    Omega-3 Fatty Acids (FISH OIL) 1200 MG CAPS Take 1,200 mg by mouth every morning       Cholecalciferol (VITAMIN D) 10 MCG (400 UNIT) CAPS Capsule Take 1 capsule by mouth daily       dorzolamide-timolol (COSOPT) 22.3-6.8 MG/ML ophthalmic solution Place 1 drop into the left eye every morning       latanoprost (XALATAN) 0.005 % ophthalmic solution Place 1 drop into both eyes daily       bumetanide (BUMEX) 0.5 MG tablet Take 1 tablet by mouth daily (Patient not taking: Reported on 6/3/2024) 6/3/2024: Taking them sometimes      potassium chloride (KLOR-CON M) 20 MEQ extended release tablet Take 1 tablet by mouth daily (Patient not taking: Reported on 10/1/2024)        No current facility-administered medications for this visit.       Advance Care Planning:   Not reviewed during this call     Care Planning:    Goals Addressed                   This Visit's Progress     Conditions and Symptoms   On track     I will schedule office visits, as directed by my provider.  I will keep my appointment or reschedule if I have to cancel.  I will notify my provider of any barriers to my plan of care.  I will notify my provider of any symptoms that indicate a worsening of my condition.    Barriers: none  Plan for overcoming my barriers: N/A  Confidence: 10/10  Anticipated Goal Completion Date: 12/30/24         Medication Management   On track     I will take my medication as directed.  I will notify my provider of any problems with medications, like adverse effects or side effects.  I will notify my provider/Care Coordinator if I am unable to afford my medications.  I will notify my provider for advice before I

## 2024-10-11 ENCOUNTER — CARE COORDINATION (OUTPATIENT)
Facility: CLINIC | Age: 89
End: 2024-10-11

## 2024-10-11 NOTE — CARE COORDINATION
Ambulatory Care Coordination Note     10/11/2024 10:38 AM     Zeina Purcell  outreach attempt by this ACM today to perform care management follow up . ACM was unable to reach the family,    by telephone today; left voice message requesting a return phone call to this ACM.     ACM: Adolfo Jackson RN

## 2024-10-16 ENCOUNTER — CARE COORDINATION (OUTPATIENT)
Facility: CLINIC | Age: 89
End: 2024-10-16

## 2024-10-16 NOTE — CARE COORDINATION
Ambulatory Care Coordination Note     10/16/2024 9:29 AM     Family, Zeina  outreach attempt by this ACM today to perform care management follow up . ACM was unable to reach the family,    by telephone today; left voice message requesting a return phone call to this ACM.     ACM: Adolfo Jackson RN

## 2024-10-21 ENCOUNTER — CARE COORDINATION (OUTPATIENT)
Facility: CLINIC | Age: 89
End: 2024-10-21

## 2024-10-21 NOTE — CARE COORDINATION
Ambulatory Care Coordination Note     10/21/2024 3:47 PM     Patient Current Location:  Home: [address] [place] [id number]     Zeina Purcell,  contacted the ACM by telephone. Verified name and  with ACM as identifiers. MANE verified and on file.          ACM: Adolfo Jackson RN     Challenges to be reviewed by the provider   Additional needs identified to be addressed with provider No  none               Method of communication with provider: none.    Has the patient been seen in the ED since your last call? no    Care Summary Note:     1. Hx of DM2, CKD III, neuropathy, hyperuricemia, htn, LLE, gout, anemia, aortic regurg, dCHF, HLD  2. Niece called stating that they were going to miss the patient's card appt later today. Assisted w/ reschedule for next week.  3. Also verified they new all upcoming appts.  4. No other questions/issues at this time.    Offered patient enrollment in the Remote Patient Monitoring (RPM) program for in-home monitoring: Patient is not eligible for RPM program because: patient does not have qualifying diagnosis.     Assessments Completed:   General Assessment    Do you have any symptoms that are causing concern?: No          Medications Reviewed:   Patient denies any changes with medications and reports taking all medications as prescribed.    Advance Care Planning:   Not reviewed during this call     Care Planning:    Goals Addressed                   This Visit's Progress     Conditions and Symptoms   On track     I will schedule office visits, as directed by my provider.  I will keep my appointment or reschedule if I have to cancel.  I will notify my provider of any barriers to my plan of care.  I will notify my provider of any symptoms that indicate a worsening of my condition.    Barriers: none  Plan for overcoming my barriers: N/A  Confidence: 10/10  Anticipated Goal Completion Date: 24         Medication Management   On track     I will take my medication as

## 2024-10-29 ENCOUNTER — CARE COORDINATION (OUTPATIENT)
Facility: CLINIC | Age: 89
End: 2024-10-29

## 2024-10-29 NOTE — CARE COORDINATION
Ambulatory Care Coordination Note     10/29/2024 9:25 AM     Patient Current Location:  Home: [address] [place] [id number]     ACM contacted the family by telephone. Verified name and  with family as identifiers. Spoke to god dtr, Zeina Vallejo. MANE verified and on file.          ACM: Adolfo Jackson RN     Challenges to be reviewed by the provider   Additional needs identified to be addressed with provider No  none               Method of communication with provider: none.    Has the patient been seen in the ED since your last call? no    Care Summary Note:     1. Hx of DM2, CKD III, neuropathy, hyperuricemia, htn, LLE, gout, anemia, aortic regurg, dCHF, HLD  2. God dtr states had to miss yesterday's Card appt. States had car trouble.  Reports had called and rescheduled, but no appt noted in Epic. Assisted w/ reschedule.  3. States pt doing well otherwise  4. No other questions/issues at this time.    Offered patient enrollment in the Remote Patient Monitoring (RPM) program for in-home monitoring: Patient is not eligible for RPM program because: patient does not have qualifying diagnosis.     Assessments Completed:   General Assessment    Do you have any symptoms that are causing concern?: No      ,   Social Determinants of Health     Tobacco Use: Low Risk  (2024)    Patient History     Smoking Tobacco Use: Never     Smokeless Tobacco Use: Never     Passive Exposure: Not on file   Alcohol Use: Not At Risk (10/24/2023)    AUDIT-C     Frequency of Alcohol Consumption: Never     Average Number of Drinks: Patient does not drink     Frequency of Binge Drinking: Never   Financial Resource Strain: Low Risk  (2024)    Overall Financial Resource Strain (CARDIA)     Difficulty of Paying Living Expenses: Not hard at all   Food Insecurity: No Food Insecurity (2024)    Hunger Vital Sign     Worried About Running Out of Food in the Last Year: Never true     Ran Out of Food in the Last Year:

## 2024-11-04 NOTE — PROGRESS NOTES
Estela Garcia presents today for a 6 month check-up.  She was last seen in the office by Dr. Belle on 4/23/24 and during that visit, no changes were made to her medications.    She is feeling well and was accompanied by a family member.  She offers no cardiac complaints.    She is a 89 year old female with history of essential hypertension, type 2 diabetes mellitus,  dyslipidemia, GI bleed and anemia, DVT.  She was found to have aortic insufficiency on an echocardiogram back in 2018.  She was hospitalized at the beginning of September 2019 for worsening renal failure and nausea and vomiting.  She underwent a repeat echocardiogram which showed preserved LV systolic function, EF 56-60%, grade 1 diastolic dysfunction, biatrial enlargement, mild to moderate aortic insufficiency, which is relatively unchanged compared to her prior study.  In 2022, she was found to have significant bradycardia with heart rates in the 30s so she wore a 7-day event monitor in May 2022 which demonstrated an asymptomatic sinus bradycardia with an average heart rate of 43 bpm, range from 26 to 85 bpm.  She had 1 prolonged pauses lasting 3 seconds during sleeping hours.    She was hospitalized from 8/29/23 through 8/31/23 for an acute DVT of the left lower extremity.  During that hospital stay, she was on a heparin drip and an IVC filter was placed on 8/30/23.  Prior to that brief hospitalization, she was hospitalized from 8/9/23 through 8/11/23 for a GI bleed and anemia (Hgb 6.7) requiring a transfusion.  An EGD was performed and she was found to have proximal gastritis and a Deulafoy lesion of proximal body and is s/p BiCAP treatment.  She was placed on chronic PPI therapy.     On 2/12/24, she underwent an upper extremity duplex study that was negative for DVT.       Estela Garcia is a 89 y.o. female presents today for :  Chief Complaint   Patient presents with    Follow-up     6 months       PMH:  Past Medical History:   Diagnosis

## 2024-11-05 ENCOUNTER — OFFICE VISIT (OUTPATIENT)
Facility: CLINIC | Age: 89
End: 2024-11-05

## 2024-11-05 VITALS
WEIGHT: 134.2 LBS | HEART RATE: 51 BPM | DIASTOLIC BLOOD PRESSURE: 80 MMHG | SYSTOLIC BLOOD PRESSURE: 139 MMHG | BODY MASS INDEX: 27.05 KG/M2 | OXYGEN SATURATION: 100 % | TEMPERATURE: 97.7 F | HEIGHT: 59 IN | RESPIRATION RATE: 16 BRPM

## 2024-11-05 DIAGNOSIS — Z00.00 MEDICARE ANNUAL WELLNESS VISIT, SUBSEQUENT: ICD-10-CM

## 2024-11-05 DIAGNOSIS — I50.32 DIASTOLIC CHF, CHRONIC (HCC): ICD-10-CM

## 2024-11-05 DIAGNOSIS — I10 ESSENTIAL HYPERTENSION: ICD-10-CM

## 2024-11-05 DIAGNOSIS — E11.22 TYPE 2 DIABETES MELLITUS WITH STAGE 4 CHRONIC KIDNEY DISEASE, WITHOUT LONG-TERM CURRENT USE OF INSULIN (HCC): ICD-10-CM

## 2024-11-05 DIAGNOSIS — E11.9 DIET-CONTROLLED TYPE 2 DIABETES MELLITUS (HCC): ICD-10-CM

## 2024-11-05 DIAGNOSIS — Z23 NEEDS FLU SHOT: Primary | ICD-10-CM

## 2024-11-05 DIAGNOSIS — N18.4 CKD (CHRONIC KIDNEY DISEASE) STAGE 4, GFR 15-29 ML/MIN (HCC): ICD-10-CM

## 2024-11-05 DIAGNOSIS — N18.4 TYPE 2 DIABETES MELLITUS WITH STAGE 4 CHRONIC KIDNEY DISEASE, WITHOUT LONG-TERM CURRENT USE OF INSULIN (HCC): ICD-10-CM

## 2024-11-05 DIAGNOSIS — I50.32 CHRONIC DIASTOLIC HEART FAILURE (HCC): ICD-10-CM

## 2024-11-05 DIAGNOSIS — E78.2 MIXED HYPERLIPIDEMIA: ICD-10-CM

## 2024-11-05 DIAGNOSIS — D63.8 ANEMIA OF CHRONIC DISEASE: ICD-10-CM

## 2024-11-05 RX ORDER — PRAVASTATIN SODIUM 20 MG
20 TABLET ORAL NIGHTLY
Qty: 90 TABLET | Refills: 3 | Status: SHIPPED | OUTPATIENT
Start: 2024-11-05

## 2024-11-05 RX ORDER — POTASSIUM CHLORIDE 1500 MG/1
20 TABLET, EXTENDED RELEASE ORAL DAILY
Qty: 60 TABLET | Status: CANCELLED | OUTPATIENT
Start: 2024-11-05

## 2024-11-05 RX ORDER — DORZOLAMIDE HYDROCHLORIDE AND TIMOLOL MALEATE 20; 5 MG/ML; MG/ML
1 SOLUTION/ DROPS OPHTHALMIC EVERY MORNING
Status: CANCELLED | OUTPATIENT
Start: 2024-11-05

## 2024-11-05 RX ORDER — FERROUS SULFATE 325(65) MG
325 TABLET ORAL
Qty: 90 TABLET | Refills: 1 | Status: SHIPPED | OUTPATIENT
Start: 2024-11-05

## 2024-11-05 ASSESSMENT — PATIENT HEALTH QUESTIONNAIRE - PHQ9
2. FEELING DOWN, DEPRESSED OR HOPELESS: SEVERAL DAYS
SUM OF ALL RESPONSES TO PHQ QUESTIONS 1-9: 1
1. LITTLE INTEREST OR PLEASURE IN DOING THINGS: NOT AT ALL
SUM OF ALL RESPONSES TO PHQ QUESTIONS 1-9: 1
SUM OF ALL RESPONSES TO PHQ9 QUESTIONS 1 & 2: 1

## 2024-11-05 NOTE — PROGRESS NOTES
Medicare Annual Wellness Visit    Estela Garcia is here for No chief complaint on file.    Assessment & Plan   Needs flu shot  -     Influenza, FLUAD Trivalent, (age 65 y+), IM, Preservative Free, 0.5mL  Medicare annual wellness visit, subsequent    Recommendations for Preventive Services Due: see orders and patient instructions/AVS.  Recommended screening schedule for the next 5-10 years is provided to the patient in written form: see Patient Instructions/AVS.     No follow-ups on file.     Subjective       Patient's complete Health Risk Assessment and screening values have been reviewed and are found in Flowsheets. The following problems were reviewed today and where indicated follow up appointments were made and/or referrals ordered.    Positive Risk Factor Screenings with Interventions:    Fall Risk:  Do you feel unsteady or are you worried about falling? : (!) yes  2 or more falls in past year?: (!) yes  Fall with injury in past year?: (!) yes (shoulder injury)     Interventions:    Reviewed medications, home hazards, visual acuity, and co-morbidities that can increase risk for falls  See AVS for additional education material                                   Objective   Vitals:    11/05/24 1045   BP: 139/80   Site: Left Upper Arm   Position: Sitting   Cuff Size: Large Adult   Pulse: 51   Resp: 16   Temp: 97.7 °F (36.5 °C)   TempSrc: Temporal   SpO2: 100%   Weight: 60.9 kg (134 lb 3.2 oz)   Height: 1.499 m (4' 11\")      Body mass index is 27.11 kg/m².                  No Known Allergies  Prior to Visit Medications    Medication Sig Taking? Authorizing Provider   ferrous sulfate (SV IRON) 325 (65 Fe) MG tablet Take 1 tablet by mouth daily (with breakfast) Yes Dunia Malik MD   acetaminophen (TYLENOL) 325 MG tablet Take 2 tablets by mouth every 6 hours as needed for Pain Yes Dunia Malik MD   amLODIPine (NORVASC) 10 MG tablet Take 1 tablet by mouth daily Yes Dunia Malik MD   pravastatin

## 2024-11-05 NOTE — PATIENT INSTRUCTIONS
PLEASE FOLLOW UP WITH ALL YOUR SPECIALISTS  1) Dr. Maury Pfeiffer- nephrology  2) Dr. Fuentes- Hematology  3) Dr. Sims- Gastroenterology  Already has an appt with cardiology 11/13  Preventing Falls: Care Instructions  Injuries and health problems such as trouble walking or poor eyesight can increase your risk of falling. So can some medicines. But there are things you can do to help prevent falls. You can exercise to get stronger. You can also arrange your home to make it safer.    Talk to your doctor about the medicines you take. Ask if any of them increase the risk of falls and whether they can be changed or stopped.   Try to exercise regularly. It can help improve your strength and balance. This can help lower your risk of falling.         Practice fall safety and prevention.   Wear low-heeled shoes that fit well and give your feet good support. Talk to your doctor if you have foot problems that make this hard.  Carry a cellphone or wear a medical alert device that you can use to call for help.  Use stepladders instead of chairs to reach high objects. Don't climb if you're at risk for falls. Ask for help, if needed.  Wear the correct eyeglasses, if you need them.        Make your home safer.   Remove rugs, cords, clutter, and furniture from walkways.  Keep your house well lit. Use night-lights in hallways and bathrooms.  Install and use sturdy handrails on stairways.  Wear nonskid footwear, even inside. Don't walk barefoot or in socks without shoes.        Be safe outside.   Use handrails, curb cuts, and ramps whenever possible.  Keep your hands free by using a shoulder bag or backpack.  Try to walk in well-lit areas. Watch out for uneven ground, changes in pavement, and debris.  Be careful in the winter. Walk on the grass or gravel when sidewalks are slippery. Use de-icer on steps and walkways. Add non-slip devices to shoes.    Put grab bars and nonskid mats in your shower or tub and near the toilet. Try to use

## 2024-11-06 ENCOUNTER — CARE COORDINATION (OUTPATIENT)
Facility: CLINIC | Age: 89
End: 2024-11-06

## 2024-11-06 NOTE — CARE COORDINATION
Ambulatory Care Coordination Note     11/6/2024 2:51 PM     Zeina Purcell  outreach attempt by this ACM today to perform care management follow up . ACM was unable to reach the family,    by telephone today; left voice message requesting a return phone call to this ACM.     ACM: Adolfo Jackson RN

## 2024-11-13 ENCOUNTER — OFFICE VISIT (OUTPATIENT)
Age: 89
End: 2024-11-13
Payer: MEDICARE

## 2024-11-13 VITALS
OXYGEN SATURATION: 99 % | BODY MASS INDEX: 27.01 KG/M2 | SYSTOLIC BLOOD PRESSURE: 138 MMHG | HEART RATE: 62 BPM | HEIGHT: 59 IN | DIASTOLIC BLOOD PRESSURE: 76 MMHG | WEIGHT: 134 LBS

## 2024-11-13 DIAGNOSIS — E78.5 HYPERLIPIDEMIA, UNSPECIFIED HYPERLIPIDEMIA TYPE: ICD-10-CM

## 2024-11-13 DIAGNOSIS — N18.30 STAGE 3 CHRONIC KIDNEY DISEASE, UNSPECIFIED WHETHER STAGE 3A OR 3B CKD (HCC): ICD-10-CM

## 2024-11-13 DIAGNOSIS — I10 ESSENTIAL HYPERTENSION: Primary | ICD-10-CM

## 2024-11-13 DIAGNOSIS — R00.1 BRADYCARDIA: ICD-10-CM

## 2024-11-13 DIAGNOSIS — I50.32 DIASTOLIC CHF, CHRONIC (HCC): ICD-10-CM

## 2024-11-13 PROCEDURE — 1126F AMNT PAIN NOTED NONE PRSNT: CPT | Performed by: NURSE PRACTITIONER

## 2024-11-13 PROCEDURE — 1160F RVW MEDS BY RX/DR IN RCRD: CPT | Performed by: NURSE PRACTITIONER

## 2024-11-13 PROCEDURE — 1090F PRES/ABSN URINE INCON ASSESS: CPT | Performed by: NURSE PRACTITIONER

## 2024-11-13 PROCEDURE — 1159F MED LIST DOCD IN RCRD: CPT | Performed by: NURSE PRACTITIONER

## 2024-11-13 PROCEDURE — G8482 FLU IMMUNIZE ORDER/ADMIN: HCPCS | Performed by: NURSE PRACTITIONER

## 2024-11-13 PROCEDURE — 1123F ACP DISCUSS/DSCN MKR DOCD: CPT | Performed by: NURSE PRACTITIONER

## 2024-11-13 PROCEDURE — 99213 OFFICE O/P EST LOW 20 MIN: CPT | Performed by: NURSE PRACTITIONER

## 2024-11-13 PROCEDURE — 1036F TOBACCO NON-USER: CPT | Performed by: NURSE PRACTITIONER

## 2024-11-13 PROCEDURE — G8417 CALC BMI ABV UP PARAM F/U: HCPCS | Performed by: NURSE PRACTITIONER

## 2024-11-13 PROCEDURE — G8427 DOCREV CUR MEDS BY ELIG CLIN: HCPCS | Performed by: NURSE PRACTITIONER

## 2024-11-13 PROCEDURE — 93000 ELECTROCARDIOGRAM COMPLETE: CPT | Performed by: NURSE PRACTITIONER

## 2024-11-13 ASSESSMENT — ANXIETY QUESTIONNAIRES
4. TROUBLE RELAXING: NOT AT ALL
6. BECOMING EASILY ANNOYED OR IRRITABLE: NOT AT ALL
IF YOU CHECKED OFF ANY PROBLEMS ON THIS QUESTIONNAIRE, HOW DIFFICULT HAVE THESE PROBLEMS MADE IT FOR YOU TO DO YOUR WORK, TAKE CARE OF THINGS AT HOME, OR GET ALONG WITH OTHER PEOPLE: NOT DIFFICULT AT ALL
7. FEELING AFRAID AS IF SOMETHING AWFUL MIGHT HAPPEN: NOT AT ALL
GAD7 TOTAL SCORE: 0
1. FEELING NERVOUS, ANXIOUS, OR ON EDGE: NOT AT ALL
5. BEING SO RESTLESS THAT IT IS HARD TO SIT STILL: NOT AT ALL
3. WORRYING TOO MUCH ABOUT DIFFERENT THINGS: NOT AT ALL
2. NOT BEING ABLE TO STOP OR CONTROL WORRYING: NOT AT ALL

## 2024-11-13 ASSESSMENT — PATIENT HEALTH QUESTIONNAIRE - PHQ9
2. FEELING DOWN, DEPRESSED OR HOPELESS: NOT AT ALL
SUM OF ALL RESPONSES TO PHQ QUESTIONS 1-9: 0
SUM OF ALL RESPONSES TO PHQ9 QUESTIONS 1 & 2: 0
1. LITTLE INTEREST OR PLEASURE IN DOING THINGS: NOT AT ALL

## 2024-11-13 ASSESSMENT — ENCOUNTER SYMPTOMS: CONSTIPATION: 1

## 2024-11-13 NOTE — PROGRESS NOTES
Estela Garcia presents today for   Chief Complaint   Patient presents with    Follow-up     6 months       Estela Garcia preferred language for health care discussion is english/other.    Is someone accompanying this pt? yes    Is the patient using any DME equipment during OV? yes    Depression Screening:  Depression: Not at risk (11/13/2024)    PHQ-2     PHQ-2 Score: 0        Learning Assessment:  No question data found.       Pt currently taking Anticoagulant therapy? no    Pt currently taking Antiplatelet therapy ? no      Coordination of Care:  1. Have you been to the ER, urgent care clinic since your last visit? Hospitalized since your last visit? no    2. Have you seen or consulted any other health care providers outside of the [facility] since your last visit? Include any pap smears or colon screening. no

## 2024-11-18 ENCOUNTER — CARE COORDINATION (OUTPATIENT)
Facility: CLINIC | Age: 89
End: 2024-11-18

## 2024-11-18 RX ORDER — POTASSIUM CHLORIDE 1500 MG/1
20 TABLET, EXTENDED RELEASE ORAL DAILY
Qty: 90 TABLET | Refills: 3 | Status: SHIPPED | OUTPATIENT
Start: 2024-11-18

## 2024-11-18 RX ORDER — BUMETANIDE 0.5 MG/1
0.5 TABLET ORAL DAILY
Qty: 90 TABLET | Refills: 3 | Status: SHIPPED | OUTPATIENT
Start: 2024-11-18

## 2024-11-18 NOTE — CARE COORDINATION
Ambulatory Care Coordination Note     2024 11:04 AM     Patient Current Location:  Home: [address] [place] [id number]     ACM contacted the family by telephone. Verified name and  with family as identifiers. Spoke to god dtr, Zeina Guybrigidlaura. MANE verified and on file.          ACM: Adolfo Jackson RN     Challenges to be reviewed by the provider   Additional needs identified to be addressed with provider No  none               Method of communication with provider: none.    Has the patient been seen in the ED since your last call? no    Care Summary Note:     1. Hx of DM2, CKD III, neuropathy, hyperuricemia, htn, LLE, gout, anemia, aortic regurg, dCHF, HLD  2. God dtr states pt doing well  3. Reports issues w/ medications.  States need for renewal of Kdur and Bumex.  Relayed to cards office, they will message provider.  Also states was unable to refill the pt's hydralazine.  Per pharmacy, can refill after .  Relayed back to Gdtr.  4. No other questions/issues at this time.    Offered patient enrollment in the Remote Patient Monitoring (RPM) program for in-home monitoring: Patient is not eligible for RPM program because: patient does not have qualifying diagnosis.     Assessments Completed:   General Assessment    Do you have any symptoms that are causing concern?: No       Medications Reviewed:   Patient denies any changes with medications and reports taking all medications as prescribed.    Advance Care Planning:   Reviewed and current     Care Planning:    Goals Addressed                   This Visit's Progress     Conditions and Symptoms   On track     I will schedule office visits, as directed by my provider.  I will keep my appointment or reschedule if I have to cancel.  I will notify my provider of any barriers to my plan of care.  I will notify my provider of any symptoms that indicate a worsening of my condition.    Barriers: none  Plan for overcoming my barriers: N/A  Confidence:

## 2024-11-21 ENCOUNTER — HOSPITAL ENCOUNTER (OUTPATIENT)
Facility: HOSPITAL | Age: 89
Discharge: HOME | End: 2024-11-21
Payer: MEDICARE

## 2024-11-21 DIAGNOSIS — N18.4 TYPE 2 DIABETES MELLITUS WITH STAGE 4 CHRONIC KIDNEY DISEASE, WITHOUT LONG-TERM CURRENT USE OF INSULIN (HCC): ICD-10-CM

## 2024-11-21 DIAGNOSIS — E11.22 TYPE 2 DIABETES MELLITUS WITH STAGE 4 CHRONIC KIDNEY DISEASE, WITHOUT LONG-TERM CURRENT USE OF INSULIN (HCC): ICD-10-CM

## 2024-11-21 LAB
ALBUMIN SERPL-MCNC: 3.9 G/DL (ref 3.4–5)
ALBUMIN/GLOB SERPL: 1 (ref 0.8–1.7)
ALP SERPL-CCNC: 49 U/L (ref 45–117)
ALT SERPL-CCNC: 14 U/L (ref 13–56)
ANION GAP SERPL CALC-SCNC: 9 MMOL/L (ref 3–18)
AST SERPL-CCNC: 23 U/L (ref 10–38)
BASOPHILS # BLD: 0 K/UL (ref 0–0.1)
BASOPHILS NFR BLD: 1 % (ref 0–2)
BILIRUB SERPL-MCNC: 0.7 MG/DL (ref 0.2–1)
BUN SERPL-MCNC: 72 MG/DL (ref 7–18)
BUN/CREAT SERPL: 30 (ref 12–20)
CALCIUM SERPL-MCNC: 10.1 MG/DL (ref 8.5–10.1)
CHLORIDE SERPL-SCNC: 103 MMOL/L (ref 100–111)
CO2 SERPL-SCNC: 25 MMOL/L (ref 21–32)
CREAT SERPL-MCNC: 2.43 MG/DL (ref 0.6–1.3)
DIFFERENTIAL METHOD BLD: ABNORMAL
EOSINOPHIL # BLD: 0.1 K/UL (ref 0–0.4)
EOSINOPHIL NFR BLD: 2 % (ref 0–5)
ERYTHROCYTE [DISTWIDTH] IN BLOOD BY AUTOMATED COUNT: 14.1 % (ref 11.6–14.5)
EST. AVERAGE GLUCOSE BLD GHB EST-MCNC: 105 MG/DL
GLOBULIN SER CALC-MCNC: 4.1 G/DL (ref 2–4)
GLUCOSE SERPL-MCNC: 110 MG/DL (ref 74–99)
HBA1C MFR BLD: 5.3 % (ref 4.2–5.6)
HCT VFR BLD AUTO: 32.5 % (ref 35–45)
HGB BLD-MCNC: 10.1 G/DL (ref 12–16)
IMM GRANULOCYTES # BLD AUTO: 0 K/UL (ref 0–0.04)
IMM GRANULOCYTES NFR BLD AUTO: 0 % (ref 0–0.5)
LYMPHOCYTES # BLD: 1.8 K/UL (ref 0.9–3.6)
LYMPHOCYTES NFR BLD: 36 % (ref 21–52)
MCH RBC QN AUTO: 29 PG (ref 24–34)
MCHC RBC AUTO-ENTMCNC: 31.1 G/DL (ref 31–37)
MCV RBC AUTO: 93.4 FL (ref 78–100)
MONOCYTES # BLD: 0.3 K/UL (ref 0.05–1.2)
MONOCYTES NFR BLD: 7 % (ref 3–10)
NEUTS SEG # BLD: 2.9 K/UL (ref 1.8–8)
NEUTS SEG NFR BLD: 55 % (ref 40–73)
NRBC # BLD: 0 K/UL (ref 0–0.01)
NRBC BLD-RTO: 0 PER 100 WBC
PLATELET # BLD AUTO: 267 K/UL (ref 135–420)
PMV BLD AUTO: 10.1 FL (ref 9.2–11.8)
POTASSIUM SERPL-SCNC: 3.8 MMOL/L (ref 3.5–5.5)
PROT SERPL-MCNC: 8 G/DL (ref 6.4–8.2)
RBC # BLD AUTO: 3.48 M/UL (ref 4.2–5.3)
SODIUM SERPL-SCNC: 137 MMOL/L (ref 136–145)
WBC # BLD AUTO: 5.2 K/UL (ref 4.6–13.2)

## 2024-11-21 PROCEDURE — 85025 COMPLETE CBC W/AUTO DIFF WBC: CPT

## 2024-11-21 PROCEDURE — 83036 HEMOGLOBIN GLYCOSYLATED A1C: CPT

## 2024-11-21 PROCEDURE — 36415 COLL VENOUS BLD VENIPUNCTURE: CPT

## 2024-11-21 PROCEDURE — 80053 COMPREHEN METABOLIC PANEL: CPT

## 2024-12-04 ENCOUNTER — CARE COORDINATION (OUTPATIENT)
Facility: CLINIC | Age: 88
End: 2024-12-04

## 2024-12-04 NOTE — CARE COORDINATION
Ambulatory Care Coordination Note     12/4/2024 10:35 AM     Family, Zeina  outreach attempt by this ACM today to perform care management follow up . ACM was unable to reach the family,    by telephone today; left voice message requesting a return phone call to this ACM.     ACM: Adolfo Jackson RN

## 2024-12-13 ENCOUNTER — CARE COORDINATION (OUTPATIENT)
Facility: CLINIC | Age: 88
End: 2024-12-13

## 2024-12-13 NOTE — CARE COORDINATION
Ambulatory Care Coordination Note     2024 10:50 AM     Patient Current Location:  Home: [address] [place] [id number]     ACM contacted the family by telephone. Verified name and  with family as identifiers. Spoke to god dtr, Zeina Vallejo. MANE verified and on file.       ACM: Adolfo Jackson RN     Challenges to be reviewed by the provider   Additional needs identified to be addressed with provider No  none               Method of communication with provider: none.    Has the patient been seen in the ED since your last call? no    Care Summary Note:     1. Hx of DM2, CKD III, neuropathy, hyperuricemia, htn, LLE, gout, anemia, aortic regurg, dCHF, HLD  2. [facility] dtr states pt doing well  3. Reports no issues at this time.  4. No other questions/issues at this time.    Offered patient enrollment in the Remote Patient Monitoring (RPM) program for in-home monitoring: Yes, but did not enroll at this time: controlled chronic disease management.     Assessments Completed:   General Assessment    Do you have any symptoms that are causing concern?: No       Medications Reviewed:   Patient denies any changes with medications and reports taking all medications as prescribed.    Advance Care Planning:   Reviewed and current     Care Planning:    Goals Addressed                   This Visit's Progress     Conditions and Symptoms   On track     I will schedule office visits, as directed by my provider.  I will keep my appointment or reschedule if I have to cancel.  I will notify my provider of any barriers to my plan of care.  I will notify my provider of any symptoms that indicate a worsening of my condition.    Barriers: none  Plan for overcoming my barriers: N/A  Confidence: 10/10  Anticipated Goal Completion Date: 24         Medication Management   On track     I will take my medication as directed.  I will notify my provider of any problems with medications, like adverse effects or side effects.  I

## 2024-12-20 ENCOUNTER — CARE COORDINATION (OUTPATIENT)
Facility: CLINIC | Age: 88
End: 2024-12-20

## 2024-12-20 NOTE — CARE COORDINATION
Ambulatory Care Coordination Note     2024 9:27 AM     Patient Current Location:  Home: [address] [place] [id number]     ACM contacted the family by telephone. Verified name and  with family as identifiers. Spoke to god dtr, Zeina Vallejo. MANE verified and on file.       ACM: Adolfo Jackson RN     Challenges to be reviewed by the provider   Additional needs identified to be addressed with provider No  none               Method of communication with provider: none.    Has the patient been seen in the ED since your last call? no    Care Summary Note:     1. Hx of DM2, CKD III, neuropathy, hyperuricemia, htn, LLE, gout, anemia, aortic regurg, dCHF, HLD  2. God dtr states pt doing well  3. Reports no issues at this time.  4. Reminded family to ensure they bring all medicine bottles to next PCP visit. Family states understanding.  4. No other questions/issues at this time.    Offered patient enrollment in the Remote Patient Monitoring (RPM) program for in-home monitoring: Yes, but did not enroll at this time: controlled chronic disease management.     Assessments Completed:   General Assessment    Do you have any symptoms that are causing concern?: No       Medications Reviewed:   Patient denies any changes with medications and reports taking all medications as prescribed.    Advance Care Planning:   Reviewed during previous call      Care Planning:    Goals Addressed                   This Visit's Progress     Conditions and Symptoms   On track     I will schedule office visits, as directed by my provider.  I will keep my appointment or reschedule if I have to cancel.  I will notify my provider of any barriers to my plan of care.  I will notify my provider of any symptoms that indicate a worsening of my condition.    Barriers: none  Plan for overcoming my barriers: N/A  Confidence: 10/10  Anticipated Goal Completion Date: 24         Medication Management   On track     I will take my

## 2024-12-30 ENCOUNTER — CARE COORDINATION (OUTPATIENT)
Facility: CLINIC | Age: 88
End: 2024-12-30

## 2024-12-30 NOTE — CARE COORDINATION
Ambulatory Care Coordination Note     2024 10:58 AM     Patient Current Location:  Home: [address] [place] [id number]     Zeina Purcell   contacted the ACM by telephone. Verified name and  with family as identifiers.     Patient graduated from the High Risk Care Management program on 2024.  Family verbalizes confidence in the ability to self-manage at this time..  Care management goals have been completed. No further Ambulatory Care Manager follow up scheduled.      ACM: Adolfo Jackson RN     Care Summary Note:     Debora called seeking clarification of upcoming dates and times of upcoming appts. States doing ok otherwise.    Care Planning:    Goals Addressed                   This Visit's Progress     COMPLETED: Conditions and Symptoms        I will schedule office visits, as directed by my provider.  I will keep my appointment or reschedule if I have to cancel.  I will notify my provider of any barriers to my plan of care.  I will notify my provider of any symptoms that indicate a worsening of my condition.    Barriers: none  Plan for overcoming my barriers: N/A  Confidence: 10/10  Anticipated Goal Completion Date: 24         COMPLETED: Medication Management        I will take my medication as directed.  I will notify my provider of any problems with medications, like adverse effects or side effects.  I will notify my provider/Care Coordinator if I am unable to afford my medications.  I will notify my provider for advice before I stop taking any of my medication.    Barriers: none  Plan for overcoming my barriers: N/A  Confidence: 10/10  Anticipated Goal Completion Date: 24       COMPLETED: Reduce Risk of Hospitalization        I will take appropriate steps to reduce my risk of Hospitalization to include:  Take all of medications as prescribed  Visit w/ all of my recommended specialists.  Visit w/ my PCP as recommended.  Avoid activities that can trigger my chronic

## 2025-01-02 ENCOUNTER — CARE COORDINATION (OUTPATIENT)
Facility: CLINIC | Age: 89
End: 2025-01-02

## 2025-01-02 ENCOUNTER — TELEPHONE (OUTPATIENT)
Facility: CLINIC | Age: 89
End: 2025-01-02

## 2025-01-02 RX ORDER — METHYLPREDNISOLONE 4 MG/1
TABLET ORAL
Qty: 1 KIT | Refills: 0 | Status: SHIPPED | OUTPATIENT
Start: 2025-01-02 | End: 2025-01-02 | Stop reason: SDUPTHER

## 2025-01-02 RX ORDER — METHYLPREDNISOLONE 4 MG/1
TABLET ORAL
Qty: 1 KIT | Refills: 0 | Status: SHIPPED | OUTPATIENT
Start: 2025-01-02 | End: 2025-01-08

## 2025-01-02 RX ORDER — LIDOCAINE 50 MG/G
1 PATCH TOPICAL DAILY
Qty: 30 PATCH | Refills: 0 | Status: SHIPPED | OUTPATIENT
Start: 2025-01-02 | End: 2025-02-01

## 2025-01-02 RX ORDER — LIDOCAINE 50 MG/G
1 PATCH TOPICAL DAILY
Qty: 30 PATCH | Refills: 0 | Status: SHIPPED | OUTPATIENT
Start: 2025-01-02 | End: 2025-01-02 | Stop reason: SDUPTHER

## 2025-01-02 NOTE — CARE COORDINATION
Ambulatory Care Coordination Note     2025 9:14 AM     Patient Current Location:  Home: [address] [place] [address]     Family, Zeina Vallejo  contacted the caregiver by telephone. Verified name and  with patient as identifiers.     Care management goals have been completed. No further Ambulatory Care Manager follow up scheduled.      ACM: Adolfo Jackson RN     Challenges to be reviewed by the provider   Additional needs identified to be addressed with provider Yes  medications-Caregiver, Zeina, states the patient has been having back pain x 3 days.  No falls, no known injuries.  Pain located mid lower back. Non radiating.  Has had this issue in the past.  Has been taking tylenol w/o relief.  Zeina is asking for something else to help with the pain.               Method of communication with provider: chart routing.

## 2025-01-02 NOTE — CARE COORDINATION
Ambulatory Care Coordination Note     2025 12:25 PM     Patient Current Location:  Home: [address] [place] [address]     Family, Zeina Vallejo  contacted the caregiver by telephone. Verified name and  with patient as identifiers.     Care Summary:    PCP placed some medications for patient. Relayed to Zeina with instructions, however, meds sent to wrong pharmacy, PCP and staff notified.    Care management goals have been completed. No further Ambulatory Care Manager follow up scheduled.      ACM: Adolfo Jackson RN     Method of communication with provider: chart routing.

## 2025-01-02 NOTE — TELEPHONE ENCOUNTER
Called Zeina Vallejo [phone]   Patient identified with 2 identifiers (name and ).  Ms. Vallejo is aware that the medications for patient has been sent to [facility] pharmacy [facility].     
Patients 3 medications have been sent to [facility] on [address] [facility].  Medrol dose chrissie lidocaine patches and voltaren gel.   
thin liquid/puree

## 2025-01-14 ENCOUNTER — CARE COORDINATION (OUTPATIENT)
Facility: CLINIC | Age: 89
End: 2025-01-14

## 2025-01-14 NOTE — CARE COORDINATION
Ambulatory Care Coordination Note     2025 11:34 AM     Patient Current Location:  Home: [address] [place] [id number]     Family, Zeina Vallejo  contacted the caregiver by telephone. Verified name and  with patient as identifiers.     Care Summary:    Caregiver requested renewal of pt's prescription for Hydralazine 100 mg TID.  Requests medication to be sent to their [facility] pharmacy on [address] in [place], [place].  Relayed to PCP's office.    Care management goals have been completed. No further Ambulatory Care Manager follow up scheduled.      ACM: Adolfo Jackson RN     Method of communication with provider: face to face.

## 2025-01-27 ENCOUNTER — CARE COORDINATION (OUTPATIENT)
Facility: CLINIC | Age: 89
End: 2025-01-27

## 2025-01-27 NOTE — CARE COORDINATION
Ambulatory Care Coordination Note     2025 9:15 AM     Patient Current Location:  Home: [address] [place] [address]     This patient was received as a referral from Provider.    ACM contacted the family by telephone. Verified name and  with family as identifiers. Provided introduction to self, and explanation of the ACM role.  Spoke to pt's niece, Zeina Vallejo. MANE verified and on file.  Family accepted care management services at this time.          ACM: Adolfo Jackson RN     Challenges to be reviewed by the provider   Additional needs identified to be addressed with provider No  none               Method of communication with provider: none.    Utilization: Initial Call - N/A    Care Summary Note:     1. Hx of DM2, CKD III, neuropathy, hyperuricemia, htn, LLE, gout, anemia, aortic regurg, dCHF, HLD  2. HRCM per PCP  3. Niece states pt doing well at the moment.  4. With plan to move pt's  to a jail, per Niece, pt does not want to go at this time and states would prefer to remain home.  5. Niece states non need for MSW at this time, but may need a new referral in the future.  6. No other questions/issues at this time.    Offered patient enrollment in the Remote Patient Monitoring (RPM) program for in-home monitoring: Yes, but did not enroll at this time: controlled chronic disease management.     Assessments Completed:   Ambulatory Care Coordination Assessment    Care Coordination Protocol  Referral from Primary Care Provider: Yes  Week 1 - Initial Assessment     Do you have all of your prescriptions and are they filled?: Yes  Barriers to medication adherence: None  Are you able to afford your medications?: Yes  How often do you have trouble taking your medications the way you have been told to take them?: I always take them as prescribed.     Do you have Home O2 Therapy?: No      Ability to seek help/take action for Emergent Urgent situations i.e. fire, crime, inclement weather or

## 2025-02-19 ENCOUNTER — CARE COORDINATION (OUTPATIENT)
Facility: CLINIC | Age: 89
End: 2025-02-19

## 2025-02-19 NOTE — CARE COORDINATION
Ambulatory Care Coordination Note     2025 10:40 AM     Patient Current Location:  Home: [address] [place] [address]     ACM contacted the family by telephone. Verified name and  with family as identifiers. Spoke to pt's niece, Zeina Vallejo. MANE verified and on file.        ACM: Adolfo Jackson RN     Challenges to be reviewed by the provider   Additional needs identified to be addressed with provider No  none               Method of communication with provider: none.    Utilization: Has the patient been seen in the ED since your last call? no    Care Summary Note:     1. Hx of DM2, CKD III, neuropathy, hyperuricemia, htn, LLE, gout, anemia, aortic regurg, dCHF, HLD  2. HRCM per PCP  3. Niece states pt doing well at the moment.  4. Aware of upcoming appts.  6. No other questions/issues at this time.    Offered patient enrollment in the Remote Patient Monitoring (RPM) program for in-home monitoring: Yes, but did not enroll at this time: controlled chronic disease management.     Assessments Completed:   General Assessment    Do you have any symptoms that are causing concern?: No      ,   Social Determinants of Health     Tobacco Use: Low Risk  (2024)    Patient History     Smoking Tobacco Use: Never     Smokeless Tobacco Use: Never     Passive Exposure: Not on file   Alcohol Use: Not At Risk (10/24/2023)    AUDIT-C     Frequency of Alcohol Consumption: Never     Average Number of Drinks: Patient does not drink     Frequency of Binge Drinking: Never   Financial Resource Strain: Low Risk  (2024)    Overall Financial Resource Strain (CARDIA)     Difficulty of Paying Living Expenses: Not hard at all   Food Insecurity: No Food Insecurity (2024)    Hunger Vital Sign     Worried About Running Out of Food in the Last Year: Never true     Ran Out of Food in the Last Year: Never true   Transportation Needs: No Transportation Needs (6/10/2024)    PRAPARE - Transportation     Lack of

## 2025-02-26 ENCOUNTER — CARE COORDINATION (OUTPATIENT)
Facility: CLINIC | Age: 89
End: 2025-02-26

## 2025-02-26 NOTE — CARE COORDINATION
Ambulatory Care Coordination Note     2/26/2025 10:32 AM     Zeina Purcell  outreach attempt by this ACM today to perform care management follow up . ACM was unable to reach the family,    by telephone today; left voice message requesting a return phone call to this ACM.     ACM: Adolfo Jackson RN

## 2025-02-27 ENCOUNTER — CARE COORDINATION (OUTPATIENT)
Facility: CLINIC | Age: 89
End: 2025-02-27

## 2025-02-27 NOTE — CARE COORDINATION
Ambulatory Care Coordination Note     2025 1:05 PM     Patient Current Location:  Home: [address] [place] [address]     ACM contacted the family by telephone. Verified name and  with family as identifiers. Spoke to pt's niece, Zeina Vallejo. MANE verified and on file.        ACM: Adolfo Jackson RN     Challenges to be reviewed by the provider   Additional needs identified to be addressed with provider No  none               Method of communication with provider: none.    Utilization: Has the patient been seen in the ED since your last call? no    Care Summary Note:     1. Hx of DM2, CKD III, neuropathy, hyperuricemia, htn, LLE, gout, anemia, aortic regurg, dCHF, HLD  2. HRCM per PCP  3. Niece states pt doing well at the moment.  4. Upcoming neph appt next week.  6. No other questions/issues at this time.    Offered patient enrollment in the Remote Patient Monitoring (RPM) program for in-home monitoring: Yes, but did not enroll at this time: controlled chronic disease management.     Assessments Completed:   General Assessment    Do you have any symptoms that are causing concern?: No       Medications Reviewed:   Patient denies any changes with medications and reports taking all medications as prescribed.    Advance Care Planning:   Reviewed during previous call      Care Planning:    Goals Addressed                   This Visit's Progress     Conditions and Symptoms   On track     I will schedule office visits, as directed by my provider.  I will keep my appointment or reschedule if I have to cancel.  I will notify my provider of any barriers to my plan of care.  I will notify my provider of any symptoms that indicate a worsening of my condition.    Barriers: none  Plan for overcoming my barriers: N/A  Confidence: 10/10  Anticipated Goal Completion Date: 25         Medication Management   On track     I will take my medication as directed.  I will notify my provider of any problems with

## 2025-03-03 ENCOUNTER — CARE COORDINATION (OUTPATIENT)
Facility: CLINIC | Age: 89
End: 2025-03-03

## 2025-03-03 NOTE — CARE COORDINATION
adverse effects or side effects.  I will notify my provider/Care Coordinator if I am unable to afford my medications.  I will notify my provider for advice before I stop taking any of my medication.    Barriers: none  Plan for overcoming my barriers: N/A  Confidence: 10/10  Anticipated Goal Completion Date: 4/27/25       Reduce Risk of Hospitalization   On track     I will take appropriate steps to reduce my risk of Hospitalization to include:  Take all of medications as prescribed  Visit w/ all of my recommended specialists.  Visit w/ my PCP as recommended.  Avoid activities that can trigger my chronic conditions.    Barriers: none  Plan for overcoming my barriers: N/A  Confidence: 10/10  Anticipated Goal Completion Date: 4/27/25                 PCP/Specialist follow up:   Future Appointments         Provider Specialty Dept Phone    4/14/2025 1:15 PM Dunia Malik MD Family Medicine [phone]    4/25/2025 11:20 AM Kiko Belle MD Cardiology [phone]            Follow Up:   Plan for next AC outreach in approximately 1 week to complete:  - medication review   - goal progression.   Caregiver is agreeable to this plan.

## 2025-03-10 ENCOUNTER — CARE COORDINATION (OUTPATIENT)
Facility: CLINIC | Age: 89
End: 2025-03-10

## 2025-03-10 NOTE — CARE COORDINATION
Ambulatory Care Coordination Note     3/10/2025 9:26 AM     Patient Current Location:  Home: [address] [place] [id number]     ACM contacted the family by telephone. Verified name and  with family as identifiers. Spoke to pt's niece, Zeina Vallejo. MANE verified and on file.        ACM: Adolfo Jackson RN     Challenges to be reviewed by the provider   Additional needs identified to be addressed with provider No  none               Method of communication with provider: none.    Utilization: Has the patient been seen in the ED since your last call? no    Care Summary Note:     1. Hx of DM2, CKD III, neuropathy, hyperuricemia, htn, LLE, gout, anemia, aortic regurg, dCHF, HLD  2. HRCM per PCP  3. Niece states pt doing well  4. Family states had a good visit w/ neph.  6. Family states no other questions/issues at this time.    Offered patient enrollment in the Remote Patient Monitoring (RPM) program for in-home monitoring: Yes, but did not enroll at this time: controlled chronic disease management.     Assessments Completed:   General Assessment    Do you have any symptoms that are causing concern?: No       Medications Reviewed:   Patient denies any changes with medications and reports taking all medications as prescribed.    Advance Care Planning:   Reviewed during previous call      Care Planning:    Goals Addressed                   This Visit's Progress     Conditions and Symptoms   On track     I will schedule office visits, as directed by my provider.  I will keep my appointment or reschedule if I have to cancel.  I will notify my provider of any barriers to my plan of care.  I will notify my provider of any symptoms that indicate a worsening of my condition.    Barriers: none  Plan for overcoming my barriers: N/A  Confidence: 10/10  Anticipated Goal Completion Date: 25         Medication Management   On track     I will take my medication as directed.  I will notify my provider of any

## 2025-03-17 ENCOUNTER — CARE COORDINATION (OUTPATIENT)
Facility: CLINIC | Age: 89
End: 2025-03-17

## 2025-03-17 NOTE — CARE COORDINATION
like adverse effects or side effects.  I will notify my provider/Care Coordinator if I am unable to afford my medications.  I will notify my provider for advice before I stop taking any of my medication.    Barriers: none  Plan for overcoming my barriers: N/A  Confidence: 10/10  Anticipated Goal Completion Date: 4/27/25       Reduce Risk of Hospitalization   On track     I will take appropriate steps to reduce my risk of Hospitalization to include:  Take all of medications as prescribed  Visit w/ all of my recommended specialists.  Visit w/ my PCP as recommended.  Avoid activities that can trigger my chronic conditions.    Barriers: none  Plan for overcoming my barriers: N/A  Confidence: 10/10  Anticipated Goal Completion Date: 4/27/25               PCP/Specialist follow up:   Future Appointments         Provider Specialty Dept Phone    4/14/2025 1:15 PM Dunia Malik MD Family Medicine [phone]    4/25/2025 11:20 AM Kiko Belle MD Cardiology [phone]            Follow Up:   Plan for next AC outreach in approximately 1 week to complete:  - medication review   - goal progression.   Caregiver is agreeable to this plan.

## 2025-03-31 ENCOUNTER — CARE COORDINATION (OUTPATIENT)
Facility: CLINIC | Age: 89
End: 2025-03-31

## 2025-03-31 NOTE — CARE COORDINATION
Ambulatory Care Coordination Note     3/31/2025 1:01 PM     Family, Zeina  outreach attempt by this ACM today to perform care management follow up . ACM was unable to reach the family,    by telephone today; left voice message requesting a return phone call to this ACM.     ACM: Adolfo Jackson RN

## 2025-04-21 ENCOUNTER — CARE COORDINATION (OUTPATIENT)
Facility: CLINIC | Age: 89
End: 2025-04-21

## 2025-04-21 NOTE — CARE COORDINATION
Ambulatory Care Coordination Note     4/21/2025 10:17 AM     ACM outreach attempt by this ACM today to perform care management follow up . ACM was unable to reach the family, Dalia Vu  by telephone today;   left voice message requesting a return phone call to this ACM.     ACM: Adolfo Jacskon RN

## 2025-04-25 ENCOUNTER — OFFICE VISIT (OUTPATIENT)
Age: 89
End: 2025-04-25
Payer: MEDICARE

## 2025-04-25 VITALS
WEIGHT: 133 LBS | HEART RATE: 62 BPM | SYSTOLIC BLOOD PRESSURE: 138 MMHG | DIASTOLIC BLOOD PRESSURE: 60 MMHG | BODY MASS INDEX: 26.81 KG/M2 | HEIGHT: 59 IN | OXYGEN SATURATION: 100 %

## 2025-04-25 DIAGNOSIS — E78.2 MIXED HYPERLIPIDEMIA: ICD-10-CM

## 2025-04-25 DIAGNOSIS — I35.1 NONRHEUMATIC AORTIC VALVE INSUFFICIENCY: ICD-10-CM

## 2025-04-25 DIAGNOSIS — N18.30 STAGE 3 CHRONIC KIDNEY DISEASE, UNSPECIFIED WHETHER STAGE 3A OR 3B CKD (HCC): ICD-10-CM

## 2025-04-25 DIAGNOSIS — I50.32 DIASTOLIC CHF, CHRONIC (HCC): ICD-10-CM

## 2025-04-25 DIAGNOSIS — E78.5 HYPERLIPIDEMIA, UNSPECIFIED HYPERLIPIDEMIA TYPE: ICD-10-CM

## 2025-04-25 DIAGNOSIS — I10 ESSENTIAL HYPERTENSION: Primary | ICD-10-CM

## 2025-04-25 PROCEDURE — 1160F RVW MEDS BY RX/DR IN RCRD: CPT | Performed by: INTERNAL MEDICINE

## 2025-04-25 PROCEDURE — 1126F AMNT PAIN NOTED NONE PRSNT: CPT | Performed by: INTERNAL MEDICINE

## 2025-04-25 PROCEDURE — 93000 ELECTROCARDIOGRAM COMPLETE: CPT | Performed by: INTERNAL MEDICINE

## 2025-04-25 PROCEDURE — G8427 DOCREV CUR MEDS BY ELIG CLIN: HCPCS | Performed by: INTERNAL MEDICINE

## 2025-04-25 PROCEDURE — 1159F MED LIST DOCD IN RCRD: CPT | Performed by: INTERNAL MEDICINE

## 2025-04-25 PROCEDURE — 1123F ACP DISCUSS/DSCN MKR DOCD: CPT | Performed by: INTERNAL MEDICINE

## 2025-04-25 PROCEDURE — 1036F TOBACCO NON-USER: CPT | Performed by: INTERNAL MEDICINE

## 2025-04-25 PROCEDURE — 99214 OFFICE O/P EST MOD 30 MIN: CPT | Performed by: INTERNAL MEDICINE

## 2025-04-25 PROCEDURE — G8417 CALC BMI ABV UP PARAM F/U: HCPCS | Performed by: INTERNAL MEDICINE

## 2025-04-25 PROCEDURE — 1090F PRES/ABSN URINE INCON ASSESS: CPT | Performed by: INTERNAL MEDICINE

## 2025-04-25 ASSESSMENT — ENCOUNTER SYMPTOMS
VOMITING: 0
SORE THROAT: 0
SHORTNESS OF BREATH: 0
COUGH: 0
NAUSEA: 0
ABDOMINAL PAIN: 0
ABDOMINAL DISTENTION: 0

## 2025-04-25 NOTE — PROGRESS NOTES
04/25/25     Estela Garcia  is a 89 y.o. female     Chief Complaint   Patient presents with    Follow-up     1 year       HPI  Patient presents for a follow-up office visit.   She was initially referred here for evaluation of a cardiac murmur.  She has a long-standing history of essential hypertension, type 2 diabetes mellitus, and dyslipidemia.    Patient patient was found to have aortic insufficiency on an echocardiogram back in 2018.  She was recently hospitalized at the beginning of September 2019 for worsening renal failure and nausea and vomiting.  She underwent a repeat echocardiogram which showed preserved LV systolic function, EF 56-60%, grade 1 diastolic dysfunction, biatrial enlargement, mild to moderate aortic insufficiency, which is relatively unchanged compared to her prior study.    Patient was discovered to have a significant bradycardia with heart rates in the 30s at last visit, so she wore a 7-day event monitor in May 2022 which demonstrated an asymptomatic sinus bradycardia with an average heart rate of 43 bpm, range from 26 to 85 bpm.  She had 1 prolonged pauses lasting 3 seconds during sleeping hours.    She was hospitalized in August 2023 for an acute left lower extremity DVT, but cannot be started on blood thinners due to a previous upper GI bleed earlier in the month requiring therapies to her stomach.  As result she underwent placement of an IVC filter.      She was last seen in the office by her nurse practitioner approximately 5 months ago.  Since that visit, she reports feeling well.  She did have a fall over the summer without any injuries.  She has had no recurrent falls over the past 8 months.  She denies any chest pain, shortness of breath, orthopnea or PND.  She does have swelling in both feet and ankles which is unchanged from baseline.  This typically worsens throughout the day and will improve overnight.    Past Medical History:   Diagnosis Date    Anemia     Carotid bruit

## 2025-04-25 NOTE — PROGRESS NOTES
Estela Garcia presents today for   Chief Complaint   Patient presents with    Follow-up     1 year       Estela Garcia preferred language for health care discussion is english/other.    Is someone accompanying this pt? yes    Is the patient using any DME equipment during OV? yes    Depression Screening:  Depression: Not at risk (11/13/2024)    PHQ-2     PHQ-2 Score: 0        Learning Assessment:  Who is the primary learner? Patient    What is the preferred language for health care of the primary learner? ENGLISH    How does the primary learner prefer to learn new concepts? DEMONSTRATION    Answered By patient    Relationship to Learner SELF           Pt currently taking Anticoagulant therapy? no    Pt currently taking Antiplatelet therapy ? no      Coordination of Care:  1. Have you been to the ER, urgent care clinic since your last visit? Hospitalized since your last visit? yes    2. Have you seen or consulted any other health care providers outside of the [facility] since your last visit? Include any pap smears or colon screening. no     No

## 2025-04-29 ENCOUNTER — OFFICE VISIT (OUTPATIENT)
Facility: CLINIC | Age: 89
End: 2025-04-29
Payer: MEDICARE

## 2025-04-29 VITALS
DIASTOLIC BLOOD PRESSURE: 66 MMHG | SYSTOLIC BLOOD PRESSURE: 152 MMHG | RESPIRATION RATE: 16 BRPM | HEART RATE: 50 BPM | HEIGHT: 59 IN | TEMPERATURE: 97.5 F | OXYGEN SATURATION: 98 % | BODY MASS INDEX: 26 KG/M2 | WEIGHT: 129 LBS

## 2025-04-29 DIAGNOSIS — E11.9 DIET-CONTROLLED TYPE 2 DIABETES MELLITUS (HCC): ICD-10-CM

## 2025-04-29 DIAGNOSIS — I50.32 DIASTOLIC CHF, CHRONIC (HCC): ICD-10-CM

## 2025-04-29 DIAGNOSIS — E11.22 TYPE 2 DIABETES MELLITUS WITH STAGE 4 CHRONIC KIDNEY DISEASE, WITHOUT LONG-TERM CURRENT USE OF INSULIN (HCC): ICD-10-CM

## 2025-04-29 DIAGNOSIS — R01.1 HEART MURMUR: ICD-10-CM

## 2025-04-29 DIAGNOSIS — D63.8 ANEMIA OF CHRONIC DISEASE: ICD-10-CM

## 2025-04-29 DIAGNOSIS — N18.4 TYPE 2 DIABETES MELLITUS WITH STAGE 4 CHRONIC KIDNEY DISEASE, WITHOUT LONG-TERM CURRENT USE OF INSULIN (HCC): ICD-10-CM

## 2025-04-29 DIAGNOSIS — N18.4 CKD (CHRONIC KIDNEY DISEASE) STAGE 4, GFR 15-29 ML/MIN (HCC): ICD-10-CM

## 2025-04-29 DIAGNOSIS — I10 ESSENTIAL HYPERTENSION: Primary | ICD-10-CM

## 2025-04-29 PROBLEM — I85.01 BLEEDING ESOPHAGEAL VARICES, UNSPECIFIED ESOPHAGEAL VARICES TYPE (HCC): Status: RESOLVED | Noted: 2024-01-24 | Resolved: 2025-04-29

## 2025-04-29 PROCEDURE — 1160F RVW MEDS BY RX/DR IN RCRD: CPT | Performed by: FAMILY MEDICINE

## 2025-04-29 PROCEDURE — G8427 DOCREV CUR MEDS BY ELIG CLIN: HCPCS | Performed by: FAMILY MEDICINE

## 2025-04-29 PROCEDURE — 1090F PRES/ABSN URINE INCON ASSESS: CPT | Performed by: FAMILY MEDICINE

## 2025-04-29 PROCEDURE — 1126F AMNT PAIN NOTED NONE PRSNT: CPT | Performed by: FAMILY MEDICINE

## 2025-04-29 PROCEDURE — 1123F ACP DISCUSS/DSCN MKR DOCD: CPT | Performed by: FAMILY MEDICINE

## 2025-04-29 PROCEDURE — 99214 OFFICE O/P EST MOD 30 MIN: CPT | Performed by: FAMILY MEDICINE

## 2025-04-29 PROCEDURE — 1036F TOBACCO NON-USER: CPT | Performed by: FAMILY MEDICINE

## 2025-04-29 PROCEDURE — 1159F MED LIST DOCD IN RCRD: CPT | Performed by: FAMILY MEDICINE

## 2025-04-29 PROCEDURE — G8417 CALC BMI ABV UP PARAM F/U: HCPCS | Performed by: FAMILY MEDICINE

## 2025-04-29 SDOH — ECONOMIC STABILITY: FOOD INSECURITY: WITHIN THE PAST 12 MONTHS, THE FOOD YOU BOUGHT JUST DIDN'T LAST AND YOU DIDN'T HAVE MONEY TO GET MORE.: NEVER TRUE

## 2025-04-29 SDOH — ECONOMIC STABILITY: FOOD INSECURITY: WITHIN THE PAST 12 MONTHS, YOU WORRIED THAT YOUR FOOD WOULD RUN OUT BEFORE YOU GOT MONEY TO BUY MORE.: NEVER TRUE

## 2025-04-29 ASSESSMENT — PATIENT HEALTH QUESTIONNAIRE - PHQ9
SUM OF ALL RESPONSES TO PHQ QUESTIONS 1-9: 0
1. LITTLE INTEREST OR PLEASURE IN DOING THINGS: NOT AT ALL
SUM OF ALL RESPONSES TO PHQ QUESTIONS 1-9: 0
SUM OF ALL RESPONSES TO PHQ QUESTIONS 1-9: 0
2. FEELING DOWN, DEPRESSED OR HOPELESS: NOT AT ALL
SUM OF ALL RESPONSES TO PHQ QUESTIONS 1-9: 0

## 2025-04-29 NOTE — PROGRESS NOTES
Have you been to the ER, urgent care clinic since your last visit?  Hospitalized since your last visit?   NO    Have you seen or consulted any other health care providers outside our system since your last visit?   NO

## 2025-04-29 NOTE — PROGRESS NOTES
Estela Garcia, 89 y.o.,  female    SUBJECTIVE  Ff-up    No new concerns    HTN/CKD 4 GFR .   She continues to take hydralazine, norvasc and as needed Bumex for leg edema.  With iron and Epogen she is following dr. CAROL cardona nephrology,reviewed note not a candidate for dialysis.  She is residing with goddaughter who supervises with care    Asymptomatic bradycardia/aortic regurgitation- She has h/o AV block and advised against BB.  Following cardiology Dr. Belle, no changes    DM w/ CKD 3 and neuorpathy-  diet controlled, She Reports FBG  1teens.    HL-on pravachol     GERD w/ hx GI bleed requiring admission 9/2023,EGD showing proximal gastritis and dieulafoy lesion s/p bicap treatment. Currently protonix BID, pt reports no pain no longer with bleeding, she is off anticoag for hx DVT. She says she is following dr. Sims     Anemia- ACD/iron def, hgb 9-10's, hx acute GI bleeding sept 2023, now off anticoag, she is following [facility]    She is now residing with her goddaughter Zeina Vallejo [phone] and god grand daughter Estephania Flores [phone]. Says she is able to perform ADLs    ROS:  See HPI, all others negative        Patient Active Problem List   Diagnosis Code    Essential hypertension I10    Mixed hyperlipidemia E78.2    Advanced directives, counseling/discussion Z71.89    Controlled type 2 diabetes mellitus with stage 3 chronic kidney disease, without long-term current use of insulin ([facility]) E11.22, N18.3         Current Outpatient Medications:     diclofenac sodium (VOLTAREN) 1 % GEL, Apply 4 g topically 4 times daily, Disp: 100 g, Rfl: 0    potassium chloride (KLOR-CON M) 20 MEQ extended release tablet, Take 1 tablet by mouth daily, Disp: 90 tablet, Rfl: 3    bumetanide (BUMEX) 0.5 MG tablet, Take 1 tablet by mouth daily, Disp: 90 tablet, Rfl: 3    pravastatin (PRAVACHOL) 20 MG tablet, Take 1 tablet by mouth nightly, Disp: 90 tablet, Rfl: 3    ferrous sulfate (SV IRON) 325 (65 Fe) MG tablet, Take 1

## 2025-05-05 ENCOUNTER — CARE COORDINATION (OUTPATIENT)
Facility: CLINIC | Age: 89
End: 2025-05-05

## 2025-05-05 NOTE — CARE COORDINATION
Ambulatory Care Coordination Note     5/5/2025 3:10 PM     ACM outreach attempt by this ACM today to perform care management follow up . ACM was unable to reach the family, Dalia Vu  by telephone today;   left voice message requesting a return phone call to this ACM.     ACM: Adolfo Jackson RN

## 2025-05-06 ENCOUNTER — CARE COORDINATION (OUTPATIENT)
Facility: CLINIC | Age: 89
End: 2025-05-06

## 2025-05-06 NOTE — CARE COORDINATION
Ambulatory Care Coordination Note     2025 9:31 AM     Patient Current Location:  Home: [address] [place] [id number]     ACM contacted the family by telephone. Verified name and  with family as identifiers. Spoke to pt's niece, Zeina Vallejo. MANE verified and on file.   ACM: Adolfo Jackson RN     Challenges to be reviewed by the provider   Additional needs identified to be addressed with provider No  none               Method of communication with provider: none.    Utilization: Has the patient been seen in the ED since your last call? no    Care Summary Note:     1. Hx of DM2, CKD III, neuropathy, hyperuricemia, htn, LLE, gout, anemia, aortic regurg, dCHF, HLD  2. HRCM per PCP  3. Niece states pt doing well  4. Recent PCP visit  6. Family states no other questions/issues at this time.    Offered patient enrollment in the Remote Patient Monitoring (RPM) program for in-home monitoring: Yes, but did not enroll at this time: controlled chronic disease management.     Assessments Completed:   General Assessment    Do you have any symptoms that are causing concern?: No       Medications Reviewed:   Patient denies any changes with medications and reports taking all medications as prescribed.    Advance Care Planning:   Reviewed during previous call      Care Planning:    Goals Addressed                   This Visit's Progress     Conditions and Symptoms   On track     I will schedule office visits, as directed by my provider.  I will keep my appointment or reschedule if I have to cancel.  I will notify my provider of any barriers to my plan of care.  I will notify my provider of any symptoms that indicate a worsening of my condition.    Barriers: none  Plan for overcoming my barriers: N/A  Confidence: 10/10  Anticipated Goal Completion Date: 25         Medication Management   On track     I will take my medication as directed.  I will notify my provider of any problems with medications, like

## 2025-05-08 RX ORDER — FERROUS SULFATE 325(65) MG
TABLET ORAL
Qty: 90 TABLET | Refills: 1 | Status: SHIPPED | OUTPATIENT
Start: 2025-05-08

## 2025-05-19 ENCOUNTER — CARE COORDINATION (OUTPATIENT)
Facility: CLINIC | Age: 89
End: 2025-05-19

## 2025-05-19 NOTE — CARE COORDINATION
Ambulatory Care Coordination Note     2025 9:27 AM     Patient Current Location:  Home: [address] [place] [id number]     ACM contacted the family by telephone. Verified name and  with family as identifiers.     Patient graduated from the High Risk Care Management program on 2025.  Family verbalizes confidence in the ability to self-manage at this time..  Care management goals have been completed. No further Ambulatory Care Manager follow up scheduled.      ACM: Adolfo Jackson RN     Care Planning:    Goals Addressed                   This Visit's Progress     COMPLETED: Conditions and Symptoms        I will schedule office visits, as directed by my provider.  I will keep my appointment or reschedule if I have to cancel.  I will notify my provider of any barriers to my plan of care.  I will notify my provider of any symptoms that indicate a worsening of my condition.    Barriers: none  Plan for overcoming my barriers: N/A  Confidence: 10/10  Anticipated Goal Completion Date: 25         COMPLETED: Medication Management        I will take my medication as directed.  I will notify my provider of any problems with medications, like adverse effects or side effects.  I will notify my provider/Care Coordinator if I am unable to afford my medications.  I will notify my provider for advice before I stop taking any of my medication.    Barriers: none  Plan for overcoming my barriers: N/A  Confidence: 10/10  Anticipated Goal Completion Date: 25       COMPLETED: Reduce Risk of Hospitalization        I will take appropriate steps to reduce my risk of Hospitalization to include:  Take all of medications as prescribed  Visit w/ all of my recommended specialists.  Visit w/ my PCP as recommended.  Avoid activities that can trigger my chronic conditions.    Barriers: none  Plan for overcoming my barriers: N/A  Confidence: 10/10  Anticipated Goal Completion Date: 25

## 2025-06-09 ENCOUNTER — TELEPHONE (OUTPATIENT)
Facility: CLINIC | Age: 89
End: 2025-06-09

## 2025-06-09 RX ORDER — HYDRALAZINE HYDROCHLORIDE 100 MG/1
100 TABLET, FILM COATED ORAL 3 TIMES DAILY
Qty: 270 TABLET | Refills: 1 | Status: SHIPPED | OUTPATIENT
Start: 2025-06-09

## 2025-06-09 NOTE — TELEPHONE ENCOUNTER
This patient contacted office for the following prescriptions to be filled:    Medication requested : hydrALAZINE (APRESOLINE) 100 MG tablet   PCP: [facility]  Pharmacy or Print: [facility]   Mail order or Local pharmacy [facility]   Last office visit 4/29/2025  Next office visit 11/3/2025 pt is out of this medication

## 2025-06-09 NOTE — TELEPHONE ENCOUNTER
Patient is out of medication.    LOV: 4/29/25  Next OV: 11/03/25  Last labs: 11/21/24 & 3/06/25  Last refill: 9/23/24

## 2025-08-26 RX ORDER — PRAVASTATIN SODIUM 20 MG
20 TABLET ORAL NIGHTLY
Qty: 90 TABLET | Refills: 3 | Status: SHIPPED | OUTPATIENT
Start: 2025-08-26

## 2025-08-26 RX ORDER — BUMETANIDE 0.5 MG/1
0.5 TABLET ORAL DAILY
Qty: 90 TABLET | Refills: 3 | Status: SHIPPED | OUTPATIENT
Start: 2025-08-26

## (undated) DEVICE — SYR 50ML SLIP TIP NSAF LF STRL --

## (undated) DEVICE — SYRINGE MED 25GA 3ML L5/8IN SUBQ PLAS W/ DETACH NDL SFTY

## (undated) DEVICE — CATHETER SUCT TR FL TIP 14FR W/ O CTRL

## (undated) DEVICE — FLUFF AND POLYMER UNDERPAD,EXTRA HEAVY: Brand: WINGS

## (undated) DEVICE — MEDI-VAC SUCTION HIGH CAPACITY: Brand: CARDINAL HEALTH

## (undated) DEVICE — FORCEPS BX L240CM JAW DIA2.4MM ORNG L CAP W/ NDL DISP RAD

## (undated) DEVICE — ENDOSCOPY PUMP TUBING/ CAP SET: Brand: ERBE

## (undated) DEVICE — FCPS RAD JAW 4LC 240CM W/NDL -- BX/20 RADIAL JAW 4

## (undated) DEVICE — MEDI-VAC NON-CONDUCTIVE SUCTION TUBING: Brand: CARDINAL HEALTH

## (undated) DEVICE — AIRLIFE™ NASAL OXYGEN CANNULA CURVED, FLARED TIP WITH 14 FOOT (4.3 M) CRUSH-RESISTANT TUBING, OVER-THE-EAR STYLE: Brand: AIRLIFE™

## (undated) DEVICE — STERILE POLYISOPRENE POWDER-FREE SURGICAL GLOVES: Brand: PROTEXIS

## (undated) DEVICE — BIPOLAR ELECTROHEMOSTASIS CATHETER: Brand: GOLD PROBE

## (undated) DEVICE — CANNULA NSL AD TBNG L14FT STD PVC O2 CRV CONN NONFLARED NSL

## (undated) DEVICE — BITE BLOCK ENDOSCP UNIV AD 6 TO 9.4 MM

## (undated) DEVICE — LINER SUCT CANSTR 3000CC PLAS SFT PRE ASSEMB W/OUT TBNG W/

## (undated) DEVICE — FLEX ADVANTAGE 3000CC: Brand: FLEX ADVANTAGE

## (undated) DEVICE — SOLUTION IRRIG 1000ML H2O STRL BLT

## (undated) DEVICE — GAUZE,SPONGE,4"X4",16PLY,STRL,LF,10/TRAY: Brand: MEDLINE

## (undated) DEVICE — UNDERPAD INCONT W23XL36IN STD BLU POLYPR BK FLUF SFT

## (undated) DEVICE — BASIN EMSIS 16OZ GRAPHITE PLAS KID SHP MOLD GRAD FOR ORAL

## (undated) DEVICE — YANKAUER,SMOOTH HANDLE,HIGH CAPACITY: Brand: MEDLINE INDUSTRIES, INC.

## (undated) DEVICE — SYRINGE MED 50ML LUERSLIP TIP

## (undated) DEVICE — BASIN EMESIS 500CC ROSE 250/CS 60/PLT: Brand: MEDEGEN MEDICAL PRODUCTS, LLC